# Patient Record
Sex: FEMALE | Race: BLACK OR AFRICAN AMERICAN | NOT HISPANIC OR LATINO | Employment: UNEMPLOYED | URBAN - METROPOLITAN AREA
[De-identification: names, ages, dates, MRNs, and addresses within clinical notes are randomized per-mention and may not be internally consistent; named-entity substitution may affect disease eponyms.]

---

## 2017-07-20 ENCOUNTER — HISTORICAL (OUTPATIENT)
Dept: INTERNAL MEDICINE | Facility: CLINIC | Age: 52
End: 2017-07-20

## 2017-07-20 LAB
ABS NEUT (OLG): 4.23 X10(3)/MCL (ref 2.1–9.2)
ALBUMIN SERPL-MCNC: 3.4 GM/DL (ref 3.4–5)
ALBUMIN/GLOB SERPL: 1 RATIO (ref 1–2)
ALP SERPL-CCNC: 104 UNIT/L (ref 45–117)
ALT SERPL-CCNC: 56 UNIT/L (ref 12–78)
AST SERPL-CCNC: 58 UNIT/L (ref 15–37)
BASOPHILS # BLD AUTO: 0.05 X10(3)/MCL
BASOPHILS NFR BLD AUTO: 1 % (ref 0–1)
BILIRUB SERPL-MCNC: 0.3 MG/DL (ref 0.2–1)
BILIRUBIN DIRECT+TOT PNL SERPL-MCNC: 0.1 MG/DL
BILIRUBIN DIRECT+TOT PNL SERPL-MCNC: 0.2 MG/DL
BUN SERPL-MCNC: 18 MG/DL (ref 7–18)
CALCIUM SERPL-MCNC: 10.4 MG/DL (ref 8.5–10.1)
CHLORIDE SERPL-SCNC: 105 MMOL/L (ref 98–107)
CHOLEST SERPL-MCNC: 178 MG/DL
CHOLEST/HDLC SERPL: 3 {RATIO} (ref 0–4.4)
CO2 SERPL-SCNC: 24 MMOL/L (ref 21–32)
CREAT SERPL-MCNC: 1.7 MG/DL (ref 0.6–1.3)
EOSINOPHIL # BLD AUTO: 0.33 10*3/UL
EOSINOPHIL NFR BLD AUTO: 4 % (ref 0–5)
ERYTHROCYTE [DISTWIDTH] IN BLOOD BY AUTOMATED COUNT: 16.2 % (ref 11.5–14.5)
EST. AVERAGE GLUCOSE BLD GHB EST-MCNC: 137 MG/DL
GLOBULIN SER-MCNC: 4.5 GM/ML (ref 2.3–3.5)
GLUCOSE SERPL-MCNC: 95 MG/DL (ref 74–106)
HBA1C MFR BLD: 6.4 % (ref 4.2–6.3)
HCT VFR BLD AUTO: 32.4 % (ref 35–46)
HDLC SERPL-MCNC: 59 MG/DL
HGB BLD-MCNC: 10.5 GM/DL (ref 12–16)
IMM GRANULOCYTES # BLD AUTO: 0.18 10*3/UL
IMM GRANULOCYTES NFR BLD AUTO: 2 %
LDLC SERPL CALC-MCNC: 100 MG/DL (ref 0–130)
LYMPHOCYTES # BLD AUTO: 2.76 X10(3)/MCL
LYMPHOCYTES NFR BLD AUTO: 34 % (ref 15–40)
MCH RBC QN AUTO: 27.9 PG (ref 26–34)
MCHC RBC AUTO-ENTMCNC: 32.4 GM/DL (ref 31–37)
MCV RBC AUTO: 86.2 FL (ref 80–100)
MONOCYTES # BLD AUTO: 0.67 X10(3)/MCL
MONOCYTES NFR BLD AUTO: 8 % (ref 4–12)
NEUTROPHILS # BLD AUTO: 4.23 X10(3)/MCL
NEUTROPHILS NFR BLD AUTO: 51 X10(3)/MCL
PLATELET # BLD AUTO: 332 X10(3)/MCL (ref 130–400)
PMV BLD AUTO: 10.1 FL (ref 7.4–10.4)
POTASSIUM SERPL-SCNC: 3.3 MMOL/L (ref 3.5–5.1)
PROT SERPL-MCNC: 7.9 GM/DL (ref 6.4–8.2)
RBC # BLD AUTO: 3.76 X10(6)/MCL (ref 4–5.2)
SODIUM SERPL-SCNC: 142 MMOL/L (ref 136–145)
TRIGL SERPL-MCNC: 96 MG/DL
TSH SERPL-ACNC: 2.57 MIU/L (ref 0.36–3.74)
VLDLC SERPL CALC-MCNC: 19 MG/DL
WBC # SPEC AUTO: 8.2 X10(3)/MCL (ref 4.5–11)

## 2017-07-21 ENCOUNTER — HOSPITAL ENCOUNTER (OUTPATIENT)
Dept: MEDSURG UNIT | Facility: HOSPITAL | Age: 52
End: 2017-07-23
Attending: INTERNAL MEDICINE | Admitting: INTERNAL MEDICINE

## 2017-07-21 LAB
ABS NEUT (OLG): 5.15 X10(3)/MCL (ref 2.1–9.2)
ALBUMIN SERPL-MCNC: 3.8 GM/DL (ref 3.4–5)
ALBUMIN/GLOB SERPL: 1 RATIO (ref 1–2)
ALP SERPL-CCNC: 103 UNIT/L (ref 45–117)
ALT SERPL-CCNC: 68 UNIT/L (ref 12–78)
AMPHET UR QL SCN: NEGATIVE
AMYLASE SERPL-CCNC: 45 UNIT/L (ref 25–115)
APAP SERPL-MCNC: <2 MCG/ML (ref 10–30)
APPEARANCE, UA: ABNORMAL
AST SERPL-CCNC: 84 UNIT/L (ref 15–37)
BACTERIA #/AREA URNS AUTO: ABNORMAL /[HPF]
BARBITURATE SCN PRESENT UR: NEGATIVE
BASOPHILS # BLD AUTO: 0.05 X10(3)/MCL
BASOPHILS NFR BLD AUTO: 0 % (ref 0–1)
BENZODIAZ UR QL SCN: NEGATIVE
BILIRUB SERPL-MCNC: 0.3 MG/DL (ref 0.2–1)
BILIRUB UR QL STRIP: NEGATIVE
BILIRUBIN DIRECT+TOT PNL SERPL-MCNC: 0.1 MG/DL
BILIRUBIN DIRECT+TOT PNL SERPL-MCNC: 0.2 MG/DL
BUN SERPL-MCNC: 21 MG/DL (ref 7–18)
CALCIUM SERPL-MCNC: 11 MG/DL (ref 8.5–10.1)
CANNABINOIDS UR QL SCN: NEGATIVE
CHLORIDE SERPL-SCNC: 105 MMOL/L (ref 98–107)
CK MB SERPL-MCNC: <1 NG/ML (ref 1–3.6)
CK SERPL-CCNC: 102 UNIT/L (ref 26–192)
CO2 SERPL-SCNC: 24 MMOL/L (ref 21–32)
COCAINE UR QL SCN: NEGATIVE
COLOR UR: YELLOW
CREAT SERPL-MCNC: 1.7 MG/DL (ref 0.6–1.3)
EOSINOPHIL # BLD AUTO: 0.24 10*3/UL
EOSINOPHIL NFR BLD AUTO: 3 % (ref 0–5)
ERYTHROCYTE [DISTWIDTH] IN BLOOD BY AUTOMATED COUNT: 16.4 % (ref 11.5–14.5)
ETHANOL SERPL-MCNC: 4 MG/DL
GLOBULIN SER-MCNC: 4.8 GM/ML (ref 2.3–3.5)
GLUCOSE (UA): NORMAL
GLUCOSE SERPL-MCNC: 122 MG/DL (ref 74–106)
HCT VFR BLD AUTO: 32.7 % (ref 35–46)
HGB BLD-MCNC: 10.7 GM/DL (ref 12–16)
HGB UR QL STRIP: NEGATIVE
HYALINE CASTS #/AREA URNS LPF: 0 /[LPF]
IMM GRANULOCYTES # BLD AUTO: 0.2 10*3/UL
IMM GRANULOCYTES NFR BLD AUTO: 2 %
KETONES UR QL STRIP: NEGATIVE
LACTATE SERPL-SCNC: 1.8 MMOL/L (ref 0.4–2)
LACTATE SERPL-SCNC: 2.6 MMOL/L (ref 0.4–2)
LEUKOCYTE ESTERASE UR QL STRIP: 500 LEU/UL
LIPASE SERPL-CCNC: 109 UNIT/L (ref 73–393)
LYMPHOCYTES # BLD AUTO: 3.02 X10(3)/MCL
LYMPHOCYTES NFR BLD AUTO: 32 % (ref 15–40)
MAGNESIUM SERPL-MCNC: 2.1 MG/DL (ref 1.8–2.4)
MCH RBC QN AUTO: 28.1 PG (ref 26–34)
MCHC RBC AUTO-ENTMCNC: 32.7 GM/DL (ref 31–37)
MCV RBC AUTO: 85.8 FL (ref 80–100)
MONOCYTES # BLD AUTO: 0.64 X10(3)/MCL
MONOCYTES NFR BLD AUTO: 7 % (ref 4–12)
MUCOUS THREADS URNS QL MICRO: ABNORMAL
NEUTROPHILS # BLD AUTO: 5.15 X10(3)/MCL
NEUTROPHILS NFR BLD AUTO: 55 X10(3)/MCL
NITRITE UR QL STRIP: NEGATIVE
OPIATES UR QL SCN: NEGATIVE
PCP UR QL: NEGATIVE
PH UR STRIP.AUTO: 6 [PH] (ref 5–8)
PH UR STRIP: 6 [PH] (ref 4.5–8)
PLATELET # BLD AUTO: 342 X10(3)/MCL (ref 130–400)
PMV BLD AUTO: 10.6 FL (ref 7.4–10.4)
POC TROPONIN: 0 NG/ML (ref 0–0.08)
POTASSIUM SERPL-SCNC: 3.2 MMOL/L (ref 3.5–5.1)
PROT SERPL-MCNC: 8.6 GM/DL (ref 6.4–8.2)
PROT UR QL STRIP: 20 MG/DL
RBC # BLD AUTO: 3.81 X10(6)/MCL (ref 4–5.2)
RBC #/AREA URNS AUTO: ABNORMAL /[HPF]
RPR SER QL: NON REACTIVE
SALICYLATES SERPL-MCNC: 4.4 MG/DL (ref 2.8–20)
SODIUM SERPL-SCNC: 143 MMOL/L (ref 136–145)
SP GR UR STRIP: 1.02 (ref 1–1.03)
SQUAMOUS #/AREA URNS LPF: >100 /[LPF]
TEMPERATURE, URINE (OHS): 25 DEGC (ref 20–25)
UROBILINOGEN UR STRIP-ACNC: NORMAL
WBC # SPEC AUTO: 9.3 X10(3)/MCL (ref 4.5–11)
WBC #/AREA URNS AUTO: ABNORMAL /HPF

## 2017-07-22 LAB
ABS NEUT (OLG): 4.09 X10(3)/MCL (ref 2.1–9.2)
BASOPHILS # BLD AUTO: 0.03 X10(3)/MCL
BASOPHILS NFR BLD AUTO: 0 % (ref 0–1)
BUN SERPL-MCNC: 17 MG/DL (ref 7–18)
CALCIUM SERPL-MCNC: 10 MG/DL (ref 8.5–10.1)
CHLORIDE SERPL-SCNC: 108 MMOL/L (ref 98–107)
CO2 SERPL-SCNC: 24 MMOL/L (ref 21–32)
CREAT SERPL-MCNC: 1.4 MG/DL (ref 0.6–1.3)
EOSINOPHIL # BLD AUTO: 0.29 X10(3)/MCL
EOSINOPHIL NFR BLD AUTO: 4 % (ref 0–5)
ERYTHROCYTE [DISTWIDTH] IN BLOOD BY AUTOMATED COUNT: 16.4 % (ref 11.5–14.5)
GLUCOSE SERPL-MCNC: 73 MG/DL (ref 74–106)
HCT VFR BLD AUTO: 27.9 % (ref 35–46)
HGB BLD-MCNC: 9.2 GM/DL (ref 12–16)
IMM GRANULOCYTES # BLD AUTO: 0.17 10*3/UL
IMM GRANULOCYTES NFR BLD AUTO: 2 %
LYMPHOCYTES # BLD AUTO: 2.58 X10(3)/MCL
LYMPHOCYTES NFR BLD AUTO: 33 % (ref 15–40)
MCH RBC QN AUTO: 28.2 PG (ref 26–34)
MCHC RBC AUTO-ENTMCNC: 33 GM/DL (ref 31–37)
MCV RBC AUTO: 85.6 FL (ref 80–100)
MONOCYTES # BLD AUTO: 0.57 X10(3)/MCL
MONOCYTES NFR BLD AUTO: 7 % (ref 4–12)
NEUTROPHILS # BLD AUTO: 4.09 X10(3)/MCL
NEUTROPHILS NFR BLD AUTO: 53 X10(3)/MCL
PLATELET # BLD AUTO: 301 X10(3)/MCL (ref 130–400)
PMV BLD AUTO: 10.9 FL (ref 7.4–10.4)
POTASSIUM SERPL-SCNC: 3.4 MMOL/L (ref 3.5–5.1)
RBC # BLD AUTO: 3.26 X10(6)/MCL (ref 4–5.2)
SODIUM SERPL-SCNC: 144 MMOL/L (ref 136–145)
WBC # SPEC AUTO: 7.7 X10(3)/MCL (ref 4.5–11)

## 2017-07-23 LAB
ABS NEUT (OLG): 4.24 X10(3)/MCL (ref 2.1–9.2)
BASOPHILS # BLD AUTO: 0.04 X10(3)/MCL
BASOPHILS NFR BLD AUTO: 0 % (ref 0–1)
BUN SERPL-MCNC: 16 MG/DL (ref 7–18)
CALCIUM SERPL-MCNC: 9.8 MG/DL (ref 8.5–10.1)
CHLORIDE SERPL-SCNC: 106 MMOL/L (ref 98–107)
CO2 SERPL-SCNC: 26 MMOL/L (ref 21–32)
CREAT SERPL-MCNC: 1.5 MG/DL (ref 0.6–1.3)
EOSINOPHIL # BLD AUTO: 0.38 10*3/UL
EOSINOPHIL NFR BLD AUTO: 5 % (ref 0–5)
ERYTHROCYTE [DISTWIDTH] IN BLOOD BY AUTOMATED COUNT: 16.5 % (ref 11.5–14.5)
FINAL CULTURE: NO GROWTH
GLUCOSE SERPL-MCNC: 92 MG/DL (ref 74–106)
HCT VFR BLD AUTO: 28.1 % (ref 35–46)
HGB BLD-MCNC: 9.3 GM/DL (ref 12–16)
IMM GRANULOCYTES # BLD AUTO: 0.26 10*3/UL
IMM GRANULOCYTES NFR BLD AUTO: 3 %
LYMPHOCYTES # BLD AUTO: 2.41 X10(3)/MCL
LYMPHOCYTES NFR BLD AUTO: 30 % (ref 15–40)
MCH RBC QN AUTO: 28.2 PG (ref 26–34)
MCHC RBC AUTO-ENTMCNC: 33.1 GM/DL (ref 31–37)
MCV RBC AUTO: 85.2 FL (ref 80–100)
MONOCYTES # BLD AUTO: 0.64 X10(3)/MCL
MONOCYTES NFR BLD AUTO: 8 % (ref 4–12)
NEUTROPHILS # BLD AUTO: 4.24 X10(3)/MCL
NEUTROPHILS NFR BLD AUTO: 53 X10(3)/MCL
PLATELET # BLD AUTO: 293 X10(3)/MCL (ref 130–400)
PMV BLD AUTO: 10.5 FL (ref 7.4–10.4)
POTASSIUM SERPL-SCNC: 3.3 MMOL/L (ref 3.5–5.1)
RBC # BLD AUTO: 3.3 X10(6)/MCL (ref 4–5.2)
SODIUM SERPL-SCNC: 142 MMOL/L (ref 136–145)
WBC # SPEC AUTO: 8 X10(3)/MCL (ref 4.5–11)

## 2017-07-26 LAB — FINAL CULTURE: NORMAL

## 2017-08-15 ENCOUNTER — HISTORICAL (OUTPATIENT)
Dept: RADIOLOGY | Facility: HOSPITAL | Age: 52
End: 2017-08-15

## 2017-12-06 ENCOUNTER — HISTORICAL (OUTPATIENT)
Dept: INTERNAL MEDICINE | Facility: CLINIC | Age: 52
End: 2017-12-06

## 2017-12-06 ENCOUNTER — HOSPITAL ENCOUNTER (OUTPATIENT)
Dept: MEDSURG UNIT | Facility: HOSPITAL | Age: 52
End: 2017-12-08
Attending: INTERNAL MEDICINE | Admitting: INTERNAL MEDICINE

## 2017-12-06 LAB
ABS NEUT (OLG): 0.82 X10(3)/MCL
ALBUMIN SERPL-MCNC: 3.8 GM/DL (ref 3.4–5)
ALBUMIN/GLOB SERPL: 1 RATIO (ref 1–2)
ALP SERPL-CCNC: 112 UNIT/L (ref 45–117)
ALT SERPL-CCNC: 20 UNIT/L (ref 12–78)
ANISOCYTOSIS BLD QL SMEAR: ABNORMAL
APPEARANCE, UA: CLEAR
AST SERPL-CCNC: 41 UNIT/L (ref 15–37)
BACTERIA #/AREA URNS AUTO: ABNORMAL /[HPF]
BASOPHILS NFR BLD MANUAL: 0 %
BILIRUB SERPL-MCNC: 0.2 MG/DL (ref 0.2–1)
BILIRUB UR QL STRIP: NEGATIVE
BILIRUBIN DIRECT+TOT PNL SERPL-MCNC: <0.1 MG/DL
BILIRUBIN DIRECT+TOT PNL SERPL-MCNC: ABNORMAL MG/DL
BUN SERPL-MCNC: 20 MG/DL (ref 7–18)
CALCIUM SERPL-MCNC: 11.7 MG/DL (ref 8.5–10.1)
CALCIUM SERPL-MCNC: 12.2 MG/DL (ref 8.5–10.1)
CHLORIDE SERPL-SCNC: 105 MMOL/L (ref 98–107)
CHOLEST SERPL-MCNC: 177 MG/DL
CHOLEST/HDLC SERPL: 3.1 {RATIO} (ref 0–4.4)
CO2 SERPL-SCNC: 23 MMOL/L (ref 21–32)
COLOR UR: ABNORMAL
CREAT SERPL-MCNC: 1.2 MG/DL (ref 0.6–1.3)
EOSINOPHIL NFR BLD MANUAL: 5 %
ERYTHROCYTE [DISTWIDTH] IN BLOOD BY AUTOMATED COUNT: 16.3 % (ref 11.5–14.5)
EST. AVERAGE GLUCOSE BLD GHB EST-MCNC: 123 MG/DL
GLOBULIN SER-MCNC: 4.3 GM/ML (ref 2.3–3.5)
GLUCOSE (UA): NORMAL
GLUCOSE SERPL-MCNC: 89 MG/DL (ref 74–106)
GRANULOCYTES NFR BLD MANUAL: 27 % (ref 43–75)
HBA1C MFR BLD: 5.9 % (ref 4.2–6.3)
HCT VFR BLD AUTO: 24.2 % (ref 35–46)
HDLC SERPL-MCNC: 58 MG/DL
HGB BLD-MCNC: 7.7 GM/DL (ref 12–16)
HGB UR QL STRIP: NEGATIVE
HYALINE CASTS #/AREA URNS LPF: ABNORMAL /[LPF]
HYPOCHROMIA BLD QL SMEAR: ABNORMAL
KETONES UR QL STRIP: NEGATIVE
LDLC SERPL CALC-MCNC: 82 MG/DL (ref 0–130)
LEUKOCYTE ESTERASE UR QL STRIP: NEGATIVE
LYMPHOCYTES NFR BLD MANUAL: 60 % (ref 20.5–51.1)
MAGNESIUM SERPL-MCNC: 2.2 MG/DL (ref 1.8–2.4)
MCH RBC QN AUTO: 30.7 PG (ref 26–34)
MCHC RBC AUTO-ENTMCNC: 31.8 GM/DL (ref 31–37)
MCV RBC AUTO: 96.4 FL (ref 80–100)
MICROCYTES BLD QL SMEAR: ABNORMAL
MONOCYTES NFR BLD MANUAL: 7 % (ref 2–9)
NEUTS BAND NFR BLD MANUAL: 1 % (ref 0–10)
NITRITE UR QL STRIP: NEGATIVE
PH UR STRIP: 5 [PH] (ref 4.5–8)
PLATELET # BLD AUTO: 279 X10(3)/MCL (ref 130–400)
PLATELET # BLD EST: NORMAL 10*3/UL
PMV BLD AUTO: 10.3 FL (ref 7.4–10.4)
POIKILOCYTOSIS BLD QL SMEAR: ABNORMAL
POTASSIUM SERPL-SCNC: 4.4 MMOL/L (ref 3.5–5.1)
PROT SERPL-MCNC: 7.2 GM/DL
PROT SERPL-MCNC: 8.1 GM/DL (ref 6.4–8.2)
PROT UR QL STRIP: 10 MG/DL
PTH-INTACT SERPL-MCNC: 6.3 PG/ML (ref 13.8–85)
RBC # BLD AUTO: 2.51 X10(6)/MCL (ref 4–5.2)
RBC #/AREA URNS AUTO: ABNORMAL /[HPF]
RBC MORPH BLD: ABNORMAL
SODIUM SERPL-SCNC: 138 MMOL/L (ref 136–145)
SP GR UR STRIP: 1.02 (ref 1–1.03)
SQUAMOUS #/AREA URNS LPF: ABNORMAL /[LPF]
TRIGL SERPL-MCNC: 185 MG/DL
TSH SERPL-ACNC: 2.61 MIU/L (ref 0.36–3.74)
UROBILINOGEN UR STRIP-ACNC: NORMAL MG/DL
VLDLC SERPL CALC-MCNC: 37 MG/DL
WBC # SPEC AUTO: 3.5 X10(3)/MCL (ref 4.5–11)
WBC #/AREA URNS AUTO: ABNORMAL /HPF

## 2017-12-07 LAB
ABS NEUT (OLG): 0.86 X10(3)/MCL
ALBUMIN SERPL-MCNC: 2.9 GM/DL (ref 3.4–5)
ALBUMIN/GLOB SERPL: 1 RATIO (ref 1–2)
ALP SERPL-CCNC: 86 UNIT/L (ref 45–117)
ALT SERPL-CCNC: 16 UNIT/L (ref 12–78)
ANISOCYTOSIS BLD QL SMEAR: ABNORMAL
AST SERPL-CCNC: 35 UNIT/L (ref 15–37)
BASOPHILS NFR BLD MANUAL: 2 %
BILIRUB SERPL-MCNC: 0.2 MG/DL (ref 0.2–1)
BILIRUBIN DIRECT+TOT PNL SERPL-MCNC: 0.1 MG/DL
BILIRUBIN DIRECT+TOT PNL SERPL-MCNC: 0.1 MG/DL
BUN SERPL-MCNC: 19 MG/DL (ref 7–18)
CALCIUM SERPL-MCNC: 11 MG/DL (ref 8.5–10.1)
CHLORIDE SERPL-SCNC: 108 MMOL/L (ref 98–107)
CO2 SERPL-SCNC: 22 MMOL/L (ref 21–32)
CREAT SERPL-MCNC: 1 MG/DL (ref 0.6–1.3)
CROSSMATCH INTERPRETATION: NORMAL
EOSINOPHIL NFR BLD MANUAL: 2 %
ERYTHROCYTE [DISTWIDTH] IN BLOOD BY AUTOMATED COUNT: 16.4 % (ref 11.5–14.5)
GLOBULIN SER-MCNC: 3.4 GM/ML (ref 2.3–3.5)
GLUCOSE SERPL-MCNC: 81 MG/DL (ref 74–106)
GRANULOCYTES NFR BLD MANUAL: 29 % (ref 43–75)
GROUP & RH: NORMAL
HCT VFR BLD AUTO: 19.3 % (ref 35–46)
HCT VFR BLD AUTO: 28.5 % (ref 35–46)
HGB BLD-MCNC: 6 GM/DL (ref 12–16)
HGB BLD-MCNC: 9.5 GM/DL (ref 12–16)
HYPOCHROMIA BLD QL SMEAR: ABNORMAL
LYMPHOCYTES NFR BLD MANUAL: 50 % (ref 20.5–51.1)
MCH RBC QN AUTO: 30.6 PG (ref 26–34)
MCHC RBC AUTO-ENTMCNC: 31.1 GM/DL (ref 31–37)
MCV RBC AUTO: 98.5 FL (ref 80–100)
MICROCYTES BLD QL SMEAR: ABNORMAL
MONOCYTES NFR BLD MANUAL: 9 % (ref 2–9)
NEUTS BAND NFR BLD MANUAL: 8 % (ref 0–10)
PLATELET # BLD AUTO: 221 X10(3)/MCL (ref 130–400)
PLATELET # BLD EST: NORMAL 10*3/UL
PMV BLD AUTO: 10.3 FL (ref 7.4–10.4)
POIKILOCYTOSIS BLD QL SMEAR: ABNORMAL
POTASSIUM SERPL-SCNC: 4.9 MMOL/L (ref 3.5–5.1)
PRODUCT READY: NORMAL
PROT SERPL-MCNC: 6.3 GM/DL (ref 6.4–8.2)
RBC # BLD AUTO: 1.96 X10(6)/MCL (ref 4–5.2)
RBC MORPH BLD: ABNORMAL
SODIUM SERPL-SCNC: 140 MMOL/L (ref 136–145)
TRANSFUSION ORDER: NORMAL
WBC # SPEC AUTO: 2.9 X10(3)/MCL (ref 4.5–11)

## 2017-12-08 LAB
ABS NEUT (OLG): 1.12 X10(3)/MCL
ALBUMIN SERPL-MCNC: 2.7 GM/DL (ref 3.4–5)
ALBUMIN/GLOB SERPL: 1 RATIO (ref 1–2)
ALP SERPL-CCNC: 86 UNIT/L (ref 45–117)
ALT SERPL-CCNC: 15 UNIT/L (ref 12–78)
ANISOCYTOSIS BLD QL SMEAR: ABNORMAL
AST SERPL-CCNC: 42 UNIT/L (ref 15–37)
BASOPHILS NFR BLD MANUAL: 0 %
BILIRUB SERPL-MCNC: 0.2 MG/DL (ref 0.2–1)
BILIRUBIN DIRECT+TOT PNL SERPL-MCNC: 0.1 MG/DL
BILIRUBIN DIRECT+TOT PNL SERPL-MCNC: 0.1 MG/DL
BUN SERPL-MCNC: 20 MG/DL (ref 7–18)
CALCIUM SERPL-MCNC: 10.1 MG/DL (ref 8.5–10.1)
CHLORIDE SERPL-SCNC: 108 MMOL/L (ref 98–107)
CO2 SERPL-SCNC: 22 MMOL/L (ref 21–32)
CREAT SERPL-MCNC: 1.2 MG/DL (ref 0.6–1.3)
EOSINOPHIL NFR BLD MANUAL: 6 %
ERYTHROCYTE [DISTWIDTH] IN BLOOD BY AUTOMATED COUNT: 18.2 % (ref 11.5–14.5)
FERRITIN SERPL-MCNC: 1829.2 NG/ML (ref 8–388)
FOLATE SERPL-MCNC: 2.3 NG/ML (ref 3.1–17.5)
GLOBULIN SER-MCNC: 3.4 GM/ML (ref 2.3–3.5)
GLUCOSE SERPL-MCNC: 82 MG/DL (ref 74–106)
GRANULOCYTES NFR BLD MANUAL: 39 % (ref 43–75)
HCT VFR BLD AUTO: 26.2 % (ref 35–46)
HGB BLD-MCNC: 8.6 GM/DL (ref 12–16)
HIV 1+2 AB+HIV1 P24 AG SERPL QL IA: NONREACTIVE
HYPOCHROMIA BLD QL SMEAR: ABNORMAL
INR PPP: 1.04 (ref 0.9–1.2)
IRON SERPL-MCNC: 95 MCG/DL (ref 50–170)
LDH SERPL-CCNC: 461 UNIT/L (ref 84–246)
LYMPHOCYTES NFR BLD MANUAL: 31 % (ref 20.5–51.1)
MACROCYTES BLD QL SMEAR: ABNORMAL
MCH RBC QN AUTO: 30 PG (ref 26–34)
MCHC RBC AUTO-ENTMCNC: 32.8 GM/DL (ref 31–37)
MCV RBC AUTO: 91.3 FL (ref 80–100)
METAMYELOCYTES NFR BLD MANUAL: 1 %
MICROCYTES BLD QL SMEAR: ABNORMAL
MONOCYTES NFR BLD MANUAL: 12 % (ref 2–9)
MYELOCYTES NFR BLD MANUAL: 3 %
NEUTS BAND NFR BLD MANUAL: 8 % (ref 0–10)
NRBC BLD MANUAL-RTO: 1 %
PLATELET # BLD AUTO: 194 X10(3)/MCL (ref 130–400)
PLATELET # BLD EST: ADEQUATE 10*3/UL
PMV BLD AUTO: 9.8 FL (ref 7.4–10.4)
POIKILOCYTOSIS BLD QL SMEAR: ABNORMAL
POLYCHROMASIA BLD QL SMEAR: ABNORMAL
POTASSIUM SERPL-SCNC: 4.3 MMOL/L (ref 3.5–5.1)
PROT SERPL-MCNC: 6.1 GM/DL (ref 6.4–8.2)
PROTHROMBIN TIME: 13.4 SECOND(S) (ref 11.9–14.4)
RBC # BLD AUTO: 2.87 X10(6)/MCL (ref 4–5.2)
RBC MORPH BLD: ABNORMAL
RET# (OHS): 0.04 X10(6)/MCL (ref 0.02–0.08)
RETICULOCYTE COUNT AUTOMATED (OLG): 1.2 % (ref 0.5–1.5)
SODIUM SERPL-SCNC: 140 MMOL/L (ref 136–145)
VIT B12 SERPL-MCNC: 820 PG/ML (ref 193–986)
WBC # SPEC AUTO: 3.5 X10(3)/MCL (ref 4.5–11)

## 2017-12-11 ENCOUNTER — HISTORICAL (OUTPATIENT)
Dept: RADIATION THERAPY | Facility: HOSPITAL | Age: 52
End: 2017-12-11

## 2018-01-04 ENCOUNTER — HISTORICAL (OUTPATIENT)
Dept: RADIOLOGY | Facility: HOSPITAL | Age: 53
End: 2018-01-04

## 2018-01-10 ENCOUNTER — HISTORICAL (OUTPATIENT)
Dept: RADIATION THERAPY | Facility: HOSPITAL | Age: 53
End: 2018-01-10

## 2018-01-15 ENCOUNTER — HISTORICAL (OUTPATIENT)
Dept: RADIATION THERAPY | Facility: HOSPITAL | Age: 53
End: 2018-01-15

## 2018-01-15 ENCOUNTER — HISTORICAL (OUTPATIENT)
Dept: ADMINISTRATIVE | Facility: HOSPITAL | Age: 53
End: 2018-01-15

## 2018-01-15 ENCOUNTER — HISTORICAL (OUTPATIENT)
Dept: INTERNAL MEDICINE | Facility: CLINIC | Age: 53
End: 2018-01-15

## 2018-01-15 LAB
ABS NEUT (OLG): 3.07 X10(3)/MCL
ALBUMIN SERPL-MCNC: 2.9 GM/DL (ref 3.4–5)
ALBUMIN/GLOB SERPL: 1 RATIO (ref 1–2)
ALP SERPL-CCNC: 229 UNIT/L (ref 45–117)
ALT SERPL-CCNC: 22 UNIT/L (ref 12–78)
ANISOCYTOSIS BLD QL SMEAR: ABNORMAL
AST SERPL-CCNC: 66 UNIT/L (ref 15–37)
BASOPHILS NFR BLD MANUAL: 0 %
BILIRUB SERPL-MCNC: 0.4 MG/DL (ref 0.2–1)
BILIRUBIN DIRECT+TOT PNL SERPL-MCNC: 0.1 MG/DL
BILIRUBIN DIRECT+TOT PNL SERPL-MCNC: 0.3 MG/DL
BUN SERPL-MCNC: 15 MG/DL (ref 7–18)
CALCIUM SERPL-MCNC: 8.8 MG/DL (ref 8.5–10.1)
CHLORIDE SERPL-SCNC: 105 MMOL/L (ref 98–107)
CO2 SERPL-SCNC: 26 MMOL/L (ref 21–32)
CREAT SERPL-MCNC: 0.9 MG/DL (ref 0.6–1.3)
EOSINOPHIL NFR BLD MANUAL: 3 %
ERYTHROCYTE [DISTWIDTH] IN BLOOD BY AUTOMATED COUNT: 17.5 % (ref 11.5–14.5)
FSH SERPL-ACNC: 4.4 MIU/ML
GLOBULIN SER-MCNC: 4.2 GM/ML (ref 2.3–3.5)
GLUCOSE SERPL-MCNC: 129 MG/DL (ref 74–106)
GRANULOCYTES NFR BLD MANUAL: 40 % (ref 43–75)
HCT VFR BLD AUTO: 30.4 % (ref 35–46)
HGB BLD-MCNC: 9.5 GM/DL (ref 12–16)
HYPOCHROMIA BLD QL SMEAR: ABNORMAL
LYMPHOCYTES NFR BLD MANUAL: 1 %
LYMPHOCYTES NFR BLD MANUAL: 33 % (ref 20.5–51.1)
MACROCYTES BLD QL SMEAR: ABNORMAL
MCH RBC QN AUTO: 30.2 PG (ref 26–34)
MCHC RBC AUTO-ENTMCNC: 31.3 GM/DL (ref 31–37)
MCV RBC AUTO: 96.5 FL (ref 80–100)
METAMYELOCYTES NFR BLD MANUAL: 8 %
MICROCYTES BLD QL SMEAR: ABNORMAL
MONOCYTES NFR BLD MANUAL: 5 % (ref 2–9)
MYELOCYTES NFR BLD MANUAL: 3 %
NEUTS BAND NFR BLD MANUAL: 5 % (ref 0–10)
NRBC BLD MANUAL-RTO: 3 %
PLATELET # BLD AUTO: 292 X10(3)/MCL (ref 130–400)
PLATELET # BLD EST: NORMAL 10*3/UL
PMV BLD AUTO: 9.5 FL (ref 7.4–10.4)
POIKILOCYTOSIS BLD QL SMEAR: ABNORMAL
POLYCHROMASIA BLD QL SMEAR: ABNORMAL
POTASSIUM SERPL-SCNC: 3.4 MMOL/L (ref 3.5–5.1)
PROT SERPL-MCNC: 7.1 GM/DL (ref 6.4–8.2)
RBC # BLD AUTO: 3.15 X10(6)/MCL (ref 4–5.2)
RBC MORPH BLD: ABNORMAL
SODIUM SERPL-SCNC: 140 MMOL/L (ref 136–145)
WBC # SPEC AUTO: 7.4 X10(3)/MCL (ref 4.5–11)

## 2018-01-16 ENCOUNTER — HISTORICAL (OUTPATIENT)
Dept: RADIATION THERAPY | Facility: HOSPITAL | Age: 53
End: 2018-01-16

## 2018-01-22 ENCOUNTER — HISTORICAL (OUTPATIENT)
Dept: RADIATION THERAPY | Facility: HOSPITAL | Age: 53
End: 2018-01-22

## 2018-01-23 ENCOUNTER — HISTORICAL (OUTPATIENT)
Dept: RADIATION THERAPY | Facility: HOSPITAL | Age: 53
End: 2018-01-23

## 2018-01-24 ENCOUNTER — HISTORICAL (OUTPATIENT)
Dept: RADIATION THERAPY | Facility: HOSPITAL | Age: 53
End: 2018-01-24

## 2018-01-25 ENCOUNTER — HISTORICAL (OUTPATIENT)
Dept: RADIATION THERAPY | Facility: HOSPITAL | Age: 53
End: 2018-01-25

## 2018-01-26 ENCOUNTER — HISTORICAL (OUTPATIENT)
Dept: RADIATION THERAPY | Facility: HOSPITAL | Age: 53
End: 2018-01-26

## 2018-01-29 ENCOUNTER — HISTORICAL (OUTPATIENT)
Dept: RADIATION THERAPY | Facility: HOSPITAL | Age: 53
End: 2018-01-29

## 2018-01-30 ENCOUNTER — HISTORICAL (OUTPATIENT)
Dept: RADIATION THERAPY | Facility: HOSPITAL | Age: 53
End: 2018-01-30

## 2018-01-31 ENCOUNTER — HISTORICAL (OUTPATIENT)
Dept: RADIATION THERAPY | Facility: HOSPITAL | Age: 53
End: 2018-01-31

## 2018-02-01 ENCOUNTER — HISTORICAL (OUTPATIENT)
Dept: RADIATION THERAPY | Facility: HOSPITAL | Age: 53
End: 2018-02-01

## 2018-02-02 ENCOUNTER — HISTORICAL (OUTPATIENT)
Dept: RADIATION THERAPY | Facility: HOSPITAL | Age: 53
End: 2018-02-02

## 2018-02-06 ENCOUNTER — HISTORICAL (OUTPATIENT)
Dept: RADIATION THERAPY | Facility: HOSPITAL | Age: 53
End: 2018-02-06

## 2018-02-12 ENCOUNTER — HISTORICAL (OUTPATIENT)
Dept: ADMINISTRATIVE | Facility: HOSPITAL | Age: 53
End: 2018-02-12

## 2018-02-23 ENCOUNTER — HISTORICAL (OUTPATIENT)
Dept: SURGERY | Facility: HOSPITAL | Age: 53
End: 2018-02-23

## 2018-02-23 LAB
B-HCG SERPL QL: NEGATIVE
HCT VFR BLD AUTO: 23.3 % (ref 35–46)
HGB BLD-MCNC: 7.6 GM/DL (ref 12–16)
POTASSIUM SERPL-SCNC: 3.2 MMOL/L (ref 3.5–5.1)

## 2018-03-05 ENCOUNTER — HISTORICAL (OUTPATIENT)
Dept: ADMINISTRATIVE | Facility: HOSPITAL | Age: 53
End: 2018-03-05

## 2018-03-05 LAB
ABS NEUT (OLG): 1.4 X10(3)/MCL
ALBUMIN SERPL-MCNC: 3.3 GM/DL (ref 3.4–5)
ALBUMIN/GLOB SERPL: 1 RATIO (ref 1–2)
ALP SERPL-CCNC: 127 UNIT/L (ref 45–117)
ALT SERPL-CCNC: 20 UNIT/L (ref 12–78)
ANISOCYTOSIS BLD QL SMEAR: NORMAL
AST SERPL-CCNC: 59 UNIT/L (ref 15–37)
BASOPHILS NFR BLD MANUAL: 0 %
BILIRUB SERPL-MCNC: 0.5 MG/DL (ref 0.2–1)
BILIRUBIN DIRECT+TOT PNL SERPL-MCNC: 0.2 MG/DL
BILIRUBIN DIRECT+TOT PNL SERPL-MCNC: 0.3 MG/DL
BUN SERPL-MCNC: 12 MG/DL (ref 7–18)
CALCIUM SERPL-MCNC: 9.1 MG/DL (ref 8.5–10.1)
CHLORIDE SERPL-SCNC: 104 MMOL/L (ref 98–107)
CHOLEST SERPL-MCNC: 146 MG/DL
CHOLEST/HDLC SERPL: 3 {RATIO} (ref 0–4.4)
CO2 SERPL-SCNC: 25 MMOL/L (ref 21–32)
CREAT SERPL-MCNC: 0.8 MG/DL (ref 0.6–1.3)
EOSINOPHIL NFR BLD MANUAL: 2 %
ERYTHROCYTE [DISTWIDTH] IN BLOOD BY AUTOMATED COUNT: 18.9 % (ref 11.5–14.5)
EST. AVERAGE GLUCOSE BLD GHB EST-MCNC: 105 MG/DL
GLOBULIN SER-MCNC: 3.9 GM/ML (ref 2.3–3.5)
GLUCOSE SERPL-MCNC: 113 MG/DL (ref 74–106)
GRANULOCYTES NFR BLD MANUAL: 47 % (ref 43–75)
HBA1C MFR BLD: 5.3 % (ref 4.2–6.3)
HCT VFR BLD AUTO: 20.8 % (ref 35–46)
HDLC SERPL-MCNC: 48 MG/DL
HGB BLD-MCNC: 6.8 GM/DL (ref 12–16)
HYPOCHROMIA BLD QL SMEAR: NORMAL
LDLC SERPL CALC-MCNC: 62 MG/DL (ref 0–130)
LYMPHOCYTES NFR BLD MANUAL: 32 % (ref 20.5–51.1)
MCH RBC QN AUTO: 30.9 PG (ref 26–34)
MCHC RBC AUTO-ENTMCNC: 32.7 GM/DL (ref 31–37)
MCV RBC AUTO: 94.5 FL (ref 80–100)
MONOCYTES NFR BLD MANUAL: 9 % (ref 2–9)
NEUTS BAND NFR BLD MANUAL: 10 % (ref 0–10)
NRBC BLD MANUAL-RTO: 1 %
PLATELET # BLD AUTO: 88 X10(3)/MCL (ref 130–400)
PLATELET # BLD EST: NORMAL 10*3/UL
PMV BLD AUTO: 11.7 FL (ref 7.4–10.4)
POIKILOCYTOSIS BLD QL SMEAR: NORMAL
POTASSIUM SERPL-SCNC: 3 MMOL/L (ref 3.5–5.1)
PROT SERPL-MCNC: 7.2 GM/DL (ref 6.4–8.2)
RBC # BLD AUTO: 2.2 X10(6)/MCL (ref 4–5.2)
RBC MORPH BLD: NORMAL
SODIUM SERPL-SCNC: 140 MMOL/L (ref 136–145)
TRIGL SERPL-MCNC: 181 MG/DL
TSH SERPL-ACNC: 2.62 MIU/L (ref 0.36–3.74)
VLDLC SERPL CALC-MCNC: 36 MG/DL
WBC # SPEC AUTO: 3.1 X10(3)/MCL (ref 4.5–11)

## 2018-03-12 ENCOUNTER — HISTORICAL (OUTPATIENT)
Dept: INFUSION THERAPY | Facility: HOSPITAL | Age: 53
End: 2018-03-12

## 2018-03-12 LAB
ABS NEUT (OLG): 1.34 X10(3)/MCL
ANISOCYTOSIS BLD QL SMEAR: NORMAL
BASOPHILS NFR BLD MANUAL: 0 %
CROSSMATCH INTERPRETATION: NORMAL
EOSINOPHIL NFR BLD MANUAL: 6 %
ERYTHROCYTE [DISTWIDTH] IN BLOOD BY AUTOMATED COUNT: 19.2 % (ref 11.5–14.5)
GRANULOCYTES NFR BLD MANUAL: 55 % (ref 43–75)
HCT VFR BLD AUTO: 20 % (ref 35–46)
HGB BLD-MCNC: 6.4 GM/DL (ref 12–16)
HYPOCHROMIA BLD QL SMEAR: NORMAL
LYMPHOCYTES NFR BLD MANUAL: 28 % (ref 20.5–51.1)
MACROCYTES BLD QL SMEAR: NORMAL
MCH RBC QN AUTO: 30.8 PG (ref 26–34)
MCHC RBC AUTO-ENTMCNC: 32 GM/DL (ref 31–37)
MCV RBC AUTO: 96.2 FL (ref 80–100)
MONOCYTES NFR BLD MANUAL: 7 % (ref 2–9)
NEUTS BAND NFR BLD MANUAL: 4 % (ref 0–10)
PLATELET # BLD AUTO: 78 X10(3)/MCL (ref 130–400)
PLATELET # BLD EST: NORMAL 10*3/UL
PMV BLD AUTO: 12.4 FL (ref 7.4–10.4)
POIKILOCYTOSIS BLD QL SMEAR: NORMAL
PRODUCT READY: NORMAL
RBC # BLD AUTO: 2.08 X10(6)/MCL (ref 4–5.2)
RBC MORPH BLD: NORMAL
TRANSFUSION ORDER: NORMAL
WBC # SPEC AUTO: 3.3 X10(3)/MCL (ref 4.5–11)

## 2018-03-13 ENCOUNTER — HISTORICAL (OUTPATIENT)
Dept: RADIOLOGY | Facility: HOSPITAL | Age: 53
End: 2018-03-13

## 2018-03-21 ENCOUNTER — HISTORICAL (OUTPATIENT)
Dept: INFUSION THERAPY | Facility: HOSPITAL | Age: 53
End: 2018-03-21

## 2018-03-21 LAB
ABS NEUT (OLG): 1.54 X10(3)/MCL
ALBUMIN SERPL-MCNC: 3.3 GM/DL (ref 3.4–5)
ALBUMIN/GLOB SERPL: 1 RATIO (ref 1–2)
ALP SERPL-CCNC: 174 UNIT/L (ref 45–117)
ALT SERPL-CCNC: 16 UNIT/L (ref 12–78)
ANISOCYTOSIS BLD QL SMEAR: ABNORMAL
AST SERPL-CCNC: 39 UNIT/L (ref 15–37)
BASOPHILS NFR BLD MANUAL: 0 %
BILIRUB SERPL-MCNC: 0.7 MG/DL (ref 0.2–1)
BILIRUBIN DIRECT+TOT PNL SERPL-MCNC: 0.2 MG/DL
BILIRUBIN DIRECT+TOT PNL SERPL-MCNC: 0.5 MG/DL
BUN SERPL-MCNC: 17 MG/DL (ref 7–18)
CALCIUM SERPL-MCNC: 9.1 MG/DL (ref 8.5–10.1)
CHLORIDE SERPL-SCNC: 107 MMOL/L (ref 98–107)
CO2 SERPL-SCNC: 24 MMOL/L (ref 21–32)
CREAT SERPL-MCNC: 0.9 MG/DL (ref 0.6–1.3)
EOSINOPHIL NFR BLD MANUAL: 0 %
ERYTHROCYTE [DISTWIDTH] IN BLOOD BY AUTOMATED COUNT: 19.3 % (ref 11.5–14.5)
GLOBULIN SER-MCNC: 3.8 GM/ML (ref 2.3–3.5)
GLUCOSE SERPL-MCNC: 86 MG/DL (ref 74–106)
GRANULOCYTES NFR BLD MANUAL: 33 % (ref 43–75)
HCT VFR BLD AUTO: 31.7 % (ref 35–46)
HGB BLD-MCNC: 10.4 GM/DL (ref 12–16)
HYPOCHROMIA BLD QL SMEAR: ABNORMAL
LYMPHOCYTES NFR BLD MANUAL: 2 %
LYMPHOCYTES NFR BLD MANUAL: 24 % (ref 20.5–51.1)
MCH RBC QN AUTO: 30.1 PG (ref 26–34)
MCHC RBC AUTO-ENTMCNC: 32.8 GM/DL (ref 31–37)
MCV RBC AUTO: 91.6 FL (ref 80–100)
METAMYELOCYTES NFR BLD MANUAL: 8 %
MICROCYTES BLD QL SMEAR: ABNORMAL
MONOCYTES NFR BLD MANUAL: 9 % (ref 2–9)
MYELOCYTES NFR BLD MANUAL: 4 %
NEUTS BAND NFR BLD MANUAL: 19 % (ref 0–10)
NRBC BLD MANUAL-RTO: 1 %
PLATELET # BLD AUTO: 205 X10(3)/MCL (ref 130–400)
PLATELET # BLD EST: ADEQUATE 10*3/UL
PMV BLD AUTO: 11 FL (ref 7.4–10.4)
POIKILOCYTOSIS BLD QL SMEAR: ABNORMAL
POLYCHROMASIA BLD QL SMEAR: ABNORMAL
POTASSIUM SERPL-SCNC: 3.4 MMOL/L (ref 3.5–5.1)
PROT SERPL-MCNC: 7.1 GM/DL (ref 6.4–8.2)
RBC # BLD AUTO: 3.46 X10(6)/MCL (ref 4–5.2)
RBC MORPH BLD: ABNORMAL
SODIUM SERPL-SCNC: 141 MMOL/L (ref 136–145)
WBC # SPEC AUTO: 3.9 X10(3)/MCL (ref 4.5–11)

## 2018-03-28 ENCOUNTER — HISTORICAL (OUTPATIENT)
Dept: ADMINISTRATIVE | Facility: HOSPITAL | Age: 53
End: 2018-03-28

## 2018-03-28 LAB
ABS NEUT (OLG): 0.15 X10(3)/MCL
ALBUMIN SERPL-MCNC: 2.7 GM/DL (ref 3.4–5)
ALBUMIN/GLOB SERPL: 1 RATIO (ref 1–2)
ALP SERPL-CCNC: 173 UNIT/L (ref 45–117)
ALT SERPL-CCNC: 12 UNIT/L (ref 12–78)
ANISOCYTOSIS BLD QL SMEAR: ABNORMAL
AST SERPL-CCNC: 24 UNIT/L (ref 15–37)
BASOPHILS NFR BLD MANUAL: 1 %
BILIRUB SERPL-MCNC: 0.9 MG/DL (ref 0.2–1)
BILIRUBIN DIRECT+TOT PNL SERPL-MCNC: 0.4 MG/DL
BILIRUBIN DIRECT+TOT PNL SERPL-MCNC: 0.5 MG/DL
BUN SERPL-MCNC: 6 MG/DL (ref 7–18)
CALCIUM SERPL-MCNC: 8 MG/DL (ref 8.5–10.1)
CHLORIDE SERPL-SCNC: 105 MMOL/L (ref 98–107)
CO2 SERPL-SCNC: 22 MMOL/L (ref 21–32)
CREAT SERPL-MCNC: 0.5 MG/DL (ref 0.6–1.3)
DACRYOCYTES BLD QL SMEAR: ABNORMAL
EOSINOPHIL NFR BLD MANUAL: 3 %
ERYTHROCYTE [DISTWIDTH] IN BLOOD BY AUTOMATED COUNT: 17.9 % (ref 11.5–14.5)
GLOBULIN SER-MCNC: 4.1 GM/ML (ref 2.3–3.5)
GLUCOSE SERPL-MCNC: 85 MG/DL (ref 74–106)
GRANULOCYTES NFR BLD MANUAL: 3 % (ref 43–75)
HCT VFR BLD AUTO: 22.2 % (ref 35–46)
HGB BLD-MCNC: 7.4 GM/DL (ref 12–16)
LYMPHOCYTES NFR BLD MANUAL: 13 %
LYMPHOCYTES NFR BLD MANUAL: 63 % (ref 20.5–51.1)
MCH RBC QN AUTO: 29.6 PG (ref 26–34)
MCHC RBC AUTO-ENTMCNC: 33.3 GM/DL (ref 31–37)
MCV RBC AUTO: 88.8 FL (ref 80–100)
METAMYELOCYTES NFR BLD MANUAL: 1 %
MONOCYTES NFR BLD MANUAL: 7 % (ref 2–9)
NEUTS BAND NFR BLD MANUAL: 8 % (ref 0–10)
PLATELET # BLD AUTO: 153 X10(3)/MCL (ref 130–400)
PLATELET # BLD EST: ADEQUATE 10*3/UL
PMV BLD AUTO: 10.3 FL (ref 7.4–10.4)
POTASSIUM SERPL-SCNC: 3 MMOL/L (ref 3.5–5.1)
PROT SERPL-MCNC: 6.8 GM/DL (ref 6.4–8.2)
RBC # BLD AUTO: 2.5 X10(6)/MCL (ref 4–5.2)
RBC MORPH BLD: ABNORMAL
SODIUM SERPL-SCNC: 138 MMOL/L (ref 136–145)
WBC # SPEC AUTO: 0.7 X10(3)/MCL (ref 4.5–11)

## 2018-06-06 ENCOUNTER — HISTORICAL (OUTPATIENT)
Dept: RADIATION THERAPY | Facility: HOSPITAL | Age: 53
End: 2018-06-06

## 2018-06-11 ENCOUNTER — HISTORICAL (OUTPATIENT)
Dept: ADMINISTRATIVE | Facility: HOSPITAL | Age: 53
End: 2018-06-11

## 2018-06-11 LAB
ABS NEUT (OLG): 3.83 X10(3)/MCL (ref 2.1–9.2)
ALBUMIN SERPL-MCNC: 3.6 GM/DL (ref 3.4–5)
ALBUMIN/GLOB SERPL: 1 RATIO (ref 1–2)
ALP SERPL-CCNC: 198 UNIT/L (ref 45–117)
ALT SERPL-CCNC: 30 UNIT/L (ref 12–78)
AST SERPL-CCNC: 18 UNIT/L (ref 15–37)
BASOPHILS # BLD AUTO: 0.02 X10(3)/MCL
BASOPHILS NFR BLD AUTO: 0 %
BILIRUB SERPL-MCNC: 0.5 MG/DL (ref 0.2–1)
BILIRUBIN DIRECT+TOT PNL SERPL-MCNC: 0.1 MG/DL
BILIRUBIN DIRECT+TOT PNL SERPL-MCNC: 0.4 MG/DL
BUN SERPL-MCNC: 20 MG/DL (ref 7–18)
CALCIUM SERPL-MCNC: 8.7 MG/DL (ref 8.5–10.1)
CEA SERPL-MCNC: 4.6 NG/ML
CHLORIDE SERPL-SCNC: 112 MMOL/L (ref 98–107)
CO2 SERPL-SCNC: 21 MMOL/L (ref 21–32)
CREAT SERPL-MCNC: 0.8 MG/DL (ref 0.6–1.3)
EOSINOPHIL # BLD AUTO: 0.16 X10(3)/MCL
EOSINOPHIL NFR BLD AUTO: 3 %
ERYTHROCYTE [DISTWIDTH] IN BLOOD BY AUTOMATED COUNT: 17.6 % (ref 11.5–14.5)
GLOBULIN SER-MCNC: 3.4 GM/ML (ref 2.3–3.5)
GLUCOSE SERPL-MCNC: 97 MG/DL (ref 74–106)
HCT VFR BLD AUTO: 27.5 % (ref 35–46)
HGB BLD-MCNC: 8.6 GM/DL (ref 12–16)
IMM GRANULOCYTES # BLD AUTO: 0.02 10*3/UL
IMM GRANULOCYTES NFR BLD AUTO: 0 %
LYMPHOCYTES # BLD AUTO: 0.97 X10(3)/MCL
LYMPHOCYTES NFR BLD AUTO: 18 % (ref 13–40)
MCH RBC QN AUTO: 30.4 PG (ref 26–34)
MCHC RBC AUTO-ENTMCNC: 31.3 GM/DL (ref 31–37)
MCV RBC AUTO: 97.2 FL (ref 80–100)
MONOCYTES # BLD AUTO: 0.37 X10(3)/MCL
MONOCYTES NFR BLD AUTO: 7 % (ref 4–12)
NEUTROPHILS # BLD AUTO: 3.83 X10(3)/MCL
NEUTROPHILS NFR BLD AUTO: 71 X10(3)/MCL
PLATELET # BLD AUTO: 307 X10(3)/MCL (ref 130–400)
PMV BLD AUTO: 9.9 FL (ref 7.4–10.4)
POTASSIUM SERPL-SCNC: 3.8 MMOL/L (ref 3.5–5.1)
PROT SERPL-MCNC: 7 GM/DL (ref 6.4–8.2)
RBC # BLD AUTO: 2.83 X10(6)/MCL (ref 4–5.2)
SODIUM SERPL-SCNC: 141 MMOL/L (ref 136–145)
WBC # SPEC AUTO: 5.4 X10(3)/MCL (ref 4.5–11)

## 2018-07-16 ENCOUNTER — HISTORICAL (OUTPATIENT)
Dept: RADIOLOGY | Facility: HOSPITAL | Age: 53
End: 2018-07-16

## 2018-09-27 ENCOUNTER — HISTORICAL (OUTPATIENT)
Dept: RADIOLOGY | Facility: HOSPITAL | Age: 53
End: 2018-09-27

## 2018-09-27 LAB
ABS NEUT (OLG): 3.6 X10(3)/MCL (ref 2.1–9.2)
ALBUMIN SERPL-MCNC: 4.1 GM/DL (ref 3.4–5)
ALBUMIN/GLOB SERPL: 1 RATIO (ref 1–2)
ALP SERPL-CCNC: 86 UNIT/L (ref 45–117)
ALT SERPL-CCNC: 32 UNIT/L (ref 12–78)
AST SERPL-CCNC: 30 UNIT/L (ref 15–37)
BASOPHILS # BLD AUTO: 0.04 X10(3)/MCL
BASOPHILS NFR BLD AUTO: 1 %
BILIRUB SERPL-MCNC: 0.5 MG/DL (ref 0.2–1)
BILIRUBIN DIRECT+TOT PNL SERPL-MCNC: 0.1 MG/DL
BILIRUBIN DIRECT+TOT PNL SERPL-MCNC: 0.4 MG/DL
BUN SERPL-MCNC: 23 MG/DL (ref 7–18)
CALCIUM SERPL-MCNC: 9.5 MG/DL (ref 8.5–10.1)
CHLORIDE SERPL-SCNC: 108 MMOL/L (ref 98–107)
CHOLEST SERPL-MCNC: 188 MG/DL
CHOLEST/HDLC SERPL: 3.5 {RATIO} (ref 0–4.4)
CO2 SERPL-SCNC: 21 MMOL/L (ref 21–32)
CREAT SERPL-MCNC: 1.2 MG/DL (ref 0.6–1.3)
EOSINOPHIL # BLD AUTO: 0.19 X10(3)/MCL
EOSINOPHIL NFR BLD AUTO: 3 %
ERYTHROCYTE [DISTWIDTH] IN BLOOD BY AUTOMATED COUNT: 16.5 % (ref 11.5–14.5)
EST. AVERAGE GLUCOSE BLD GHB EST-MCNC: 105 MG/DL
GLOBULIN SER-MCNC: 4.3 GM/ML (ref 2.3–3.5)
GLUCOSE SERPL-MCNC: 76 MG/DL (ref 74–106)
HBA1C MFR BLD: 5.3 % (ref 4.2–6.3)
HCT VFR BLD AUTO: 36.4 % (ref 35–46)
HDLC SERPL-MCNC: 54 MG/DL
HGB BLD-MCNC: 11.9 GM/DL (ref 12–16)
IMM GRANULOCYTES # BLD AUTO: 0.07 10*3/UL
IMM GRANULOCYTES NFR BLD AUTO: 1 %
LDLC SERPL CALC-MCNC: 117 MG/DL (ref 0–130)
LYMPHOCYTES # BLD AUTO: 1.42 X10(3)/MCL
LYMPHOCYTES NFR BLD AUTO: 24 % (ref 13–40)
MCH RBC QN AUTO: 29 PG (ref 26–34)
MCHC RBC AUTO-ENTMCNC: 32.7 GM/DL (ref 31–37)
MCV RBC AUTO: 88.8 FL (ref 80–100)
MONOCYTES # BLD AUTO: 0.55 X10(3)/MCL
MONOCYTES NFR BLD AUTO: 9 % (ref 4–12)
NEUTROPHILS # BLD AUTO: 3.6 X10(3)/MCL
NEUTROPHILS NFR BLD AUTO: 61 X10(3)/MCL
PLATELET # BLD AUTO: 279 X10(3)/MCL (ref 130–400)
PMV BLD AUTO: 9.8 FL (ref 7.4–10.4)
POTASSIUM SERPL-SCNC: 4.6 MMOL/L (ref 3.5–5.1)
PROT SERPL-MCNC: 8.4 GM/DL (ref 6.4–8.2)
RBC # BLD AUTO: 4.1 X10(6)/MCL (ref 4–5.2)
SODIUM SERPL-SCNC: 138 MMOL/L (ref 136–145)
TRIGL SERPL-MCNC: 85 MG/DL
TSH SERPL-ACNC: 3.21 MIU/L (ref 0.36–3.74)
VLDLC SERPL CALC-MCNC: 17 MG/DL
WBC # SPEC AUTO: 5.9 X10(3)/MCL (ref 4.5–11)

## 2018-09-28 ENCOUNTER — HISTORICAL (OUTPATIENT)
Dept: RADIOLOGY | Facility: HOSPITAL | Age: 53
End: 2018-09-28

## 2018-10-11 ENCOUNTER — HISTORICAL (OUTPATIENT)
Dept: RADIOLOGY | Facility: HOSPITAL | Age: 53
End: 2018-10-11

## 2018-11-26 ENCOUNTER — HISTORICAL (OUTPATIENT)
Dept: RADIATION THERAPY | Facility: HOSPITAL | Age: 53
End: 2018-11-26

## 2018-12-20 ENCOUNTER — HISTORICAL (OUTPATIENT)
Dept: ADMINISTRATIVE | Facility: HOSPITAL | Age: 53
End: 2018-12-20

## 2018-12-20 LAB
ABS NEUT (OLG): 2.41 X10(3)/MCL (ref 2.1–9.2)
ALBUMIN SERPL-MCNC: 3.7 GM/DL (ref 3.4–5)
ALBUMIN/GLOB SERPL: 1 RATIO (ref 1–2)
ALP SERPL-CCNC: 104 UNIT/L (ref 45–117)
ALT SERPL-CCNC: 39 UNIT/L (ref 12–78)
AST SERPL-CCNC: 70 UNIT/L (ref 15–37)
BASOPHILS # BLD AUTO: 0.03 X10(3)/MCL
BASOPHILS NFR BLD AUTO: 1 %
BILIRUB SERPL-MCNC: 0.3 MG/DL (ref 0.2–1)
BILIRUBIN DIRECT+TOT PNL SERPL-MCNC: 0.1 MG/DL
BILIRUBIN DIRECT+TOT PNL SERPL-MCNC: 0.2 MG/DL
BUN SERPL-MCNC: 41 MG/DL (ref 7–18)
CALCIUM SERPL-MCNC: 10.3 MG/DL (ref 8.5–10.1)
CHLORIDE SERPL-SCNC: 105 MMOL/L (ref 98–107)
CO2 SERPL-SCNC: 22 MMOL/L (ref 21–32)
CREAT SERPL-MCNC: 2.2 MG/DL (ref 0.6–1.3)
EOSINOPHIL # BLD AUTO: 0.1 10*3/UL
EOSINOPHIL NFR BLD AUTO: 2 %
ERYTHROCYTE [DISTWIDTH] IN BLOOD BY AUTOMATED COUNT: 14.2 % (ref 11.5–14.5)
GLOBULIN SER-MCNC: 4.3 GM/ML (ref 2.3–3.5)
GLUCOSE SERPL-MCNC: 82 MG/DL (ref 74–106)
HCT VFR BLD AUTO: 31.9 % (ref 35–46)
HGB BLD-MCNC: 10.1 GM/DL (ref 12–16)
IMM GRANULOCYTES # BLD AUTO: 0.17 10*3/UL
IMM GRANULOCYTES NFR BLD AUTO: 4 %
LYMPHOCYTES # BLD AUTO: 1.13 X10(3)/MCL
LYMPHOCYTES NFR BLD AUTO: 26 % (ref 13–40)
MCH RBC QN AUTO: 30.1 PG (ref 26–34)
MCHC RBC AUTO-ENTMCNC: 31.7 GM/DL (ref 31–37)
MCV RBC AUTO: 94.9 FL (ref 80–100)
MONOCYTES # BLD AUTO: 0.49 X10(3)/MCL
MONOCYTES NFR BLD AUTO: 11 % (ref 4–12)
NEUTROPHILS # BLD AUTO: 2.41 X10(3)/MCL
NEUTROPHILS NFR BLD AUTO: 56 X10(3)/MCL
PLATELET # BLD AUTO: 222 X10(3)/MCL (ref 130–400)
PMV BLD AUTO: 10.1 FL (ref 7.4–10.4)
POTASSIUM SERPL-SCNC: 4.5 MMOL/L (ref 3.5–5.1)
PROT SERPL-MCNC: 8 GM/DL (ref 6.4–8.2)
RBC # BLD AUTO: 3.36 X10(6)/MCL (ref 4–5.2)
SODIUM SERPL-SCNC: 137 MMOL/L (ref 136–145)
WBC # SPEC AUTO: 4.3 X10(3)/MCL (ref 4.5–11)

## 2019-01-21 ENCOUNTER — HISTORICAL (OUTPATIENT)
Dept: ADMINISTRATIVE | Facility: HOSPITAL | Age: 54
End: 2019-01-21

## 2019-01-21 LAB
ALBUMIN SERPL-MCNC: 3.4 GM/DL (ref 3.4–5)
ALBUMIN/GLOB SERPL: 0.83 RATIO (ref 1.1–2)
ALP SERPL-CCNC: 124 UNIT/L (ref 45–117)
ALT SERPL-CCNC: 16 UNIT/L (ref 12–78)
ANISOCYTOSIS BLD QL SMEAR: ABNORMAL
AST SERPL-CCNC: 51 UNIT/L (ref 15–37)
BASOPHILS NFR BLD MANUAL: 0 %
BILIRUB SERPL-MCNC: 0.2 MG/DL (ref 0.2–1)
BILIRUBIN DIRECT+TOT PNL SERPL-MCNC: <0.1 MG/DL
BILIRUBIN DIRECT+TOT PNL SERPL-MCNC: ABNORMAL MG/DL
BUN SERPL-MCNC: 26 MG/DL (ref 7–18)
CALCIUM SERPL-MCNC: 9.8 MG/DL (ref 8.5–10.1)
CEA SERPL-MCNC: 206.1 NG/ML
CHLORIDE SERPL-SCNC: 104 MMOL/L (ref 98–107)
CO2 SERPL-SCNC: 25 MMOL/L (ref 21–32)
CREAT SERPL-MCNC: 1.8 MG/DL (ref 0.6–1.3)
EOSINOPHIL NFR BLD MANUAL: 7 %
ERYTHROCYTE [DISTWIDTH] IN BLOOD BY AUTOMATED COUNT: 15.5 % (ref 11.5–14.5)
GLOBULIN SER-MCNC: 4.1 GM/ML (ref 2.3–3.5)
GLUCOSE SERPL-MCNC: 90 MG/DL (ref 74–106)
GRANULOCYTES NFR BLD MANUAL: 54 % (ref 43–75)
HCT VFR BLD AUTO: 29 % (ref 35–46)
HGB BLD-MCNC: 9 GM/DL (ref 12–16)
HYPOCHROMIA BLD QL SMEAR: ABNORMAL
LYMPHOCYTES NFR BLD MANUAL: 23 % (ref 20.5–51.1)
MCH RBC QN AUTO: 30.3 PG (ref 26–34)
MCHC RBC AUTO-ENTMCNC: 31 GM/DL (ref 31–37)
MCV RBC AUTO: 97.6 FL (ref 80–100)
METAMYELOCYTES NFR BLD MANUAL: 1 %
MONOCYTES NFR BLD MANUAL: 8 % (ref 2–9)
MYELOCYTES NFR BLD MANUAL: 1 %
NEUTS BAND NFR BLD MANUAL: 6 % (ref 0–10)
PLATELET # BLD AUTO: 242 X10(3)/MCL (ref 130–400)
PLATELET # BLD EST: ADEQUATE 10*3/UL
PMV BLD AUTO: 10.5 FL (ref 7.4–10.4)
POLYCHROMASIA BLD QL SMEAR: ABNORMAL
POTASSIUM SERPL-SCNC: 3.9 MMOL/L (ref 3.5–5.1)
PROT SERPL-MCNC: 7.5 GM/DL (ref 6.4–8.2)
RBC # BLD AUTO: 2.97 X10(6)/MCL (ref 4–5.2)
RBC MORPH BLD: ABNORMAL
SODIUM SERPL-SCNC: 137 MMOL/L (ref 136–145)
WBC # SPEC AUTO: 5.6 X10(3)/MCL (ref 4.5–11)

## 2019-02-15 ENCOUNTER — HISTORICAL (OUTPATIENT)
Dept: HEMATOLOGY/ONCOLOGY | Facility: CLINIC | Age: 54
End: 2019-02-15

## 2019-02-20 ENCOUNTER — HISTORICAL (OUTPATIENT)
Dept: RADIOLOGY | Facility: HOSPITAL | Age: 54
End: 2019-02-20

## 2019-02-28 ENCOUNTER — HISTORICAL (OUTPATIENT)
Dept: ADMINISTRATIVE | Facility: HOSPITAL | Age: 54
End: 2019-02-28

## 2019-02-28 LAB
ABS NEUT (OLG): 1.66 X10(3)/MCL (ref 2.1–9.2)
ALBUMIN SERPL-MCNC: 4.1 GM/DL (ref 3.4–5)
ALBUMIN/GLOB SERPL: 1 RATIO (ref 1.1–2)
ALP SERPL-CCNC: 143 UNIT/L (ref 45–117)
ALT SERPL-CCNC: 50 UNIT/L (ref 12–78)
AST SERPL-CCNC: 80 UNIT/L (ref 15–37)
BASOPHILS # BLD AUTO: 0.03 X10(3)/MCL
BASOPHILS NFR BLD AUTO: 1 %
BILIRUB SERPL-MCNC: 0.5 MG/DL (ref 0.2–1)
BILIRUBIN DIRECT+TOT PNL SERPL-MCNC: 0.2 MG/DL
BILIRUBIN DIRECT+TOT PNL SERPL-MCNC: 0.3 MG/DL
BUN SERPL-MCNC: 32 MG/DL (ref 7–18)
CALCIUM SERPL-MCNC: 11.7 MG/DL (ref 8.5–10.1)
CEA SERPL-MCNC: 285.7 NG/ML
CHLORIDE SERPL-SCNC: 108 MMOL/L (ref 98–107)
CO2 SERPL-SCNC: 21 MMOL/L (ref 21–32)
CREAT SERPL-MCNC: 1.7 MG/DL (ref 0.6–1.3)
EOSINOPHIL # BLD AUTO: 0.08 X10(3)/MCL
EOSINOPHIL NFR BLD AUTO: 2 %
ERYTHROCYTE [DISTWIDTH] IN BLOOD BY AUTOMATED COUNT: 16 % (ref 11.5–14.5)
FSH SERPL-ACNC: 60.4 MIU/ML
GLOBULIN SER-MCNC: 4.2 GM/ML (ref 2.3–3.5)
GLUCOSE SERPL-MCNC: 81 MG/DL (ref 74–106)
HCT VFR BLD AUTO: 31 % (ref 35–46)
HGB BLD-MCNC: 10 GM/DL (ref 12–16)
IMM GRANULOCYTES # BLD AUTO: 0.18 10*3/UL
IMM GRANULOCYTES NFR BLD AUTO: 5 %
LYMPHOCYTES # BLD AUTO: 1.16 X10(3)/MCL
LYMPHOCYTES NFR BLD AUTO: 32 % (ref 13–40)
MCH RBC QN AUTO: 31.6 PG (ref 26–34)
MCHC RBC AUTO-ENTMCNC: 32.3 GM/DL (ref 31–37)
MCV RBC AUTO: 98.1 FL (ref 80–100)
MONOCYTES # BLD AUTO: 0.55 X10(3)/MCL
MONOCYTES NFR BLD AUTO: 15 % (ref 4–12)
NEUTROPHILS # BLD AUTO: 1.66 X10(3)/MCL
NEUTROPHILS NFR BLD AUTO: 45 X10(3)/MCL
PLATELET # BLD AUTO: 216 X10(3)/MCL (ref 130–400)
PMV BLD AUTO: 10.3 FL (ref 7.4–10.4)
POTASSIUM SERPL-SCNC: 4 MMOL/L (ref 3.5–5.1)
PROT SERPL-MCNC: 8.3 GM/DL (ref 6.4–8.2)
RBC # BLD AUTO: 3.16 X10(6)/MCL (ref 4–5.2)
SODIUM SERPL-SCNC: 138 MMOL/L (ref 136–145)
WBC # SPEC AUTO: 3.7 X10(3)/MCL (ref 4.5–11)

## 2019-03-07 ENCOUNTER — HISTORICAL (OUTPATIENT)
Dept: INFUSION THERAPY | Facility: HOSPITAL | Age: 54
End: 2019-03-07

## 2019-03-07 LAB
ABS NEUT (OLG): 1.97 X10(3)/MCL
ALBUMIN SERPL-MCNC: 3.7 GM/DL (ref 3.4–5)
ALBUMIN/GLOB SERPL: 0.9 RATIO (ref 1.1–2)
ALP SERPL-CCNC: 144 UNIT/L (ref 45–117)
ALT SERPL-CCNC: 32 UNIT/L (ref 12–78)
ANISOCYTOSIS BLD QL SMEAR: ABNORMAL
AST SERPL-CCNC: 79 UNIT/L (ref 15–37)
BASOPHILS NFR BLD MANUAL: 1 %
BILIRUB SERPL-MCNC: 0.3 MG/DL (ref 0.2–1)
BILIRUBIN DIRECT+TOT PNL SERPL-MCNC: 0.1 MG/DL
BILIRUBIN DIRECT+TOT PNL SERPL-MCNC: 0.2 MG/DL
BUN SERPL-MCNC: 49 MG/DL (ref 7–18)
CALCIUM SERPL-MCNC: 11.9 MG/DL (ref 8.5–10.1)
CHLORIDE SERPL-SCNC: 109 MMOL/L (ref 98–107)
CO2 SERPL-SCNC: 22 MMOL/L (ref 21–32)
CREAT SERPL-MCNC: 2.1 MG/DL (ref 0.6–1.3)
EOSINOPHIL NFR BLD MANUAL: 4 %
ERYTHROCYTE [DISTWIDTH] IN BLOOD BY AUTOMATED COUNT: 15.9 % (ref 11.5–14.5)
GLOBULIN SER-MCNC: 4 GM/ML (ref 2.3–3.5)
GLUCOSE SERPL-MCNC: 79 MG/DL (ref 74–106)
GRANULOCYTES NFR BLD MANUAL: 59 % (ref 43–75)
HCT VFR BLD AUTO: 29 % (ref 35–46)
HGB BLD-MCNC: 9.3 GM/DL (ref 12–16)
LYMPHOCYTES NFR BLD MANUAL: 18 % (ref 20.5–51.1)
LYMPHOCYTES NFR BLD MANUAL: 2 %
MACROCYTES BLD QL SMEAR: ABNORMAL
MCH RBC QN AUTO: 32.1 PG (ref 26–34)
MCHC RBC AUTO-ENTMCNC: 32.1 GM/DL (ref 31–37)
MCV RBC AUTO: 100 FL (ref 80–100)
MONOCYTES NFR BLD MANUAL: 9 % (ref 2–9)
NEUTS BAND NFR BLD MANUAL: 7 % (ref 0–10)
PLATELET # BLD AUTO: 203 X10(3)/MCL (ref 130–400)
PLATELET # BLD EST: ADEQUATE 10*3/UL
PMV BLD AUTO: 10.2 FL (ref 7.4–10.4)
POIKILOCYTOSIS BLD QL SMEAR: ABNORMAL
POLYCHROMASIA BLD QL SMEAR: ABNORMAL
POTASSIUM SERPL-SCNC: 4.3 MMOL/L (ref 3.5–5.1)
PROT SERPL-MCNC: 7.7 GM/DL (ref 6.4–8.2)
RBC # BLD AUTO: 2.9 X10(6)/MCL (ref 4–5.2)
RBC MORPH BLD: ABNORMAL
SODIUM SERPL-SCNC: 138 MMOL/L (ref 136–145)
WBC # SPEC AUTO: 3.9 X10(3)/MCL (ref 4.5–11)

## 2019-03-26 ENCOUNTER — HISTORICAL (OUTPATIENT)
Dept: ADMINISTRATIVE | Facility: HOSPITAL | Age: 54
End: 2019-03-26

## 2019-03-26 LAB
ABS NEUT (OLG): 4.08 X10(3)/MCL
ALBUMIN SERPL-MCNC: 2.8 GM/DL (ref 3.4–5)
ALBUMIN/GLOB SERPL: 0.8 RATIO (ref 1.1–2)
ALP SERPL-CCNC: 421 UNIT/L (ref 45–117)
ALT SERPL-CCNC: 16 UNIT/L (ref 12–78)
ANISOCYTOSIS BLD QL SMEAR: ABNORMAL
AST SERPL-CCNC: 21 UNIT/L (ref 15–37)
BASOPHILS NFR BLD MANUAL: 1 %
BILIRUB SERPL-MCNC: 0.3 MG/DL (ref 0.2–1)
BILIRUBIN DIRECT+TOT PNL SERPL-MCNC: <0.1 MG/DL
BILIRUBIN DIRECT+TOT PNL SERPL-MCNC: ABNORMAL MG/DL
BUN SERPL-MCNC: 16 MG/DL (ref 7–18)
CALCIUM SERPL-MCNC: 8.2 MG/DL (ref 8.5–10.1)
CHLORIDE SERPL-SCNC: 111 MMOL/L (ref 98–107)
CO2 SERPL-SCNC: 24 MMOL/L (ref 21–32)
CREAT SERPL-MCNC: 1.1 MG/DL (ref 0.6–1.3)
DACRYOCYTES BLD QL SMEAR: ABNORMAL
EOSINOPHIL NFR BLD MANUAL: 0 %
ERYTHROCYTE [DISTWIDTH] IN BLOOD BY AUTOMATED COUNT: 16.1 % (ref 11.5–14.5)
GLOBULIN SER-MCNC: 3.3 GM/ML (ref 2.3–3.5)
GLUCOSE SERPL-MCNC: 104 MG/DL (ref 74–106)
GRANULOCYTES NFR BLD MANUAL: 71 % (ref 43–75)
HCT VFR BLD AUTO: 24.3 % (ref 35–46)
HGB BLD-MCNC: 7.7 GM/DL (ref 12–16)
LYMPHOCYTES NFR BLD MANUAL: 12 % (ref 20.5–51.1)
MACROCYTES BLD QL SMEAR: ABNORMAL
MAGNESIUM SERPL-MCNC: 2 MG/DL (ref 1.6–2.6)
MCH RBC QN AUTO: 32 PG (ref 26–34)
MCHC RBC AUTO-ENTMCNC: 31.7 GM/DL (ref 31–37)
MCV RBC AUTO: 100.8 FL (ref 80–100)
METAMYELOCYTES NFR BLD MANUAL: 2 %
MICROCYTES BLD QL SMEAR: ABNORMAL
MONOCYTES NFR BLD MANUAL: 9 % (ref 2–9)
NEUTS BAND NFR BLD MANUAL: 5 % (ref 0–10)
PLATELET # BLD AUTO: 205 X10(3)/MCL (ref 130–400)
PLATELET # BLD EST: NORMAL 10*3/UL
PMV BLD AUTO: 10.1 FL (ref 7.4–10.4)
POLYCHROMASIA BLD QL SMEAR: ABNORMAL
POTASSIUM SERPL-SCNC: 2.8 MMOL/L (ref 3.5–5.1)
PROT SERPL-MCNC: 6.1 GM/DL (ref 6.4–8.2)
RBC # BLD AUTO: 2.41 X10(6)/MCL (ref 4–5.2)
RBC MORPH BLD: ABNORMAL
SCHISTOCYTES BLD QL AUTO: ABNORMAL
SODIUM SERPL-SCNC: 143 MMOL/L (ref 136–145)
WBC # SPEC AUTO: 6.4 X10(3)/MCL (ref 4.5–11)

## 2019-03-28 ENCOUNTER — HISTORICAL (OUTPATIENT)
Dept: INFUSION THERAPY | Facility: HOSPITAL | Age: 54
End: 2019-03-28

## 2019-04-02 ENCOUNTER — HISTORICAL (OUTPATIENT)
Dept: ADMINISTRATIVE | Facility: HOSPITAL | Age: 54
End: 2019-04-02

## 2019-04-02 LAB
ABS NEUT (OLG): 1.92 X10(3)/MCL (ref 2.1–9.2)
ALBUMIN SERPL-MCNC: 2.9 GM/DL (ref 3.4–5)
ALBUMIN/GLOB SERPL: 0.9 RATIO (ref 1.1–2)
ALP SERPL-CCNC: 470 UNIT/L (ref 45–117)
ALT SERPL-CCNC: 19 UNIT/L (ref 12–78)
AST SERPL-CCNC: 25 UNIT/L (ref 15–37)
BASOPHILS # BLD AUTO: 0.03 X10(3)/MCL
BASOPHILS NFR BLD AUTO: 1 %
BILIRUB SERPL-MCNC: 0.5 MG/DL (ref 0.2–1)
BILIRUBIN DIRECT+TOT PNL SERPL-MCNC: 0.2 MG/DL
BILIRUBIN DIRECT+TOT PNL SERPL-MCNC: 0.3 MG/DL
BUN SERPL-MCNC: 12 MG/DL (ref 7–18)
CALCIUM SERPL-MCNC: 8.4 MG/DL (ref 8.5–10.1)
CEA SERPL-MCNC: 97.1 NG/ML
CHLORIDE SERPL-SCNC: 109 MMOL/L (ref 98–107)
CO2 SERPL-SCNC: 24 MMOL/L (ref 21–32)
CREAT SERPL-MCNC: 0.9 MG/DL (ref 0.6–1.3)
EOSINOPHIL # BLD AUTO: 0.11 X10(3)/MCL
EOSINOPHIL NFR BLD AUTO: 3 %
ERYTHROCYTE [DISTWIDTH] IN BLOOD BY AUTOMATED COUNT: 15.9 % (ref 11.5–14.5)
GLOBULIN SER-MCNC: 3.2 GM/ML (ref 2.3–3.5)
GLUCOSE SERPL-MCNC: 73 MG/DL (ref 74–106)
HCT VFR BLD AUTO: 23.3 % (ref 35–46)
HGB BLD-MCNC: 7.5 GM/DL (ref 12–16)
IMM GRANULOCYTES # BLD AUTO: 0.5 10*3/UL
IMM GRANULOCYTES NFR BLD AUTO: 13 %
LYMPHOCYTES # BLD AUTO: 0.93 X10(3)/MCL
LYMPHOCYTES NFR BLD AUTO: 24 % (ref 13–40)
MAGNESIUM SERPL-MCNC: 2.2 MG/DL (ref 1.6–2.6)
MCH RBC QN AUTO: 32.6 PG (ref 26–34)
MCHC RBC AUTO-ENTMCNC: 32.2 GM/DL (ref 31–37)
MCV RBC AUTO: 101.3 FL (ref 80–100)
MONOCYTES # BLD AUTO: 0.3 X10(3)/MCL
MONOCYTES NFR BLD AUTO: 8 % (ref 4–12)
NEUTROPHILS # BLD AUTO: 1.92 X10(3)/MCL
NEUTROPHILS NFR BLD AUTO: 51 X10(3)/MCL
PLATELET # BLD AUTO: 204 X10(3)/MCL (ref 130–400)
PMV BLD AUTO: 11.5 FL (ref 7.4–10.4)
POTASSIUM SERPL-SCNC: 3.9 MMOL/L (ref 3.5–5.1)
PROT SERPL-MCNC: 6.1 GM/DL (ref 6.4–8.2)
RBC # BLD AUTO: 2.3 X10(6)/MCL (ref 4–5.2)
SODIUM SERPL-SCNC: 139 MMOL/L (ref 136–145)
WBC # SPEC AUTO: 3.8 X10(3)/MCL (ref 4.5–11)

## 2019-04-09 ENCOUNTER — HISTORICAL (OUTPATIENT)
Dept: ADMINISTRATIVE | Facility: HOSPITAL | Age: 54
End: 2019-04-09

## 2019-04-09 LAB
FERRITIN SERPL-MCNC: 1667.6 NG/ML (ref 8–388)
FOLATE SERPL-MCNC: 5.3 NG/ML (ref 3.1–17.5)
IRON SATN MFR SERPL: 27 % (ref 15–50)
IRON SERPL-MCNC: 76 MCG/DL (ref 50–170)
LDH SERPL-CCNC: 368 UNIT/L (ref 84–246)
PROT SERPL-MCNC: 5.7 GM/DL
RET# (OHS): 0.1 X10(6)/MCL (ref 0.02–0.08)
RETICULOCYTE COUNT AUTOMATED (OLG): 4.4 % (ref 0.5–1.5)
TIBC SERPL-MCNC: 282 MCG/DL (ref 250–450)
TRANSFERRIN SERPL-MCNC: 191 MG/DL (ref 200–360)

## 2019-04-25 ENCOUNTER — HISTORICAL (OUTPATIENT)
Dept: ADMINISTRATIVE | Facility: HOSPITAL | Age: 54
End: 2019-04-25

## 2019-04-25 LAB
ABS NEUT (OLG): 2.24 X10(3)/MCL (ref 2.1–9.2)
ALBUMIN SERPL-MCNC: 3.2 GM/DL (ref 3.4–5)
ALBUMIN/GLOB SERPL: 1 RATIO (ref 1.1–2)
ALP SERPL-CCNC: 688 UNIT/L (ref 45–117)
ALT SERPL-CCNC: 23 UNIT/L (ref 12–78)
AST SERPL-CCNC: 22 UNIT/L (ref 15–37)
BASOPHILS # BLD AUTO: 0.03 X10(3)/MCL
BASOPHILS NFR BLD AUTO: 1 %
BILIRUB SERPL-MCNC: 0.4 MG/DL (ref 0.2–1)
BILIRUBIN DIRECT+TOT PNL SERPL-MCNC: <0.1 MG/DL
BILIRUBIN DIRECT+TOT PNL SERPL-MCNC: ABNORMAL MG/DL
BUN SERPL-MCNC: 22 MG/DL (ref 7–18)
CALCIUM SERPL-MCNC: 8.5 MG/DL (ref 8.5–10.1)
CHLORIDE SERPL-SCNC: 110 MMOL/L (ref 98–107)
CO2 SERPL-SCNC: 24 MMOL/L (ref 21–32)
CREAT SERPL-MCNC: 0.9 MG/DL (ref 0.6–1.3)
EOSINOPHIL # BLD AUTO: 0.06 10*3/UL
EOSINOPHIL NFR BLD AUTO: 1 %
ERYTHROCYTE [DISTWIDTH] IN BLOOD BY AUTOMATED COUNT: 18.6 % (ref 11.5–14.5)
GLOBULIN SER-MCNC: 3.3 GM/ML (ref 2.3–3.5)
GLUCOSE SERPL-MCNC: 104 MG/DL (ref 74–106)
HCT VFR BLD AUTO: 27.6 % (ref 35–46)
HGB BLD-MCNC: 8.3 GM/DL (ref 12–16)
IMM GRANULOCYTES # BLD AUTO: 0.4 10*3/UL
IMM GRANULOCYTES NFR BLD AUTO: 9 %
LYMPHOCYTES # BLD AUTO: 1.46 X10(3)/MCL
LYMPHOCYTES NFR BLD AUTO: 32 % (ref 13–40)
MCH RBC QN AUTO: 31.9 PG (ref 26–34)
MCHC RBC AUTO-ENTMCNC: 30.1 GM/DL (ref 31–37)
MCV RBC AUTO: 106.2 FL (ref 80–100)
MONOCYTES # BLD AUTO: 0.36 X10(3)/MCL
MONOCYTES NFR BLD AUTO: 8 % (ref 4–12)
NEUTROPHILS # BLD AUTO: 2.24 X10(3)/MCL
NEUTROPHILS NFR BLD AUTO: 49 X10(3)/MCL
PLATELET # BLD AUTO: 216 X10(3)/MCL (ref 130–400)
PMV BLD AUTO: 10.1 FL (ref 7.4–10.4)
POTASSIUM SERPL-SCNC: 4 MMOL/L (ref 3.5–5.1)
PROT SERPL-MCNC: 6.5 GM/DL (ref 6.4–8.2)
RBC # BLD AUTO: 2.6 X10(6)/MCL (ref 4–5.2)
SODIUM SERPL-SCNC: 140 MMOL/L (ref 136–145)
WBC # SPEC AUTO: 4.6 X10(3)/MCL (ref 4.5–11)

## 2019-04-26 ENCOUNTER — HISTORICAL (OUTPATIENT)
Dept: INFUSION THERAPY | Facility: HOSPITAL | Age: 54
End: 2019-04-26

## 2019-05-16 ENCOUNTER — HISTORICAL (OUTPATIENT)
Dept: ADMINISTRATIVE | Facility: HOSPITAL | Age: 54
End: 2019-05-16

## 2019-05-16 LAB
ABS NEUT (OLG): 2 X10(3)/MCL (ref 2.1–9.2)
ALBUMIN SERPL-MCNC: 3.5 GM/DL (ref 3.4–5)
ALBUMIN/GLOB SERPL: 1 RATIO (ref 1.1–2)
ALP SERPL-CCNC: 353 UNIT/L (ref 45–117)
ALT SERPL-CCNC: 19 UNIT/L (ref 12–78)
AST SERPL-CCNC: 16 UNIT/L (ref 15–37)
BASOPHILS # BLD AUTO: 0.04 X10(3)/MCL
BASOPHILS NFR BLD AUTO: 1 %
BILIRUB SERPL-MCNC: 0.3 MG/DL (ref 0.2–1)
BILIRUBIN DIRECT+TOT PNL SERPL-MCNC: 0.1 MG/DL
BILIRUBIN DIRECT+TOT PNL SERPL-MCNC: 0.2 MG/DL
BUN SERPL-MCNC: 18 MG/DL (ref 7–18)
CALCIUM SERPL-MCNC: 8.3 MG/DL (ref 8.5–10.1)
CEA SERPL-MCNC: 8.3 NG/ML
CHLORIDE SERPL-SCNC: 111 MMOL/L (ref 98–107)
CO2 SERPL-SCNC: 24 MMOL/L (ref 21–32)
CREAT SERPL-MCNC: 0.9 MG/DL (ref 0.6–1.3)
EOSINOPHIL # BLD AUTO: 0.01 X10(3)/MCL
EOSINOPHIL NFR BLD AUTO: 0 %
ERYTHROCYTE [DISTWIDTH] IN BLOOD BY AUTOMATED COUNT: 18 % (ref 11.5–14.5)
GLOBULIN SER-MCNC: 3.4 GM/ML (ref 2.3–3.5)
GLUCOSE SERPL-MCNC: 91 MG/DL (ref 74–106)
HCT VFR BLD AUTO: 27 % (ref 35–46)
HGB BLD-MCNC: 8.2 GM/DL (ref 12–16)
IMM GRANULOCYTES # BLD AUTO: 0.17 10*3/UL
IMM GRANULOCYTES NFR BLD AUTO: 4 %
LYMPHOCYTES # BLD AUTO: 1.1 X10(3)/MCL
LYMPHOCYTES NFR BLD AUTO: 29 % (ref 13–40)
MCH RBC QN AUTO: 31.4 PG (ref 26–34)
MCHC RBC AUTO-ENTMCNC: 30.4 GM/DL (ref 31–37)
MCV RBC AUTO: 103.4 FL (ref 80–100)
MONOCYTES # BLD AUTO: 0.5 X10(3)/MCL
MONOCYTES NFR BLD AUTO: 13 % (ref 4–12)
NEUTROPHILS # BLD AUTO: 2 X10(3)/MCL
NEUTROPHILS NFR BLD AUTO: 52 X10(3)/MCL
PLATELET # BLD AUTO: 289 X10(3)/MCL (ref 130–400)
PMV BLD AUTO: 10.4 FL (ref 7.4–10.4)
POTASSIUM SERPL-SCNC: 3.4 MMOL/L (ref 3.5–5.1)
PROT SERPL-MCNC: 6.9 GM/DL (ref 6.4–8.2)
RBC # BLD AUTO: 2.61 X10(6)/MCL (ref 4–5.2)
SODIUM SERPL-SCNC: 140 MMOL/L (ref 136–145)
WBC # SPEC AUTO: 3.8 X10(3)/MCL (ref 4.5–11)

## 2019-05-17 ENCOUNTER — HISTORICAL (OUTPATIENT)
Dept: INFUSION THERAPY | Facility: HOSPITAL | Age: 54
End: 2019-05-17

## 2019-06-03 ENCOUNTER — HISTORICAL (OUTPATIENT)
Dept: RADIOLOGY | Facility: HOSPITAL | Age: 54
End: 2019-06-03

## 2019-06-06 ENCOUNTER — HISTORICAL (OUTPATIENT)
Dept: ADMINISTRATIVE | Facility: HOSPITAL | Age: 54
End: 2019-06-06

## 2019-06-06 LAB
ABS NEUT (OLG): 2.31 X10(3)/MCL (ref 2.1–9.2)
ALBUMIN SERPL-MCNC: 3.4 GM/DL (ref 3.4–5)
ALBUMIN/GLOB SERPL: 1.1 RATIO (ref 1.1–2)
ALP SERPL-CCNC: 216 UNIT/L (ref 45–117)
ALT SERPL-CCNC: 38 UNIT/L (ref 12–78)
AST SERPL-CCNC: 26 UNIT/L (ref 15–37)
BASOPHILS # BLD AUTO: 0.04 X10(3)/MCL
BASOPHILS NFR BLD AUTO: 1 %
BILIRUB SERPL-MCNC: 0.3 MG/DL (ref 0.2–1)
BILIRUBIN DIRECT+TOT PNL SERPL-MCNC: 0.1 MG/DL
BILIRUBIN DIRECT+TOT PNL SERPL-MCNC: 0.2 MG/DL
BUN SERPL-MCNC: 17 MG/DL (ref 7–18)
CALCIUM SERPL-MCNC: 8.6 MG/DL (ref 8.5–10.1)
CHLORIDE SERPL-SCNC: 112 MMOL/L (ref 98–107)
CO2 SERPL-SCNC: 22 MMOL/L (ref 21–32)
CREAT SERPL-MCNC: 0.9 MG/DL (ref 0.6–1.3)
EOSINOPHIL # BLD AUTO: 0.03 X10(3)/MCL
EOSINOPHIL NFR BLD AUTO: 1 %
ERYTHROCYTE [DISTWIDTH] IN BLOOD BY AUTOMATED COUNT: 17.7 % (ref 11.5–14.5)
GLOBULIN SER-MCNC: 3.2 GM/ML (ref 2.3–3.5)
GLUCOSE SERPL-MCNC: 103 MG/DL (ref 74–106)
HCT VFR BLD AUTO: 26.8 % (ref 35–46)
HGB BLD-MCNC: 8.3 GM/DL (ref 12–16)
IMM GRANULOCYTES # BLD AUTO: 0.04 10*3/UL
IMM GRANULOCYTES NFR BLD AUTO: 1 %
LYMPHOCYTES # BLD AUTO: 0.99 X10(3)/MCL
LYMPHOCYTES NFR BLD AUTO: 26 % (ref 13–40)
MCH RBC QN AUTO: 31.8 PG (ref 26–34)
MCHC RBC AUTO-ENTMCNC: 31 GM/DL (ref 31–37)
MCV RBC AUTO: 102.7 FL (ref 80–100)
MONOCYTES # BLD AUTO: 0.36 X10(3)/MCL
MONOCYTES NFR BLD AUTO: 10 % (ref 4–12)
NEUTROPHILS # BLD AUTO: 2.31 X10(3)/MCL
NEUTROPHILS NFR BLD AUTO: 61 X10(3)/MCL
PLATELET # BLD AUTO: 296 X10(3)/MCL (ref 130–400)
PMV BLD AUTO: 10.7 FL (ref 7.4–10.4)
POTASSIUM SERPL-SCNC: 3.8 MMOL/L (ref 3.5–5.1)
PROT SERPL-MCNC: 6.6 GM/DL (ref 6.4–8.2)
RBC # BLD AUTO: 2.61 X10(6)/MCL (ref 4–5.2)
SODIUM SERPL-SCNC: 140 MMOL/L (ref 136–145)
WBC # SPEC AUTO: 3.8 X10(3)/MCL (ref 4.5–11)

## 2019-06-07 ENCOUNTER — HISTORICAL (OUTPATIENT)
Dept: INFUSION THERAPY | Facility: HOSPITAL | Age: 54
End: 2019-06-07

## 2019-06-07 LAB — VIT B12 SERPL-MCNC: 1795 PG/ML (ref 193–986)

## 2019-06-21 ENCOUNTER — HISTORICAL (OUTPATIENT)
Dept: RADIOLOGY | Facility: HOSPITAL | Age: 54
End: 2019-06-21

## 2019-06-27 ENCOUNTER — HISTORICAL (OUTPATIENT)
Dept: ADMINISTRATIVE | Facility: HOSPITAL | Age: 54
End: 2019-06-27

## 2019-06-27 LAB
ABS NEUT (OLG): 1.96 X10(3)/MCL (ref 2.1–9.2)
ALBUMIN SERPL-MCNC: 3.5 GM/DL (ref 3.4–5)
ALBUMIN/GLOB SERPL: 1.1 RATIO (ref 1.1–2)
ALP SERPL-CCNC: 167 UNIT/L (ref 45–117)
ALT SERPL-CCNC: 50 UNIT/L (ref 12–78)
AST SERPL-CCNC: 30 UNIT/L (ref 15–37)
BASOPHILS # BLD AUTO: 0.05 X10(3)/MCL
BASOPHILS NFR BLD AUTO: 1 %
BILIRUB SERPL-MCNC: 0.2 MG/DL (ref 0.2–1)
BILIRUBIN DIRECT+TOT PNL SERPL-MCNC: <0.1 MG/DL
BILIRUBIN DIRECT+TOT PNL SERPL-MCNC: ABNORMAL MG/DL
BUN SERPL-MCNC: 24 MG/DL (ref 7–18)
CALCIUM SERPL-MCNC: 8.4 MG/DL (ref 8.5–10.1)
CHLORIDE SERPL-SCNC: 111 MMOL/L (ref 98–107)
CO2 SERPL-SCNC: 25 MMOL/L (ref 21–32)
CREAT SERPL-MCNC: 0.9 MG/DL (ref 0.6–1.3)
EOSINOPHIL # BLD AUTO: 0.03 10*3/UL
EOSINOPHIL NFR BLD AUTO: 1 %
ERYTHROCYTE [DISTWIDTH] IN BLOOD BY AUTOMATED COUNT: 17.4 % (ref 11.5–14.5)
GLOBULIN SER-MCNC: 3.2 GM/ML (ref 2.3–3.5)
GLUCOSE SERPL-MCNC: 83 MG/DL (ref 74–106)
HCT VFR BLD AUTO: 29.1 % (ref 35–46)
HGB BLD-MCNC: 9 GM/DL (ref 12–16)
IMM GRANULOCYTES # BLD AUTO: 0.05 10*3/UL
IMM GRANULOCYTES NFR BLD AUTO: 1 %
LYMPHOCYTES # BLD AUTO: 1.12 X10(3)/MCL
LYMPHOCYTES NFR BLD AUTO: 29 % (ref 13–40)
MCH RBC QN AUTO: 31 PG (ref 26–34)
MCHC RBC AUTO-ENTMCNC: 30.9 GM/DL (ref 31–37)
MCV RBC AUTO: 100.3 FL (ref 80–100)
MONOCYTES # BLD AUTO: 0.6 X10(3)/MCL
MONOCYTES NFR BLD AUTO: 16 % (ref 4–12)
NEUTROPHILS # BLD AUTO: 1.96 X10(3)/MCL
NEUTROPHILS NFR BLD AUTO: 52 X10(3)/MCL
PLATELET # BLD AUTO: 245 X10(3)/MCL (ref 130–400)
PMV BLD AUTO: 11.3 FL (ref 7.4–10.4)
POTASSIUM SERPL-SCNC: 3.9 MMOL/L (ref 3.5–5.1)
PROT SERPL-MCNC: 6.7 GM/DL (ref 6.4–8.2)
RBC # BLD AUTO: 2.9 X10(6)/MCL (ref 4–5.2)
SODIUM SERPL-SCNC: 140 MMOL/L (ref 136–145)
WBC # SPEC AUTO: 3.8 X10(3)/MCL (ref 4.5–11)

## 2019-06-28 ENCOUNTER — HISTORICAL (OUTPATIENT)
Dept: INFUSION THERAPY | Facility: HOSPITAL | Age: 54
End: 2019-06-28

## 2019-07-18 ENCOUNTER — HISTORICAL (OUTPATIENT)
Dept: ADMINISTRATIVE | Facility: HOSPITAL | Age: 54
End: 2019-07-18

## 2019-07-18 LAB
ABS NEUT (OLG): 2.14 X10(3)/MCL (ref 2.1–9.2)
ALBUMIN SERPL-MCNC: 3.5 GM/DL (ref 3.4–5)
ALBUMIN/GLOB SERPL: 1.2 RATIO (ref 1.1–2)
ALP SERPL-CCNC: 153 UNIT/L (ref 45–117)
ALT SERPL-CCNC: 45 UNIT/L (ref 12–78)
AST SERPL-CCNC: 21 UNIT/L (ref 15–37)
BASOPHILS # BLD AUTO: 0.04 X10(3)/MCL
BASOPHILS NFR BLD AUTO: 1 %
BILIRUB SERPL-MCNC: 0.3 MG/DL (ref 0.2–1)
BILIRUBIN DIRECT+TOT PNL SERPL-MCNC: 0.1 MG/DL
BILIRUBIN DIRECT+TOT PNL SERPL-MCNC: 0.2 MG/DL
BUN SERPL-MCNC: 20 MG/DL (ref 7–18)
CALCIUM SERPL-MCNC: 8.4 MG/DL (ref 8.5–10.1)
CHLORIDE SERPL-SCNC: 113 MMOL/L (ref 98–107)
CO2 SERPL-SCNC: 24 MMOL/L (ref 21–32)
CREAT SERPL-MCNC: 0.9 MG/DL (ref 0.6–1.3)
EOSINOPHIL # BLD AUTO: 0.04 X10(3)/MCL
EOSINOPHIL NFR BLD AUTO: 1 %
ERYTHROCYTE [DISTWIDTH] IN BLOOD BY AUTOMATED COUNT: 16.8 % (ref 11.5–14.5)
GLOBULIN SER-MCNC: 3 GM/ML (ref 2.3–3.5)
GLUCOSE SERPL-MCNC: 74 MG/DL (ref 74–106)
HCT VFR BLD AUTO: 29.8 % (ref 35–46)
HGB BLD-MCNC: 9.1 GM/DL (ref 12–16)
IMM GRANULOCYTES # BLD AUTO: 0.02 10*3/UL
IMM GRANULOCYTES NFR BLD AUTO: 0 %
LYMPHOCYTES # BLD AUTO: 1.03 X10(3)/MCL
LYMPHOCYTES NFR BLD AUTO: 27 % (ref 13–40)
MCH RBC QN AUTO: 30.7 PG (ref 26–34)
MCHC RBC AUTO-ENTMCNC: 30.5 GM/DL (ref 31–37)
MCV RBC AUTO: 100.7 FL (ref 80–100)
MONOCYTES # BLD AUTO: 0.52 X10(3)/MCL
MONOCYTES NFR BLD AUTO: 14 % (ref 4–12)
NEUTROPHILS # BLD AUTO: 2.14 X10(3)/MCL
NEUTROPHILS NFR BLD AUTO: 56 X10(3)/MCL
PLATELET # BLD AUTO: 271 X10(3)/MCL (ref 130–400)
PMV BLD AUTO: 11.2 FL (ref 7.4–10.4)
POTASSIUM SERPL-SCNC: 4 MMOL/L (ref 3.5–5.1)
PROT SERPL-MCNC: 6.5 GM/DL (ref 6.4–8.2)
RBC # BLD AUTO: 2.96 X10(6)/MCL (ref 4–5.2)
SODIUM SERPL-SCNC: 140 MMOL/L (ref 136–145)
WBC # SPEC AUTO: 3.8 X10(3)/MCL (ref 4.5–11)

## 2019-07-19 ENCOUNTER — HISTORICAL (OUTPATIENT)
Dept: INFUSION THERAPY | Facility: HOSPITAL | Age: 54
End: 2019-07-19

## 2019-08-08 ENCOUNTER — HISTORICAL (OUTPATIENT)
Dept: ADMINISTRATIVE | Facility: HOSPITAL | Age: 54
End: 2019-08-08

## 2019-08-08 LAB
ABS NEUT (OLG): 2.97 X10(3)/MCL (ref 2.1–9.2)
ALBUMIN SERPL-MCNC: 3.4 GM/DL (ref 3.4–5)
ALBUMIN/GLOB SERPL: 1 RATIO (ref 1.1–2)
ALP SERPL-CCNC: 104 UNIT/L (ref 45–117)
ALT SERPL-CCNC: 56 UNIT/L (ref 12–78)
AST SERPL-CCNC: 20 UNIT/L (ref 15–37)
BASOPHILS # BLD AUTO: 0.03 X10(3)/MCL
BASOPHILS NFR BLD AUTO: 1 %
BILIRUB SERPL-MCNC: 0.2 MG/DL (ref 0.2–1)
BILIRUBIN DIRECT+TOT PNL SERPL-MCNC: <0.1 MG/DL
BILIRUBIN DIRECT+TOT PNL SERPL-MCNC: ABNORMAL MG/DL
BUN SERPL-MCNC: 24 MG/DL (ref 7–18)
CALCIUM SERPL-MCNC: 8.2 MG/DL (ref 8.5–10.1)
CHLORIDE SERPL-SCNC: 113 MMOL/L (ref 98–107)
CO2 SERPL-SCNC: 24 MMOL/L (ref 21–32)
CREAT SERPL-MCNC: 1.1 MG/DL (ref 0.6–1.3)
EOSINOPHIL # BLD AUTO: 0.02 10*3/UL
EOSINOPHIL NFR BLD AUTO: 0 %
ERYTHROCYTE [DISTWIDTH] IN BLOOD BY AUTOMATED COUNT: 16.4 % (ref 11.5–14.5)
GLOBULIN SER-MCNC: 3.3 GM/ML (ref 2.3–3.5)
GLUCOSE SERPL-MCNC: 71 MG/DL (ref 74–106)
HCT VFR BLD AUTO: 31.7 % (ref 35–46)
HGB BLD-MCNC: 9.9 GM/DL (ref 12–16)
IMM GRANULOCYTES # BLD AUTO: 0.02 10*3/UL
IMM GRANULOCYTES NFR BLD AUTO: 0 %
LYMPHOCYTES # BLD AUTO: 1.02 X10(3)/MCL
LYMPHOCYTES NFR BLD AUTO: 21 % (ref 13–40)
MCH RBC QN AUTO: 30.7 PG (ref 26–34)
MCHC RBC AUTO-ENTMCNC: 31.2 GM/DL (ref 31–37)
MCV RBC AUTO: 98.1 FL (ref 80–100)
MONOCYTES # BLD AUTO: 0.7 X10(3)/MCL
MONOCYTES NFR BLD AUTO: 15 % (ref 0–24)
NEUTROPHILS # BLD AUTO: 2.97 X10(3)/MCL
NEUTROPHILS NFR BLD AUTO: 62 X10(3)/MCL
PLATELET # BLD AUTO: 322 X10(3)/MCL (ref 130–400)
PMV BLD AUTO: 10.6 FL (ref 7.4–10.4)
POTASSIUM SERPL-SCNC: 3.4 MMOL/L (ref 3.5–5.1)
PROT SERPL-MCNC: 6.7 GM/DL (ref 6.4–8.2)
RBC # BLD AUTO: 3.23 X10(6)/MCL (ref 4–5.2)
SODIUM SERPL-SCNC: 142 MMOL/L (ref 136–145)
WBC # SPEC AUTO: 4.8 X10(3)/MCL (ref 4.5–11)

## 2019-08-09 ENCOUNTER — HISTORICAL (OUTPATIENT)
Dept: INFUSION THERAPY | Facility: HOSPITAL | Age: 54
End: 2019-08-09

## 2019-09-06 ENCOUNTER — HISTORICAL (OUTPATIENT)
Dept: RADIOLOGY | Facility: HOSPITAL | Age: 54
End: 2019-09-06

## 2019-09-11 ENCOUNTER — HISTORICAL (OUTPATIENT)
Dept: ADMINISTRATIVE | Facility: HOSPITAL | Age: 54
End: 2019-09-11

## 2019-09-11 LAB
ABS NEUT (OLG): 3.62 X10(3)/MCL (ref 2.1–9.2)
ALBUMIN SERPL-MCNC: 3.4 GM/DL (ref 3.4–5)
ALBUMIN/GLOB SERPL: 1 RATIO (ref 1.1–2)
ALP SERPL-CCNC: 121 UNIT/L (ref 45–117)
ALT SERPL-CCNC: 27 UNIT/L (ref 12–78)
AST SERPL-CCNC: 15 UNIT/L (ref 15–37)
BASOPHILS # BLD AUTO: 0 X10(3)/MCL (ref 0–0.2)
BASOPHILS NFR BLD AUTO: 1 %
BILIRUB SERPL-MCNC: 0.3 MG/DL (ref 0.2–1)
BILIRUBIN DIRECT+TOT PNL SERPL-MCNC: 0.1 MG/DL (ref 0–0.2)
BILIRUBIN DIRECT+TOT PNL SERPL-MCNC: 0.2 MG/DL
BUN SERPL-MCNC: 21 MG/DL (ref 7–18)
CALCIUM SERPL-MCNC: 8.1 MG/DL (ref 8.5–10.1)
CHLORIDE SERPL-SCNC: 111 MMOL/L (ref 98–107)
CO2 SERPL-SCNC: 24 MMOL/L (ref 21–32)
CREAT SERPL-MCNC: 1.2 MG/DL (ref 0.6–1.3)
EOSINOPHIL # BLD AUTO: 0.1 X10(3)/MCL (ref 0–0.9)
EOSINOPHIL NFR BLD AUTO: 2 %
ERYTHROCYTE [DISTWIDTH] IN BLOOD BY AUTOMATED COUNT: 14.7 % (ref 11.5–14.5)
GLOBULIN SER-MCNC: 3.5 GM/ML (ref 2.3–3.5)
GLUCOSE SERPL-MCNC: 105 MG/DL (ref 74–106)
HCT VFR BLD AUTO: 32.6 % (ref 35–46)
HGB BLD-MCNC: 10.2 GM/DL (ref 12–16)
IMM GRANULOCYTES # BLD AUTO: 0.01 10*3/UL
IMM GRANULOCYTES NFR BLD AUTO: 0 %
LYMPHOCYTES # BLD AUTO: 1 X10(3)/MCL (ref 0.6–4.6)
LYMPHOCYTES NFR BLD AUTO: 19 %
MCH RBC QN AUTO: 30.4 PG (ref 26–34)
MCHC RBC AUTO-ENTMCNC: 31.3 GM/DL (ref 31–37)
MCV RBC AUTO: 97 FL (ref 80–100)
MONOCYTES # BLD AUTO: 0.6 X10(3)/MCL (ref 0.1–1.3)
MONOCYTES NFR BLD AUTO: 10 %
NEUTROPHILS # BLD AUTO: 3.62 X10(3)/MCL (ref 2.1–9.2)
NEUTROPHILS NFR BLD AUTO: 68 %
PLATELET # BLD AUTO: 293 X10(3)/MCL (ref 130–400)
PMV BLD AUTO: 9.6 FL (ref 7.4–10.4)
POTASSIUM SERPL-SCNC: 3.6 MMOL/L (ref 3.5–5.1)
PROT SERPL-MCNC: 6.9 GM/DL (ref 6.4–8.2)
RBC # BLD AUTO: 3.36 X10(6)/MCL (ref 4–5.2)
SODIUM SERPL-SCNC: 140 MMOL/L (ref 136–145)
WBC # SPEC AUTO: 5.3 X10(3)/MCL (ref 4.5–11)

## 2019-09-12 ENCOUNTER — HISTORICAL (OUTPATIENT)
Dept: INFUSION THERAPY | Facility: HOSPITAL | Age: 54
End: 2019-09-12

## 2019-09-19 ENCOUNTER — HISTORICAL (OUTPATIENT)
Dept: RADIOLOGY | Facility: HOSPITAL | Age: 54
End: 2019-09-19

## 2019-10-02 ENCOUNTER — HISTORICAL (OUTPATIENT)
Dept: ADMINISTRATIVE | Facility: HOSPITAL | Age: 54
End: 2019-10-02

## 2019-10-02 LAB
ABS NEUT (OLG): 2.58 X10(3)/MCL (ref 2.1–9.2)
ALBUMIN SERPL-MCNC: 3.4 GM/DL (ref 3.4–5)
ALBUMIN/GLOB SERPL: 1 RATIO (ref 1.1–2)
ALP SERPL-CCNC: 96 UNIT/L (ref 45–117)
ALT SERPL-CCNC: 30 UNIT/L (ref 12–78)
AST SERPL-CCNC: 13 UNIT/L (ref 15–37)
BASOPHILS # BLD AUTO: 0 X10(3)/MCL (ref 0–0.2)
BASOPHILS NFR BLD AUTO: 1 %
BILIRUB SERPL-MCNC: 0.4 MG/DL (ref 0.2–1)
BILIRUBIN DIRECT+TOT PNL SERPL-MCNC: 0.2 MG/DL
BILIRUBIN DIRECT+TOT PNL SERPL-MCNC: 0.2 MG/DL (ref 0–0.2)
BUN SERPL-MCNC: 21 MG/DL (ref 7–18)
CALCIUM SERPL-MCNC: 8.4 MG/DL (ref 8.5–10.1)
CHLORIDE SERPL-SCNC: 110 MMOL/L (ref 98–107)
CO2 SERPL-SCNC: 26 MMOL/L (ref 21–32)
CREAT SERPL-MCNC: 1.1 MG/DL (ref 0.6–1.3)
EOSINOPHIL # BLD AUTO: 0 X10(3)/MCL (ref 0–0.9)
EOSINOPHIL NFR BLD AUTO: 1 %
ERYTHROCYTE [DISTWIDTH] IN BLOOD BY AUTOMATED COUNT: 14.9 % (ref 11.5–14.5)
GLOBULIN SER-MCNC: 3.4 GM/ML (ref 2.3–3.5)
GLUCOSE SERPL-MCNC: 99 MG/DL (ref 74–106)
HCT VFR BLD AUTO: 32.6 % (ref 35–46)
HGB BLD-MCNC: 10.2 GM/DL (ref 12–16)
IMM GRANULOCYTES # BLD AUTO: 0.02 10*3/UL
IMM GRANULOCYTES NFR BLD AUTO: 0 %
LYMPHOCYTES # BLD AUTO: 1.3 X10(3)/MCL (ref 0.6–4.6)
LYMPHOCYTES NFR BLD AUTO: 29 %
MCH RBC QN AUTO: 29.9 PG (ref 26–34)
MCHC RBC AUTO-ENTMCNC: 31.3 GM/DL (ref 31–37)
MCV RBC AUTO: 95.6 FL (ref 80–100)
MONOCYTES # BLD AUTO: 0.4 X10(3)/MCL (ref 0.1–1.3)
MONOCYTES NFR BLD AUTO: 10 %
NEUTROPHILS # BLD AUTO: 2.58 X10(3)/MCL (ref 2.1–9.2)
NEUTROPHILS NFR BLD AUTO: 59 %
PLATELET # BLD AUTO: 338 X10(3)/MCL (ref 130–400)
PMV BLD AUTO: 9.7 FL (ref 7.4–10.4)
POTASSIUM SERPL-SCNC: 4.4 MMOL/L (ref 3.5–5.1)
PROT SERPL-MCNC: 6.8 GM/DL (ref 6.4–8.2)
RBC # BLD AUTO: 3.41 X10(6)/MCL (ref 4–5.2)
SODIUM SERPL-SCNC: 140 MMOL/L (ref 136–145)
WBC # SPEC AUTO: 4.4 X10(3)/MCL (ref 4.5–11)

## 2019-10-03 ENCOUNTER — HISTORICAL (OUTPATIENT)
Dept: INFUSION THERAPY | Facility: HOSPITAL | Age: 54
End: 2019-10-03

## 2019-10-11 ENCOUNTER — HISTORICAL (OUTPATIENT)
Dept: RADIOLOGY | Facility: HOSPITAL | Age: 54
End: 2019-10-11

## 2019-10-24 ENCOUNTER — HISTORICAL (OUTPATIENT)
Dept: ADMINISTRATIVE | Facility: HOSPITAL | Age: 54
End: 2019-10-24

## 2019-10-24 LAB
ABS NEUT (OLG): 2.1 X10(3)/MCL (ref 2.1–9.2)
ALBUMIN SERPL-MCNC: 3.4 GM/DL (ref 3.4–5)
ALBUMIN/GLOB SERPL: 1 RATIO (ref 1.1–2)
ALP SERPL-CCNC: 92 UNIT/L (ref 45–117)
ALT SERPL-CCNC: 31 UNIT/L (ref 12–78)
AST SERPL-CCNC: 18 UNIT/L (ref 15–37)
BASOPHILS # BLD AUTO: 0 X10(3)/MCL (ref 0–0.2)
BASOPHILS NFR BLD AUTO: 1 %
BILIRUB SERPL-MCNC: 0.4 MG/DL (ref 0.2–1)
BILIRUBIN DIRECT+TOT PNL SERPL-MCNC: 0.1 MG/DL (ref 0–0.2)
BILIRUBIN DIRECT+TOT PNL SERPL-MCNC: 0.3 MG/DL
BUN SERPL-MCNC: 26 MG/DL (ref 7–18)
CALCIUM SERPL-MCNC: 8.3 MG/DL (ref 8.5–10.1)
CHLORIDE SERPL-SCNC: 109 MMOL/L (ref 98–107)
CO2 SERPL-SCNC: 22 MMOL/L (ref 21–32)
CREAT SERPL-MCNC: 1.2 MG/DL (ref 0.6–1.3)
EOSINOPHIL # BLD AUTO: 0 X10(3)/MCL (ref 0–0.9)
EOSINOPHIL NFR BLD AUTO: 1 %
ERYTHROCYTE [DISTWIDTH] IN BLOOD BY AUTOMATED COUNT: 15.7 % (ref 11.5–14.5)
GLOBULIN SER-MCNC: 3.5 GM/ML (ref 2.3–3.5)
GLUCOSE SERPL-MCNC: 95 MG/DL (ref 74–106)
HCT VFR BLD AUTO: 33.2 % (ref 35–46)
HGB BLD-MCNC: 10.4 GM/DL (ref 12–16)
IMM GRANULOCYTES # BLD AUTO: 0.01 10*3/UL
IMM GRANULOCYTES NFR BLD AUTO: 0 %
LYMPHOCYTES # BLD AUTO: 1.4 X10(3)/MCL (ref 0.6–4.6)
LYMPHOCYTES NFR BLD AUTO: 34 %
MCH RBC QN AUTO: 29.9 PG (ref 26–34)
MCHC RBC AUTO-ENTMCNC: 31.3 GM/DL (ref 31–37)
MCV RBC AUTO: 95.4 FL (ref 80–100)
MONOCYTES # BLD AUTO: 0.5 X10(3)/MCL (ref 0.1–1.3)
MONOCYTES NFR BLD AUTO: 11 %
NEUTROPHILS # BLD AUTO: 2.1 X10(3)/MCL (ref 2.1–9.2)
NEUTROPHILS NFR BLD AUTO: 52 %
PLATELET # BLD AUTO: 332 X10(3)/MCL (ref 130–400)
PMV BLD AUTO: 10 FL (ref 7.4–10.4)
POTASSIUM SERPL-SCNC: 3.8 MMOL/L (ref 3.5–5.1)
PROT SERPL-MCNC: 6.9 GM/DL (ref 6.4–8.2)
RBC # BLD AUTO: 3.48 X10(6)/MCL (ref 4–5.2)
SODIUM SERPL-SCNC: 139 MMOL/L (ref 136–145)
WBC # SPEC AUTO: 4 X10(3)/MCL (ref 4.5–11)

## 2019-10-25 ENCOUNTER — HISTORICAL (OUTPATIENT)
Dept: INFUSION THERAPY | Facility: HOSPITAL | Age: 54
End: 2019-10-25

## 2019-11-11 ENCOUNTER — HISTORICAL (OUTPATIENT)
Dept: RADIOLOGY | Facility: HOSPITAL | Age: 54
End: 2019-11-11

## 2019-11-13 ENCOUNTER — HISTORICAL (OUTPATIENT)
Dept: ADMINISTRATIVE | Facility: HOSPITAL | Age: 54
End: 2019-11-13

## 2019-11-13 LAB
ABS NEUT (OLG): 3.75 X10(3)/MCL (ref 2.1–9.2)
ALBUMIN SERPL-MCNC: 3.3 GM/DL (ref 3.4–5)
ALBUMIN/GLOB SERPL: 0.9 RATIO (ref 1.1–2)
ALP SERPL-CCNC: 74 UNIT/L (ref 45–117)
ALT SERPL-CCNC: 20 UNIT/L (ref 12–78)
AST SERPL-CCNC: 16 UNIT/L (ref 15–37)
BASOPHILS # BLD AUTO: 0 X10(3)/MCL (ref 0–0.2)
BASOPHILS NFR BLD AUTO: 1 %
BILIRUB SERPL-MCNC: 0.4 MG/DL (ref 0.2–1)
BILIRUBIN DIRECT+TOT PNL SERPL-MCNC: 0.1 MG/DL (ref 0–0.2)
BILIRUBIN DIRECT+TOT PNL SERPL-MCNC: 0.3 MG/DL
BUN SERPL-MCNC: 22 MG/DL (ref 7–18)
CALCIUM SERPL-MCNC: 8.9 MG/DL (ref 8.5–10.1)
CHLORIDE SERPL-SCNC: 111 MMOL/L (ref 98–107)
CO2 SERPL-SCNC: 22 MMOL/L (ref 21–32)
CREAT SERPL-MCNC: 1 MG/DL (ref 0.6–1.3)
EOSINOPHIL # BLD AUTO: 0 X10(3)/MCL (ref 0–0.9)
EOSINOPHIL NFR BLD AUTO: 0 %
ERYTHROCYTE [DISTWIDTH] IN BLOOD BY AUTOMATED COUNT: 15.9 % (ref 11.5–14.5)
GLOBULIN SER-MCNC: 3.8 GM/ML (ref 2.3–3.5)
GLUCOSE SERPL-MCNC: 83 MG/DL (ref 74–106)
HCT VFR BLD AUTO: 33 % (ref 35–46)
HGB BLD-MCNC: 10.1 GM/DL (ref 12–16)
IMM GRANULOCYTES # BLD AUTO: 0.02 10*3/UL
IMM GRANULOCYTES NFR BLD AUTO: 0 %
LYMPHOCYTES # BLD AUTO: 1.3 X10(3)/MCL (ref 0.6–4.6)
LYMPHOCYTES NFR BLD AUTO: 23 %
MCH RBC QN AUTO: 28.8 PG (ref 26–34)
MCHC RBC AUTO-ENTMCNC: 30.6 GM/DL (ref 31–37)
MCV RBC AUTO: 94 FL (ref 80–100)
MONOCYTES # BLD AUTO: 0.6 X10(3)/MCL (ref 0.1–1.3)
MONOCYTES NFR BLD AUTO: 10 %
NEUTROPHILS # BLD AUTO: 3.75 X10(3)/MCL (ref 2.1–9.2)
NEUTROPHILS NFR BLD AUTO: 66 %
PLATELET # BLD AUTO: 384 X10(3)/MCL (ref 130–400)
PMV BLD AUTO: 10.1 FL (ref 7.4–10.4)
POTASSIUM SERPL-SCNC: 4.2 MMOL/L (ref 3.5–5.1)
PROT SERPL-MCNC: 7.1 GM/DL (ref 6.4–8.2)
RBC # BLD AUTO: 3.51 X10(6)/MCL (ref 4–5.2)
SODIUM SERPL-SCNC: 140 MMOL/L (ref 136–145)
WBC # SPEC AUTO: 5.7 X10(3)/MCL (ref 4.5–11)

## 2019-11-15 ENCOUNTER — HISTORICAL (OUTPATIENT)
Dept: INFUSION THERAPY | Facility: HOSPITAL | Age: 54
End: 2019-11-15

## 2019-12-04 ENCOUNTER — HISTORICAL (OUTPATIENT)
Dept: ADMINISTRATIVE | Facility: HOSPITAL | Age: 54
End: 2019-12-04

## 2019-12-04 LAB
ABS NEUT (OLG): 3.04 X10(3)/MCL (ref 2.1–9.2)
ALBUMIN SERPL-MCNC: 3.7 GM/DL (ref 3.4–5)
ALBUMIN/GLOB SERPL: 1.1 RATIO (ref 1.1–2)
ALP SERPL-CCNC: 77 UNIT/L (ref 45–117)
ALT SERPL-CCNC: 22 UNIT/L (ref 12–78)
AST SERPL-CCNC: 13 UNIT/L (ref 15–37)
BASOPHILS # BLD AUTO: 0 X10(3)/MCL (ref 0–0.2)
BASOPHILS NFR BLD AUTO: 1 %
BILIRUB SERPL-MCNC: 0.4 MG/DL (ref 0.2–1)
BILIRUBIN DIRECT+TOT PNL SERPL-MCNC: <0.1 MG/DL (ref 0–0.2)
BILIRUBIN DIRECT+TOT PNL SERPL-MCNC: ABNORMAL MG/DL
BUN SERPL-MCNC: 23 MG/DL (ref 7–18)
CALCIUM SERPL-MCNC: 8.5 MG/DL (ref 8.5–10.1)
CHLORIDE SERPL-SCNC: 112 MMOL/L (ref 98–107)
CO2 SERPL-SCNC: 20 MMOL/L (ref 21–32)
CREAT SERPL-MCNC: 1.1 MG/DL (ref 0.6–1.3)
EOSINOPHIL # BLD AUTO: 0 X10(3)/MCL (ref 0–0.9)
EOSINOPHIL NFR BLD AUTO: 0 %
ERYTHROCYTE [DISTWIDTH] IN BLOOD BY AUTOMATED COUNT: 17.2 % (ref 11.5–14.5)
GLOBULIN SER-MCNC: 3.5 GM/ML (ref 2.3–3.5)
GLUCOSE SERPL-MCNC: 109 MG/DL (ref 74–106)
HCT VFR BLD AUTO: 34.4 % (ref 35–46)
HGB BLD-MCNC: 10.6 GM/DL (ref 12–16)
IMM GRANULOCYTES # BLD AUTO: 0.02 10*3/UL
IMM GRANULOCYTES NFR BLD AUTO: 0 %
LYMPHOCYTES # BLD AUTO: 1.2 X10(3)/MCL (ref 0.6–4.6)
LYMPHOCYTES NFR BLD AUTO: 27 %
MCH RBC QN AUTO: 29 PG (ref 26–34)
MCHC RBC AUTO-ENTMCNC: 30.8 GM/DL (ref 31–37)
MCV RBC AUTO: 94.2 FL (ref 80–100)
MONOCYTES # BLD AUTO: 0.3 X10(3)/MCL (ref 0.1–1.3)
MONOCYTES NFR BLD AUTO: 6 %
NEUTROPHILS # BLD AUTO: 3.04 X10(3)/MCL (ref 2.1–9.2)
NEUTROPHILS NFR BLD AUTO: 65 %
PLATELET # BLD AUTO: 295 X10(3)/MCL (ref 130–400)
PMV BLD AUTO: 10.5 FL (ref 7.4–10.4)
POTASSIUM SERPL-SCNC: 4.3 MMOL/L (ref 3.5–5.1)
PROT SERPL-MCNC: 7.2 GM/DL (ref 6.4–8.2)
RBC # BLD AUTO: 3.65 X10(6)/MCL (ref 4–5.2)
SODIUM SERPL-SCNC: 140 MMOL/L (ref 136–145)
WBC # SPEC AUTO: 4.7 X10(3)/MCL (ref 4.5–11)

## 2019-12-06 ENCOUNTER — HISTORICAL (OUTPATIENT)
Dept: INFUSION THERAPY | Facility: HOSPITAL | Age: 54
End: 2019-12-06

## 2019-12-30 ENCOUNTER — HISTORICAL (OUTPATIENT)
Dept: RADIOLOGY | Facility: HOSPITAL | Age: 54
End: 2019-12-30

## 2020-01-02 ENCOUNTER — HISTORICAL (OUTPATIENT)
Dept: ADMINISTRATIVE | Facility: HOSPITAL | Age: 55
End: 2020-01-02

## 2020-01-02 LAB
ABS NEUT (OLG): 2.13 X10(3)/MCL (ref 2.1–9.2)
ALBUMIN SERPL-MCNC: 3.5 GM/DL (ref 3.4–5)
ALBUMIN/GLOB SERPL: 1.1 RATIO (ref 1.1–2)
ALP SERPL-CCNC: 66 UNIT/L (ref 45–117)
ALT SERPL-CCNC: 14 UNIT/L (ref 12–78)
AST SERPL-CCNC: 13 UNIT/L (ref 15–37)
BASOPHILS # BLD AUTO: 0 X10(3)/MCL (ref 0–0.2)
BASOPHILS NFR BLD AUTO: 1 %
BILIRUB SERPL-MCNC: 0.4 MG/DL (ref 0.2–1)
BILIRUBIN DIRECT+TOT PNL SERPL-MCNC: <0.1 MG/DL (ref 0–0.2)
BILIRUBIN DIRECT+TOT PNL SERPL-MCNC: ABNORMAL MG/DL
BUN SERPL-MCNC: 23 MG/DL (ref 7–18)
CALCIUM SERPL-MCNC: 8.4 MG/DL (ref 8.5–10.1)
CHLORIDE SERPL-SCNC: 110 MMOL/L (ref 98–107)
CO2 SERPL-SCNC: 24 MMOL/L (ref 21–32)
CREAT SERPL-MCNC: 1.1 MG/DL (ref 0.6–1.3)
EOSINOPHIL # BLD AUTO: 0.2 X10(3)/MCL (ref 0–0.9)
EOSINOPHIL NFR BLD AUTO: 4 %
ERYTHROCYTE [DISTWIDTH] IN BLOOD BY AUTOMATED COUNT: 16.8 % (ref 11.5–14.5)
GLOBULIN SER-MCNC: 3.3 GM/ML (ref 2.3–3.5)
GLUCOSE SERPL-MCNC: 77 MG/DL (ref 74–106)
HCT VFR BLD AUTO: 33.5 % (ref 35–46)
HGB BLD-MCNC: 10.3 GM/DL (ref 12–16)
IMM GRANULOCYTES # BLD AUTO: 0.01 10*3/UL
IMM GRANULOCYTES NFR BLD AUTO: 0 %
LYMPHOCYTES # BLD AUTO: 1.2 X10(3)/MCL (ref 0.6–4.6)
LYMPHOCYTES NFR BLD AUTO: 29 %
MCH RBC QN AUTO: 29.2 PG (ref 26–34)
MCHC RBC AUTO-ENTMCNC: 30.7 GM/DL (ref 31–37)
MCV RBC AUTO: 94.9 FL (ref 80–100)
MONOCYTES # BLD AUTO: 0.6 X10(3)/MCL (ref 0.1–1.3)
MONOCYTES NFR BLD AUTO: 14 %
NEUTROPHILS # BLD AUTO: 2.13 X10(3)/MCL (ref 2.1–9.2)
NEUTROPHILS NFR BLD AUTO: 53 %
PLATELET # BLD AUTO: 352 X10(3)/MCL (ref 130–400)
PMV BLD AUTO: 9.7 FL (ref 7.4–10.4)
POTASSIUM SERPL-SCNC: 4.2 MMOL/L (ref 3.5–5.1)
PROT SERPL-MCNC: 6.8 GM/DL (ref 6.4–8.2)
RBC # BLD AUTO: 3.53 X10(6)/MCL (ref 4–5.2)
SODIUM SERPL-SCNC: 139 MMOL/L (ref 136–145)
WBC # SPEC AUTO: 4 X10(3)/MCL (ref 4.5–11)

## 2020-01-03 ENCOUNTER — HISTORICAL (OUTPATIENT)
Dept: INFUSION THERAPY | Facility: HOSPITAL | Age: 55
End: 2020-01-03

## 2020-01-15 ENCOUNTER — HISTORICAL (OUTPATIENT)
Dept: HEMATOLOGY/ONCOLOGY | Facility: CLINIC | Age: 55
End: 2020-01-15

## 2020-01-15 LAB
ABS NEUT (OLG): 3.61 X10(3)/MCL (ref 2.1–9.2)
ALBUMIN SERPL-MCNC: 3.4 GM/DL (ref 3.4–5)
ALBUMIN/GLOB SERPL: 1.2 RATIO (ref 1.1–2)
ALP SERPL-CCNC: 62 UNIT/L (ref 45–117)
ALT SERPL-CCNC: 14 UNIT/L (ref 12–78)
AST SERPL-CCNC: 15 UNIT/L (ref 15–37)
BASOPHILS # BLD AUTO: 0 X10(3)/MCL (ref 0–0.2)
BASOPHILS NFR BLD AUTO: 0 %
BILIRUB SERPL-MCNC: 0.2 MG/DL (ref 0.2–1)
BILIRUBIN DIRECT+TOT PNL SERPL-MCNC: <0.1 MG/DL (ref 0–0.2)
BILIRUBIN DIRECT+TOT PNL SERPL-MCNC: >0.1 MG/DL
BUN SERPL-MCNC: 26 MG/DL (ref 7–18)
CALCIUM SERPL-MCNC: 8.4 MG/DL (ref 8.5–10.1)
CHLORIDE SERPL-SCNC: 106 MMOL/L (ref 98–107)
CO2 SERPL-SCNC: 24 MMOL/L (ref 21–32)
CREAT SERPL-MCNC: 1.1 MG/DL (ref 0.6–1.3)
EOSINOPHIL # BLD AUTO: 0 X10(3)/MCL (ref 0–0.9)
EOSINOPHIL NFR BLD AUTO: 0 %
ERYTHROCYTE [DISTWIDTH] IN BLOOD BY AUTOMATED COUNT: 16.5 % (ref 11.5–14.5)
GLOBULIN SER-MCNC: 2.9 GM/ML (ref 2.3–3.5)
GLUCOSE SERPL-MCNC: 75 MG/DL (ref 74–106)
HCT VFR BLD AUTO: 33.3 % (ref 35–46)
HGB BLD-MCNC: 10.5 GM/DL (ref 12–16)
IMM GRANULOCYTES # BLD AUTO: 0.16 10*3/UL
IMM GRANULOCYTES NFR BLD AUTO: 3 %
LYMPHOCYTES # BLD AUTO: 1.2 X10(3)/MCL (ref 0.6–4.6)
LYMPHOCYTES NFR BLD AUTO: 22 %
MCH RBC QN AUTO: 29.6 PG (ref 26–34)
MCHC RBC AUTO-ENTMCNC: 31.5 GM/DL (ref 31–37)
MCV RBC AUTO: 93.8 FL (ref 80–100)
MONOCYTES # BLD AUTO: 0.5 X10(3)/MCL (ref 0.1–1.3)
MONOCYTES NFR BLD AUTO: 9 %
NEUTROPHILS # BLD AUTO: 3.61 X10(3)/MCL (ref 2.1–9.2)
NEUTROPHILS NFR BLD AUTO: 66 %
PLATELET # BLD AUTO: 307 X10(3)/MCL (ref 130–400)
PMV BLD AUTO: 9.9 FL (ref 7.4–10.4)
POTASSIUM SERPL-SCNC: 4.1 MMOL/L (ref 3.5–5.1)
PROT SERPL-MCNC: 6.3 GM/DL (ref 6.4–8.2)
RBC # BLD AUTO: 3.55 X10(6)/MCL (ref 4–5.2)
SODIUM SERPL-SCNC: 137 MMOL/L (ref 136–145)
WBC # SPEC AUTO: 5.5 X10(3)/MCL (ref 4.5–11)

## 2020-01-23 ENCOUNTER — HISTORICAL (OUTPATIENT)
Dept: ADMINISTRATIVE | Facility: HOSPITAL | Age: 55
End: 2020-01-23

## 2020-01-23 LAB
ABS NEUT (OLG): 2.54 X10(3)/MCL (ref 2.1–9.2)
ALBUMIN SERPL-MCNC: 3.4 GM/DL (ref 3.4–5)
ALBUMIN/GLOB SERPL: 1 RATIO (ref 1.1–2)
ALP SERPL-CCNC: 63 UNIT/L (ref 45–117)
ALT SERPL-CCNC: 15 UNIT/L (ref 12–78)
AST SERPL-CCNC: 14 UNIT/L (ref 15–37)
BASOPHILS # BLD AUTO: 0 X10(3)/MCL (ref 0–0.2)
BASOPHILS NFR BLD AUTO: 1 %
BILIRUB SERPL-MCNC: 0.4 MG/DL (ref 0.2–1)
BILIRUBIN DIRECT+TOT PNL SERPL-MCNC: 0.1 MG/DL (ref 0–0.2)
BILIRUBIN DIRECT+TOT PNL SERPL-MCNC: 0.3 MG/DL
BUN SERPL-MCNC: 26 MG/DL (ref 7–18)
CALCIUM SERPL-MCNC: 8.6 MG/DL (ref 8.5–10.1)
CHLORIDE SERPL-SCNC: 110 MMOL/L (ref 98–107)
CO2 SERPL-SCNC: 21 MMOL/L (ref 21–32)
CREAT SERPL-MCNC: 1.1 MG/DL (ref 0.6–1.3)
EOSINOPHIL # BLD AUTO: 0 X10(3)/MCL (ref 0–0.9)
EOSINOPHIL NFR BLD AUTO: 1 %
ERYTHROCYTE [DISTWIDTH] IN BLOOD BY AUTOMATED COUNT: 16.4 % (ref 11.5–14.5)
GLOBULIN SER-MCNC: 3.3 GM/ML (ref 2.3–3.5)
GLUCOSE SERPL-MCNC: 73 MG/DL (ref 74–106)
HCT VFR BLD AUTO: 34.8 % (ref 35–46)
HGB BLD-MCNC: 10.9 GM/DL (ref 12–16)
IMM GRANULOCYTES # BLD AUTO: 0.01 10*3/UL
IMM GRANULOCYTES NFR BLD AUTO: 0 %
LYMPHOCYTES # BLD AUTO: 1 X10(3)/MCL (ref 0.6–4.6)
LYMPHOCYTES NFR BLD AUTO: 25 %
MCH RBC QN AUTO: 29.9 PG (ref 26–34)
MCHC RBC AUTO-ENTMCNC: 31.3 GM/DL (ref 31–37)
MCV RBC AUTO: 95.6 FL (ref 80–100)
MONOCYTES # BLD AUTO: 0.4 X10(3)/MCL (ref 0.1–1.3)
MONOCYTES NFR BLD AUTO: 10 %
NEUTROPHILS # BLD AUTO: 2.54 X10(3)/MCL (ref 2.1–9.2)
NEUTROPHILS NFR BLD AUTO: 63 %
PLATELET # BLD AUTO: 330 X10(3)/MCL (ref 130–400)
PMV BLD AUTO: 9.9 FL (ref 7.4–10.4)
POTASSIUM SERPL-SCNC: 3.9 MMOL/L (ref 3.5–5.1)
PROT SERPL-MCNC: 6.7 GM/DL (ref 6.4–8.2)
RBC # BLD AUTO: 3.64 X10(6)/MCL (ref 4–5.2)
SODIUM SERPL-SCNC: 137 MMOL/L (ref 136–145)
WBC # SPEC AUTO: 4.1 X10(3)/MCL (ref 4.5–11)

## 2020-02-05 ENCOUNTER — HISTORICAL (OUTPATIENT)
Dept: INFUSION THERAPY | Facility: HOSPITAL | Age: 55
End: 2020-02-05

## 2020-02-05 LAB
ABS NEUT (OLG): 3.44 X10(3)/MCL (ref 2.1–9.2)
ALBUMIN SERPL-MCNC: 3.6 GM/DL (ref 3.4–5)
ALBUMIN/GLOB SERPL: 1.1 RATIO (ref 1.1–2)
ALP SERPL-CCNC: 61 UNIT/L (ref 45–117)
ALT SERPL-CCNC: 11 UNIT/L (ref 12–78)
AST SERPL-CCNC: 9 UNIT/L (ref 15–37)
BASOPHILS # BLD AUTO: 0 X10(3)/MCL (ref 0–0.2)
BASOPHILS NFR BLD AUTO: 0 %
BILIRUB SERPL-MCNC: 0.3 MG/DL (ref 0.2–1)
BILIRUBIN DIRECT+TOT PNL SERPL-MCNC: 0.1 MG/DL (ref 0–0.2)
BILIRUBIN DIRECT+TOT PNL SERPL-MCNC: 0.2 MG/DL
BUN SERPL-MCNC: 26 MG/DL (ref 7–18)
CALCIUM SERPL-MCNC: 8.9 MG/DL (ref 8.5–10.1)
CHLORIDE SERPL-SCNC: 111 MMOL/L (ref 98–107)
CO2 SERPL-SCNC: 19 MMOL/L (ref 21–32)
CREAT SERPL-MCNC: 1.2 MG/DL (ref 0.6–1.3)
ERYTHROCYTE [DISTWIDTH] IN BLOOD BY AUTOMATED COUNT: 15.7 % (ref 11.5–14.5)
GLOBULIN SER-MCNC: 3.4 GM/ML (ref 2.3–3.5)
GLUCOSE SERPL-MCNC: 94 MG/DL (ref 74–106)
HCT VFR BLD AUTO: 32.5 % (ref 35–46)
HGB BLD-MCNC: 10.4 GM/DL (ref 12–16)
IMM GRANULOCYTES # BLD AUTO: 0.02 10*3/UL
IMM GRANULOCYTES NFR BLD AUTO: 0 %
LYMPHOCYTES # BLD AUTO: 0.6 X10(3)/MCL (ref 0.6–4.6)
LYMPHOCYTES NFR BLD AUTO: 14 %
MAGNESIUM SERPL-MCNC: 2.4 MG/DL (ref 1.6–2.6)
MCH RBC QN AUTO: 29.5 PG (ref 26–34)
MCHC RBC AUTO-ENTMCNC: 32 GM/DL (ref 31–37)
MCV RBC AUTO: 92.3 FL (ref 80–100)
MONOCYTES # BLD AUTO: 0.1 X10(3)/MCL (ref 0.1–1.3)
MONOCYTES NFR BLD AUTO: 3 %
NEUTROPHILS # BLD AUTO: 3.44 X10(3)/MCL (ref 2.1–9.2)
NEUTROPHILS NFR BLD AUTO: 82 %
PLATELET # BLD AUTO: 377 X10(3)/MCL (ref 130–400)
PMV BLD AUTO: 9.8 FL (ref 7.4–10.4)
POTASSIUM SERPL-SCNC: 4.1 MMOL/L (ref 3.5–5.1)
PROT SERPL-MCNC: 7 GM/DL (ref 6.4–8.2)
RBC # BLD AUTO: 3.52 X10(6)/MCL (ref 4–5.2)
SODIUM SERPL-SCNC: 137 MMOL/L (ref 136–145)
WBC # SPEC AUTO: 4.2 X10(3)/MCL (ref 4.5–11)

## 2020-02-10 ENCOUNTER — HISTORICAL (OUTPATIENT)
Dept: RADIOLOGY | Facility: HOSPITAL | Age: 55
End: 2020-02-10

## 2020-02-19 ENCOUNTER — HISTORICAL (OUTPATIENT)
Dept: ADMINISTRATIVE | Facility: HOSPITAL | Age: 55
End: 2020-02-19

## 2020-02-19 LAB
ABS NEUT (OLG): 3.37 X10(3)/MCL (ref 2.1–9.2)
ALBUMIN SERPL-MCNC: 3.3 GM/DL (ref 3.4–5)
ALBUMIN/GLOB SERPL: 1 RATIO (ref 1.1–2)
ALP SERPL-CCNC: 58 UNIT/L (ref 45–117)
ALT SERPL-CCNC: 16 UNIT/L (ref 12–78)
AST SERPL-CCNC: 12 UNIT/L (ref 15–37)
BASOPHILS # BLD AUTO: 0 X10(3)/MCL (ref 0–0.2)
BASOPHILS NFR BLD AUTO: 1 %
BILIRUB SERPL-MCNC: 0.3 MG/DL (ref 0.2–1)
BILIRUBIN DIRECT+TOT PNL SERPL-MCNC: <0.1 MG/DL (ref 0–0.2)
BILIRUBIN DIRECT+TOT PNL SERPL-MCNC: >0.2 MG/DL
BUN SERPL-MCNC: 28 MG/DL (ref 7–18)
CALCIUM SERPL-MCNC: 8.7 MG/DL (ref 8.5–10.1)
CHLORIDE SERPL-SCNC: 108 MMOL/L (ref 98–107)
CO2 SERPL-SCNC: 25 MMOL/L (ref 21–32)
CREAT SERPL-MCNC: 1.1 MG/DL (ref 0.6–1.3)
EOSINOPHIL # BLD AUTO: 0 X10(3)/MCL (ref 0–0.9)
EOSINOPHIL NFR BLD AUTO: 0 %
ERYTHROCYTE [DISTWIDTH] IN BLOOD BY AUTOMATED COUNT: 16.5 % (ref 11.5–14.5)
GLOBULIN SER-MCNC: 3.4 GM/ML (ref 2.3–3.5)
GLUCOSE SERPL-MCNC: 112 MG/DL (ref 74–106)
HCT VFR BLD AUTO: 34.3 % (ref 35–46)
HGB BLD-MCNC: 10.9 GM/DL (ref 12–16)
IMM GRANULOCYTES # BLD AUTO: 0.05 10*3/UL
IMM GRANULOCYTES NFR BLD AUTO: 1 %
LYMPHOCYTES # BLD AUTO: 1.2 X10(3)/MCL (ref 0.6–4.6)
LYMPHOCYTES NFR BLD AUTO: 24 %
MCH RBC QN AUTO: 29.8 PG (ref 26–34)
MCHC RBC AUTO-ENTMCNC: 31.8 GM/DL (ref 31–37)
MCV RBC AUTO: 93.7 FL (ref 80–100)
MONOCYTES # BLD AUTO: 0.3 X10(3)/MCL (ref 0.1–1.3)
MONOCYTES NFR BLD AUTO: 6 %
NEUTROPHILS # BLD AUTO: 3.37 X10(3)/MCL (ref 2.1–9.2)
NEUTROPHILS NFR BLD AUTO: 69 %
PLATELET # BLD AUTO: 267 X10(3)/MCL (ref 130–400)
PMV BLD AUTO: 10.3 FL (ref 7.4–10.4)
POTASSIUM SERPL-SCNC: 4.1 MMOL/L (ref 3.5–5.1)
PROT SERPL-MCNC: 6.7 GM/DL (ref 6.4–8.2)
RBC # BLD AUTO: 3.66 X10(6)/MCL (ref 4–5.2)
SODIUM SERPL-SCNC: 138 MMOL/L (ref 136–145)
WBC # SPEC AUTO: 4.9 X10(3)/MCL (ref 4.5–11)

## 2020-02-26 ENCOUNTER — HISTORICAL (OUTPATIENT)
Dept: INFUSION THERAPY | Facility: HOSPITAL | Age: 55
End: 2020-02-26

## 2020-02-26 LAB
ABS NEUT (OLG): 2.16 X10(3)/MCL (ref 2.1–9.2)
ALBUMIN SERPL-MCNC: 3.5 GM/DL (ref 3.4–5)
ALBUMIN/GLOB SERPL: 1.1 RATIO (ref 1.1–2)
ALP SERPL-CCNC: 54 UNIT/L (ref 45–117)
ALT SERPL-CCNC: 18 UNIT/L (ref 12–78)
AST SERPL-CCNC: 14 UNIT/L (ref 15–37)
BASOPHILS # BLD AUTO: 0 X10(3)/MCL (ref 0–0.2)
BASOPHILS NFR BLD AUTO: 1 %
BILIRUB SERPL-MCNC: 0.4 MG/DL (ref 0.2–1)
BILIRUBIN DIRECT+TOT PNL SERPL-MCNC: 0.1 MG/DL (ref 0–0.2)
BILIRUBIN DIRECT+TOT PNL SERPL-MCNC: 0.3 MG/DL
BUN SERPL-MCNC: 22 MG/DL (ref 7–18)
CALCIUM SERPL-MCNC: 8.4 MG/DL (ref 8.5–10.1)
CHLORIDE SERPL-SCNC: 110 MMOL/L (ref 98–107)
CO2 SERPL-SCNC: 24 MMOL/L (ref 21–32)
CREAT SERPL-MCNC: 1.1 MG/DL (ref 0.6–1.3)
EOSINOPHIL # BLD AUTO: 0 X10(3)/MCL (ref 0–0.9)
EOSINOPHIL NFR BLD AUTO: 0 %
ERYTHROCYTE [DISTWIDTH] IN BLOOD BY AUTOMATED COUNT: 16.2 % (ref 11.5–14.5)
GLOBULIN SER-MCNC: 3.1 GM/ML (ref 2.3–3.5)
GLUCOSE SERPL-MCNC: 93 MG/DL (ref 74–106)
HCT VFR BLD AUTO: 33.2 % (ref 35–46)
HGB BLD-MCNC: 10.3 GM/DL (ref 12–16)
IMM GRANULOCYTES # BLD AUTO: 0.01 10*3/UL
IMM GRANULOCYTES NFR BLD AUTO: 0 %
LYMPHOCYTES # BLD AUTO: 1 X10(3)/MCL (ref 0.6–4.6)
LYMPHOCYTES NFR BLD AUTO: 28 %
MCH RBC QN AUTO: 29.2 PG (ref 26–34)
MCHC RBC AUTO-ENTMCNC: 31 GM/DL (ref 31–37)
MCV RBC AUTO: 94.1 FL (ref 80–100)
MONOCYTES # BLD AUTO: 0.4 X10(3)/MCL (ref 0.1–1.3)
MONOCYTES NFR BLD AUTO: 11 %
NEUTROPHILS # BLD AUTO: 2.16 X10(3)/MCL (ref 2.1–9.2)
NEUTROPHILS NFR BLD AUTO: 59 %
PLATELET # BLD AUTO: 376 X10(3)/MCL (ref 130–400)
PMV BLD AUTO: 8.9 FL (ref 7.4–10.4)
POTASSIUM SERPL-SCNC: 4.2 MMOL/L (ref 3.5–5.1)
PROT SERPL-MCNC: 6.6 GM/DL (ref 6.4–8.2)
RBC # BLD AUTO: 3.53 X10(6)/MCL (ref 4–5.2)
SODIUM SERPL-SCNC: 138 MMOL/L (ref 136–145)
WBC # SPEC AUTO: 3.7 X10(3)/MCL (ref 4.5–11)

## 2020-03-17 ENCOUNTER — HISTORICAL (OUTPATIENT)
Dept: ADMINISTRATIVE | Facility: HOSPITAL | Age: 55
End: 2020-03-17

## 2020-03-17 LAB
ABS NEUT (OLG): 1.79 X10(3)/MCL (ref 2.1–9.2)
ALBUMIN SERPL-MCNC: 3.3 GM/DL (ref 3.4–5)
ALBUMIN/GLOB SERPL: 1.1 RATIO (ref 1.1–2)
ALP SERPL-CCNC: 50 UNIT/L (ref 45–117)
ALT SERPL-CCNC: 17 UNIT/L (ref 12–78)
AST SERPL-CCNC: 12 UNIT/L (ref 15–37)
BASOPHILS # BLD AUTO: 0 X10(3)/MCL (ref 0–0.2)
BASOPHILS NFR BLD AUTO: 2 %
BILIRUB SERPL-MCNC: 0.5 MG/DL (ref 0.2–1)
BILIRUBIN DIRECT+TOT PNL SERPL-MCNC: 0.1 MG/DL (ref 0–0.2)
BILIRUBIN DIRECT+TOT PNL SERPL-MCNC: 0.4 MG/DL
BUN SERPL-MCNC: 24 MG/DL (ref 7–18)
CALCIUM SERPL-MCNC: 8.4 MG/DL (ref 8.5–10.1)
CHLORIDE SERPL-SCNC: 112 MMOL/L (ref 98–107)
CO2 SERPL-SCNC: 22 MMOL/L (ref 21–32)
CREAT SERPL-MCNC: 1 MG/DL (ref 0.6–1.3)
EOSINOPHIL # BLD AUTO: 0 X10(3)/MCL (ref 0–0.9)
EOSINOPHIL NFR BLD AUTO: 1 %
ERYTHROCYTE [DISTWIDTH] IN BLOOD BY AUTOMATED COUNT: 16.5 % (ref 11.5–14.5)
GLOBULIN SER-MCNC: 3.1 GM/ML (ref 2.3–3.5)
GLUCOSE SERPL-MCNC: 87 MG/DL (ref 74–106)
HCT VFR BLD AUTO: 32.4 % (ref 35–46)
HGB BLD-MCNC: 10.2 GM/DL (ref 12–16)
LYMPHOCYTES # BLD AUTO: 1 X10(3)/MCL (ref 0.6–4.6)
LYMPHOCYTES NFR BLD AUTO: 30 %
MCH RBC QN AUTO: 29.6 PG (ref 26–34)
MCHC RBC AUTO-ENTMCNC: 31.5 GM/DL (ref 31–37)
MCV RBC AUTO: 93.9 FL (ref 80–100)
MONOCYTES # BLD AUTO: 0.4 X10(3)/MCL (ref 0.1–1.3)
MONOCYTES NFR BLD AUTO: 12 %
NEUTROPHILS # BLD AUTO: 1.79 X10(3)/MCL (ref 2.1–9.2)
NEUTROPHILS NFR BLD AUTO: 55 %
PLATELET # BLD AUTO: 286 X10(3)/MCL (ref 130–400)
PMV BLD AUTO: 10 FL (ref 7.4–10.4)
POTASSIUM SERPL-SCNC: 3.8 MMOL/L (ref 3.5–5.1)
PROT SERPL-MCNC: 6.4 GM/DL (ref 6.4–8.2)
RBC # BLD AUTO: 3.45 X10(6)/MCL (ref 4–5.2)
SODIUM SERPL-SCNC: 138 MMOL/L (ref 136–145)
WBC # SPEC AUTO: 3.3 X10(3)/MCL (ref 4.5–11)

## 2020-03-18 ENCOUNTER — HISTORICAL (OUTPATIENT)
Dept: INFUSION THERAPY | Facility: HOSPITAL | Age: 55
End: 2020-03-18

## 2020-04-06 ENCOUNTER — HISTORICAL (OUTPATIENT)
Dept: ADMINISTRATIVE | Facility: HOSPITAL | Age: 55
End: 2020-04-06

## 2020-04-06 LAB
ABS NEUT (OLG): 1.89 X10(3)/MCL (ref 2.1–9.2)
ALBUMIN SERPL-MCNC: 3.2 GM/DL (ref 3.4–5)
ALBUMIN/GLOB SERPL: 1 RATIO (ref 1.1–2)
ALP SERPL-CCNC: 69 UNIT/L (ref 45–117)
ALT SERPL-CCNC: 32 UNIT/L (ref 12–78)
AST SERPL-CCNC: 17 UNIT/L (ref 15–37)
BASOPHILS # BLD AUTO: 0 X10(3)/MCL (ref 0–0.2)
BASOPHILS NFR BLD AUTO: 1 %
BILIRUB SERPL-MCNC: 0.3 MG/DL (ref 0.2–1)
BILIRUBIN DIRECT+TOT PNL SERPL-MCNC: <0.1 MG/DL (ref 0–0.2)
BILIRUBIN DIRECT+TOT PNL SERPL-MCNC: >0.2 MG/DL
BUN SERPL-MCNC: 28 MG/DL (ref 7–18)
CALCIUM SERPL-MCNC: 7.9 MG/DL (ref 8.5–10.1)
CHLORIDE SERPL-SCNC: 114 MMOL/L (ref 98–107)
CO2 SERPL-SCNC: 21 MMOL/L (ref 21–32)
CREAT SERPL-MCNC: 1.2 MG/DL (ref 0.6–1.3)
EOSINOPHIL # BLD AUTO: 0 X10(3)/MCL (ref 0–0.9)
EOSINOPHIL NFR BLD AUTO: 0 %
ERYTHROCYTE [DISTWIDTH] IN BLOOD BY AUTOMATED COUNT: 16.9 % (ref 11.5–14.5)
GLOBULIN SER-MCNC: 3.1 GM/ML (ref 2.3–3.5)
GLUCOSE SERPL-MCNC: 82 MG/DL (ref 74–106)
HCT VFR BLD AUTO: 30.7 % (ref 35–46)
HGB BLD-MCNC: 9.8 GM/DL (ref 12–16)
IMM GRANULOCYTES # BLD AUTO: 0.01 10*3/UL
IMM GRANULOCYTES NFR BLD AUTO: 0 %
LYMPHOCYTES # BLD AUTO: 1.5 X10(3)/MCL (ref 0.6–4.6)
LYMPHOCYTES NFR BLD AUTO: 38 %
MAGNESIUM SERPL-MCNC: 2.2 MG/DL (ref 1.6–2.6)
MCH RBC QN AUTO: 30.3 PG (ref 26–34)
MCHC RBC AUTO-ENTMCNC: 31.9 GM/DL (ref 31–37)
MCV RBC AUTO: 95 FL (ref 80–100)
MONOCYTES # BLD AUTO: 0.5 X10(3)/MCL (ref 0.1–1.3)
MONOCYTES NFR BLD AUTO: 13 %
NEUTROPHILS # BLD AUTO: 1.89 X10(3)/MCL (ref 2.1–9.2)
NEUTROPHILS NFR BLD AUTO: 47 %
PLATELET # BLD AUTO: 330 X10(3)/MCL (ref 130–400)
PMV BLD AUTO: 10.1 FL (ref 7.4–10.4)
POTASSIUM SERPL-SCNC: 3.9 MMOL/L (ref 3.5–5.1)
PROT SERPL-MCNC: 6.3 GM/DL (ref 6.4–8.2)
RBC # BLD AUTO: 3.23 X10(6)/MCL (ref 4–5.2)
SODIUM SERPL-SCNC: 140 MMOL/L (ref 136–145)
WBC # SPEC AUTO: 4 X10(3)/MCL (ref 4.5–11)

## 2020-04-08 ENCOUNTER — HISTORICAL (OUTPATIENT)
Dept: INFUSION THERAPY | Facility: HOSPITAL | Age: 55
End: 2020-04-08

## 2020-04-16 ENCOUNTER — HISTORICAL (OUTPATIENT)
Dept: RADIOLOGY | Facility: HOSPITAL | Age: 55
End: 2020-04-16

## 2020-04-21 ENCOUNTER — HISTORICAL (OUTPATIENT)
Dept: HEMATOLOGY/ONCOLOGY | Facility: CLINIC | Age: 55
End: 2020-04-21

## 2020-04-21 LAB
ABS NEUT (OLG): 2.32 X10(3)/MCL (ref 2.1–9.2)
ALBUMIN SERPL-MCNC: 3.4 GM/DL (ref 3.4–5)
ALBUMIN/GLOB SERPL: 1.1 RATIO (ref 1.1–2)
ALP SERPL-CCNC: 58 UNIT/L (ref 45–117)
ALT SERPL-CCNC: 29 UNIT/L (ref 12–78)
AST SERPL-CCNC: 22 UNIT/L (ref 15–37)
BASOPHILS # BLD AUTO: 0 X10(3)/MCL (ref 0–0.2)
BASOPHILS NFR BLD AUTO: 1 %
BILIRUB SERPL-MCNC: 0.3 MG/DL (ref 0.2–1)
BILIRUBIN DIRECT+TOT PNL SERPL-MCNC: <0.1 MG/DL (ref 0–0.2)
BILIRUBIN DIRECT+TOT PNL SERPL-MCNC: ABNORMAL MG/DL
BUN SERPL-MCNC: 24 MG/DL (ref 7–18)
CALCIUM SERPL-MCNC: 8.1 MG/DL (ref 8.5–10.1)
CHLORIDE SERPL-SCNC: 110 MMOL/L (ref 98–107)
CO2 SERPL-SCNC: 23 MMOL/L (ref 21–32)
CREAT SERPL-MCNC: 1.1 MG/DL (ref 0.6–1.3)
EOSINOPHIL # BLD AUTO: 0.1 X10(3)/MCL (ref 0–0.9)
EOSINOPHIL NFR BLD AUTO: 2 %
ERYTHROCYTE [DISTWIDTH] IN BLOOD BY AUTOMATED COUNT: 16.7 % (ref 11.5–14.5)
GLOBULIN SER-MCNC: 3 GM/ML (ref 2.3–3.5)
GLUCOSE SERPL-MCNC: 77 MG/DL (ref 74–106)
HCT VFR BLD AUTO: 32.5 % (ref 35–46)
HGB BLD-MCNC: 10.1 GM/DL (ref 12–16)
IMM GRANULOCYTES # BLD AUTO: 0.08 10*3/UL
IMM GRANULOCYTES NFR BLD AUTO: 2 %
LYMPHOCYTES # BLD AUTO: 1.1 X10(3)/MCL (ref 0.6–4.6)
LYMPHOCYTES NFR BLD AUTO: 28 %
MCH RBC QN AUTO: 29.5 PG (ref 26–34)
MCHC RBC AUTO-ENTMCNC: 31.1 GM/DL (ref 31–37)
MCV RBC AUTO: 95 FL (ref 80–100)
MONOCYTES # BLD AUTO: 0.4 X10(3)/MCL (ref 0.1–1.3)
MONOCYTES NFR BLD AUTO: 10 %
NEUTROPHILS # BLD AUTO: 2.32 X10(3)/MCL (ref 2.1–9.2)
NEUTROPHILS NFR BLD AUTO: 58 %
PLATELET # BLD AUTO: 287 X10(3)/MCL (ref 130–400)
PMV BLD AUTO: 9.7 FL (ref 7.4–10.4)
POTASSIUM SERPL-SCNC: 4 MMOL/L (ref 3.5–5.1)
PROT SERPL-MCNC: 6.4 GM/DL (ref 6.4–8.2)
RBC # BLD AUTO: 3.42 X10(6)/MCL (ref 4–5.2)
SODIUM SERPL-SCNC: 139 MMOL/L (ref 136–145)
WBC # SPEC AUTO: 4 X10(3)/MCL (ref 4.5–11)

## 2020-04-29 ENCOUNTER — HISTORICAL (OUTPATIENT)
Dept: INFUSION THERAPY | Facility: HOSPITAL | Age: 55
End: 2020-04-29

## 2020-04-29 LAB
ABS NEUT (OLG): 1.15 X10(3)/MCL (ref 2.1–9.2)
ALBUMIN SERPL-MCNC: 3.5 GM/DL (ref 3.4–5)
ALBUMIN/GLOB SERPL: 1 RATIO (ref 1.1–2)
ALP SERPL-CCNC: 68 UNIT/L (ref 45–117)
ALT SERPL-CCNC: 21 UNIT/L (ref 12–78)
AST SERPL-CCNC: 16 UNIT/L (ref 15–37)
BASOPHILS # BLD AUTO: 0 X10(3)/MCL (ref 0–0.2)
BASOPHILS NFR BLD AUTO: 2 %
BILIRUB SERPL-MCNC: 0.4 MG/DL (ref 0.2–1)
BILIRUBIN DIRECT+TOT PNL SERPL-MCNC: <0.1 MG/DL (ref 0–0.2)
BILIRUBIN DIRECT+TOT PNL SERPL-MCNC: ABNORMAL MG/DL
BUN SERPL-MCNC: 32 MG/DL (ref 7–18)
CALCIUM SERPL-MCNC: 8.7 MG/DL (ref 8.5–10.1)
CHLORIDE SERPL-SCNC: 113 MMOL/L (ref 98–107)
CO2 SERPL-SCNC: 22 MMOL/L (ref 21–32)
CREAT SERPL-MCNC: 1.2 MG/DL (ref 0.6–1.3)
EOSINOPHIL # BLD AUTO: 0 X10(3)/MCL (ref 0–0.9)
EOSINOPHIL NFR BLD AUTO: 2 %
ERYTHROCYTE [DISTWIDTH] IN BLOOD BY AUTOMATED COUNT: 16.5 % (ref 11.5–14.5)
GLOBULIN SER-MCNC: 3.4 GM/ML (ref 2.3–3.5)
GLUCOSE SERPL-MCNC: 82 MG/DL (ref 74–106)
HCT VFR BLD AUTO: 33.3 % (ref 35–46)
HGB BLD-MCNC: 10.3 GM/DL (ref 12–16)
LYMPHOCYTES # BLD AUTO: 1 X10(3)/MCL (ref 0.6–4.6)
LYMPHOCYTES NFR BLD AUTO: 36 %
MCH RBC QN AUTO: 29.8 PG (ref 26–34)
MCHC RBC AUTO-ENTMCNC: 30.9 GM/DL (ref 31–37)
MCV RBC AUTO: 96.2 FL (ref 80–100)
MONOCYTES # BLD AUTO: 0.5 X10(3)/MCL (ref 0.1–1.3)
MONOCYTES NFR BLD AUTO: 18 %
NEUTROPHILS # BLD AUTO: 1.15 X10(3)/MCL (ref 2.1–9.2)
NEUTROPHILS NFR BLD AUTO: 43 %
PLATELET # BLD AUTO: 328 X10(3)/MCL (ref 130–400)
PMV BLD AUTO: 9.4 FL (ref 7.4–10.4)
POTASSIUM SERPL-SCNC: 4.2 MMOL/L (ref 3.5–5.1)
PROT SERPL-MCNC: 6.9 GM/DL (ref 6.4–8.2)
RBC # BLD AUTO: 3.46 X10(6)/MCL (ref 4–5.2)
SODIUM SERPL-SCNC: 143 MMOL/L (ref 136–145)
WBC # SPEC AUTO: 2.7 X10(3)/MCL (ref 4.5–11)

## 2020-05-19 ENCOUNTER — HISTORICAL (OUTPATIENT)
Dept: ADMINISTRATIVE | Facility: HOSPITAL | Age: 55
End: 2020-05-19

## 2020-05-19 LAB
ABS NEUT (OLG): 1.7 X10(3)/MCL (ref 2.1–9.2)
ALBUMIN SERPL-MCNC: 3.3 GM/DL (ref 3.4–5)
ALBUMIN/GLOB SERPL: 1.1 RATIO (ref 1.1–2)
ALP SERPL-CCNC: 50 UNIT/L (ref 45–117)
ALT SERPL-CCNC: 24 UNIT/L (ref 12–78)
AST SERPL-CCNC: 17 UNIT/L (ref 15–37)
BASOPHILS # BLD AUTO: 0 X10(3)/MCL (ref 0–0.2)
BASOPHILS NFR BLD AUTO: 1 %
BILIRUB SERPL-MCNC: 0.4 MG/DL (ref 0.2–1)
BILIRUBIN DIRECT+TOT PNL SERPL-MCNC: <0.1 MG/DL (ref 0–0.2)
BILIRUBIN DIRECT+TOT PNL SERPL-MCNC: ABNORMAL MG/DL
BUN SERPL-MCNC: 20 MG/DL (ref 7–18)
CALCIUM SERPL-MCNC: 8.4 MG/DL (ref 8.5–10.1)
CHLORIDE SERPL-SCNC: 111 MMOL/L (ref 98–107)
CO2 SERPL-SCNC: 23 MMOL/L (ref 21–32)
CREAT SERPL-MCNC: 1 MG/DL (ref 0.6–1.3)
EOSINOPHIL # BLD AUTO: 0 X10(3)/MCL (ref 0–0.9)
EOSINOPHIL NFR BLD AUTO: 0 %
ERYTHROCYTE [DISTWIDTH] IN BLOOD BY AUTOMATED COUNT: 16.1 % (ref 11.5–14.5)
GLOBULIN SER-MCNC: 3.1 GM/ML (ref 2.3–3.5)
GLUCOSE SERPL-MCNC: 78 MG/DL (ref 74–106)
HCT VFR BLD AUTO: 32.4 % (ref 35–46)
HGB BLD-MCNC: 10.2 GM/DL (ref 12–16)
IMM GRANULOCYTES # BLD AUTO: 0.01 10*3/UL
IMM GRANULOCYTES NFR BLD AUTO: 0 %
LYMPHOCYTES # BLD AUTO: 0.9 X10(3)/MCL (ref 0.6–4.6)
LYMPHOCYTES NFR BLD AUTO: 29 %
MCH RBC QN AUTO: 30.9 PG (ref 26–34)
MCHC RBC AUTO-ENTMCNC: 31.5 GM/DL (ref 31–37)
MCV RBC AUTO: 98.2 FL (ref 80–100)
MONOCYTES # BLD AUTO: 0.4 X10(3)/MCL (ref 0.1–1.3)
MONOCYTES NFR BLD AUTO: 13 %
NEUTROPHILS # BLD AUTO: 1.7 X10(3)/MCL (ref 2.1–9.2)
NEUTROPHILS NFR BLD AUTO: 56 %
PLATELET # BLD AUTO: 279 X10(3)/MCL (ref 130–400)
PMV BLD AUTO: 10 FL (ref 7.4–10.4)
POTASSIUM SERPL-SCNC: 4.3 MMOL/L (ref 3.5–5.1)
PROT SERPL-MCNC: 6.4 GM/DL (ref 6.4–8.2)
RBC # BLD AUTO: 3.3 X10(6)/MCL (ref 4–5.2)
SODIUM SERPL-SCNC: 139 MMOL/L (ref 136–145)
WBC # SPEC AUTO: 3 X10(3)/MCL (ref 4.5–11)

## 2020-05-20 ENCOUNTER — HISTORICAL (OUTPATIENT)
Dept: INFUSION THERAPY | Facility: HOSPITAL | Age: 55
End: 2020-05-20

## 2020-05-27 ENCOUNTER — HISTORICAL (OUTPATIENT)
Dept: RADIOLOGY | Facility: HOSPITAL | Age: 55
End: 2020-05-27

## 2020-06-09 ENCOUNTER — HISTORICAL (OUTPATIENT)
Dept: HEMATOLOGY/ONCOLOGY | Facility: CLINIC | Age: 55
End: 2020-06-09

## 2020-06-09 LAB
ABS NEUT (OLG): 2.1 X10(3)/MCL (ref 2.1–9.2)
ALBUMIN SERPL-MCNC: 3.3 GM/DL (ref 3.4–5)
ALBUMIN/GLOB SERPL: 1 RATIO (ref 1.1–2)
ALP SERPL-CCNC: 56 UNIT/L (ref 45–117)
ALT SERPL-CCNC: 21 UNIT/L (ref 12–78)
AST SERPL-CCNC: 17 UNIT/L (ref 15–37)
BASOPHILS # BLD AUTO: 0 X10(3)/MCL (ref 0–0.2)
BASOPHILS NFR BLD AUTO: 2 %
BILIRUB SERPL-MCNC: 0.4 MG/DL (ref 0.2–1)
BILIRUBIN DIRECT+TOT PNL SERPL-MCNC: 0.1 MG/DL (ref 0–0.2)
BILIRUBIN DIRECT+TOT PNL SERPL-MCNC: 0.3 MG/DL
BUN SERPL-MCNC: 26 MG/DL (ref 7–18)
CALCIUM SERPL-MCNC: 8.5 MG/DL (ref 8.5–10.1)
CHLORIDE SERPL-SCNC: 110 MMOL/L (ref 98–107)
CO2 SERPL-SCNC: 23 MMOL/L (ref 21–32)
CREAT SERPL-MCNC: 1.2 MG/DL (ref 0.6–1.3)
EOSINOPHIL # BLD AUTO: 0 X10(3)/MCL (ref 0–0.9)
EOSINOPHIL NFR BLD AUTO: 0 %
ERYTHROCYTE [DISTWIDTH] IN BLOOD BY AUTOMATED COUNT: 15.3 % (ref 11.5–14.5)
GLOBULIN SER-MCNC: 3.2 GM/ML (ref 2.3–3.5)
GLUCOSE SERPL-MCNC: 74 MG/DL (ref 74–106)
HCT VFR BLD AUTO: 33 % (ref 35–46)
HGB BLD-MCNC: 10.3 GM/DL (ref 12–16)
IMM GRANULOCYTES # BLD AUTO: 0.01 10*3/UL
IMM GRANULOCYTES NFR BLD AUTO: 0 %
LYMPHOCYTES # BLD AUTO: 0.6 X10(3)/MCL (ref 0.6–4.6)
LYMPHOCYTES NFR BLD AUTO: 18 %
MCH RBC QN AUTO: 30.7 PG (ref 26–34)
MCHC RBC AUTO-ENTMCNC: 31.2 GM/DL (ref 31–37)
MCV RBC AUTO: 98.5 FL (ref 80–100)
MONOCYTES # BLD AUTO: 0.4 X10(3)/MCL (ref 0.1–1.3)
MONOCYTES NFR BLD AUTO: 14 %
NEUTROPHILS # BLD AUTO: 2.1 X10(3)/MCL (ref 2.1–9.2)
NEUTROPHILS NFR BLD AUTO: 67 %
PLATELET # BLD AUTO: 326 X10(3)/MCL (ref 130–400)
PMV BLD AUTO: 10.2 FL (ref 7.4–10.4)
POTASSIUM SERPL-SCNC: 3.8 MMOL/L (ref 3.5–5.1)
PROT SERPL-MCNC: 6.5 GM/DL (ref 6.4–8.2)
RBC # BLD AUTO: 3.35 X10(6)/MCL (ref 4–5.2)
SODIUM SERPL-SCNC: 139 MMOL/L (ref 136–145)
WBC # SPEC AUTO: 3.2 X10(3)/MCL (ref 4.5–11)

## 2020-06-12 ENCOUNTER — HISTORICAL (OUTPATIENT)
Dept: INFUSION THERAPY | Facility: HOSPITAL | Age: 55
End: 2020-06-12

## 2020-06-12 LAB
ABS NEUT (OLG): 1.04 X10(3)/MCL (ref 2.1–9.2)
ACANTHOCYTES (OLG): NORMAL
ALBUMIN SERPL-MCNC: 3.4 GM/DL (ref 3.4–5)
ALBUMIN/GLOB SERPL: 1 RATIO (ref 1.1–2)
ALP SERPL-CCNC: 58 UNIT/L (ref 45–117)
ALT SERPL-CCNC: 24 UNIT/L (ref 12–78)
ANISOCYTOSIS BLD QL SMEAR: NORMAL
AST SERPL-CCNC: 17 UNIT/L (ref 15–37)
BASOPHILS NFR BLD MANUAL: 0 %
BILIRUB SERPL-MCNC: 0.3 MG/DL (ref 0.2–1)
BILIRUBIN DIRECT+TOT PNL SERPL-MCNC: <0.1 MG/DL (ref 0–0.2)
BILIRUBIN DIRECT+TOT PNL SERPL-MCNC: ABNORMAL MG/DL
BUN SERPL-MCNC: 22 MG/DL (ref 7–18)
BURR CELLS BLD QL SMEAR: NORMAL
CALCIUM SERPL-MCNC: 8.6 MG/DL (ref 8.5–10.1)
CHLORIDE SERPL-SCNC: 109 MMOL/L (ref 98–107)
CO2 SERPL-SCNC: 20 MMOL/L (ref 21–32)
CREAT SERPL-MCNC: 1.2 MG/DL (ref 0.6–1.3)
EOSINOPHIL NFR BLD MANUAL: 0 %
ERYTHROCYTE [DISTWIDTH] IN BLOOD BY AUTOMATED COUNT: 15 % (ref 11.5–14.5)
GLOBULIN SER-MCNC: 3.5 GM/ML (ref 2.3–3.5)
GLUCOSE SERPL-MCNC: 128 MG/DL (ref 74–106)
GRANULOCYTES NFR BLD MANUAL: 72 % (ref 43–75)
HCT VFR BLD AUTO: 35.2 % (ref 35–46)
HGB BLD-MCNC: 11.3 GM/DL (ref 12–16)
HYPOCHROMIA BLD QL SMEAR: NORMAL
LYMPHOCYTES NFR BLD MANUAL: 24 % (ref 20.5–51.1)
MACROCYTES BLD QL SMEAR: NORMAL
MCH RBC QN AUTO: 31.1 PG (ref 26–34)
MCHC RBC AUTO-ENTMCNC: 32.1 GM/DL (ref 31–37)
MCV RBC AUTO: 97 FL (ref 80–100)
MICROCYTES BLD QL SMEAR: NORMAL
MONOCYTES NFR BLD MANUAL: 4 % (ref 2–9)
OVALOCYTES BLD QL SMEAR: NORMAL
PLATELET # BLD AUTO: 327 X10(3)/MCL (ref 130–400)
PLATELET # BLD EST: ADEQUATE 10*3/UL
PMV BLD AUTO: 10.3 FL (ref 7.4–10.4)
POIKILOCYTOSIS BLD QL SMEAR: NORMAL
POTASSIUM SERPL-SCNC: 4.9 MMOL/L (ref 3.5–5.1)
PROT SERPL-MCNC: 6.9 GM/DL (ref 6.4–8.2)
RBC # BLD AUTO: 3.63 X10(6)/MCL (ref 4–5.2)
RBC MORPH BLD: NORMAL
SODIUM SERPL-SCNC: 138 MMOL/L (ref 136–145)
WBC # SPEC AUTO: 1.5 X10(3)/MCL (ref 4.5–11)

## 2020-07-02 ENCOUNTER — HISTORICAL (OUTPATIENT)
Dept: INFUSION THERAPY | Facility: HOSPITAL | Age: 55
End: 2020-07-02

## 2020-07-02 LAB
ABS NEUT (OLG): 2.58 X10(3)/MCL (ref 2.1–9.2)
ALBUMIN SERPL-MCNC: 3.4 GM/DL (ref 3.4–5)
ALBUMIN/GLOB SERPL: 1 RATIO (ref 1.1–2)
ALP SERPL-CCNC: 55 UNIT/L (ref 45–117)
ALT SERPL-CCNC: 26 UNIT/L (ref 12–78)
AST SERPL-CCNC: 17 UNIT/L (ref 15–37)
BASOPHILS # BLD AUTO: 0 X10(3)/MCL (ref 0–0.2)
BASOPHILS NFR BLD AUTO: 0 %
BILIRUB SERPL-MCNC: 0.3 MG/DL (ref 0.2–1)
BILIRUBIN DIRECT+TOT PNL SERPL-MCNC: 0.1 MG/DL (ref 0–0.2)
BILIRUBIN DIRECT+TOT PNL SERPL-MCNC: 0.2 MG/DL
BUN SERPL-MCNC: 24 MG/DL (ref 7–18)
CALCIUM SERPL-MCNC: 8.7 MG/DL (ref 8.5–10.1)
CHLORIDE SERPL-SCNC: 110 MMOL/L (ref 98–107)
CO2 SERPL-SCNC: 24 MMOL/L (ref 21–32)
CREAT SERPL-MCNC: 1.3 MG/DL (ref 0.6–1.3)
ERYTHROCYTE [DISTWIDTH] IN BLOOD BY AUTOMATED COUNT: 15.7 % (ref 11.5–14.5)
GLOBULIN SER-MCNC: 3.3 GM/ML (ref 2.3–3.5)
GLUCOSE SERPL-MCNC: 106 MG/DL (ref 74–106)
HCT VFR BLD AUTO: 31.3 % (ref 35–46)
HGB BLD-MCNC: 9.9 GM/DL (ref 12–16)
IMM GRANULOCYTES # BLD AUTO: 0.01 10*3/UL
IMM GRANULOCYTES NFR BLD AUTO: 0 %
LYMPHOCYTES # BLD AUTO: 0.6 X10(3)/MCL (ref 0.6–4.6)
LYMPHOCYTES NFR BLD AUTO: 17 %
MCH RBC QN AUTO: 30.3 PG (ref 26–34)
MCHC RBC AUTO-ENTMCNC: 31.6 GM/DL (ref 31–37)
MCV RBC AUTO: 95.7 FL (ref 80–100)
MONOCYTES # BLD AUTO: 0.2 X10(3)/MCL (ref 0.1–1.3)
MONOCYTES NFR BLD AUTO: 5 %
NEUTROPHILS # BLD AUTO: 2.58 X10(3)/MCL (ref 2.1–9.2)
NEUTROPHILS NFR BLD AUTO: 78 %
PLATELET # BLD AUTO: 407 X10(3)/MCL (ref 130–400)
PMV BLD AUTO: 9.9 FL (ref 7.4–10.4)
POTASSIUM SERPL-SCNC: 3.7 MMOL/L (ref 3.5–5.1)
PROT SERPL-MCNC: 6.7 GM/DL (ref 6.4–8.2)
RBC # BLD AUTO: 3.27 X10(6)/MCL (ref 4–5.2)
SODIUM SERPL-SCNC: 139 MMOL/L (ref 136–145)
WBC # SPEC AUTO: 3.3 X10(3)/MCL (ref 4.5–11)

## 2020-07-17 ENCOUNTER — HISTORICAL (OUTPATIENT)
Dept: RADIOLOGY | Facility: HOSPITAL | Age: 55
End: 2020-07-17

## 2020-07-22 ENCOUNTER — HISTORICAL (OUTPATIENT)
Dept: ADMINISTRATIVE | Facility: HOSPITAL | Age: 55
End: 2020-07-22

## 2020-07-22 LAB
ABS NEUT (OLG): 6.46 X10(3)/MCL (ref 2.1–9.2)
ALBUMIN SERPL-MCNC: 3.1 GM/DL (ref 3.4–5)
ALBUMIN/GLOB SERPL: 0.9 RATIO (ref 1.1–2)
ALP SERPL-CCNC: 53 UNIT/L (ref 45–117)
ALT SERPL-CCNC: 23 UNIT/L (ref 12–78)
AST SERPL-CCNC: 20 UNIT/L (ref 15–37)
BASOPHILS # BLD AUTO: 0.1 X10(3)/MCL (ref 0–0.2)
BASOPHILS NFR BLD AUTO: 1 %
BILIRUB SERPL-MCNC: 0.2 MG/DL (ref 0.2–1)
BILIRUBIN DIRECT+TOT PNL SERPL-MCNC: <0.1 MG/DL (ref 0–0.2)
BILIRUBIN DIRECT+TOT PNL SERPL-MCNC: ABNORMAL MG/DL
BUN SERPL-MCNC: 22 MG/DL (ref 7–18)
CALCIUM SERPL-MCNC: 8.6 MG/DL (ref 8.5–10.1)
CHLORIDE SERPL-SCNC: 113 MMOL/L (ref 98–107)
CO2 SERPL-SCNC: 22 MMOL/L (ref 21–32)
CREAT SERPL-MCNC: 1.2 MG/DL (ref 0.6–1.3)
EOSINOPHIL # BLD AUTO: 0 X10(3)/MCL (ref 0–0.9)
EOSINOPHIL NFR BLD AUTO: 0 %
ERYTHROCYTE [DISTWIDTH] IN BLOOD BY AUTOMATED COUNT: 16.6 % (ref 11.5–14.5)
GLOBULIN SER-MCNC: 3.3 GM/ML (ref 2.3–3.5)
GLUCOSE SERPL-MCNC: 88 MG/DL (ref 74–106)
HCT VFR BLD AUTO: 31.5 % (ref 35–46)
HGB BLD-MCNC: 9.7 GM/DL (ref 12–16)
IMM GRANULOCYTES # BLD AUTO: 0.05 10*3/UL
IMM GRANULOCYTES NFR BLD AUTO: 1 %
LYMPHOCYTES # BLD AUTO: 0.9 X10(3)/MCL (ref 0.6–4.6)
LYMPHOCYTES NFR BLD AUTO: 11 %
MAGNESIUM SERPL-MCNC: 2.4 MG/DL (ref 1.6–2.6)
MCH RBC QN AUTO: 29.3 PG (ref 26–34)
MCHC RBC AUTO-ENTMCNC: 30.8 GM/DL (ref 31–37)
MCV RBC AUTO: 95.2 FL (ref 80–100)
MONOCYTES # BLD AUTO: 0.3 X10(3)/MCL (ref 0.1–1.3)
MONOCYTES NFR BLD AUTO: 4 %
NEUTROPHILS # BLD AUTO: 6.46 X10(3)/MCL (ref 2.1–9.2)
NEUTROPHILS NFR BLD AUTO: 84 %
PLATELET # BLD AUTO: 379 X10(3)/MCL (ref 130–400)
PMV BLD AUTO: 9.6 FL (ref 7.4–10.4)
POTASSIUM SERPL-SCNC: 4 MMOL/L (ref 3.5–5.1)
PROT SERPL-MCNC: 6.4 GM/DL (ref 6.4–8.2)
RBC # BLD AUTO: 3.31 X10(6)/MCL (ref 4–5.2)
SODIUM SERPL-SCNC: 139 MMOL/L (ref 136–145)
WBC # SPEC AUTO: 7.7 X10(3)/MCL (ref 4.5–11)

## 2020-07-23 ENCOUNTER — HISTORICAL (OUTPATIENT)
Dept: INFUSION THERAPY | Facility: HOSPITAL | Age: 55
End: 2020-07-23

## 2020-07-31 ENCOUNTER — HISTORICAL (OUTPATIENT)
Dept: HEMATOLOGY/ONCOLOGY | Facility: CLINIC | Age: 55
End: 2020-07-31

## 2020-07-31 LAB
ABS NEUT (OLG): 2.42 X10(3)/MCL (ref 2.1–9.2)
ALBUMIN SERPL-MCNC: 3.1 GM/DL (ref 3.4–5)
ALBUMIN/GLOB SERPL: 1 RATIO (ref 1.1–2)
ALP SERPL-CCNC: 56 UNIT/L (ref 45–117)
ALT SERPL-CCNC: 18 UNIT/L (ref 12–78)
ANISOCYTOSIS BLD QL SMEAR: ABNORMAL
AST SERPL-CCNC: 13 UNIT/L (ref 15–37)
BASOPHILS NFR BLD MANUAL: 0 %
BILIRUB SERPL-MCNC: 0.3 MG/DL (ref 0.2–1)
BILIRUBIN DIRECT+TOT PNL SERPL-MCNC: 0.1 MG/DL (ref 0–0.2)
BILIRUBIN DIRECT+TOT PNL SERPL-MCNC: 0.2 MG/DL
BUN SERPL-MCNC: 19 MG/DL (ref 7–18)
CALCIUM SERPL-MCNC: 8.4 MG/DL (ref 8.5–10.1)
CHLORIDE SERPL-SCNC: 111 MMOL/L (ref 98–107)
CO2 SERPL-SCNC: 23 MMOL/L (ref 21–32)
CREAT SERPL-MCNC: 1.2 MG/DL (ref 0.6–1.3)
EOSINOPHIL NFR BLD MANUAL: 1 %
ERYTHROCYTE [DISTWIDTH] IN BLOOD BY AUTOMATED COUNT: 17.6 % (ref 11.5–14.5)
GLOBULIN SER-MCNC: 3 GM/ML (ref 2.3–3.5)
GLUCOSE SERPL-MCNC: 86 MG/DL (ref 74–106)
GRANULOCYTES NFR BLD MANUAL: 60 % (ref 43–75)
HCT VFR BLD AUTO: 27.8 % (ref 35–46)
HGB BLD-MCNC: 8.8 GM/DL (ref 12–16)
HYPOCHROMIA BLD QL SMEAR: ABNORMAL
LYMPHOCYTES NFR BLD MANUAL: 18 % (ref 20.5–51.1)
MACROCYTES BLD QL SMEAR: ABNORMAL
MCH RBC QN AUTO: 29.8 PG (ref 26–34)
MCHC RBC AUTO-ENTMCNC: 31.7 GM/DL (ref 31–37)
MCV RBC AUTO: 94.2 FL (ref 80–100)
MICROCYTES BLD QL SMEAR: ABNORMAL
MONOCYTES NFR BLD MANUAL: 18 % (ref 2–9)
NEUTS BAND NFR BLD MANUAL: 3 % (ref 0–10)
OVALOCYTES BLD QL SMEAR: ABNORMAL
PLATELET # BLD AUTO: 316 X10(3)/MCL (ref 130–400)
PLATELET # BLD EST: ADEQUATE 10*3/UL
PMV BLD AUTO: 10.6 FL (ref 7.4–10.4)
POIKILOCYTOSIS BLD QL SMEAR: ABNORMAL
POTASSIUM SERPL-SCNC: 3.5 MMOL/L (ref 3.5–5.1)
PROT SERPL-MCNC: 6.1 GM/DL (ref 6.4–8.2)
RBC # BLD AUTO: 2.95 X10(6)/MCL (ref 4–5.2)
RBC MORPH BLD: ABNORMAL
SCHISTOCYTES BLD QL AUTO: ABNORMAL
SODIUM SERPL-SCNC: 141 MMOL/L (ref 136–145)
WBC # SPEC AUTO: 5.5 X10(3)/MCL (ref 4.5–11)

## 2020-08-12 ENCOUNTER — HISTORICAL (OUTPATIENT)
Dept: INFUSION THERAPY | Facility: HOSPITAL | Age: 55
End: 2020-08-12

## 2020-08-12 LAB
ABS NEUT (OLG): 2.46 X10(3)/MCL (ref 2.1–9.2)
ALBUMIN SERPL-MCNC: 3.3 GM/DL (ref 3.4–5)
ALBUMIN/GLOB SERPL: 1 RATIO (ref 1.1–2)
ALP SERPL-CCNC: 43 UNIT/L (ref 45–117)
ALT SERPL-CCNC: 10 UNIT/L (ref 12–78)
AST SERPL-CCNC: 10 UNIT/L (ref 15–37)
BASOPHILS # BLD AUTO: 0 X10(3)/MCL (ref 0–0.2)
BASOPHILS NFR BLD AUTO: 0 %
BILIRUB SERPL-MCNC: 0.4 MG/DL (ref 0.2–1)
BILIRUBIN DIRECT+TOT PNL SERPL-MCNC: 0.1 MG/DL (ref 0–0.2)
BILIRUBIN DIRECT+TOT PNL SERPL-MCNC: 0.3 MG/DL
BUN SERPL-MCNC: 24 MG/DL (ref 7–18)
CALCIUM SERPL-MCNC: 8.7 MG/DL (ref 8.5–10.1)
CHLORIDE SERPL-SCNC: 112 MMOL/L (ref 98–107)
CO2 SERPL-SCNC: 18 MMOL/L (ref 21–32)
CREAT SERPL-MCNC: 1.3 MG/DL (ref 0.6–1.3)
ERYTHROCYTE [DISTWIDTH] IN BLOOD BY AUTOMATED COUNT: 17.7 % (ref 11.5–14.5)
GLOBULIN SER-MCNC: 3.3 GM/ML (ref 2.3–3.5)
GLUCOSE SERPL-MCNC: 146 MG/DL (ref 74–106)
HCT VFR BLD AUTO: 30.9 % (ref 35–46)
HGB BLD-MCNC: 9.8 GM/DL (ref 12–16)
LYMPHOCYTES # BLD AUTO: 0.5 X10(3)/MCL (ref 0.6–4.6)
LYMPHOCYTES NFR BLD AUTO: 17 %
MCH RBC QN AUTO: 29.9 PG (ref 26–34)
MCHC RBC AUTO-ENTMCNC: 31.7 GM/DL (ref 31–37)
MCV RBC AUTO: 94.2 FL (ref 80–100)
MONOCYTES # BLD AUTO: 0 X10(3)/MCL (ref 0.1–1.3)
MONOCYTES NFR BLD AUTO: 1 %
NEUTROPHILS # BLD AUTO: 2.46 X10(3)/MCL (ref 2.1–9.2)
NEUTROPHILS NFR BLD AUTO: 82 %
PLATELET # BLD AUTO: 324 X10(3)/MCL (ref 130–400)
PMV BLD AUTO: 10.3 FL (ref 7.4–10.4)
POTASSIUM SERPL-SCNC: 4.1 MMOL/L (ref 3.5–5.1)
PROT SERPL-MCNC: 6.6 GM/DL (ref 6.4–8.2)
RBC # BLD AUTO: 3.28 X10(6)/MCL (ref 4–5.2)
SODIUM SERPL-SCNC: 138 MMOL/L (ref 136–145)
WBC # SPEC AUTO: 3 X10(3)/MCL (ref 4.5–11)

## 2020-09-09 ENCOUNTER — HISTORICAL (OUTPATIENT)
Dept: RADIOLOGY | Facility: HOSPITAL | Age: 55
End: 2020-09-09

## 2020-09-10 ENCOUNTER — HISTORICAL (OUTPATIENT)
Dept: INFUSION THERAPY | Facility: HOSPITAL | Age: 55
End: 2020-09-10

## 2020-09-10 LAB
ABS NEUT (OLG): 5.67 X10(3)/MCL (ref 2.1–9.2)
ALBUMIN SERPL-MCNC: 3.6 GM/DL (ref 3.4–5)
ALBUMIN/GLOB SERPL: 1.1 RATIO (ref 1.1–2)
ALP SERPL-CCNC: 49 UNIT/L (ref 45–117)
ALT SERPL-CCNC: 10 UNIT/L (ref 12–78)
AST SERPL-CCNC: 16 UNIT/L (ref 15–37)
BASOPHILS # BLD AUTO: 0 X10(3)/MCL (ref 0–0.2)
BASOPHILS NFR BLD AUTO: 0 %
BILIRUB SERPL-MCNC: 0.3 MG/DL (ref 0.2–1)
BILIRUBIN DIRECT+TOT PNL SERPL-MCNC: <0.1 MG/DL (ref 0–0.2)
BILIRUBIN DIRECT+TOT PNL SERPL-MCNC: ABNORMAL MG/DL
BUN SERPL-MCNC: 21 MG/DL (ref 7–18)
CALCIUM SERPL-MCNC: 9.1 MG/DL (ref 8.5–10.1)
CHLORIDE SERPL-SCNC: 108 MMOL/L (ref 98–107)
CO2 SERPL-SCNC: 24 MMOL/L (ref 21–32)
CREAT SERPL-MCNC: 1.2 MG/DL (ref 0.6–1.3)
ERYTHROCYTE [DISTWIDTH] IN BLOOD BY AUTOMATED COUNT: 17.2 % (ref 11.5–14.5)
GLOBULIN SER-MCNC: 3.2 GM/ML (ref 2.3–3.5)
GLUCOSE SERPL-MCNC: 96 MG/DL (ref 74–106)
HCT VFR BLD AUTO: 34.8 % (ref 35–46)
HGB BLD-MCNC: 11 GM/DL (ref 12–16)
IMM GRANULOCYTES # BLD AUTO: 0.02 10*3/UL
IMM GRANULOCYTES NFR BLD AUTO: 0 %
LYMPHOCYTES # BLD AUTO: 0.6 X10(3)/MCL (ref 0.6–4.6)
LYMPHOCYTES NFR BLD AUTO: 9 %
MCH RBC QN AUTO: 30.1 PG (ref 26–34)
MCHC RBC AUTO-ENTMCNC: 31.6 GM/DL (ref 31–37)
MCV RBC AUTO: 95.3 FL (ref 80–100)
MONOCYTES # BLD AUTO: 0.2 X10(3)/MCL (ref 0.1–1.3)
MONOCYTES NFR BLD AUTO: 2 %
NEUTROPHILS # BLD AUTO: 5.67 X10(3)/MCL (ref 2.1–9.2)
NEUTROPHILS NFR BLD AUTO: 88 %
PLATELET # BLD AUTO: 319 X10(3)/MCL (ref 130–400)
PMV BLD AUTO: 10.8 FL (ref 7.4–10.4)
POTASSIUM SERPL-SCNC: 4.3 MMOL/L (ref 3.5–5.1)
PROT SERPL-MCNC: 6.8 GM/DL (ref 6.4–8.2)
RBC # BLD AUTO: 3.65 X10(6)/MCL (ref 4–5.2)
SODIUM SERPL-SCNC: 138 MMOL/L (ref 136–145)
WBC # SPEC AUTO: 6.4 X10(3)/MCL (ref 4.5–11)

## 2020-10-01 ENCOUNTER — HISTORICAL (OUTPATIENT)
Dept: INFUSION THERAPY | Facility: HOSPITAL | Age: 55
End: 2020-10-01

## 2020-10-01 LAB
ABS NEUT (OLG): 2.81 X10(3)/MCL (ref 2.1–9.2)
ALBUMIN SERPL-MCNC: 3.4 GM/DL (ref 3.4–5)
ALBUMIN/GLOB SERPL: 1 RATIO (ref 1.1–2)
ALP SERPL-CCNC: 53 UNIT/L (ref 45–117)
ALT SERPL-CCNC: 21 UNIT/L (ref 12–78)
AST SERPL-CCNC: 19 UNIT/L (ref 15–37)
BASOPHILS NFR BLD MANUAL: 0 %
BILIRUB SERPL-MCNC: 0.2 MG/DL (ref 0.2–1)
BILIRUBIN DIRECT+TOT PNL SERPL-MCNC: <0.1 MG/DL (ref 0–0.2)
BILIRUBIN DIRECT+TOT PNL SERPL-MCNC: ABNORMAL MG/DL
BUN SERPL-MCNC: 35 MG/DL (ref 7–18)
CALCIUM SERPL-MCNC: 9.3 MG/DL (ref 8.5–10.1)
CHLORIDE SERPL-SCNC: 106 MMOL/L (ref 98–107)
CO2 SERPL-SCNC: 23 MMOL/L (ref 21–32)
CREAT SERPL-MCNC: 1 MG/DL (ref 0.6–1.3)
EOSINOPHIL NFR BLD MANUAL: 0 %
ERYTHROCYTE [DISTWIDTH] IN BLOOD BY AUTOMATED COUNT: 17 % (ref 11.5–14.5)
GLOBULIN SER-MCNC: 3.4 GM/ML (ref 2.3–3.5)
GLUCOSE SERPL-MCNC: 118 MG/DL (ref 74–106)
GRANULOCYTES NFR BLD MANUAL: 82 % (ref 43–75)
HCT VFR BLD AUTO: 32.7 % (ref 35–46)
HGB BLD-MCNC: 10.2 GM/DL (ref 12–16)
LYMPHOCYTES NFR BLD MANUAL: 18 % (ref 20.5–51.1)
MAGNESIUM SERPL-MCNC: 2.7 MG/DL (ref 1.6–2.6)
MCH RBC QN AUTO: 30 PG (ref 26–34)
MCHC RBC AUTO-ENTMCNC: 31.2 GM/DL (ref 31–37)
MCV RBC AUTO: 96.2 FL (ref 80–100)
MONOCYTES NFR BLD MANUAL: 0 % (ref 2–9)
PLATELET # BLD AUTO: 358 X10(3)/MCL (ref 130–400)
PMV BLD AUTO: 10.9 FL (ref 7.4–10.4)
POTASSIUM SERPL-SCNC: 5.3 MMOL/L (ref 3.5–5.1)
PROT SERPL-MCNC: 6.8 GM/DL (ref 6.4–8.2)
RBC # BLD AUTO: 3.4 X10(6)/MCL (ref 4–5.2)
SODIUM SERPL-SCNC: 137 MMOL/L (ref 136–145)
WBC # SPEC AUTO: 3.5 X10(3)/MCL (ref 4.5–11)

## 2020-10-22 ENCOUNTER — HISTORICAL (OUTPATIENT)
Dept: INFUSION THERAPY | Facility: HOSPITAL | Age: 55
End: 2020-10-22

## 2020-10-22 LAB
ABS NEUT (OLG): 2.16 X10(3)/MCL (ref 2.1–9.2)
ALBUMIN SERPL-MCNC: 3.6 GM/DL (ref 3.5–5)
ALBUMIN/GLOB SERPL: 1.2 RATIO (ref 1.1–2)
ALP SERPL-CCNC: 51 UNIT/L (ref 40–150)
ALT SERPL-CCNC: 11 UNIT/L (ref 0–55)
AST SERPL-CCNC: 16 UNIT/L (ref 5–34)
BILIRUB SERPL-MCNC: 0.2 MG/DL
BILIRUBIN DIRECT+TOT PNL SERPL-MCNC: 0.1 MG/DL (ref 0–0.5)
BILIRUBIN DIRECT+TOT PNL SERPL-MCNC: 0.1 MG/DL (ref 0–0.8)
BUN SERPL-MCNC: 39 MG/DL (ref 9.8–20.1)
CALCIUM SERPL-MCNC: 9.3 MG/DL (ref 8.4–10.2)
CHLORIDE SERPL-SCNC: 105 MMOL/L (ref 98–107)
CO2 SERPL-SCNC: 22 MMOL/L (ref 22–29)
CREAT SERPL-MCNC: 0.95 MG/DL (ref 0.55–1.02)
ERYTHROCYTE [DISTWIDTH] IN BLOOD BY AUTOMATED COUNT: 17.1 % (ref 11.5–14.5)
GLOBULIN SER-MCNC: 3.1 GM/DL (ref 2.4–3.5)
GLUCOSE SERPL-MCNC: 104 MG/DL (ref 74–100)
HCT VFR BLD AUTO: 31.8 % (ref 35–46)
HGB BLD-MCNC: 10 GM/DL (ref 12–16)
IMM GRANULOCYTES # BLD AUTO: 0.01 10*3/UL
IMM GRANULOCYTES NFR BLD AUTO: 0 %
LYMPHOCYTES # BLD AUTO: 0.6 X10(3)/MCL (ref 0.6–4.6)
LYMPHOCYTES NFR BLD AUTO: 20 %
MCH RBC QN AUTO: 29.7 PG (ref 26–34)
MCHC RBC AUTO-ENTMCNC: 31.4 GM/DL (ref 31–37)
MCV RBC AUTO: 94.4 FL (ref 80–100)
MONOCYTES # BLD AUTO: 0.1 X10(3)/MCL (ref 0.1–1.3)
MONOCYTES NFR BLD AUTO: 2 %
NEUTROPHILS # BLD AUTO: 2.16 X10(3)/MCL (ref 2.1–9.2)
NEUTROPHILS NFR BLD AUTO: 77 %
PLATELET # BLD AUTO: 347 X10(3)/MCL (ref 130–400)
PMV BLD AUTO: 10.9 FL (ref 7.4–10.4)
POTASSIUM SERPL-SCNC: 5.2 MMOL/L (ref 3.5–5.1)
PROT SERPL-MCNC: 6.7 GM/DL (ref 6.4–8.3)
RBC # BLD AUTO: 3.37 X10(6)/MCL (ref 4–5.2)
SODIUM SERPL-SCNC: 133 MMOL/L (ref 136–145)
WBC # SPEC AUTO: 2.8 X10(3)/MCL (ref 4.5–11)

## 2020-11-10 ENCOUNTER — HISTORICAL (OUTPATIENT)
Dept: RADIOLOGY | Facility: HOSPITAL | Age: 55
End: 2020-11-10

## 2020-11-13 ENCOUNTER — HISTORICAL (OUTPATIENT)
Dept: INFUSION THERAPY | Facility: HOSPITAL | Age: 55
End: 2020-11-13

## 2020-11-13 LAB
ABS NEUT (OLG): 3.39 X10(3)/MCL (ref 2.1–9.2)
ALBUMIN SERPL-MCNC: 3.8 GM/DL (ref 3.5–5)
ALBUMIN/GLOB SERPL: 1.5 RATIO (ref 1.1–2)
ALP SERPL-CCNC: 53 UNIT/L (ref 40–150)
ALT SERPL-CCNC: 9 UNIT/L (ref 0–55)
AST SERPL-CCNC: 15 UNIT/L (ref 5–34)
BASOPHILS # BLD AUTO: 0 X10(3)/MCL (ref 0–0.2)
BASOPHILS NFR BLD AUTO: 0 %
BILIRUB SERPL-MCNC: 0.3 MG/DL
BILIRUBIN DIRECT+TOT PNL SERPL-MCNC: 0.1 MG/DL (ref 0–0.5)
BILIRUBIN DIRECT+TOT PNL SERPL-MCNC: 0.2 MG/DL (ref 0–0.8)
BUN SERPL-MCNC: 31 MG/DL (ref 9.8–20.1)
CALCIUM SERPL-MCNC: 9 MG/DL (ref 8.4–10.2)
CHLORIDE SERPL-SCNC: 107 MMOL/L (ref 98–107)
CO2 SERPL-SCNC: 20 MMOL/L (ref 22–29)
CREAT SERPL-MCNC: 0.93 MG/DL (ref 0.55–1.02)
ERYTHROCYTE [DISTWIDTH] IN BLOOD BY AUTOMATED COUNT: 16.7 % (ref 11.5–14.5)
GLOBULIN SER-MCNC: 2.6 GM/DL (ref 2.4–3.5)
GLUCOSE SERPL-MCNC: 87 MG/DL (ref 74–100)
HCT VFR BLD AUTO: 30.9 % (ref 35–46)
HGB BLD-MCNC: 10 GM/DL (ref 12–16)
IMM GRANULOCYTES # BLD AUTO: 0.02 10*3/UL
IMM GRANULOCYTES NFR BLD AUTO: 0 %
LYMPHOCYTES # BLD AUTO: 0.8 X10(3)/MCL (ref 0.6–4.6)
LYMPHOCYTES NFR BLD AUTO: 17 %
MAGNESIUM SERPL-MCNC: 2.35 MG/DL (ref 1.6–2.6)
MCH RBC QN AUTO: 30.4 PG (ref 26–34)
MCHC RBC AUTO-ENTMCNC: 32.4 GM/DL (ref 31–37)
MCV RBC AUTO: 93.9 FL (ref 80–100)
MONOCYTES # BLD AUTO: 0.2 X10(3)/MCL (ref 0.1–1.3)
MONOCYTES NFR BLD AUTO: 6 %
NEUTROPHILS # BLD AUTO: 3.39 X10(3)/MCL (ref 2.1–9.2)
NEUTROPHILS NFR BLD AUTO: 76 %
PLATELET # BLD AUTO: 289 X10(3)/MCL (ref 130–400)
PMV BLD AUTO: 10.6 FL (ref 7.4–10.4)
POTASSIUM SERPL-SCNC: 4.8 MMOL/L (ref 3.5–5.1)
PROT SERPL-MCNC: 6.4 GM/DL (ref 6.4–8.3)
RBC # BLD AUTO: 3.29 X10(6)/MCL (ref 4–5.2)
SODIUM SERPL-SCNC: 136 MMOL/L (ref 136–145)
WBC # SPEC AUTO: 4.4 X10(3)/MCL (ref 4.5–11)

## 2020-11-23 ENCOUNTER — HISTORICAL (OUTPATIENT)
Dept: HEMATOLOGY/ONCOLOGY | Facility: CLINIC | Age: 55
End: 2020-11-23

## 2020-11-23 LAB
ABS NEUT (OLG): 11.56 X10(3)/MCL (ref 2.1–9.2)
ALBUMIN SERPL-MCNC: 3.4 GM/DL (ref 3.5–5)
ALBUMIN/GLOB SERPL: 1.4 RATIO (ref 1.1–2)
ALP SERPL-CCNC: 76 UNIT/L (ref 40–150)
ALT SERPL-CCNC: 7 UNIT/L (ref 0–55)
ANISOCYTOSIS BLD QL SMEAR: ABNORMAL
AST SERPL-CCNC: 12 UNIT/L (ref 5–34)
BASOPHILS NFR BLD MANUAL: 0 %
BILIRUB SERPL-MCNC: 0.2 MG/DL
BILIRUBIN DIRECT+TOT PNL SERPL-MCNC: 0.1 MG/DL (ref 0–0.5)
BILIRUBIN DIRECT+TOT PNL SERPL-MCNC: 0.1 MG/DL (ref 0–0.8)
BUN SERPL-MCNC: 18 MG/DL (ref 9.8–20.1)
CALCIUM SERPL-MCNC: 8.7 MG/DL (ref 8.4–10.2)
CHLORIDE SERPL-SCNC: 108 MMOL/L (ref 98–107)
CO2 SERPL-SCNC: 25 MMOL/L (ref 22–29)
CREAT SERPL-MCNC: 0.94 MG/DL (ref 0.55–1.02)
EOSINOPHIL NFR BLD MANUAL: 0 %
ERYTHROCYTE [DISTWIDTH] IN BLOOD BY AUTOMATED COUNT: 17.8 % (ref 11.5–14.5)
GLOBULIN SER-MCNC: 2.5 GM/DL (ref 2.4–3.5)
GLUCOSE SERPL-MCNC: 89 MG/DL (ref 74–100)
GRANULOCYTES NFR BLD MANUAL: 79 % (ref 43–75)
HCT VFR BLD AUTO: 30 % (ref 35–46)
HGB BLD-MCNC: 9.2 GM/DL (ref 12–16)
LYMPHOCYTES NFR BLD MANUAL: 7 % (ref 20.5–51.1)
MCH RBC QN AUTO: 29.4 PG (ref 26–34)
MCHC RBC AUTO-ENTMCNC: 30.7 GM/DL (ref 31–37)
MCV RBC AUTO: 95.8 FL (ref 80–100)
METAMYELOCYTES NFR BLD MANUAL: 2 %
MONOCYTES NFR BLD MANUAL: 6 % (ref 2–9)
MYELOCYTES NFR BLD MANUAL: 3 %
NEUTS BAND NFR BLD MANUAL: 3 % (ref 0–10)
PLATELET # BLD AUTO: 297 X10(3)/MCL (ref 130–400)
PLATELET # BLD EST: ADEQUATE 10*3/UL
PMV BLD AUTO: 10.5 FL (ref 7.4–10.4)
POIKILOCYTOSIS BLD QL SMEAR: ABNORMAL
POLYCHROMASIA BLD QL SMEAR: ABNORMAL
POTASSIUM SERPL-SCNC: 3.9 MMOL/L (ref 3.5–5.1)
PROT SERPL-MCNC: 5.9 GM/DL (ref 6.4–8.3)
RBC # BLD AUTO: 3.13 X10(6)/MCL (ref 4–5.2)
RBC MORPH BLD: ABNORMAL
SCHISTOCYTES BLD QL AUTO: ABNORMAL
SODIUM SERPL-SCNC: 140 MMOL/L (ref 136–145)
WBC # SPEC AUTO: 17.3 X10(3)/MCL (ref 4.5–11)

## 2020-12-04 ENCOUNTER — HISTORICAL (OUTPATIENT)
Dept: INFUSION THERAPY | Facility: HOSPITAL | Age: 55
End: 2020-12-04

## 2020-12-04 LAB
ABS NEUT (OLG): 1.87 X10(3)/MCL (ref 2.1–9.2)
ALBUMIN SERPL-MCNC: 3.7 GM/DL (ref 3.5–5)
ALBUMIN/GLOB SERPL: 1.3 RATIO (ref 1.1–2)
ALP SERPL-CCNC: 56 UNIT/L (ref 40–150)
ALT SERPL-CCNC: 9 UNIT/L (ref 0–55)
AST SERPL-CCNC: 14 UNIT/L (ref 5–34)
BILIRUB SERPL-MCNC: 0.3 MG/DL
BILIRUBIN DIRECT+TOT PNL SERPL-MCNC: 0.1 MG/DL (ref 0–0.5)
BILIRUBIN DIRECT+TOT PNL SERPL-MCNC: 0.2 MG/DL (ref 0–0.8)
BUN SERPL-MCNC: 36 MG/DL (ref 9.8–20.1)
CALCIUM SERPL-MCNC: 9.1 MG/DL (ref 8.4–10.2)
CHLORIDE SERPL-SCNC: 107 MMOL/L (ref 98–107)
CO2 SERPL-SCNC: 21 MMOL/L (ref 22–29)
CREAT SERPL-MCNC: 0.86 MG/DL (ref 0.55–1.02)
ERYTHROCYTE [DISTWIDTH] IN BLOOD BY AUTOMATED COUNT: 17 % (ref 11.5–14.5)
GLOBULIN SER-MCNC: 2.9 GM/DL (ref 2.4–3.5)
GLUCOSE SERPL-MCNC: 96 MG/DL (ref 74–100)
HCT VFR BLD AUTO: 31.6 % (ref 35–46)
HGB BLD-MCNC: 9.8 GM/DL (ref 12–16)
IMM GRANULOCYTES # BLD AUTO: 0.01 10*3/UL
IMM GRANULOCYTES NFR BLD AUTO: 0 %
LYMPHOCYTES # BLD AUTO: 0.9 X10(3)/MCL (ref 0.6–4.6)
LYMPHOCYTES NFR BLD AUTO: 29 %
MCH RBC QN AUTO: 29.3 PG (ref 26–34)
MCHC RBC AUTO-ENTMCNC: 31 GM/DL (ref 31–37)
MCV RBC AUTO: 94.6 FL (ref 80–100)
MONOCYTES # BLD AUTO: 0.4 X10(3)/MCL (ref 0.1–1.3)
MONOCYTES NFR BLD AUTO: 11 %
NEUTROPHILS # BLD AUTO: 1.87 X10(3)/MCL (ref 2.1–9.2)
NEUTROPHILS NFR BLD AUTO: 59 %
PLATELET # BLD AUTO: 380 X10(3)/MCL (ref 130–400)
PMV BLD AUTO: 10.1 FL (ref 7.4–10.4)
POTASSIUM SERPL-SCNC: 4.6 MMOL/L (ref 3.5–5.1)
PROT SERPL-MCNC: 6.6 GM/DL (ref 6.4–8.3)
RBC # BLD AUTO: 3.34 X10(6)/MCL (ref 4–5.2)
SODIUM SERPL-SCNC: 136 MMOL/L (ref 136–145)
WBC # SPEC AUTO: 3.2 X10(3)/MCL (ref 4.5–11)

## 2020-12-09 ENCOUNTER — HISTORICAL (OUTPATIENT)
Dept: RADIOLOGY | Facility: HOSPITAL | Age: 55
End: 2020-12-09

## 2020-12-28 ENCOUNTER — HISTORICAL (OUTPATIENT)
Dept: INFUSION THERAPY | Facility: HOSPITAL | Age: 55
End: 2020-12-28

## 2020-12-28 LAB
ABS NEUT (OLG): 2.8 X10(3)/MCL (ref 2.1–9.2)
ALBUMIN SERPL-MCNC: 3.7 GM/DL (ref 3.5–5)
ALBUMIN/GLOB SERPL: 1.3 RATIO (ref 1.1–2)
ALP SERPL-CCNC: 50 UNIT/L (ref 40–150)
ALT SERPL-CCNC: 7 UNIT/L (ref 0–55)
AST SERPL-CCNC: 13 UNIT/L (ref 5–34)
BASOPHILS # BLD AUTO: 0 X10(3)/MCL (ref 0–0.2)
BASOPHILS NFR BLD AUTO: 0 %
BILIRUB SERPL-MCNC: 0.4 MG/DL
BILIRUBIN DIRECT+TOT PNL SERPL-MCNC: 0.2 MG/DL (ref 0–0.5)
BILIRUBIN DIRECT+TOT PNL SERPL-MCNC: 0.2 MG/DL (ref 0–0.8)
BUN SERPL-MCNC: 35 MG/DL (ref 9.8–20.1)
CALCIUM SERPL-MCNC: 9.2 MG/DL (ref 8.4–10.2)
CHLORIDE SERPL-SCNC: 106 MMOL/L (ref 98–107)
CO2 SERPL-SCNC: 22 MMOL/L (ref 22–29)
CREAT SERPL-MCNC: 0.91 MG/DL (ref 0.55–1.02)
ERYTHROCYTE [DISTWIDTH] IN BLOOD BY AUTOMATED COUNT: 16.8 % (ref 11.5–14.5)
GLOBULIN SER-MCNC: 2.8 GM/DL (ref 2.4–3.5)
GLUCOSE SERPL-MCNC: 88 MG/DL (ref 74–100)
HCT VFR BLD AUTO: 29.8 % (ref 35–46)
HGB BLD-MCNC: 9.3 GM/DL (ref 12–16)
IMM GRANULOCYTES # BLD AUTO: 0.01 10*3/UL
IMM GRANULOCYTES NFR BLD AUTO: 0 %
LYMPHOCYTES # BLD AUTO: 0.7 X10(3)/MCL (ref 0.6–4.6)
LYMPHOCYTES NFR BLD AUTO: 18 %
MCH RBC QN AUTO: 29.2 PG (ref 26–34)
MCHC RBC AUTO-ENTMCNC: 31.2 GM/DL (ref 31–37)
MCV RBC AUTO: 93.4 FL (ref 80–100)
MONOCYTES # BLD AUTO: 0.3 X10(3)/MCL (ref 0.1–1.3)
MONOCYTES NFR BLD AUTO: 7 %
NEUTROPHILS # BLD AUTO: 2.8 X10(3)/MCL (ref 2.1–9.2)
NEUTROPHILS NFR BLD AUTO: 74 %
PLATELET # BLD AUTO: 378 X10(3)/MCL (ref 130–400)
PMV BLD AUTO: 10.4 FL (ref 7.4–10.4)
POTASSIUM SERPL-SCNC: 4.6 MMOL/L (ref 3.5–5.1)
PROT SERPL-MCNC: 6.5 GM/DL (ref 6.4–8.3)
RBC # BLD AUTO: 3.19 X10(6)/MCL (ref 4–5.2)
SODIUM SERPL-SCNC: 136 MMOL/L (ref 136–145)
WBC # SPEC AUTO: 3.8 X10(3)/MCL (ref 4.5–11)

## 2021-01-01 ENCOUNTER — HISTORICAL (OUTPATIENT)
Dept: ADMINISTRATIVE | Facility: HOSPITAL | Age: 56
End: 2021-01-01

## 2021-01-01 ENCOUNTER — HISTORICAL (OUTPATIENT)
Dept: RADIOLOGY | Facility: HOSPITAL | Age: 56
End: 2021-01-01

## 2021-01-01 ENCOUNTER — HISTORICAL (OUTPATIENT)
Dept: RADIATION THERAPY | Facility: HOSPITAL | Age: 56
End: 2021-01-01

## 2021-01-01 ENCOUNTER — HISTORICAL (OUTPATIENT)
Dept: INFUSION THERAPY | Facility: HOSPITAL | Age: 56
End: 2021-01-01

## 2021-01-01 ENCOUNTER — HISTORICAL (OUTPATIENT)
Dept: LAB | Facility: HOSPITAL | Age: 56
End: 2021-01-01

## 2021-01-01 LAB
ABS NEUT (OLG): 0.76 X10(3)/MCL (ref 2.1–9.2)
ABS NEUT (OLG): 1.13 X10(3)/MCL (ref 2.1–9.2)
ABS NEUT (OLG): 1.37 X10(3)/MCL (ref 2.1–9.2)
ABS NEUT (OLG): 1.41 X10(3)/MCL (ref 2.1–9.2)
ABS NEUT (OLG): 1.59 X10(3)/MCL (ref 2.1–9.2)
ABS NEUT (OLG): 1.64 X10(3)/MCL (ref 2.1–9.2)
ABS NEUT (OLG): 2.22 X10(3)/MCL (ref 2.1–9.2)
ABS NEUT (OLG): 2.51 X10(3)/MCL (ref 2.1–9.2)
ALBUMIN SERPL-MCNC: 3.3 GM/DL (ref 3.5–5)
ALBUMIN SERPL-MCNC: 3.5 GM/DL (ref 3.5–5)
ALBUMIN SERPL-MCNC: 3.6 GM/DL (ref 3.5–5)
ALBUMIN SERPL-MCNC: 3.6 GM/DL (ref 3.5–5)
ALBUMIN SERPL-MCNC: 3.7 GM/DL (ref 3.5–5)
ALBUMIN SERPL-MCNC: 3.7 GM/DL (ref 3.5–5)
ALBUMIN SERPL-MCNC: 3.8 GM/DL (ref 3.5–5)
ALBUMIN SERPL-MCNC: 4.2 GM/DL (ref 3.5–5)
ALBUMIN/GLOB SERPL: 0.9 RATIO (ref 1.1–2)
ALBUMIN/GLOB SERPL: 1 RATIO (ref 1.1–2)
ALBUMIN/GLOB SERPL: 1.2 RATIO (ref 1.1–2)
ALBUMIN/GLOB SERPL: 1.2 RATIO (ref 1.1–2)
ALP SERPL-CCNC: 54 UNIT/L (ref 40–150)
ALP SERPL-CCNC: 54 UNIT/L (ref 40–150)
ALP SERPL-CCNC: 59 UNIT/L (ref 40–150)
ALP SERPL-CCNC: 59 UNIT/L (ref 40–150)
ALP SERPL-CCNC: 65 UNIT/L (ref 40–150)
ALP SERPL-CCNC: 74 UNIT/L (ref 40–150)
ALP SERPL-CCNC: 81 UNIT/L (ref 40–150)
ALP SERPL-CCNC: 91 UNIT/L (ref 40–150)
ALT SERPL-CCNC: 5 UNIT/L (ref 0–55)
ALT SERPL-CCNC: 5 UNIT/L (ref 0–55)
ALT SERPL-CCNC: 6 UNIT/L (ref 0–55)
ALT SERPL-CCNC: 7 UNIT/L (ref 0–55)
ALT SERPL-CCNC: <5 UNIT/L (ref 0–55)
ANISOCYTOSIS BLD QL SMEAR: ABNORMAL
AST SERPL-CCNC: 12 UNIT/L (ref 5–34)
AST SERPL-CCNC: 13 UNIT/L (ref 5–34)
AST SERPL-CCNC: 14 UNIT/L (ref 5–34)
AST SERPL-CCNC: 16 UNIT/L (ref 5–34)
AST SERPL-CCNC: 17 UNIT/L (ref 5–34)
BASOPHILS # BLD AUTO: 0 X10(3)/MCL (ref 0–0.2)
BASOPHILS NFR BLD AUTO: 1 %
BILIRUB SERPL-MCNC: 0.3 MG/DL
BILIRUB SERPL-MCNC: 0.5 MG/DL
BILIRUB SERPL-MCNC: 0.5 MG/DL
BILIRUB SERPL-MCNC: 0.6 MG/DL
BILIRUB SERPL-MCNC: 0.7 MG/DL
BILIRUB SERPL-MCNC: 1.1 MG/DL
BILIRUBIN DIRECT+TOT PNL SERPL-MCNC: 0.1 MG/DL (ref 0–0.5)
BILIRUBIN DIRECT+TOT PNL SERPL-MCNC: 0.1 MG/DL (ref 0–0.5)
BILIRUBIN DIRECT+TOT PNL SERPL-MCNC: 0.1 MG/DL (ref 0–0.8)
BILIRUBIN DIRECT+TOT PNL SERPL-MCNC: 0.2 MG/DL (ref 0–0.5)
BILIRUBIN DIRECT+TOT PNL SERPL-MCNC: 0.2 MG/DL (ref 0–0.8)
BILIRUBIN DIRECT+TOT PNL SERPL-MCNC: 0.2 MG/DL (ref 0–0.8)
BILIRUBIN DIRECT+TOT PNL SERPL-MCNC: 0.3 MG/DL (ref 0–0.5)
BILIRUBIN DIRECT+TOT PNL SERPL-MCNC: 0.3 MG/DL (ref 0–0.8)
BILIRUBIN DIRECT+TOT PNL SERPL-MCNC: 0.4 MG/DL (ref 0–0.5)
BILIRUBIN DIRECT+TOT PNL SERPL-MCNC: 0.5 MG/DL (ref 0–0.8)
BILIRUBIN DIRECT+TOT PNL SERPL-MCNC: 0.7 MG/DL (ref 0–0.8)
BUN SERPL-MCNC: 14.8 MG/DL (ref 9.8–20.1)
BUN SERPL-MCNC: 20.7 MG/DL (ref 9.8–20.1)
BUN SERPL-MCNC: 21.8 MG/DL (ref 9.8–20.1)
BUN SERPL-MCNC: 22.1 MG/DL (ref 9.8–20.1)
BUN SERPL-MCNC: 22.8 MG/DL (ref 9.8–20.1)
BUN SERPL-MCNC: 23.4 MG/DL (ref 9.8–20.1)
BUN SERPL-MCNC: 25.5 MG/DL (ref 9.8–20.1)
BUN SERPL-MCNC: 34.2 MG/DL (ref 9.8–20.1)
CALCIUM SERPL-MCNC: 10 MG/DL (ref 8.7–10.5)
CALCIUM SERPL-MCNC: 9.1 MG/DL (ref 8.7–10.5)
CALCIUM SERPL-MCNC: 9.4 MG/DL (ref 8.4–10.2)
CALCIUM SERPL-MCNC: 9.5 MG/DL (ref 8.4–10.2)
CALCIUM SERPL-MCNC: 9.5 MG/DL (ref 8.4–10.2)
CALCIUM SERPL-MCNC: 9.7 MG/DL (ref 8.7–10.5)
CALCIUM SERPL-MCNC: 9.8 MG/DL (ref 8.4–10.2)
CALCIUM SERPL-MCNC: 9.8 MG/DL (ref 8.4–10.2)
CHLORIDE SERPL-SCNC: 105 MMOL/L (ref 98–107)
CHLORIDE SERPL-SCNC: 105 MMOL/L (ref 98–107)
CHLORIDE SERPL-SCNC: 107 MMOL/L (ref 98–107)
CHLORIDE SERPL-SCNC: 107 MMOL/L (ref 98–107)
CHLORIDE SERPL-SCNC: 108 MMOL/L (ref 98–107)
CHLORIDE SERPL-SCNC: 109 MMOL/L (ref 98–107)
CO2 SERPL-SCNC: 19 MMOL/L (ref 22–29)
CO2 SERPL-SCNC: 21 MMOL/L (ref 22–29)
CO2 SERPL-SCNC: 21 MMOL/L (ref 22–29)
CO2 SERPL-SCNC: 23 MMOL/L (ref 22–29)
CO2 SERPL-SCNC: 25 MMOL/L (ref 22–29)
CO2 SERPL-SCNC: 25 MMOL/L (ref 22–29)
CREAT SERPL-MCNC: 0.81 MG/DL (ref 0.55–1.02)
CREAT SERPL-MCNC: 0.89 MG/DL (ref 0.55–1.02)
CREAT SERPL-MCNC: 0.98 MG/DL (ref 0.55–1.02)
CREAT SERPL-MCNC: 1.01 MG/DL (ref 0.55–1.02)
CREAT SERPL-MCNC: 1.01 MG/DL (ref 0.55–1.02)
CREAT SERPL-MCNC: 1.09 MG/DL (ref 0.55–1.02)
CREAT SERPL-MCNC: 1.13 MG/DL (ref 0.55–1.02)
CREAT SERPL-MCNC: 1.2 MG/DL (ref 0.55–1.02)
CREAT SERPL-MCNC: 1.21 MG/DL (ref 0.55–1.02)
EOSINOPHIL # BLD AUTO: 0.1 X10(3)/MCL (ref 0–0.9)
EOSINOPHIL # BLD AUTO: 0.2 X10(3)/MCL (ref 0–0.9)
EOSINOPHIL NFR BLD AUTO: 2 %
EOSINOPHIL NFR BLD AUTO: 3 %
EOSINOPHIL NFR BLD AUTO: 4 %
EOSINOPHIL NFR BLD AUTO: 6 %
EOSINOPHIL NFR BLD MANUAL: 7 % (ref 0–8)
EOSINOPHIL NFR BLD MANUAL: 9 %
ERYTHROCYTE [DISTWIDTH] IN BLOOD BY AUTOMATED COUNT: 15.9 % (ref 11.5–14.5)
ERYTHROCYTE [DISTWIDTH] IN BLOOD BY AUTOMATED COUNT: 16.3 % (ref 11.5–14.5)
ERYTHROCYTE [DISTWIDTH] IN BLOOD BY AUTOMATED COUNT: 16.3 % (ref 11.5–17)
ERYTHROCYTE [DISTWIDTH] IN BLOOD BY AUTOMATED COUNT: 16.7 % (ref 11.5–14.5)
ERYTHROCYTE [DISTWIDTH] IN BLOOD BY AUTOMATED COUNT: 17.4 % (ref 11.5–14.5)
ERYTHROCYTE [DISTWIDTH] IN BLOOD BY AUTOMATED COUNT: 17.6 % (ref 11.5–14.5)
ERYTHROCYTE [DISTWIDTH] IN BLOOD BY AUTOMATED COUNT: 18.1 % (ref 11.5–14.5)
ERYTHROCYTE [DISTWIDTH] IN BLOOD BY AUTOMATED COUNT: 22.6 % (ref 11.5–14.5)
GLOBULIN SER-MCNC: 3 GM/DL (ref 2.4–3.5)
GLOBULIN SER-MCNC: 3.4 GM/DL (ref 2.4–3.5)
GLOBULIN SER-MCNC: 3.6 GM/DL (ref 2.4–3.5)
GLOBULIN SER-MCNC: 3.9 GM/DL (ref 2.4–3.5)
GLOBULIN SER-MCNC: 4 GM/DL (ref 2.4–3.5)
GLUCOSE SERPL-MCNC: 112 MG/DL (ref 74–100)
GLUCOSE SERPL-MCNC: 122 MG/DL (ref 74–100)
GLUCOSE SERPL-MCNC: 80 MG/DL (ref 74–100)
GLUCOSE SERPL-MCNC: 81 MG/DL (ref 74–100)
GLUCOSE SERPL-MCNC: 82 MG/DL (ref 74–100)
GLUCOSE SERPL-MCNC: 83 MG/DL (ref 74–100)
GLUCOSE SERPL-MCNC: 83 MG/DL (ref 74–100)
GLUCOSE SERPL-MCNC: 89 MG/DL (ref 74–100)
HCT VFR BLD AUTO: 29.9 % (ref 35–46)
HCT VFR BLD AUTO: 30.6 % (ref 35–46)
HCT VFR BLD AUTO: 30.6 % (ref 35–46)
HCT VFR BLD AUTO: 31 % (ref 35–46)
HCT VFR BLD AUTO: 31.5 % (ref 37–47)
HCT VFR BLD AUTO: 32.4 % (ref 35–46)
HCT VFR BLD AUTO: 32.6 % (ref 35–46)
HCT VFR BLD AUTO: 33.2 % (ref 35–46)
HGB BLD-MCNC: 10.4 GM/DL (ref 12–16)
HGB BLD-MCNC: 10.4 GM/DL (ref 12–16)
HGB BLD-MCNC: 10.8 GM/DL (ref 12–16)
HGB BLD-MCNC: 9.7 GM/DL (ref 12–16)
HGB BLD-MCNC: 9.7 GM/DL (ref 12–16)
HGB BLD-MCNC: 9.8 GM/DL (ref 12–16)
HGB BLD-MCNC: 9.8 GM/DL (ref 12–16)
HGB BLD-MCNC: 9.9 GM/DL (ref 12–16)
IMM GRANULOCYTES # BLD AUTO: 0.01 % (ref 0–0.02)
IMM GRANULOCYTES # BLD AUTO: 0.01 10*3/UL
IMM GRANULOCYTES NFR BLD AUTO: 0 %
IMM GRANULOCYTES NFR BLD AUTO: 0.3 % (ref 0–0.43)
LYMPHOCYTES # BLD AUTO: 0.7 X10(3)/MCL (ref 0.6–4.6)
LYMPHOCYTES # BLD AUTO: 0.9 X10(3)/MCL (ref 0.6–4.6)
LYMPHOCYTES # BLD AUTO: 1 X10(3)/MCL (ref 0.6–4.6)
LYMPHOCYTES # BLD AUTO: 1.1 X10(3)/MCL (ref 0.6–4.6)
LYMPHOCYTES # BLD AUTO: 1.1 X10(3)/MCL (ref 0.6–4.6)
LYMPHOCYTES NFR BLD AUTO: 24 %
LYMPHOCYTES NFR BLD AUTO: 25 %
LYMPHOCYTES NFR BLD AUTO: 27 %
LYMPHOCYTES NFR BLD AUTO: 30 %
LYMPHOCYTES NFR BLD AUTO: 31 %
LYMPHOCYTES NFR BLD AUTO: 38 %
LYMPHOCYTES NFR BLD AUTO: 38 %
LYMPHOCYTES NFR BLD MANUAL: 30 % (ref 13–40)
LYMPHOCYTES NFR BLD MANUAL: 61 % (ref 20.5–51.1)
MACROCYTES BLD QL SMEAR: SLIGHT
MAGNESIUM SERPL-MCNC: 2.2 MG/DL (ref 1.6–2.6)
MCH RBC QN AUTO: 31.8 PG (ref 27–31)
MCH RBC QN AUTO: 32.6 PG (ref 26–34)
MCH RBC QN AUTO: 32.8 PG (ref 26–34)
MCH RBC QN AUTO: 33.3 PG (ref 26–34)
MCH RBC QN AUTO: 33.6 PG (ref 26–34)
MCH RBC QN AUTO: 33.6 PG (ref 26–34)
MCH RBC QN AUTO: 34.1 PG (ref 26–34)
MCH RBC QN AUTO: 34.1 PG (ref 26–34)
MCHC RBC AUTO-ENTMCNC: 31.3 GM/DL (ref 31–37)
MCHC RBC AUTO-ENTMCNC: 31.4 GM/DL (ref 33–36)
MCHC RBC AUTO-ENTMCNC: 31.9 GM/DL (ref 31–37)
MCHC RBC AUTO-ENTMCNC: 32 GM/DL (ref 31–37)
MCHC RBC AUTO-ENTMCNC: 32 GM/DL (ref 31–37)
MCHC RBC AUTO-ENTMCNC: 32.1 GM/DL (ref 31–37)
MCHC RBC AUTO-ENTMCNC: 32.4 GM/DL (ref 31–37)
MCHC RBC AUTO-ENTMCNC: 32.5 GM/DL (ref 31–37)
MCV RBC AUTO: 101.3 FL (ref 80–94)
MCV RBC AUTO: 101.7 FL (ref 80–100)
MCV RBC AUTO: 102.8 FL (ref 80–100)
MCV RBC AUTO: 103.4 FL (ref 80–100)
MCV RBC AUTO: 103.5 FL (ref 80–100)
MCV RBC AUTO: 106.2 FL (ref 80–100)
MCV RBC AUTO: 106.5 FL (ref 80–100)
MCV RBC AUTO: 106.6 FL (ref 80–100)
MONOCYTES # BLD AUTO: 0.3 X10(3)/MCL (ref 0.1–1.3)
MONOCYTES # BLD AUTO: 0.4 X10(3)/MCL (ref 0.1–1.3)
MONOCYTES # BLD AUTO: 0.5 X10(3)/MCL (ref 0.1–1.3)
MONOCYTES # BLD AUTO: 0.8 X10(3)/MCL (ref 0.1–1.3)
MONOCYTES NFR BLD AUTO: 10 %
MONOCYTES NFR BLD AUTO: 15 %
MONOCYTES NFR BLD AUTO: 16 %
MONOCYTES NFR BLD AUTO: 16 %
MONOCYTES NFR BLD AUTO: 20 %
MONOCYTES NFR BLD MANUAL: 19 % (ref 2–11)
MONOCYTES NFR BLD MANUAL: 4 % (ref 2–9)
NEUTROPHILS # BLD AUTO: 1.13 X10(3)/MCL (ref 2.1–9.2)
NEUTROPHILS # BLD AUTO: 1.37 X10(3)/MCL (ref 2.1–9.2)
NEUTROPHILS # BLD AUTO: 1.41 X10(3)/MCL (ref 2.1–9.2)
NEUTROPHILS # BLD AUTO: 1.59 X10(3)/MCL (ref 2.1–9.2)
NEUTROPHILS # BLD AUTO: 1.64 X10(3)/MCL (ref 1.4–7.9)
NEUTROPHILS # BLD AUTO: 2.22 X10(3)/MCL (ref 2.1–9.2)
NEUTROPHILS # BLD AUTO: 2.51 X10(3)/MCL (ref 2.1–9.2)
NEUTROPHILS NFR BLD AUTO: 42 %
NEUTROPHILS NFR BLD AUTO: 45 %
NEUTROPHILS NFR BLD AUTO: 47 %
NEUTROPHILS NFR BLD AUTO: 49 %
NEUTROPHILS NFR BLD AUTO: 54 %
NEUTROPHILS NFR BLD AUTO: 61 %
NEUTROPHILS NFR BLD AUTO: 63 %
NEUTROPHILS NFR BLD MANUAL: 26 % (ref 47–80)
NEUTROPHILS NFR BLD MANUAL: 43 % (ref 47–80)
NEUTS BAND NFR BLD MANUAL: 1 % (ref 0–11)
NRBC BLD AUTO-RTO: 0 % (ref 0–0.2)
PLATELET # BLD AUTO: 231 X10(3)/MCL (ref 130–400)
PLATELET # BLD AUTO: 245 X10(3)/MCL (ref 130–400)
PLATELET # BLD AUTO: 257 X10(3)/MCL (ref 130–400)
PLATELET # BLD AUTO: 282 X10(3)/MCL (ref 130–400)
PLATELET # BLD AUTO: 295 X10(3)/MCL (ref 130–400)
PLATELET # BLD AUTO: 312 X10(3)/MCL (ref 130–400)
PLATELET # BLD AUTO: 315 X10(3)/MCL (ref 130–400)
PLATELET # BLD AUTO: 338 X10(3)/MCL (ref 130–400)
PLATELET # BLD EST: NORMAL 10*3/UL
PMV BLD AUTO: 10.4 FL (ref 7.4–10.4)
PMV BLD AUTO: 9.2 FL (ref 7.4–10.4)
PMV BLD AUTO: 9.4 FL (ref 7.4–10.4)
PMV BLD AUTO: 9.4 FL (ref 7.4–10.4)
PMV BLD AUTO: 9.6 FL (ref 9.4–12.4)
PMV BLD AUTO: 9.7 FL (ref 7.4–10.4)
PMV BLD AUTO: 9.9 FL (ref 7.4–10.4)
PMV BLD AUTO: 9.9 FL (ref 7.4–10.4)
POIKILOCYTOSIS BLD QL SMEAR: ABNORMAL
POTASSIUM SERPL-SCNC: 3.6 MMOL/L (ref 3.5–5.1)
POTASSIUM SERPL-SCNC: 3.9 MMOL/L (ref 3.5–5.1)
POTASSIUM SERPL-SCNC: 4.1 MMOL/L (ref 3.5–5.1)
POTASSIUM SERPL-SCNC: 4.1 MMOL/L (ref 3.5–5.1)
POTASSIUM SERPL-SCNC: 4.2 MMOL/L (ref 3.5–5.1)
POTASSIUM SERPL-SCNC: 4.2 MMOL/L (ref 3.5–5.1)
POTASSIUM SERPL-SCNC: 4.4 MMOL/L (ref 3.5–5.1)
POTASSIUM SERPL-SCNC: 4.8 MMOL/L (ref 3.5–5.1)
PROT SERPL-MCNC: 6.7 GM/DL (ref 6.4–8.3)
PROT SERPL-MCNC: 6.7 GM/DL (ref 6.4–8.3)
PROT SERPL-MCNC: 7.2 GM/DL (ref 6.4–8.3)
PROT SERPL-MCNC: 7.2 GM/DL (ref 6.4–8.3)
PROT SERPL-MCNC: 7.3 GM/DL (ref 6.4–8.3)
PROT SERPL-MCNC: 7.4 GM/DL (ref 6.4–8.3)
PROT SERPL-MCNC: 7.8 GM/DL (ref 6.4–8.3)
PROT SERPL-MCNC: 7.8 GM/DL (ref 6.4–8.3)
RBC # BLD AUTO: 2.87 X10(6)/MCL (ref 4–5.2)
RBC # BLD AUTO: 2.89 X10(6)/MCL (ref 4–5.2)
RBC # BLD AUTO: 2.91 X10(6)/MCL (ref 4–5.2)
RBC # BLD AUTO: 3.01 X10(6)/MCL (ref 4–5.2)
RBC # BLD AUTO: 3.05 X10(6)/MCL (ref 4–5.2)
RBC # BLD AUTO: 3.11 X10(6)/MCL (ref 4.2–5.4)
RBC # BLD AUTO: 3.17 X10(6)/MCL (ref 4–5.2)
RBC # BLD AUTO: 3.21 X10(6)/MCL (ref 4–5.2)
RBC MORPH BLD: NORMAL
SODIUM SERPL-SCNC: 136 MMOL/L (ref 136–145)
SODIUM SERPL-SCNC: 137 MMOL/L (ref 136–145)
SODIUM SERPL-SCNC: 138 MMOL/L (ref 136–145)
SODIUM SERPL-SCNC: 139 MMOL/L (ref 136–145)
SODIUM SERPL-SCNC: 139 MMOL/L (ref 136–145)
SODIUM SERPL-SCNC: 140 MMOL/L (ref 136–145)
WBC # SPEC AUTO: 2 X10(3)/MCL (ref 4.5–11)
WBC # SPEC AUTO: 2.6 X10(3)/MCL (ref 4.5–11)
WBC # SPEC AUTO: 2.7 X10(3)/MCL (ref 4.5–11)
WBC # SPEC AUTO: 2.9 X10(3)/MCL (ref 4.5–11)
WBC # SPEC AUTO: 3.3 X10(3)/MCL (ref 4.5–11)
WBC # SPEC AUTO: 3.6 X10(3)/MCL (ref 4.5–11.5)
WBC # SPEC AUTO: 3.7 X10(3)/MCL (ref 4.5–11)
WBC # SPEC AUTO: 4 X10(3)/MCL (ref 4.5–11)

## 2021-01-04 ENCOUNTER — HISTORICAL (OUTPATIENT)
Dept: RADIATION THERAPY | Facility: HOSPITAL | Age: 56
End: 2021-01-04

## 2021-01-12 ENCOUNTER — HISTORICAL (OUTPATIENT)
Dept: RADIOLOGY | Facility: HOSPITAL | Age: 56
End: 2021-01-12

## 2021-01-13 ENCOUNTER — HISTORICAL (OUTPATIENT)
Dept: RADIATION THERAPY | Facility: HOSPITAL | Age: 56
End: 2021-01-13

## 2021-01-14 ENCOUNTER — HISTORICAL (OUTPATIENT)
Dept: RADIATION THERAPY | Facility: HOSPITAL | Age: 56
End: 2021-01-14

## 2021-01-15 ENCOUNTER — HISTORICAL (OUTPATIENT)
Dept: RADIATION THERAPY | Facility: HOSPITAL | Age: 56
End: 2021-01-15

## 2021-01-16 ENCOUNTER — HISTORICAL (OUTPATIENT)
Dept: RADIATION THERAPY | Facility: HOSPITAL | Age: 56
End: 2021-01-16

## 2021-01-17 ENCOUNTER — HISTORICAL (OUTPATIENT)
Dept: RADIATION THERAPY | Facility: HOSPITAL | Age: 56
End: 2021-01-17

## 2021-01-18 ENCOUNTER — HISTORICAL (OUTPATIENT)
Dept: HEMATOLOGY/ONCOLOGY | Facility: CLINIC | Age: 56
End: 2021-01-18

## 2021-01-18 ENCOUNTER — HISTORICAL (OUTPATIENT)
Dept: RADIATION THERAPY | Facility: HOSPITAL | Age: 56
End: 2021-01-18

## 2021-01-18 LAB
ABS NEUT (OLG): 1.21 X10(3)/MCL (ref 2.1–9.2)
ALBUMIN SERPL-MCNC: 3.6 GM/DL (ref 3.5–5)
ALBUMIN/GLOB SERPL: 1.4 RATIO (ref 1.1–2)
ALP SERPL-CCNC: 49 UNIT/L (ref 40–150)
ALT SERPL-CCNC: 18 UNIT/L (ref 0–55)
AST SERPL-CCNC: 18 UNIT/L (ref 5–34)
BASOPHILS # BLD AUTO: 0 X10(3)/MCL (ref 0–0.2)
BASOPHILS NFR BLD AUTO: 0 %
BILIRUB SERPL-MCNC: 0.4 MG/DL
BILIRUBIN DIRECT+TOT PNL SERPL-MCNC: 0.1 MG/DL (ref 0–0.5)
BILIRUBIN DIRECT+TOT PNL SERPL-MCNC: 0.3 MG/DL (ref 0–0.8)
BUN SERPL-MCNC: 31 MG/DL (ref 9.8–20.1)
CALCIUM SERPL-MCNC: 8.8 MG/DL (ref 8.4–10.2)
CHLORIDE SERPL-SCNC: 106 MMOL/L (ref 98–107)
CO2 SERPL-SCNC: 23 MMOL/L (ref 22–29)
CREAT SERPL-MCNC: 0.89 MG/DL (ref 0.55–1.02)
ERYTHROCYTE [DISTWIDTH] IN BLOOD BY AUTOMATED COUNT: 16.6 % (ref 11.5–14.5)
GLOBULIN SER-MCNC: 2.6 GM/DL (ref 2.4–3.5)
GLUCOSE SERPL-MCNC: 83 MG/DL (ref 74–100)
HCT VFR BLD AUTO: 29.7 % (ref 35–46)
HGB BLD-MCNC: 9.4 GM/DL (ref 12–16)
IMM GRANULOCYTES # BLD AUTO: 0.01 10*3/UL
IMM GRANULOCYTES NFR BLD AUTO: 0 %
LYMPHOCYTES # BLD AUTO: 0.9 X10(3)/MCL (ref 0.6–4.6)
LYMPHOCYTES NFR BLD AUTO: 35 %
MAGNESIUM SERPL-MCNC: 2.22 MG/DL (ref 1.6–2.6)
MCH RBC QN AUTO: 29.7 PG (ref 26–34)
MCHC RBC AUTO-ENTMCNC: 31.6 GM/DL (ref 31–37)
MCV RBC AUTO: 94 FL (ref 80–100)
MONOCYTES # BLD AUTO: 0.4 X10(3)/MCL (ref 0.1–1.3)
MONOCYTES NFR BLD AUTO: 16 %
NEUTROPHILS # BLD AUTO: 1.21 X10(3)/MCL (ref 2.1–9.2)
NEUTROPHILS NFR BLD AUTO: 48 %
PLATELET # BLD AUTO: 308 X10(3)/MCL (ref 130–400)
PMV BLD AUTO: 10.5 FL (ref 7.4–10.4)
POTASSIUM SERPL-SCNC: 4.1 MMOL/L (ref 3.5–5.1)
PROT SERPL-MCNC: 6.2 GM/DL (ref 6.4–8.3)
RBC # BLD AUTO: 3.16 X10(6)/MCL (ref 4–5.2)
SODIUM SERPL-SCNC: 137 MMOL/L (ref 136–145)
WBC # SPEC AUTO: 2.5 X10(3)/MCL (ref 4.5–11)

## 2021-01-19 ENCOUNTER — HISTORICAL (OUTPATIENT)
Dept: RADIATION THERAPY | Facility: HOSPITAL | Age: 56
End: 2021-01-19

## 2021-01-20 ENCOUNTER — HISTORICAL (OUTPATIENT)
Dept: RADIATION THERAPY | Facility: HOSPITAL | Age: 56
End: 2021-01-20

## 2021-01-25 ENCOUNTER — HISTORICAL (OUTPATIENT)
Dept: RADIATION THERAPY | Facility: HOSPITAL | Age: 56
End: 2021-01-25

## 2021-01-26 ENCOUNTER — HISTORICAL (OUTPATIENT)
Dept: RADIATION THERAPY | Facility: HOSPITAL | Age: 56
End: 2021-01-26

## 2021-01-28 ENCOUNTER — HISTORICAL (OUTPATIENT)
Dept: RADIATION THERAPY | Facility: HOSPITAL | Age: 56
End: 2021-01-28

## 2021-02-01 ENCOUNTER — HISTORICAL (OUTPATIENT)
Dept: RADIATION THERAPY | Facility: HOSPITAL | Age: 56
End: 2021-02-01

## 2021-02-03 ENCOUNTER — HISTORICAL (OUTPATIENT)
Dept: ADMINISTRATIVE | Facility: HOSPITAL | Age: 56
End: 2021-02-03

## 2021-02-03 LAB
ABS NEUT (OLG): 3.26 X10(3)/MCL (ref 2.1–9.2)
ALBUMIN SERPL-MCNC: 3.5 GM/DL (ref 3.5–5)
ALBUMIN/GLOB SERPL: 1.1 RATIO (ref 1.1–2)
ALP SERPL-CCNC: 57 UNIT/L (ref 40–150)
ALT SERPL-CCNC: 6 UNIT/L (ref 0–55)
AST SERPL-CCNC: 12 UNIT/L (ref 5–34)
BASOPHILS # BLD AUTO: 0 X10(3)/MCL (ref 0–0.2)
BASOPHILS NFR BLD AUTO: 0 %
BILIRUB SERPL-MCNC: 0.3 MG/DL
BILIRUBIN DIRECT+TOT PNL SERPL-MCNC: 0.1 MG/DL (ref 0–0.5)
BILIRUBIN DIRECT+TOT PNL SERPL-MCNC: 0.2 MG/DL (ref 0–0.8)
BUN SERPL-MCNC: 29 MG/DL (ref 9.8–20.1)
CALCIUM SERPL-MCNC: 9.6 MG/DL (ref 8.4–10.2)
CHLORIDE SERPL-SCNC: 106 MMOL/L (ref 98–107)
CO2 SERPL-SCNC: 21 MMOL/L (ref 22–29)
CREAT SERPL-MCNC: 0.92 MG/DL (ref 0.55–1.02)
ERYTHROCYTE [DISTWIDTH] IN BLOOD BY AUTOMATED COUNT: 15.8 % (ref 11.5–14.5)
GLOBULIN SER-MCNC: 3.3 GM/DL (ref 2.4–3.5)
GLUCOSE SERPL-MCNC: 89 MG/DL (ref 74–100)
HCT VFR BLD AUTO: 30.3 % (ref 35–46)
HGB BLD-MCNC: 9.8 GM/DL (ref 12–16)
IMM GRANULOCYTES # BLD AUTO: 0.02 10*3/UL
IMM GRANULOCYTES NFR BLD AUTO: 0 %
LYMPHOCYTES # BLD AUTO: 0.5 X10(3)/MCL (ref 0.6–4.6)
LYMPHOCYTES NFR BLD AUTO: 12 %
MAGNESIUM SERPL-MCNC: 2.28 MG/DL (ref 1.6–2.6)
MCH RBC QN AUTO: 29.2 PG (ref 26–34)
MCHC RBC AUTO-ENTMCNC: 32.3 GM/DL (ref 31–37)
MCV RBC AUTO: 90.2 FL (ref 80–100)
MONOCYTES # BLD AUTO: 0.4 X10(3)/MCL (ref 0.1–1.3)
MONOCYTES NFR BLD AUTO: 10 %
NEUTROPHILS # BLD AUTO: 3.26 X10(3)/MCL (ref 2.1–9.2)
NEUTROPHILS NFR BLD AUTO: 78 %
PLATELET # BLD AUTO: 324 X10(3)/MCL (ref 130–400)
PMV BLD AUTO: 10.6 FL (ref 7.4–10.4)
POTASSIUM SERPL-SCNC: 4.6 MMOL/L (ref 3.5–5.1)
PROT SERPL-MCNC: 6.8 GM/DL (ref 6.4–8.3)
RBC # BLD AUTO: 3.36 X10(6)/MCL (ref 4–5.2)
SODIUM SERPL-SCNC: 137 MMOL/L (ref 136–145)
WBC # SPEC AUTO: 4.2 X10(3)/MCL (ref 4.5–11)

## 2021-02-25 ENCOUNTER — HISTORICAL (OUTPATIENT)
Dept: RADIOLOGY | Facility: HOSPITAL | Age: 56
End: 2021-02-25

## 2021-03-04 ENCOUNTER — HISTORICAL (OUTPATIENT)
Dept: ADMINISTRATIVE | Facility: HOSPITAL | Age: 56
End: 2021-03-04

## 2021-03-04 LAB
ABS NEUT (OLG): 1.07 X10(3)/MCL (ref 2.1–9.2)
ALBUMIN SERPL-MCNC: 3.6 GM/DL (ref 3.5–5)
ALBUMIN/GLOB SERPL: 1.2 RATIO (ref 1.1–2)
ALP SERPL-CCNC: 72 UNIT/L (ref 40–150)
ALT SERPL-CCNC: <5 UNIT/L (ref 0–55)
AST SERPL-CCNC: 12 UNIT/L (ref 5–34)
BASOPHILS # BLD AUTO: 0 X10(3)/MCL (ref 0–0.2)
BASOPHILS NFR BLD AUTO: 1 %
BILIRUB SERPL-MCNC: 0.3 MG/DL
BILIRUBIN DIRECT+TOT PNL SERPL-MCNC: 0.1 MG/DL (ref 0–0.5)
BILIRUBIN DIRECT+TOT PNL SERPL-MCNC: 0.2 MG/DL (ref 0–0.8)
BUN SERPL-MCNC: 18.6 MG/DL (ref 9.8–20.1)
CALCIUM SERPL-MCNC: 9.1 MG/DL (ref 8.4–10.2)
CHLORIDE SERPL-SCNC: 103 MMOL/L (ref 98–107)
CO2 SERPL-SCNC: 25 MMOL/L (ref 22–29)
CREAT SERPL-MCNC: 0.84 MG/DL (ref 0.55–1.02)
EOSINOPHIL # BLD AUTO: 0.1 X10(3)/MCL (ref 0–0.9)
EOSINOPHIL NFR BLD AUTO: 3 %
ERYTHROCYTE [DISTWIDTH] IN BLOOD BY AUTOMATED COUNT: 15.2 % (ref 11.5–14.5)
GLOBULIN SER-MCNC: 3.1 GM/DL (ref 2.4–3.5)
GLUCOSE SERPL-MCNC: 83 MG/DL (ref 74–100)
HCT VFR BLD AUTO: 33.4 % (ref 35–46)
HGB BLD-MCNC: 10.4 GM/DL (ref 12–16)
IMM GRANULOCYTES # BLD AUTO: 0.01 10*3/UL
IMM GRANULOCYTES NFR BLD AUTO: 0 %
LYMPHOCYTES # BLD AUTO: 0.8 X10(3)/MCL (ref 0.6–4.6)
LYMPHOCYTES NFR BLD AUTO: 33 %
MCH RBC QN AUTO: 28.2 PG (ref 26–34)
MCHC RBC AUTO-ENTMCNC: 31.1 GM/DL (ref 31–37)
MCV RBC AUTO: 90.5 FL (ref 80–100)
MONOCYTES # BLD AUTO: 0.4 X10(3)/MCL (ref 0.1–1.3)
MONOCYTES NFR BLD AUTO: 16 %
NEUTROPHILS # BLD AUTO: 1.07 X10(3)/MCL (ref 2.1–9.2)
NEUTROPHILS NFR BLD AUTO: 46 %
PLATELET # BLD AUTO: 300 X10(3)/MCL (ref 130–400)
PMV BLD AUTO: 9.6 FL (ref 7.4–10.4)
POTASSIUM SERPL-SCNC: 4.2 MMOL/L (ref 3.5–5.1)
PROT SERPL-MCNC: 6.7 GM/DL (ref 6.4–8.3)
RBC # BLD AUTO: 3.69 X10(6)/MCL (ref 4–5.2)
SODIUM SERPL-SCNC: 138 MMOL/L (ref 136–145)
WBC # SPEC AUTO: 2.3 X10(3)/MCL (ref 4.5–11)

## 2021-03-10 ENCOUNTER — HISTORICAL (OUTPATIENT)
Dept: RADIOLOGY | Facility: HOSPITAL | Age: 56
End: 2021-03-10

## 2021-03-10 LAB
ABS NEUT (OLG): 1.51 X10(3)/MCL (ref 2.1–9.2)
ALBUMIN SERPL-MCNC: 3.2 GM/DL (ref 3.5–5)
ALBUMIN/GLOB SERPL: 1 RATIO (ref 1.1–2)
ALP SERPL-CCNC: 65 UNIT/L (ref 40–150)
ALT SERPL-CCNC: 8 UNIT/L (ref 0–55)
AST SERPL-CCNC: 15 UNIT/L (ref 5–34)
BASOPHILS # BLD AUTO: 0 X10(3)/MCL (ref 0–0.2)
BASOPHILS NFR BLD AUTO: 1 %
BILIRUB SERPL-MCNC: 0.1 MG/DL
BILIRUBIN DIRECT+TOT PNL SERPL-MCNC: 0 MG/DL (ref 0–0.8)
BILIRUBIN DIRECT+TOT PNL SERPL-MCNC: 0.1 MG/DL (ref 0–0.5)
BUN SERPL-MCNC: 21.5 MG/DL (ref 9.8–20.1)
CALCIUM SERPL-MCNC: 9.1 MG/DL (ref 8.4–10.2)
CHLORIDE SERPL-SCNC: 105 MMOL/L (ref 98–107)
CO2 SERPL-SCNC: 25 MMOL/L (ref 22–29)
CREAT SERPL-MCNC: 0.87 MG/DL (ref 0.55–1.02)
EOSINOPHIL # BLD AUTO: 0.1 X10(3)/MCL (ref 0–0.9)
EOSINOPHIL NFR BLD AUTO: 2 %
ERYTHROCYTE [DISTWIDTH] IN BLOOD BY AUTOMATED COUNT: 15.4 % (ref 11.5–14.5)
GLOBULIN SER-MCNC: 3.3 GM/DL (ref 2.4–3.5)
GLUCOSE SERPL-MCNC: 85 MG/DL (ref 74–100)
HCT VFR BLD AUTO: 33.1 % (ref 35–46)
HGB BLD-MCNC: 10 GM/DL (ref 12–16)
LYMPHOCYTES # BLD AUTO: 0.7 X10(3)/MCL (ref 0.6–4.6)
LYMPHOCYTES NFR BLD AUTO: 26 %
MCH RBC QN AUTO: 27.5 PG (ref 26–34)
MCHC RBC AUTO-ENTMCNC: 30.2 GM/DL (ref 31–37)
MCV RBC AUTO: 91.2 FL (ref 80–100)
MONOCYTES # BLD AUTO: 0.4 X10(3)/MCL (ref 0.1–1.3)
MONOCYTES NFR BLD AUTO: 13 %
NEUTROPHILS # BLD AUTO: 1.51 X10(3)/MCL (ref 2.1–9.2)
NEUTROPHILS NFR BLD AUTO: 57 %
PLATELET # BLD AUTO: 295 X10(3)/MCL (ref 130–400)
PMV BLD AUTO: 9.6 FL (ref 7.4–10.4)
POTASSIUM SERPL-SCNC: 4.2 MMOL/L (ref 3.5–5.1)
PROT SERPL-MCNC: 6.5 GM/DL (ref 6.4–8.3)
RBC # BLD AUTO: 3.63 X10(6)/MCL (ref 4–5.2)
SODIUM SERPL-SCNC: 137 MMOL/L (ref 136–145)
WBC # SPEC AUTO: 2.6 X10(3)/MCL (ref 4.5–11)

## 2021-03-18 ENCOUNTER — HISTORICAL (OUTPATIENT)
Dept: LAB | Facility: HOSPITAL | Age: 56
End: 2021-03-18

## 2021-03-18 LAB
ABS NEUT (OLG): 0.86 X10(3)/MCL (ref 2.1–9.2)
ALBUMIN SERPL-MCNC: 3.3 GM/DL (ref 3.5–5)
ALBUMIN/GLOB SERPL: 1.2 RATIO (ref 1.1–2)
ALP SERPL-CCNC: 62 UNIT/L (ref 40–150)
ALT SERPL-CCNC: 9 UNIT/L (ref 0–55)
AST SERPL-CCNC: 15 UNIT/L (ref 5–34)
BASOPHILS # BLD AUTO: 0 X10(3)/MCL (ref 0–0.2)
BASOPHILS NFR BLD AUTO: 1 %
BILIRUB SERPL-MCNC: 0.4 MG/DL
BILIRUBIN DIRECT+TOT PNL SERPL-MCNC: 0.1 MG/DL (ref 0–0.5)
BILIRUBIN DIRECT+TOT PNL SERPL-MCNC: 0.3 MG/DL (ref 0–0.8)
BUN SERPL-MCNC: 20.1 MG/DL (ref 9.8–20.1)
CALCIUM SERPL-MCNC: 8.3 MG/DL (ref 8.4–10.2)
CHLORIDE SERPL-SCNC: 104 MMOL/L (ref 98–107)
CO2 SERPL-SCNC: 25 MMOL/L (ref 22–29)
CREAT SERPL-MCNC: 1 MG/DL (ref 0.55–1.02)
EOSINOPHIL # BLD AUTO: 0 X10(3)/MCL (ref 0–0.9)
EOSINOPHIL NFR BLD AUTO: 2 %
ERYTHROCYTE [DISTWIDTH] IN BLOOD BY AUTOMATED COUNT: 15.5 % (ref 11.5–17)
GLOBULIN SER-MCNC: 2.8 GM/DL (ref 2.4–3.5)
GLUCOSE SERPL-MCNC: 92 MG/DL (ref 74–100)
HCT VFR BLD AUTO: 34.2 % (ref 37–47)
HGB BLD-MCNC: 10.4 GM/DL (ref 12–16)
LYMPHOCYTES # BLD AUTO: 0.8 X10(3)/MCL (ref 0.6–4.6)
LYMPHOCYTES NFR BLD AUTO: 41 %
MAGNESIUM SERPL-MCNC: 2 MG/DL (ref 1.6–2.6)
MCH RBC QN AUTO: 28 PG (ref 27–31)
MCHC RBC AUTO-ENTMCNC: 30.4 GM/DL (ref 33–36)
MCV RBC AUTO: 92.2 FL (ref 80–94)
MONOCYTES # BLD AUTO: 0.3 X10(3)/MCL (ref 0.1–1.3)
MONOCYTES NFR BLD AUTO: 13 %
NEUTROPHILS # BLD AUTO: 0.86 X10(3)/MCL (ref 1.4–7.9)
NEUTROPHILS NFR BLD AUTO: 42 %
PLATELET # BLD AUTO: 267 X10(3)/MCL (ref 130–400)
PMV BLD AUTO: 9.9 FL (ref 9.4–12.4)
POTASSIUM SERPL-SCNC: 3.9 MMOL/L (ref 3.5–5.1)
PROT SERPL-MCNC: 6.1 GM/DL (ref 6.4–8.3)
RBC # BLD AUTO: 3.71 X10(6)/MCL (ref 4.2–5.4)
SODIUM SERPL-SCNC: 137 MMOL/L (ref 136–145)
WBC # SPEC AUTO: 2 X10(3)/MCL (ref 4.5–11.5)

## 2021-04-06 ENCOUNTER — HISTORICAL (OUTPATIENT)
Dept: ADMINISTRATIVE | Facility: HOSPITAL | Age: 56
End: 2021-04-06

## 2021-04-06 LAB
ABS NEUT (OLG): 1.55 X10(3)/MCL (ref 2.1–9.2)
ALBUMIN SERPL-MCNC: 3.5 GM/DL (ref 3.5–5)
ALBUMIN/GLOB SERPL: 1.1 RATIO (ref 1.1–2)
ALP SERPL-CCNC: 57 UNIT/L (ref 40–150)
ALT SERPL-CCNC: 6 UNIT/L (ref 0–55)
AST SERPL-CCNC: 13 UNIT/L (ref 5–34)
BASOPHILS # BLD AUTO: 0 X10(3)/MCL (ref 0–0.2)
BASOPHILS NFR BLD AUTO: 0 %
BILIRUB SERPL-MCNC: 0.6 MG/DL
BILIRUBIN DIRECT+TOT PNL SERPL-MCNC: 0.2 MG/DL (ref 0–0.5)
BILIRUBIN DIRECT+TOT PNL SERPL-MCNC: 0.4 MG/DL (ref 0–0.8)
BUN SERPL-MCNC: 26.2 MG/DL (ref 9.8–20.1)
CALCIUM SERPL-MCNC: 8.7 MG/DL (ref 8.4–10.2)
CHLORIDE SERPL-SCNC: 111 MMOL/L (ref 98–107)
CO2 SERPL-SCNC: 19 MMOL/L (ref 22–29)
CREAT SERPL-MCNC: 1.1 MG/DL (ref 0.55–1.02)
EOSINOPHIL # BLD AUTO: 0.1 X10(3)/MCL (ref 0–0.9)
EOSINOPHIL NFR BLD AUTO: 5 %
ERYTHROCYTE [DISTWIDTH] IN BLOOD BY AUTOMATED COUNT: 18.6 % (ref 11.5–14.5)
GLOBULIN SER-MCNC: 3.3 GM/DL (ref 2.4–3.5)
GLUCOSE SERPL-MCNC: 111 MG/DL (ref 74–100)
HCT VFR BLD AUTO: 29.2 % (ref 35–46)
HGB BLD-MCNC: 9.2 GM/DL (ref 12–16)
LYMPHOCYTES # BLD AUTO: 0.8 X10(3)/MCL (ref 0.6–4.6)
LYMPHOCYTES NFR BLD AUTO: 29 %
MAGNESIUM SERPL-MCNC: 2.1 MG/DL (ref 1.6–2.6)
MCH RBC QN AUTO: 28.5 PG (ref 26–34)
MCHC RBC AUTO-ENTMCNC: 31.5 GM/DL (ref 31–37)
MCV RBC AUTO: 90.4 FL (ref 80–100)
MONOCYTES # BLD AUTO: 0.3 X10(3)/MCL (ref 0.1–1.3)
MONOCYTES NFR BLD AUTO: 10 %
NEUTROPHILS # BLD AUTO: 1.55 X10(3)/MCL (ref 2.1–9.2)
NEUTROPHILS NFR BLD AUTO: 56 %
PLATELET # BLD AUTO: 217 X10(3)/MCL (ref 130–400)
PMV BLD AUTO: 9.6 FL (ref 7.4–10.4)
POTASSIUM SERPL-SCNC: 4.1 MMOL/L (ref 3.5–5.1)
PROT SERPL-MCNC: 6.8 GM/DL (ref 6.4–8.3)
RBC # BLD AUTO: 3.23 X10(6)/MCL (ref 4–5.2)
SODIUM SERPL-SCNC: 138 MMOL/L (ref 136–145)
WBC # SPEC AUTO: 2.8 X10(3)/MCL (ref 4.5–11)

## 2021-04-20 ENCOUNTER — HISTORICAL (OUTPATIENT)
Dept: ADMINISTRATIVE | Facility: HOSPITAL | Age: 56
End: 2021-04-20

## 2021-04-20 LAB
ABS NEUT (OLG): 1.3 X10(3)/MCL (ref 2.1–9.2)
ALBUMIN SERPL-MCNC: 3.8 GM/DL (ref 3.5–5)
ALBUMIN/GLOB SERPL: 1.2 RATIO (ref 1.1–2)
ALP SERPL-CCNC: 60 UNIT/L (ref 40–150)
ALT SERPL-CCNC: 5 UNIT/L (ref 0–55)
AST SERPL-CCNC: 11 UNIT/L (ref 5–34)
BASOPHILS # BLD AUTO: 0 X10(3)/MCL (ref 0–0.2)
BASOPHILS NFR BLD AUTO: 1 %
BILIRUB SERPL-MCNC: 0.7 MG/DL
BILIRUBIN DIRECT+TOT PNL SERPL-MCNC: 0.3 MG/DL (ref 0–0.5)
BILIRUBIN DIRECT+TOT PNL SERPL-MCNC: 0.4 MG/DL (ref 0–0.8)
BUN SERPL-MCNC: 28.7 MG/DL (ref 9.8–20.1)
CALCIUM SERPL-MCNC: 9.8 MG/DL (ref 8.4–10.2)
CHLORIDE SERPL-SCNC: 110 MMOL/L (ref 98–107)
CO2 SERPL-SCNC: 19 MMOL/L (ref 22–29)
CREAT SERPL-MCNC: 1.17 MG/DL (ref 0.55–1.02)
EOSINOPHIL # BLD AUTO: 0.3 X10(3)/MCL (ref 0–0.9)
EOSINOPHIL NFR BLD AUTO: 9 %
ERYTHROCYTE [DISTWIDTH] IN BLOOD BY AUTOMATED COUNT: 21 % (ref 11.5–14.5)
GLOBULIN SER-MCNC: 3.3 GM/DL (ref 2.4–3.5)
GLUCOSE SERPL-MCNC: 96 MG/DL (ref 74–100)
HCT VFR BLD AUTO: 29.8 % (ref 35–46)
HGB BLD-MCNC: 9.6 GM/DL (ref 12–16)
IMM GRANULOCYTES # BLD AUTO: 0.02 10*3/UL
IMM GRANULOCYTES NFR BLD AUTO: 1 %
LYMPHOCYTES # BLD AUTO: 1.1 X10(3)/MCL (ref 0.6–4.6)
LYMPHOCYTES NFR BLD AUTO: 34 %
MAGNESIUM SERPL-MCNC: 2.4 MG/DL (ref 1.6–2.6)
MCH RBC QN AUTO: 29 PG (ref 26–34)
MCHC RBC AUTO-ENTMCNC: 32.2 GM/DL (ref 31–37)
MCV RBC AUTO: 90 FL (ref 80–100)
MONOCYTES # BLD AUTO: 0.5 X10(3)/MCL (ref 0.1–1.3)
MONOCYTES NFR BLD AUTO: 16 %
NEUTROPHILS # BLD AUTO: 1.3 X10(3)/MCL (ref 2.1–9.2)
NEUTROPHILS NFR BLD AUTO: 40 %
PLATELET # BLD AUTO: 287 X10(3)/MCL (ref 130–400)
PMV BLD AUTO: 9.4 FL (ref 7.4–10.4)
POTASSIUM SERPL-SCNC: 3.7 MMOL/L (ref 3.5–5.1)
PROT SERPL-MCNC: 7.1 GM/DL (ref 6.4–8.3)
RBC # BLD AUTO: 3.31 X10(6)/MCL (ref 4–5.2)
SODIUM SERPL-SCNC: 136 MMOL/L (ref 136–145)
WBC # SPEC AUTO: 3.3 X10(3)/MCL (ref 4.5–11)

## 2021-05-12 ENCOUNTER — HISTORICAL (OUTPATIENT)
Dept: ADMINISTRATIVE | Facility: HOSPITAL | Age: 56
End: 2021-05-12

## 2021-05-12 LAB
ABS NEUT (OLG): 1.13 X10(3)/MCL (ref 2.1–9.2)
ALBUMIN SERPL-MCNC: 3.8 GM/DL (ref 3.5–5)
ALBUMIN/GLOB SERPL: 1.3 RATIO (ref 1.1–2)
ALP SERPL-CCNC: 58 UNIT/L (ref 40–150)
ALT SERPL-CCNC: <5 UNIT/L (ref 0–55)
AST SERPL-CCNC: 12 UNIT/L (ref 5–34)
BASOPHILS # BLD AUTO: 0 X10(3)/MCL (ref 0–0.2)
BASOPHILS NFR BLD AUTO: 1 %
BILIRUB SERPL-MCNC: 0.6 MG/DL
BILIRUBIN DIRECT+TOT PNL SERPL-MCNC: 0.3 MG/DL (ref 0–0.5)
BILIRUBIN DIRECT+TOT PNL SERPL-MCNC: 0.3 MG/DL (ref 0–0.8)
BUN SERPL-MCNC: 27.5 MG/DL (ref 9.8–20.1)
CALCIUM SERPL-MCNC: 9.2 MG/DL (ref 8.4–10.2)
CHLORIDE SERPL-SCNC: 112 MMOL/L (ref 98–107)
CO2 SERPL-SCNC: 17 MMOL/L (ref 22–29)
CREAT SERPL-MCNC: 1.04 MG/DL (ref 0.55–1.02)
EOSINOPHIL # BLD AUTO: 0.1 X10(3)/MCL (ref 0–0.9)
EOSINOPHIL NFR BLD AUTO: 3 %
ERYTHROCYTE [DISTWIDTH] IN BLOOD BY AUTOMATED COUNT: 26.4 % (ref 11.5–14.5)
GLOBULIN SER-MCNC: 3 GM/DL (ref 2.4–3.5)
GLUCOSE SERPL-MCNC: 83 MG/DL (ref 74–100)
HCT VFR BLD AUTO: 28.8 % (ref 35–46)
HGB BLD-MCNC: 9.1 GM/DL (ref 12–16)
IMM GRANULOCYTES # BLD AUTO: 0.03 10*3/UL
IMM GRANULOCYTES NFR BLD AUTO: 1 %
LYMPHOCYTES # BLD AUTO: 1.1 X10(3)/MCL (ref 0.6–4.6)
LYMPHOCYTES NFR BLD AUTO: 40 %
MCH RBC QN AUTO: 29.5 PG (ref 26–34)
MCHC RBC AUTO-ENTMCNC: 31.6 GM/DL (ref 31–37)
MCV RBC AUTO: 93.5 FL (ref 80–100)
MONOCYTES # BLD AUTO: 0.4 X10(3)/MCL (ref 0.1–1.3)
MONOCYTES NFR BLD AUTO: 15 %
NEUTROPHILS # BLD AUTO: 1.13 X10(3)/MCL (ref 2.1–9.2)
NEUTROPHILS NFR BLD AUTO: 41 %
PLATELET # BLD AUTO: 235 X10(3)/MCL (ref 130–400)
PMV BLD AUTO: 8.7 FL (ref 7.4–10.4)
POTASSIUM SERPL-SCNC: 4 MMOL/L (ref 3.5–5.1)
PROT SERPL-MCNC: 6.8 GM/DL (ref 6.4–8.3)
RBC # BLD AUTO: 3.08 X10(6)/MCL (ref 4–5.2)
SODIUM SERPL-SCNC: 139 MMOL/L (ref 136–145)
WBC # SPEC AUTO: 2.7 X10(3)/MCL (ref 4.5–11)

## 2021-05-20 ENCOUNTER — HISTORICAL (OUTPATIENT)
Dept: RADIOLOGY | Facility: HOSPITAL | Age: 56
End: 2021-05-20

## 2021-05-20 ENCOUNTER — HISTORICAL (OUTPATIENT)
Dept: HEMATOLOGY/ONCOLOGY | Facility: CLINIC | Age: 56
End: 2021-05-20

## 2021-05-20 LAB
ABS NEUT (OLG): 2.11 X10(3)/MCL (ref 2.1–9.2)
ALBUMIN SERPL-MCNC: 3.6 GM/DL (ref 3.5–5)
ALBUMIN/GLOB SERPL: 1.1 RATIO (ref 1.1–2)
ALP SERPL-CCNC: 60 UNIT/L (ref 40–150)
ALT SERPL-CCNC: 6 UNIT/L (ref 0–55)
AST SERPL-CCNC: 13 UNIT/L (ref 5–34)
BASOPHILS # BLD AUTO: 0 X10(3)/MCL (ref 0–0.2)
BASOPHILS NFR BLD AUTO: 0 %
BILIRUB SERPL-MCNC: 1.3 MG/DL
BILIRUBIN DIRECT+TOT PNL SERPL-MCNC: 0.5 MG/DL (ref 0–0.5)
BILIRUBIN DIRECT+TOT PNL SERPL-MCNC: 0.8 MG/DL (ref 0–0.8)
BUN SERPL-MCNC: 20.9 MG/DL (ref 9.8–20.1)
CALCIUM SERPL-MCNC: 9.3 MG/DL (ref 8.4–10.2)
CHLORIDE SERPL-SCNC: 106 MMOL/L (ref 98–107)
CO2 SERPL-SCNC: 18 MMOL/L (ref 22–29)
CREAT SERPL-MCNC: 0.89 MG/DL (ref 0.55–1.02)
EOSINOPHIL # BLD AUTO: 0.2 X10(3)/MCL (ref 0–0.9)
EOSINOPHIL NFR BLD AUTO: 3 %
ERYTHROCYTE [DISTWIDTH] IN BLOOD BY AUTOMATED COUNT: 27.8 % (ref 11.5–14.5)
GLOBULIN SER-MCNC: 3.4 GM/DL (ref 2.4–3.5)
GLUCOSE SERPL-MCNC: 78 MG/DL (ref 74–100)
HCT VFR BLD AUTO: 31.2 % (ref 35–46)
HGB BLD-MCNC: 10 GM/DL (ref 12–16)
IMM GRANULOCYTES # BLD AUTO: 0.01 10*3/UL
IMM GRANULOCYTES NFR BLD AUTO: 0 %
LYMPHOCYTES # BLD AUTO: 1.3 X10(3)/MCL (ref 0.6–4.6)
LYMPHOCYTES NFR BLD AUTO: 29 %
MAGNESIUM SERPL-MCNC: 2.1 MG/DL (ref 1.6–2.6)
MCH RBC QN AUTO: 31.3 PG (ref 26–34)
MCHC RBC AUTO-ENTMCNC: 32.1 GM/DL (ref 31–37)
MCV RBC AUTO: 97.5 FL (ref 80–100)
MONOCYTES # BLD AUTO: 0.9 X10(3)/MCL (ref 0.1–1.3)
MONOCYTES NFR BLD AUTO: 20 %
NEUTROPHILS # BLD AUTO: 2.11 X10(3)/MCL (ref 2.1–9.2)
NEUTROPHILS NFR BLD AUTO: 48 %
PLATELET # BLD AUTO: 200 X10(3)/MCL (ref 130–400)
PMV BLD AUTO: 9.5 FL (ref 7.4–10.4)
POTASSIUM SERPL-SCNC: 4.5 MMOL/L (ref 3.5–5.1)
PROT SERPL-MCNC: 7 GM/DL (ref 6.4–8.3)
RBC # BLD AUTO: 3.2 X10(6)/MCL (ref 4–5.2)
SODIUM SERPL-SCNC: 133 MMOL/L (ref 136–145)
WBC # SPEC AUTO: 4.4 X10(3)/MCL (ref 4.5–11)

## 2022-01-01 ENCOUNTER — HISTORICAL (OUTPATIENT)
Dept: RADIATION THERAPY | Facility: HOSPITAL | Age: 57
End: 2022-01-01

## 2022-01-01 ENCOUNTER — HISTORICAL (OUTPATIENT)
Dept: ADMINISTRATIVE | Facility: HOSPITAL | Age: 57
End: 2022-01-01

## 2022-01-01 ENCOUNTER — HISTORICAL (OUTPATIENT)
Dept: INFUSION THERAPY | Facility: HOSPITAL | Age: 57
End: 2022-01-01

## 2022-01-01 ENCOUNTER — ANESTHESIA EVENT (OUTPATIENT)
Dept: SURGERY | Facility: HOSPITAL | Age: 57
DRG: 956 | End: 2022-01-01
Payer: MEDICAID

## 2022-01-01 ENCOUNTER — HISTORICAL (OUTPATIENT)
Dept: INFUSION THERAPY | Facility: HOSPITAL | Age: 57
End: 2022-01-01
Payer: MEDICAID

## 2022-01-01 ENCOUNTER — HISTORICAL (OUTPATIENT)
Dept: ADMINISTRATIVE | Facility: HOSPITAL | Age: 57
End: 2022-01-01
Payer: MEDICAID

## 2022-01-01 ENCOUNTER — HISTORICAL (OUTPATIENT)
Dept: RADIOLOGY | Facility: HOSPITAL | Age: 57
End: 2022-01-01

## 2022-01-01 ENCOUNTER — HOSPITAL ENCOUNTER (INPATIENT)
Facility: HOSPITAL | Age: 57
LOS: 12 days | DRG: 956 | End: 2022-05-28
Attending: STUDENT IN AN ORGANIZED HEALTH CARE EDUCATION/TRAINING PROGRAM | Admitting: SURGERY
Payer: MEDICAID

## 2022-01-01 ENCOUNTER — DOCUMENTATION ONLY (OUTPATIENT)
Dept: HEMATOLOGY/ONCOLOGY | Facility: CLINIC | Age: 57
End: 2022-01-01
Payer: MEDICAID

## 2022-01-01 ENCOUNTER — ANESTHESIA (OUTPATIENT)
Dept: SURGERY | Facility: HOSPITAL | Age: 57
DRG: 956 | End: 2022-01-01
Payer: MEDICAID

## 2022-01-01 VITALS
OXYGEN SATURATION: 98 % | HEIGHT: 55 IN | WEIGHT: 123.88 LBS | DIASTOLIC BLOOD PRESSURE: 69 MMHG | SYSTOLIC BLOOD PRESSURE: 97 MMHG | BODY MASS INDEX: 28.67 KG/M2

## 2022-01-01 VITALS
RESPIRATION RATE: 22 BRPM | BODY MASS INDEX: 18.06 KG/M2 | WEIGHT: 105.81 LBS | DIASTOLIC BLOOD PRESSURE: 88 MMHG | SYSTOLIC BLOOD PRESSURE: 172 MMHG | HEIGHT: 64 IN | TEMPERATURE: 99 F

## 2022-01-01 DIAGNOSIS — I49.9 ABNORMAL HEART RHYTHM: ICD-10-CM

## 2022-01-01 DIAGNOSIS — S72.92XB: ICD-10-CM

## 2022-01-01 DIAGNOSIS — R56.9 CONVULSIONS, UNSPECIFIED CONVULSION TYPE: ICD-10-CM

## 2022-01-01 DIAGNOSIS — C79.9 METASTATIC MALIGNANT NEOPLASM, UNSPECIFIED SITE: ICD-10-CM

## 2022-01-01 DIAGNOSIS — E87.20 ACIDOSIS, METABOLIC: ICD-10-CM

## 2022-01-01 DIAGNOSIS — E87.70 VOLUME OVERLOAD: ICD-10-CM

## 2022-01-01 DIAGNOSIS — S82.402B FRACTURE OF LEFT TIBIA AND FIBULA, OPEN TYPE I OR II, INITIAL ENCOUNTER: ICD-10-CM

## 2022-01-01 DIAGNOSIS — M79.89 LEFT ARM SWELLING: ICD-10-CM

## 2022-01-01 DIAGNOSIS — C50.919 MALIGNANT NEOPLASM OF FEMALE BREAST, UNSPECIFIED ESTROGEN RECEPTOR STATUS, UNSPECIFIED LATERALITY, UNSPECIFIED SITE OF BREAST: Primary | ICD-10-CM
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
## 2022-01-01 DIAGNOSIS — R50.9 FEVER, UNSPECIFIED FEVER CAUSE: ICD-10-CM

## 2022-01-01 DIAGNOSIS — S72.322D: ICD-10-CM

## 2022-01-01 DIAGNOSIS — S82.402B TYPE I OR II OPEN FRACTURE OF LEFT TIBIA AND FIBULA, INITIAL ENCOUNTER: ICD-10-CM

## 2022-01-01 DIAGNOSIS — S72.91XA CLOSED FRACTURE OF RIGHT FEMUR, UNSPECIFIED FRACTURE MORPHOLOGY, UNSPECIFIED PORTION OF FEMUR, INITIAL ENCOUNTER: ICD-10-CM

## 2022-01-01 DIAGNOSIS — C50.919 MALIGNANT NEOPLASM OF FEMALE BREAST, UNSPECIFIED ESTROGEN RECEPTOR STATUS, UNSPECIFIED LATERALITY, UNSPECIFIED SITE OF BREAST: ICD-10-CM

## 2022-01-01 DIAGNOSIS — S82.202B TYPE I OR II OPEN FRACTURE OF LEFT TIBIA AND FIBULA, INITIAL ENCOUNTER: ICD-10-CM

## 2022-01-01 DIAGNOSIS — S72.321D: Primary | ICD-10-CM

## 2022-01-01 DIAGNOSIS — S27.0XXA TRAUMATIC PNEUMOTHORAX, INITIAL ENCOUNTER: ICD-10-CM

## 2022-01-01 DIAGNOSIS — S22.42XA CLOSED FRACTURE OF MULTIPLE RIBS OF LEFT SIDE, INITIAL ENCOUNTER: ICD-10-CM

## 2022-01-01 DIAGNOSIS — A41.01 STAPHYLOCOCCUS AUREUS BACTEREMIA WITH SEPSIS: ICD-10-CM

## 2022-01-01 DIAGNOSIS — S82.202B FRACTURE OF LEFT TIBIA AND FIBULA, OPEN TYPE I OR II, INITIAL ENCOUNTER: ICD-10-CM

## 2022-01-01 LAB
ABO + RH BLD: NORMAL
ABS NEUT (OLG): 0.92 (ref 2.1–9.2)
ABS NEUT (OLG): 1.31 (ref 2.1–9.2)
ABS NEUT (OLG): 1.66 X10(3)/MCL (ref 2.1–9.2)
ABS NEUT (OLG): 1.86 (ref 2.1–9.2)
ABS NEUT (OLG): 10.56 X10(3)/MCL (ref 2.1–9.2)
ABS NEUT (OLG): 11.06 X10(3)/MCL (ref 2.1–9.2)
ABS NEUT (OLG): 11.41 X10(3)/MCL (ref 2.1–9.2)
ABS NEUT (OLG): 14.25 X10(3)/MCL (ref 2.1–9.2)
ABS NEUT (OLG): 15.31 X10(3)/MCL (ref 2.1–9.2)
ABS NEUT (OLG): 2.08 (ref 2.1–9.2)
ABS NEUT (OLG): 2.36 (ref 2.1–9.2)
ABS NEUT (OLG): 2.56 (ref 2.1–9.2)
ABS NEUT (OLG): 3.91 X10(3)/MCL (ref 2.1–9.2)
ABS NEUT (OLG): 4.68 X10(3)/MCL (ref 2.1–9.2)
ABS NEUT (OLG): 5.18 X10(3)/MCL (ref 2.1–9.2)
ABS NEUT (OLG): 6.37 X10(3)/MCL (ref 2.1–9.2)
ABS NEUT (OLG): 8.37 X10(3)/MCL (ref 2.1–9.2)
ABS NEUT (OLG): 8.8 X10(3)/MCL (ref 2.1–9.2)
ABS NEUT (OLG): ABNORMAL
ABS NEUT CALC (OHS): 9.15 X10(3)/MCL (ref 2.1–9.2)
ACANTHOCYTES (OLG): ABNORMAL
ACANTHOCYTES (OLG): ABNORMAL
ALBUMIN SERPL-MCNC: 1.3 GM/DL (ref 3.5–5)
ALBUMIN SERPL-MCNC: 1.3 GM/DL (ref 3.5–5)
ALBUMIN SERPL-MCNC: 1.5 GM/DL (ref 3.5–5)
ALBUMIN SERPL-MCNC: 1.6 GM/DL (ref 3.5–5)
ALBUMIN SERPL-MCNC: 1.9 GM/DL (ref 3.5–5)
ALBUMIN SERPL-MCNC: 1.9 GM/DL (ref 3.5–5)
ALBUMIN SERPL-MCNC: 2.2 GM/DL (ref 3.5–5)
ALBUMIN SERPL-MCNC: 2.4 GM/DL (ref 3.5–5)
ALBUMIN SERPL-MCNC: 2.4 GM/DL (ref 3.5–5)
ALBUMIN SERPL-MCNC: 2.7 GM/DL (ref 3.5–5)
ALBUMIN SERPL-MCNC: 2.8 GM/DL (ref 3.5–5)
ALBUMIN SERPL-MCNC: 2.9 GM/DL (ref 3.5–5)
ALBUMIN SERPL-MCNC: 3 GM/DL (ref 3.5–5)
ALBUMIN SERPL-MCNC: 3.2 G/DL (ref 3.5–5)
ALBUMIN SERPL-MCNC: 3.3 G/DL (ref 3.5–5)
ALBUMIN SERPL-MCNC: 3.4 G/DL (ref 3.5–5)
ALBUMIN SERPL-MCNC: 3.5 G/DL (ref 3.5–5)
ALBUMIN SERPL-MCNC: 3.5 G/DL (ref 3.5–5)
ALBUMIN SERPL-MCNC: 3.5 GM/DL (ref 3.5–5)
ALBUMIN SERPL-MCNC: 3.5 GM/DL (ref 3.5–5)
ALBUMIN SERPL-MCNC: 3.6 G/DL (ref 3.5–5)
ALBUMIN/GLOB SERPL: 0.4 RATIO (ref 1.1–2)
ALBUMIN/GLOB SERPL: 0.4 RATIO (ref 1.1–2)
ALBUMIN/GLOB SERPL: 0.5 RATIO (ref 1.1–2)
ALBUMIN/GLOB SERPL: 0.6 RATIO (ref 1.1–2)
ALBUMIN/GLOB SERPL: 0.7 RATIO (ref 1.1–2)
ALBUMIN/GLOB SERPL: 0.7 {RATIO} (ref 1.1–2)
ALBUMIN/GLOB SERPL: 0.8 RATIO (ref 1.1–2)
ALBUMIN/GLOB SERPL: 0.8 RATIO (ref 1.1–2)
ALBUMIN/GLOB SERPL: 0.8 {RATIO} (ref 1.1–2)
ALBUMIN/GLOB SERPL: 0.8 {RATIO} (ref 1.1–2)
ALBUMIN/GLOB SERPL: 0.9 RATIO (ref 1.1–2)
ALBUMIN/GLOB SERPL: 1.1 RATIO (ref 1.1–2)
ALBUMIN/GLOB SERPL: 1.3 RATIO (ref 1.1–2)
ALBUMIN/GLOB SERPL: 1.4 RATIO (ref 1.1–2)
ALBUMIN/GLOB SERPL: 1.5 RATIO (ref 1.1–2)
ALBUMIN/GLOB SERPL: 1.6 RATIO (ref 1.1–2)
ALP SERPL-CCNC: 115 UNIT/L (ref 40–150)
ALP SERPL-CCNC: 116 UNIT/L (ref 40–150)
ALP SERPL-CCNC: 116 UNIT/L (ref 40–150)
ALP SERPL-CCNC: 117 UNIT/L (ref 40–150)
ALP SERPL-CCNC: 117 UNIT/L (ref 40–150)
ALP SERPL-CCNC: 122 UNIT/L (ref 40–150)
ALP SERPL-CCNC: 125 U/L (ref 40–150)
ALP SERPL-CCNC: 130 UNIT/L (ref 40–150)
ALP SERPL-CCNC: 131 U/L (ref 40–150)
ALP SERPL-CCNC: 131 UNIT/L (ref 40–150)
ALP SERPL-CCNC: 155 U/L (ref 40–150)
ALP SERPL-CCNC: 173 U/L (ref 40–150)
ALP SERPL-CCNC: 180 UNIT/L (ref 40–150)
ALP SERPL-CCNC: 233 U/L (ref 40–150)
ALP SERPL-CCNC: 240 U/L (ref 40–150)
ALP SERPL-CCNC: 72 UNIT/L (ref 40–150)
ALP SERPL-CCNC: 72 UNIT/L (ref 40–150)
ALP SERPL-CCNC: 77 UNIT/L (ref 40–150)
ALP SERPL-CCNC: 80 UNIT/L (ref 40–150)
ALP SERPL-CCNC: 90 UNIT/L (ref 40–150)
ALP SERPL-CCNC: 91 UNIT/L (ref 40–150)
ALP SERPL-CCNC: 92 UNIT/L (ref 40–150)
ALP SERPL-CCNC: 99 UNIT/L (ref 40–150)
ALT SERPL-CCNC: 10 UNIT/L (ref 0–55)
ALT SERPL-CCNC: 12 UNIT/L (ref 0–55)
ALT SERPL-CCNC: 14 UNIT/L (ref 0–55)
ALT SERPL-CCNC: 15 UNIT/L (ref 0–55)
ALT SERPL-CCNC: 16 UNIT/L (ref 0–55)
ALT SERPL-CCNC: 16 UNIT/L (ref 0–55)
ALT SERPL-CCNC: 17 U/L (ref 0–55)
ALT SERPL-CCNC: 17 UNIT/L (ref 0–55)
ALT SERPL-CCNC: 18 U/L (ref 0–55)
ALT SERPL-CCNC: 18 UNIT/L (ref 0–55)
ALT SERPL-CCNC: 22 UNIT/L (ref 0–55)
ALT SERPL-CCNC: 27 UNIT/L (ref 0–55)
ALT SERPL-CCNC: 33 UNIT/L (ref 0–55)
ALT SERPL-CCNC: 34 UNIT/L (ref 0–55)
ALT SERPL-CCNC: 6 U/L (ref 0–55)
ALT SERPL-CCNC: 7 U/L (ref 0–55)
ALT SERPL-CCNC: 7 U/L (ref 0–55)
ALT SERPL-CCNC: 8 UNIT/L (ref 0–55)
ALT SERPL-CCNC: 9 U/L (ref 0–55)
ALT SERPL-CCNC: 9 UNIT/L (ref 0–55)
ALT SERPL-CCNC: 97 UNIT/L (ref 0–55)
AMPHET UR QL SCN: NEGATIVE
ANION GAP SERPL CALC-SCNC: 20 MMOL/L (ref 8–16)
ANION GAP SERPL CALC-SCNC: 8 MEQ/L
ANION GAP SERPL CALC-SCNC: 9 MEQ/L
ANISOCYTOSIS BLD QL SMEAR: ABNORMAL
ANTIGEN TYPING RBC: NORMAL
APPEARANCE UR: ABNORMAL
APPEARANCE UR: CLEAR
APTT PPP: 35.5 SECONDS (ref 23.2–33.7)
APTT PPP: 35.6 SECONDS (ref 23.2–33.7)
APTT PPP: 37.7 SECONDS (ref 23.2–33.7)
APTT PPP: >200 SECONDS (ref 23.2–33.7)
AST SERPL-CCNC: 119 UNIT/L (ref 5–34)
AST SERPL-CCNC: 24 U/L (ref 5–34)
AST SERPL-CCNC: 24 U/L (ref 5–34)
AST SERPL-CCNC: 240 UNIT/L (ref 5–34)
AST SERPL-CCNC: 26 UNIT/L (ref 5–34)
AST SERPL-CCNC: 35 U/L (ref 5–34)
AST SERPL-CCNC: 38 UNIT/L (ref 5–34)
AST SERPL-CCNC: 39 UNIT/L (ref 5–34)
AST SERPL-CCNC: 41 UNIT/L (ref 5–34)
AST SERPL-CCNC: 41 UNIT/L (ref 5–34)
AST SERPL-CCNC: 45 U/L (ref 5–34)
AST SERPL-CCNC: 48 U/L (ref 5–34)
AST SERPL-CCNC: 49 UNIT/L (ref 5–34)
AST SERPL-CCNC: 52 UNIT/L (ref 5–34)
AST SERPL-CCNC: 61 UNIT/L (ref 5–34)
AST SERPL-CCNC: 62 UNIT/L (ref 5–34)
AST SERPL-CCNC: 62 UNIT/L (ref 5–34)
AST SERPL-CCNC: 63 UNIT/L (ref 5–34)
AST SERPL-CCNC: 66 UNIT/L (ref 5–34)
AST SERPL-CCNC: 73 U/L (ref 5–34)
AST SERPL-CCNC: 85 UNIT/L (ref 5–34)
AST SERPL-CCNC: 86 UNIT/L (ref 5–34)
AST SERPL-CCNC: 86 UNIT/L (ref 5–34)
AV INDEX (PROSTH): 0.65
AV INDEX (PROSTH): 0.81
AV MEAN GRADIENT: 1 MMHG
AV MEAN GRADIENT: 7 MMHG
AV PEAK GRADIENT: 14 MMHG
AV PEAK GRADIENT: 3 MMHG
AV REGURGITATION PRESSURE HALF TIME: 370 MS
AV VALVE AREA: 1.66 CM2
AV VALVE AREA: 2.54 CM2
AV VELOCITY RATIO: 0.7
AV VELOCITY RATIO: 0.86
B-HCG SERPL QL: 0 %
BACTERIA #/AREA URNS AUTO: ABNORMAL /HPF
BACTERIA #/AREA URNS AUTO: ABNORMAL /HPF
BACTERIA BLD CULT: ABNORMAL
BACTERIA BLD CULT: NORMAL
BACTERIA UR CULT: NO GROWTH
BARBITURATE SCN PRESENT UR: NEGATIVE
BASE EXCESS ARTERIAL: -1 MMOL/L (ref -2–2)
BASE EXCESS ARTERIAL: NORMAL
BASOPHILS # BLD AUTO: 0 10*3/UL (ref 0–0.2)
BASOPHILS # BLD AUTO: 0 X10(3)/MCL (ref 0–0.2)
BASOPHILS # BLD AUTO: 0.01 X10(3)/MCL (ref 0–0.2)
BASOPHILS # BLD AUTO: 0.02 X10(3)/MCL (ref 0–0.2)
BASOPHILS # BLD AUTO: 0.03 X10(3)/MCL (ref 0–0.2)
BASOPHILS # BLD AUTO: 0.14 X10(3)/MCL (ref 0–0.2)
BASOPHILS # BLD AUTO: 0.35 X10(3)/MCL (ref 0–0.2)
BASOPHILS NFR BLD AUTO: 0 %
BASOPHILS NFR BLD AUTO: 0.2 %
BASOPHILS NFR BLD AUTO: 0.3 %
BASOPHILS NFR BLD AUTO: 0.4 %
BASOPHILS NFR BLD AUTO: 1 %
BASOPHILS NFR BLD AUTO: 1.3 %
BASOPHILS NFR BLD AUTO: 2 %
BASOPHILS NFR BLD MANUAL: 0 %
BASOPHILS NFR BLD MANUAL: 0 % (ref 0–2)
BASOPHILS NFR BLD MANUAL: 0 X10(3)/MCL (ref 0–0.2)
BASOPHILS NFR BLD MANUAL: 1 %
BENZODIAZ UR QL SCN: NEGATIVE
BILIRUB SERPL-MCNC: 0.2 MG/DL
BILIRUB SERPL-MCNC: 0.2 MG/DL
BILIRUB SERPL-MCNC: 0.3 MG/DL
BILIRUB SERPL-MCNC: 0.3 MG/DL
BILIRUB SERPL-MCNC: 0.4 MG/DL
BILIRUB UR QL STRIP.AUTO: ABNORMAL MG/DL
BILIRUB UR QL STRIP.AUTO: NEGATIVE MG/DL
BILIRUBIN DIRECT+TOT PNL SERPL-MCNC: 0.1 (ref 0–0.5)
BILIRUBIN DIRECT+TOT PNL SERPL-MCNC: 0.1 (ref 0–0.8)
BILIRUBIN DIRECT+TOT PNL SERPL-MCNC: 0.1 MG/DL (ref 0–0.5)
BILIRUBIN DIRECT+TOT PNL SERPL-MCNC: 0.2 (ref 0–0.5)
BILIRUBIN DIRECT+TOT PNL SERPL-MCNC: 0.2 (ref 0–0.8)
BILIRUBIN DIRECT+TOT PNL SERPL-MCNC: 0.3 MG/DL (ref 0–0.8)
BILIRUBIN DIRECT+TOT PNL SERPL-MCNC: 0.6 MG/DL
BILIRUBIN DIRECT+TOT PNL SERPL-MCNC: 0.7 MG/DL
BILIRUBIN DIRECT+TOT PNL SERPL-MCNC: 0.8 MG/DL
BILIRUBIN DIRECT+TOT PNL SERPL-MCNC: 0.8 MG/DL
BILIRUBIN DIRECT+TOT PNL SERPL-MCNC: 1.2 MG/DL
BILIRUBIN DIRECT+TOT PNL SERPL-MCNC: 1.3 MG/DL
BILIRUBIN DIRECT+TOT PNL SERPL-MCNC: 11.5 MG/DL
BILIRUBIN DIRECT+TOT PNL SERPL-MCNC: 12.4 MG/DL
BILIRUBIN DIRECT+TOT PNL SERPL-MCNC: 2.1 MG/DL
BILIRUBIN DIRECT+TOT PNL SERPL-MCNC: 2.2 MG/DL
BILIRUBIN DIRECT+TOT PNL SERPL-MCNC: 3.8 MG/DL
BILIRUBIN DIRECT+TOT PNL SERPL-MCNC: 8.6 MG/DL
BILIRUBIN DIRECT+TOT PNL SERPL-MCNC: 9.9 MG/DL
BILIRUBIN DIRECT+TOT PNL SERPL-MCNC: <0.5 MG/DL
BLD PROD TYP BPU: NORMAL
BLOOD UNIT EXPIRATION DATE: NORMAL
BLOOD UNIT TYPE CODE: 5100
BLOOD UNIT TYPE CODE: 6200
BLOOD UNIT TYPE CODE: 9500
BSA FOR ECHO PROCEDURE: 1.47 M2
BSA FOR ECHO PROCEDURE: 1.47 M2
BUN SERPL-MCNC: 17 MG/DL (ref 6–30)
BUN SERPL-MCNC: 17.3 MG/DL (ref 9.8–20.1)
BUN SERPL-MCNC: 17.5 MG/DL (ref 9.8–20.1)
BUN SERPL-MCNC: 17.6 MG/DL (ref 9.8–20.1)
BUN SERPL-MCNC: 18.1 MG/DL (ref 9.8–20.1)
BUN SERPL-MCNC: 18.1 MG/DL (ref 9.8–20.1)
BUN SERPL-MCNC: 18.7 MG/DL (ref 9.8–20.1)
BUN SERPL-MCNC: 19.2 MG/DL (ref 9.8–20.1)
BUN SERPL-MCNC: 19.9 MG/DL (ref 9.8–20.1)
BUN SERPL-MCNC: 19.9 MG/DL (ref 9.8–20.1)
BUN SERPL-MCNC: 20.8 MG/DL (ref 9.8–20.1)
BUN SERPL-MCNC: 21.6 MG/DL (ref 9.8–20.1)
BUN SERPL-MCNC: 21.7 MG/DL (ref 9.8–20.1)
BUN SERPL-MCNC: 26.1 MG/DL (ref 9.8–20.1)
BUN SERPL-MCNC: 27.7 MG/DL (ref 9.8–20.1)
BUN SERPL-MCNC: 33.1 MG/DL (ref 9.8–20.1)
BUN SERPL-MCNC: 33.4 MG/DL (ref 9.8–20.1)
BUN SERPL-MCNC: 34.3 MG/DL (ref 9.8–20.1)
BUN SERPL-MCNC: 35.2 MG/DL (ref 9.8–20.1)
BUN SERPL-MCNC: 36.7 MG/DL (ref 9.8–20.1)
BUN SERPL-MCNC: 40 MG/DL (ref 9.8–20.1)
BUN SERPL-MCNC: 42.8 MG/DL (ref 9.8–20.1)
BUN SERPL-MCNC: 57.6 MG/DL (ref 9.8–20.1)
BUN SERPL-MCNC: 61.4 MG/DL (ref 9.8–20.1)
BUN SERPL-MCNC: 73 MG/DL (ref 9.8–20.1)
BUN SERPL-MCNC: 81.9 MG/DL (ref 9.8–20.1)
BURR CELLS (OLG): ABNORMAL
CALCIUM SERPL-MCNC: 10 MG/DL (ref 8.7–10.5)
CALCIUM SERPL-MCNC: 10.2 MG/DL (ref 8.7–10.5)
CALCIUM SERPL-MCNC: 10.3 MG/DL (ref 8.7–10.5)
CALCIUM SERPL-MCNC: 5.7 MG/DL (ref 8.4–10.2)
CALCIUM SERPL-MCNC: 7.2 MG/DL (ref 8.4–10.2)
CALCIUM SERPL-MCNC: 7.4 MG/DL (ref 8.4–10.2)
CALCIUM SERPL-MCNC: 7.6 MG/DL (ref 8.4–10.2)
CALCIUM SERPL-MCNC: 7.7 MG/DL (ref 8.4–10.2)
CALCIUM SERPL-MCNC: 7.7 MG/DL (ref 8.4–10.2)
CALCIUM SERPL-MCNC: 7.9 MG/DL (ref 8.4–10.2)
CALCIUM SERPL-MCNC: 8 MG/DL (ref 8.4–10.2)
CALCIUM SERPL-MCNC: 8 MG/DL (ref 8.4–10.2)
CALCIUM SERPL-MCNC: 8.2 MG/DL (ref 8.4–10.2)
CALCIUM SERPL-MCNC: 8.2 MG/DL (ref 8.4–10.2)
CALCIUM SERPL-MCNC: 8.4 MG/DL (ref 8.4–10.2)
CALCIUM SERPL-MCNC: 8.4 MG/DL (ref 8.4–10.2)
CALCIUM SERPL-MCNC: 8.6 MG/DL (ref 8.4–10.2)
CALCIUM SERPL-MCNC: 9.6 MG/DL (ref 8.7–10.5)
CALCIUM SERPL-MCNC: 9.7 MG/DL (ref 8.7–10.5)
CALCIUM SERPL-MCNC: 9.8 MG/DL (ref 8.7–10.5)
CALCIUM SERPL-MCNC: 9.9 MG/DL (ref 8.7–10.5)
CANNABINOIDS UR QL SCN: NEGATIVE
CEA SERPL-MCNC: 106.04 NG/ML (ref 0–3)
CEA SERPL-MCNC: 71.43 NG/ML (ref 0–3)
CHLORIDE SERPL-SCNC: 100 MMOL/L (ref 98–107)
CHLORIDE SERPL-SCNC: 103 MMOL/L (ref 98–107)
CHLORIDE SERPL-SCNC: 104 MMOL/L (ref 98–107)
CHLORIDE SERPL-SCNC: 104 MMOL/L (ref 98–107)
CHLORIDE SERPL-SCNC: 105 MMOL/L (ref 98–107)
CHLORIDE SERPL-SCNC: 106 MMOL/L (ref 98–107)
CHLORIDE SERPL-SCNC: 108 MMOL/L (ref 95–110)
CHLORIDE SERPL-SCNC: 108 MMOL/L (ref 98–107)
CHLORIDE SERPL-SCNC: 109 MMOL/L (ref 98–107)
CHLORIDE SERPL-SCNC: 110 MMOL/L (ref 98–107)
CHLORIDE SERPL-SCNC: 110 MMOL/L (ref 98–107)
CHLORIDE SERPL-SCNC: 111 MMOL/L (ref 98–107)
CHLORIDE SERPL-SCNC: 112 MMOL/L (ref 98–107)
CHLORIDE SERPL-SCNC: 112 MMOL/L (ref 98–107)
CHLORIDE SERPL-SCNC: 113 MMOL/L (ref 98–107)
CHLORIDE SERPL-SCNC: 114 MMOL/L (ref 98–107)
CHLORIDE SERPL-SCNC: 116 MMOL/L (ref 98–107)
CO2 SERPL-SCNC: 13 MMOL/L (ref 22–29)
CO2 SERPL-SCNC: 15 MMOL/L (ref 22–29)
CO2 SERPL-SCNC: 16 MMOL/L (ref 22–29)
CO2 SERPL-SCNC: 16 MMOL/L (ref 22–29)
CO2 SERPL-SCNC: 17 MMOL/L (ref 22–29)
CO2 SERPL-SCNC: 19 MMOL/L (ref 22–29)
CO2 SERPL-SCNC: 19 MMOL/L (ref 22–29)
CO2 SERPL-SCNC: 20 MMOL/L (ref 22–29)
CO2 SERPL-SCNC: 22 MMOL/L (ref 22–29)
CO2 SERPL-SCNC: 23 MMOL/L (ref 22–29)
CO2 SERPL-SCNC: 24 MMOL/L (ref 22–29)
CO2 SERPL-SCNC: 25 MMOL/L (ref 22–29)
CO2 SERPL-SCNC: 26 MMOL/L (ref 22–29)
COCAINE UR QL SCN: NEGATIVE
COLOR UR AUTO: ABNORMAL
COLOR UR AUTO: YELLOW
CORRECTED TEMPERATURE (PCO2): 34 MMHG (ref 35–45)
CORRECTED TEMPERATURE (PCO2): 45 MMHG (ref 35–45)
CORRECTED TEMPERATURE (PH): 7.22 (ref 7.29–7.61)
CORRECTED TEMPERATURE (PH): 7.44 (ref 7.35–7.45)
CORRECTED TEMPERATURE (PO2): 54 MMHG
CORRECTED TEMPERATURE (PO2): 84 MMHG (ref 80–100)
CREAT SERPL-MCNC: 0.74 MG/DL (ref 0.55–1.02)
CREAT SERPL-MCNC: 0.74 MG/DL (ref 0.55–1.02)
CREAT SERPL-MCNC: 0.75 MG/DL (ref 0.55–1.02)
CREAT SERPL-MCNC: 0.76 MG/DL (ref 0.55–1.02)
CREAT SERPL-MCNC: 0.76 MG/DL (ref 0.55–1.02)
CREAT SERPL-MCNC: 0.77 MG/DL (ref 0.55–1.02)
CREAT SERPL-MCNC: 0.79 MG/DL (ref 0.55–1.02)
CREAT SERPL-MCNC: 0.8 MG/DL (ref 0.55–1.02)
CREAT SERPL-MCNC: 0.8 MG/DL (ref 0.55–1.02)
CREAT SERPL-MCNC: 0.8 MG/DL (ref 0.5–1.4)
CREAT SERPL-MCNC: 0.81 MG/DL (ref 0.55–1.02)
CREAT SERPL-MCNC: 0.82 MG/DL (ref 0.55–1.02)
CREAT SERPL-MCNC: 0.82 MG/DL (ref 0.55–1.02)
CREAT SERPL-MCNC: 0.83 MG/DL (ref 0.55–1.02)
CREAT SERPL-MCNC: 0.83 MG/DL (ref 0.55–1.02)
CREAT SERPL-MCNC: 0.84 MG/DL (ref 0.55–1.02)
CREAT SERPL-MCNC: 0.86 MG/DL (ref 0.55–1.02)
CREAT SERPL-MCNC: 0.87 MG/DL (ref 0.55–1.02)
CREAT SERPL-MCNC: 0.9 MG/DL (ref 0.55–1.02)
CREAT SERPL-MCNC: 0.92 MG/DL (ref 0.55–1.02)
CREAT SERPL-MCNC: 0.93 MG/DL (ref 0.55–1.02)
CREAT SERPL-MCNC: 0.98 MG/DL (ref 0.55–1.02)
CREAT SERPL-MCNC: 0.99 MG/DL (ref 0.55–1.02)
CREAT SERPL-MCNC: 1.01 MG/DL (ref 0.55–1.02)
CREAT SERPL-MCNC: 1.13 MG/DL (ref 0.55–1.02)
CREAT SERPL-MCNC: 1.26 MG/DL (ref 0.55–1.02)
CREAT/UREA NIT SERPL: 22
CREAT/UREA NIT SERPL: 24
CROSSMATCH INTERPRETATION: NORMAL
CRP SERPL-MCNC: >240 MG/L
CV ECHO LV RWT: 0.38 CM
DISPENSE STATUS: NORMAL
DOP CALC AO PEAK VEL: 0.81 M/S
DOP CALC AO PEAK VEL: 1.84 M/S
DOP CALC AO VTI: 17.2 CM
DOP CALC AO VTI: 28.6 CM
DOP CALC LVOT AREA: 2.5 CM2
DOP CALC LVOT AREA: 3.1 CM2
DOP CALC LVOT DIAMETER: 1.8 CM
DOP CALC LVOT DIAMETER: 2 CM
DOP CALC LVOT PEAK VEL: 0.7 M/S
DOP CALC LVOT PEAK VEL: 1.28 M/S
DOP CALC LVOT STROKE VOLUME: 43.65 CM3
DOP CALC LVOT STROKE VOLUME: 47.56 CM3
DOP CALC MV VTI: 19 CM
DOP CALC MV VTI: 26.8 CM
DOP CALCLVOT PEAK VEL VTI: 13.9 CM
DOP CALCLVOT PEAK VEL VTI: 18.7 CM
E WAVE DECELERATION TIME: 129 MSEC
E WAVE DECELERATION TIME: 301 MSEC
E/A RATIO: 0.8
E/A RATIO: 1.23
E/E' RATIO: 5.71 M/S
E/E' RATIO: 7.57 M/S
ECHO LV POSTERIOR WALL: 0.8 CM (ref 0.6–1.1)
EJECTION FRACTION: 58 %
EJECTION FRACTION: 60 %
EOSINOPHIL # BLD AUTO: 0 10*3/UL (ref 0–0.9)
EOSINOPHIL # BLD AUTO: 0 10*3/UL (ref 0–0.9)
EOSINOPHIL # BLD AUTO: 0 X10(3)/MCL (ref 0–0.9)
EOSINOPHIL # BLD AUTO: 0.09 X10(3)/MCL (ref 0–0.9)
EOSINOPHIL # BLD AUTO: 0.1 10*3/UL (ref 0–0.9)
EOSINOPHIL # BLD AUTO: 0.1 X10(3)/MCL (ref 0–0.9)
EOSINOPHIL NFR BLD AUTO: 0 %
EOSINOPHIL NFR BLD AUTO: 0.5 %
EOSINOPHIL NFR BLD AUTO: 1 %
EOSINOPHIL NFR BLD AUTO: 2 %
EOSINOPHIL NFR BLD AUTO: 3 %
EOSINOPHIL NFR BLD AUTO: 3 %
EOSINOPHIL NFR BLD MANUAL: 0 %
EOSINOPHIL NFR BLD MANUAL: 0 % (ref 0–8)
EOSINOPHIL NFR BLD MANUAL: 0 X10(3)/MCL (ref 0–0.9)
EOSINOPHIL NFR BLD MANUAL: 0.06 X10(3)/MCL (ref 0–0.9)
EOSINOPHIL NFR BLD MANUAL: 0.12 X10(3)/MCL (ref 0–0.9)
EOSINOPHIL NFR BLD MANUAL: 0.2 X10(3)/MCL (ref 0–0.9)
EOSINOPHIL NFR BLD MANUAL: 0.3 X10(3)/MCL (ref 0–0.9)
EOSINOPHIL NFR BLD MANUAL: 0.7 X10(3)/MCL (ref 0–0.9)
EOSINOPHIL NFR BLD MANUAL: 1 %
EOSINOPHIL NFR BLD MANUAL: 1 %
EOSINOPHIL NFR BLD MANUAL: 2 %
EOSINOPHIL NFR BLD MANUAL: 4 %
EOSINOPHIL NFR BLD MANUAL: 7 %
ERYTHROCYTE [DISTWIDTH] IN BLOOD BY AUTOMATED COUNT: 14.7 % (ref 11.5–17)
ERYTHROCYTE [DISTWIDTH] IN BLOOD BY AUTOMATED COUNT: 15.1 % (ref 11.5–17)
ERYTHROCYTE [DISTWIDTH] IN BLOOD BY AUTOMATED COUNT: 15.1 % (ref 11.5–17)
ERYTHROCYTE [DISTWIDTH] IN BLOOD BY AUTOMATED COUNT: 15.2 % (ref 11.5–17)
ERYTHROCYTE [DISTWIDTH] IN BLOOD BY AUTOMATED COUNT: 15.4 % (ref 11.5–17)
ERYTHROCYTE [DISTWIDTH] IN BLOOD BY AUTOMATED COUNT: 15.5 % (ref 11.5–17)
ERYTHROCYTE [DISTWIDTH] IN BLOOD BY AUTOMATED COUNT: 15.8 % (ref 11.5–14.5)
ERYTHROCYTE [DISTWIDTH] IN BLOOD BY AUTOMATED COUNT: 15.8 % (ref 11.5–17)
ERYTHROCYTE [DISTWIDTH] IN BLOOD BY AUTOMATED COUNT: 15.9 % (ref 11.5–14.5)
ERYTHROCYTE [DISTWIDTH] IN BLOOD BY AUTOMATED COUNT: 16.3 % (ref 11.5–17)
ERYTHROCYTE [DISTWIDTH] IN BLOOD BY AUTOMATED COUNT: 16.5 % (ref 11.5–17)
ERYTHROCYTE [DISTWIDTH] IN BLOOD BY AUTOMATED COUNT: 16.6 % (ref 11.5–14.5)
ERYTHROCYTE [DISTWIDTH] IN BLOOD BY AUTOMATED COUNT: 16.8 % (ref 11.5–14.5)
ERYTHROCYTE [DISTWIDTH] IN BLOOD BY AUTOMATED COUNT: 16.8 % (ref 11.5–17)
ERYTHROCYTE [DISTWIDTH] IN BLOOD BY AUTOMATED COUNT: 17.1 % (ref 11.5–14.5)
ERYTHROCYTE [DISTWIDTH] IN BLOOD BY AUTOMATED COUNT: 17.1 % (ref 11.5–14.5)
ERYTHROCYTE [DISTWIDTH] IN BLOOD BY AUTOMATED COUNT: 17.4 % (ref 11.5–17)
ERYTHROCYTE [DISTWIDTH] IN BLOOD BY AUTOMATED COUNT: 17.4 % (ref 11.5–17)
ERYTHROCYTE [DISTWIDTH] IN BLOOD BY AUTOMATED COUNT: 17.6 % (ref 11.5–17)
ERYTHROCYTE [DISTWIDTH] IN BLOOD BY AUTOMATED COUNT: 17.6 % (ref 11.5–17)
ERYTHROCYTE [DISTWIDTH] IN BLOOD BY AUTOMATED COUNT: 17.7 % (ref 11.5–17)
ERYTHROCYTE [DISTWIDTH] IN BLOOD BY AUTOMATED COUNT: 17.7 % (ref 11.5–17)
ERYTHROCYTE [DISTWIDTH] IN BLOOD BY AUTOMATED COUNT: 17.9 % (ref 11.5–14.5)
ERYTHROCYTE [DISTWIDTH] IN BLOOD BY AUTOMATED COUNT: 17.9 % (ref 11.5–17)
ERYTHROCYTE [DISTWIDTH] IN BLOOD BY AUTOMATED COUNT: 17.9 % (ref 11.5–17)
ERYTHROCYTE [DISTWIDTH] IN BLOOD BY AUTOMATED COUNT: 18 % (ref 11.5–17)
ERYTHROCYTE [DISTWIDTH] IN BLOOD BY AUTOMATED COUNT: 18.2 % (ref 11.5–17)
ETHANOL SERPL-MCNC: <10 MG/DL
FENTANYL UR QL SCN: POSITIVE
FIBRINOGEN PPP-MCNC: 231 MG/DL (ref 210–463)
FIBRINOGEN PPP-MCNC: 599 MG/DL (ref 210–463)
FIBRINOGEN PPP-MCNC: 655 MG/DL (ref 210–463)
FLAG2 (OHS): 70
FLAG3 (OHS): 80
FLAGS (OHS): 70
FRACTIONAL SHORTENING: 45 % (ref 28–44)
GLOBULIN SER-MCNC: 1.8 GM/DL (ref 2.4–3.5)
GLOBULIN SER-MCNC: 1.9 GM/DL (ref 2.4–3.5)
GLOBULIN SER-MCNC: 2.2 GM/DL (ref 2.4–3.5)
GLOBULIN SER-MCNC: 2.4 GM/DL (ref 2.4–3.5)
GLOBULIN SER-MCNC: 2.7 GM/DL (ref 2.4–3.5)
GLOBULIN SER-MCNC: 2.8 GM/DL (ref 2.4–3.5)
GLOBULIN SER-MCNC: 3 GM/DL (ref 2.4–3.5)
GLOBULIN SER-MCNC: 3.1 GM/DL (ref 2.4–3.5)
GLOBULIN SER-MCNC: 3.2 GM/DL (ref 2.4–3.5)
GLOBULIN SER-MCNC: 3.3 GM/DL (ref 2.4–3.5)
GLOBULIN SER-MCNC: 3.3 GM/DL (ref 2.4–3.5)
GLOBULIN SER-MCNC: 3.5 GM/DL (ref 2.4–3.5)
GLOBULIN SER-MCNC: 3.7 GM/DL (ref 2.4–3.5)
GLOBULIN SER-MCNC: 4 GM/DL (ref 2.4–3.5)
GLOBULIN SER-MCNC: 4.4 G/DL (ref 2.4–3.5)
GLOBULIN SER-MCNC: 4.4 GM/DL (ref 2.4–3.5)
GLOBULIN SER-MCNC: 4.6 G/DL (ref 2.4–3.5)
GLOBULIN SER-MCNC: 4.8 G/DL (ref 2.4–3.5)
GLOBULIN SER-MCNC: 5 G/DL (ref 2.4–3.5)
GLUCOSE SERPL-MCNC: 102 MG/DL (ref 74–100)
GLUCOSE SERPL-MCNC: 104 MG/DL (ref 74–100)
GLUCOSE SERPL-MCNC: 106 MG/DL (ref 74–100)
GLUCOSE SERPL-MCNC: 111 MG/DL (ref 70–110)
GLUCOSE SERPL-MCNC: 111 MG/DL (ref 74–100)
GLUCOSE SERPL-MCNC: 118 MG/DL (ref 74–100)
GLUCOSE SERPL-MCNC: 136 MG/DL (ref 74–100)
GLUCOSE SERPL-MCNC: 144 MG/DL (ref 74–100)
GLUCOSE SERPL-MCNC: 147 MG/DL (ref 74–100)
GLUCOSE SERPL-MCNC: 156 MG/DL (ref 74–100)
GLUCOSE SERPL-MCNC: 204 MG/DL (ref 74–100)
GLUCOSE SERPL-MCNC: 217 MG/DL (ref 74–100)
GLUCOSE SERPL-MCNC: 62 MG/DL (ref 74–100)
GLUCOSE SERPL-MCNC: 68 MG/DL (ref 74–100)
GLUCOSE SERPL-MCNC: 686 MG/DL (ref 74–100)
GLUCOSE SERPL-MCNC: 70 MG/DL (ref 74–100)
GLUCOSE SERPL-MCNC: 71 MG/DL (ref 74–100)
GLUCOSE SERPL-MCNC: 74 MG/DL (ref 74–100)
GLUCOSE SERPL-MCNC: 76 MG/DL (ref 74–100)
GLUCOSE SERPL-MCNC: 77 MG/DL (ref 74–100)
GLUCOSE SERPL-MCNC: 77 MG/DL (ref 74–100)
GLUCOSE SERPL-MCNC: 79 MG/DL (ref 74–100)
GLUCOSE SERPL-MCNC: 80 MG/DL (ref 74–100)
GLUCOSE SERPL-MCNC: 81 MG/DL (ref 74–100)
GLUCOSE SERPL-MCNC: 81 MG/DL (ref 74–100)
GLUCOSE SERPL-MCNC: 93 MG/DL (ref 74–100)
GLUCOSE UR QL STRIP.AUTO: NEGATIVE MG/DL
GLUCOSE UR QL STRIP.AUTO: NEGATIVE MG/DL
GRAM STN SPEC: ABNORMAL
GRAN CASTS URNS QL MICRO: ABNORMAL /HPF
GROUP & RH: NORMAL
HAPTOGLOB SERPL-MCNC: 199 MG/DL (ref 35–250)
HCO3 ARTERIAL: 21.8 MMOL/L (ref 18–23)
HCO3 ARTERIAL: NORMAL
HCO3 UR-SCNC: 18.4 MMOL/L
HCO3 UR-SCNC: 23.1 MMOL/L
HCT VFR BLD AUTO: 15.8 % (ref 37–47)
HCT VFR BLD AUTO: 16.9 % (ref 37–47)
HCT VFR BLD AUTO: 19 % (ref 37–47)
HCT VFR BLD AUTO: 21.9 % (ref 37–47)
HCT VFR BLD AUTO: 23.4 % (ref 37–47)
HCT VFR BLD AUTO: 25.6 % (ref 37–47)
HCT VFR BLD AUTO: 27.3 % (ref 37–47)
HCT VFR BLD AUTO: 28.8 % (ref 37–47)
HCT VFR BLD AUTO: 29.2 % (ref 37–47)
HCT VFR BLD AUTO: 29.2 % (ref 37–47)
HCT VFR BLD AUTO: 29.4 % (ref 37–47)
HCT VFR BLD AUTO: 30.1 % (ref 37–47)
HCT VFR BLD AUTO: 30.2 % (ref 37–47)
HCT VFR BLD AUTO: 30.4 % (ref 35–46)
HCT VFR BLD AUTO: 31.4 % (ref 35–46)
HCT VFR BLD AUTO: 32.4 % (ref 37–47)
HCT VFR BLD AUTO: 32.8 % (ref 35–46)
HCT VFR BLD AUTO: 33.1 % (ref 35–46)
HCT VFR BLD AUTO: 33.2 % (ref 37–47)
HCT VFR BLD AUTO: 33.5 % (ref 35–46)
HCT VFR BLD AUTO: 34.3 % (ref 37–47)
HCT VFR BLD AUTO: 34.9 % (ref 35–46)
HCT VFR BLD AUTO: 34.9 % (ref 35–46)
HCT VFR BLD AUTO: 36.5 % (ref 37–47)
HCT VFR BLD AUTO: 38 % (ref 37–47)
HCT VFR BLD AUTO: 38 % (ref 37–47)
HCT VFR BLD AUTO: 38.4 % (ref 37–47)
HCT VFR BLD AUTO: 38.7 % (ref 37–47)
HCT VFR BLD AUTO: 43.6 % (ref 37–47)
HCT VFR BLD AUTO: 45 % (ref 37–47)
HCT VFR BLD CALC: 15 %PCV (ref 36–54)
HEMATOLOGIST REVIEW: NORMAL
HEMATOLOGIST REVIEW: NORMAL
HEMOLYSIS INTERF INDEX SERPL-ACNC: 1
HEMOLYSIS INTERF INDEX SERPL-ACNC: 1
HEMOLYSIS INTERF INDEX SERPL-ACNC: 15
HEMOLYSIS INTERF INDEX SERPL-ACNC: 4
HEMOLYSIS INTERF INDEX SERPL-ACNC: 4
HEMOLYSIS INTERF INDEX SERPL-ACNC: <0
HGB BLD-MCNC: 10 G/DL (ref 12–16)
HGB BLD-MCNC: 10 GM/DL (ref 12–16)
HGB BLD-MCNC: 10.2 G/DL (ref 12–16)
HGB BLD-MCNC: 10.4 GM/DL (ref 12–16)
HGB BLD-MCNC: 10.5 G/DL (ref 12–16)
HGB BLD-MCNC: 10.5 G/DL (ref 12–16)
HGB BLD-MCNC: 10.6 G/DL (ref 12–16)
HGB BLD-MCNC: 10.8 GM/DL (ref 12–16)
HGB BLD-MCNC: 11.2 G/DL (ref 12–16)
HGB BLD-MCNC: 11.3 GM/DL (ref 12–16)
HGB BLD-MCNC: 11.5 GM/DL (ref 12–16)
HGB BLD-MCNC: 11.9 GM/DL (ref 12–16)
HGB BLD-MCNC: 12.3 GM/DL (ref 12–16)
HGB BLD-MCNC: 13.2 GM/DL (ref 12–16)
HGB BLD-MCNC: 13.4 GM/DL (ref 12–16)
HGB BLD-MCNC: 13.5 GM/DL (ref 12–16)
HGB BLD-MCNC: 14.6 GM/DL (ref 12–16)
HGB BLD-MCNC: 14.9 GM/DL (ref 12–16)
HGB BLD-MCNC: 5 G/DL
HGB BLD-MCNC: 5.2 GM/DL (ref 12–16)
HGB BLD-MCNC: 5.8 GM/DL (ref 12–16)
HGB BLD-MCNC: 6.5 GM/DL (ref 12–16)
HGB BLD-MCNC: 6.8 G/DL (ref 12–16)
HGB BLD-MCNC: 7.5 GM/DL (ref 12–16)
HGB BLD-MCNC: 7.7 GM/DL (ref 12–16)
HGB BLD-MCNC: 7.9 GM/DL (ref 12–16)
HGB BLD-MCNC: 8.6 GM/DL (ref 12–16)
HGB BLD-MCNC: 9.2 GM/DL (ref 12–16)
HGB BLD-MCNC: 9.3 GM/DL (ref 12–16)
HGB BLD-MCNC: 9.4 GM/DL (ref 12–16)
HGB BLD-MCNC: 9.5 G/DL (ref 12–16)
HGB BLD-MCNC: 9.6 GM/DL (ref 12–16)
HGB BLD-MCNC: 9.7 GM/DL (ref 12–16)
HGB BLD-MCNC: NORMAL G/DL
HYPOCHROMIA BLD QL SMEAR: ABNORMAL
ICTERIC INTERF INDEX SERPL-ACNC: 0
ICTERIC INTERF INDEX SERPL-ACNC: 1
IMM GRANULOCYTES # BLD AUTO: 0.01 10*3/UL
IMM GRANULOCYTES # BLD AUTO: 0.01 X10(3)/MCL (ref 0–0.02)
IMM GRANULOCYTES # BLD AUTO: 0.02 10*3/UL
IMM GRANULOCYTES # BLD AUTO: 0.02 10*3/UL
IMM GRANULOCYTES # BLD AUTO: 0.04 X10(3)/MCL (ref 0–0.02)
IMM GRANULOCYTES # BLD AUTO: 0.04 X10(3)/MCL (ref 0–0.02)
IMM GRANULOCYTES # BLD AUTO: 0.05 X10(3)/MCL (ref 0–0.02)
IMM GRANULOCYTES # BLD AUTO: 0.05 X10(3)/MCL (ref 0–0.02)
IMM GRANULOCYTES # BLD AUTO: 0.08 X10(3)/MCL (ref 0–0.02)
IMM GRANULOCYTES # BLD AUTO: 0.09 X10(3)/MCL (ref 0–0.02)
IMM GRANULOCYTES # BLD AUTO: 0.1 X10(3)/MCL (ref 0–0.02)
IMM GRANULOCYTES # BLD AUTO: 0.14 X10(3)/MCL (ref 0–0.02)
IMM GRANULOCYTES # BLD AUTO: 0.14 X10(3)/MCL (ref 0–0.02)
IMM GRANULOCYTES # BLD AUTO: 0.15 X10(3)/MCL (ref 0–0.02)
IMM GRANULOCYTES # BLD AUTO: 0.18 X10(3)/MCL (ref 0–0.02)
IMM GRANULOCYTES # BLD AUTO: 0.19 X10(3)/MCL (ref 0–0.02)
IMM GRANULOCYTES # BLD AUTO: 0.29 X10(3)/MCL (ref 0–0.02)
IMM GRANULOCYTES # BLD AUTO: 0.34 X10(3)/MCL (ref 0–0.02)
IMM GRANULOCYTES # BLD AUTO: 0.36 X10(3)/MCL (ref 0–0.02)
IMM GRANULOCYTES # BLD AUTO: 0.39 X10(3)/MCL (ref 0–0.02)
IMM GRANULOCYTES # BLD AUTO: 0.54 X10(3)/MCL (ref 0–0.02)
IMM GRANULOCYTES # BLD AUTO: 0.57 X10(3)/MCL (ref 0–0.02)
IMM GRANULOCYTES NFR BLD AUTO: 0 %
IMM GRANULOCYTES NFR BLD AUTO: 0.4 % (ref 0–0.43)
IMM GRANULOCYTES NFR BLD AUTO: 0.7 % (ref 0–0.43)
IMM GRANULOCYTES NFR BLD AUTO: 0.8 % (ref 0–0.43)
IMM GRANULOCYTES NFR BLD AUTO: 0.9 % (ref 0–0.43)
IMM GRANULOCYTES NFR BLD AUTO: 1 %
IMM GRANULOCYTES NFR BLD AUTO: 1 % (ref 0–0.43)
IMM GRANULOCYTES NFR BLD AUTO: 1 % (ref 0–0.43)
IMM GRANULOCYTES NFR BLD AUTO: 1.1 % (ref 0–0.43)
IMM GRANULOCYTES NFR BLD AUTO: 1.1 % (ref 0–0.43)
IMM GRANULOCYTES NFR BLD AUTO: 1.2 % (ref 0–0.43)
IMM GRANULOCYTES NFR BLD AUTO: 1.3 % (ref 0–0.43)
IMM GRANULOCYTES NFR BLD AUTO: 1.4 % (ref 0–0.43)
IMM GRANULOCYTES NFR BLD AUTO: 1.4 % (ref 0–0.43)
IMM GRANULOCYTES NFR BLD AUTO: 1.6 % (ref 0–0.43)
IMM GRANULOCYTES NFR BLD AUTO: 1.7 % (ref 0–0.43)
IMM GRANULOCYTES NFR BLD AUTO: 1.9 % (ref 0–0.43)
IMM GRANULOCYTES NFR BLD AUTO: 2.2 % (ref 0–0.43)
IMM GRANULOCYTES NFR BLD AUTO: 2.3 % (ref 0–0.43)
IMM GRANULOCYTES NFR BLD AUTO: 3.5 % (ref 0–0.43)
IMM GRANULOCYTES NFR BLD AUTO: 3.7 % (ref 0–0.43)
IMM GRANULOCYTES NFR BLD AUTO: 5.6 % (ref 0–0.43)
IMM. NE 1 SUSPECT FLAG (OHS): 10
IMM. NE 1 SUSPECT FLAG (OHS): 10
IMM. NE 2 SUSPECT FLAG (OHS): 10
IMM. NE 2 SUSPECT FLAG (OHS): 20
IMM. NE 2 SUSPECT FLAG (OHS): 30
IMM. NE 2 SUSPECT FLAG (OHS): 40
INDIRECT COOMBS GEL: NORMAL
INR BLD: 1.15 (ref 0–1.3)
INR BLD: 1.42 (ref 0–1.3)
INR BLD: 1.51 (ref 0–1.3)
INR BLD: 1.54 (ref 0–1.3)
INR BLD: 1.58 (ref 0–1.3)
INSTRUMENT WBC (OLG): 10 X10(3)/MCL
INSTRUMENT WBC (OLG): 11 X10(3)/MCL
INSTRUMENT WBC (OLG): 11.4 X10(3)/MCL
INSTRUMENT WBC (OLG): 12.4 X10(3)/MCL
INSTRUMENT WBC (OLG): 15 X10(3)/MCL
INSTRUMENT WBC (OLG): 17.6 X10(3)/MCL
INSTRUMENT WBC (OLG): 4.6 X10(3)/MCL
INSTRUMENT WBC (OLG): 6 X10(3)/MCL
INSTRUMENT WBC (OLG): 6.1 X10(3)/MCL
INSTRUMENT WBC (OLG): 7 X10(3)/MCL
INSTRUMENT WBC (OLG): 9.3 X10(3)/MCL
INTERVENTRICULAR SEPTUM: 0.8 CM (ref 0.6–1.1)
KETONES UR QL STRIP.AUTO: NEGATIVE MG/DL
KETONES UR QL STRIP.AUTO: NEGATIVE MG/DL
LACTATE SERPL-SCNC: 1.6 MMOL/L (ref 0.5–2.2)
LACTATE SERPL-SCNC: 1.6 MMOL/L (ref 0.5–2.2)
LACTATE SERPL-SCNC: 1.7 MMOL/L (ref 0.5–2.2)
LACTATE SERPL-SCNC: 1.8 MMOL/L (ref 0.5–2.2)
LACTATE SERPL-SCNC: 1.8 MMOL/L (ref 0.5–2.2)
LACTATE SERPL-SCNC: 2 MMOL/L (ref 0.5–2.2)
LACTATE SERPL-SCNC: 2.4 MMOL/L (ref 0.5–2.2)
LACTATE SERPL-SCNC: 2.7 MMOL/L (ref 0.5–2.2)
LACTATE SERPL-SCNC: 9.2 MMOL/L (ref 0.5–2.2)
LACTATE SERPL-SCNC: 9.9 MMOL/L (ref 0.5–2.2)
LDH SERPL-CCNC: 392 U/L (ref 125–220)
LDH SERPL-CCNC: 449 U/L (ref 125–220)
LDH SERPL-CCNC: 493 U/L (ref 125–220)
LDH SERPL-CCNC: 497 U/L (ref 125–220)
LDH SERPL-CCNC: 548 U/L (ref 125–220)
LDH SERPL-CCNC: 723 U/L (ref 125–220)
LDH SERPL-CCNC: 742 U/L (ref 125–220)
LEFT ATRIUM SIZE: 3.2 CM
LEFT ATRIUM SIZE: 3.2 CM
LEFT ATRIUM VOLUME INDEX MOD: 17.7 ML/M2
LEFT ATRIUM VOLUME MOD: 26.4 CM3
LEFT INTERNAL DIMENSION IN SYSTOLE: 2.3 CM (ref 2.1–4)
LEFT VENTRICLE DIASTOLIC VOLUME INDEX: 32.62 ML/M2
LEFT VENTRICLE DIASTOLIC VOLUME INDEX: 58.52 ML/M2
LEFT VENTRICLE DIASTOLIC VOLUME: 48.6 ML
LEFT VENTRICLE DIASTOLIC VOLUME: 87.2 ML
LEFT VENTRICLE MASS INDEX: 68 G/M2
LEFT VENTRICLE SYSTOLIC VOLUME INDEX: 12.9 ML/M2
LEFT VENTRICLE SYSTOLIC VOLUME INDEX: 57.6 ML/M2
LEFT VENTRICLE SYSTOLIC VOLUME: 19.2 ML
LEFT VENTRICLE SYSTOLIC VOLUME: 85.8 ML
LEFT VENTRICULAR INTERNAL DIMENSION IN DIASTOLE: 4.2 CM (ref 3.5–6)
LEFT VENTRICULAR MASS: 101.29 G
LEUKOCYTE ESTERASE UR QL STRIP.AUTO: ABNORMAL UNIT/L
LEUKOCYTE ESTERASE UR QL STRIP.AUTO: ABNORMAL UNIT/L
LIPEMIC INTERF INDEX SERPL-ACNC: 1
LIPEMIC INTERF INDEX SERPL-ACNC: 10
LIPEMIC INTERF INDEX SERPL-ACNC: 19
LIPEMIC INTERF INDEX SERPL-ACNC: 2
LIPEMIC INTERF INDEX SERPL-ACNC: 5
LIPEMIC INTERF INDEX SERPL-ACNC: 6
LOW EVENT # SUSPECT FLAG (OHS): 70
LV LATERAL E/E' RATIO: 5.71 M/S
LV LATERAL E/E' RATIO: 6.63 M/S
LV SEPTAL E/E' RATIO: 5.71 M/S
LV SEPTAL E/E' RATIO: 8.83 M/S
LVOT MG: 1 MMHG
LVOT MG: 3 MMHG
LVOT MV: 0.46 CM/S
LVOT MV: 0.83 CM/S
LYMPH ABN # BLD MANUAL: 0 %
LYMPHOCYTES # BLD AUTO: 0.24 X10(3)/MCL (ref 0.6–4.6)
LYMPHOCYTES # BLD AUTO: 0.36 X10(3)/MCL (ref 0.6–4.6)
LYMPHOCYTES # BLD AUTO: 0.49 X10(3)/MCL (ref 0.6–4.6)
LYMPHOCYTES # BLD AUTO: 0.66 X10(3)/MCL (ref 0.6–4.6)
LYMPHOCYTES # BLD AUTO: 0.9 10*3/UL (ref 0.6–4.6)
LYMPHOCYTES # BLD AUTO: 1 10*3/UL (ref 0.6–4.6)
LYMPHOCYTES # BLD AUTO: 1 10*3/UL (ref 0.6–4.6)
LYMPHOCYTES # BLD AUTO: 1.1 X10(3)/MCL (ref 0.6–4.6)
LYMPHOCYTES # BLD AUTO: 1.2 10*3/UL (ref 0.6–4.6)
LYMPHOCYTES # BLD AUTO: 1.2 10*3/UL (ref 0.6–4.6)
LYMPHOCYTES # BLD AUTO: 1.29 X10(3)/MCL (ref 0.6–4.6)
LYMPHOCYTES # BLD AUTO: 1.4 10*3/UL (ref 0.6–4.6)
LYMPHOCYTES NFR BLD AUTO: 10.1 %
LYMPHOCYTES NFR BLD AUTO: 14.6 %
LYMPHOCYTES NFR BLD AUTO: 2.8 %
LYMPHOCYTES NFR BLD AUTO: 23 %
LYMPHOCYTES NFR BLD AUTO: 25 %
LYMPHOCYTES NFR BLD AUTO: 27 %
LYMPHOCYTES NFR BLD AUTO: 27 %
LYMPHOCYTES NFR BLD AUTO: 3.5 %
LYMPHOCYTES NFR BLD AUTO: 32 %
LYMPHOCYTES NFR BLD AUTO: 38 %
LYMPHOCYTES NFR BLD AUTO: 48 %
LYMPHOCYTES NFR BLD AUTO: 7.6 %
LYMPHOCYTES NFR BLD MANUAL: 0.11 X10(3)/MCL
LYMPHOCYTES NFR BLD MANUAL: 0.11 X10(3)/MCL
LYMPHOCYTES NFR BLD MANUAL: 0.45 X10(3)/MCL
LYMPHOCYTES NFR BLD MANUAL: 0.49 X10(3)/MCL
LYMPHOCYTES NFR BLD MANUAL: 0.64 X10(3)/MCL
LYMPHOCYTES NFR BLD MANUAL: 0.65 X10(3)/MCL
LYMPHOCYTES NFR BLD MANUAL: 0.74 X10(3)/MCL
LYMPHOCYTES NFR BLD MANUAL: 0.79 X10(3)/MCL
LYMPHOCYTES NFR BLD MANUAL: 0.83 X10(3)/MCL
LYMPHOCYTES NFR BLD MANUAL: 1 %
LYMPHOCYTES NFR BLD MANUAL: 1 %
LYMPHOCYTES NFR BLD MANUAL: 1.02 X10(3)/MCL
LYMPHOCYTES NFR BLD MANUAL: 1.1 X10(3)/MCL
LYMPHOCYTES NFR BLD MANUAL: 1.58 X10(3)/MCL
LYMPHOCYTES NFR BLD MANUAL: 11 %
LYMPHOCYTES NFR BLD MANUAL: 13 %
LYMPHOCYTES NFR BLD MANUAL: 13 %
LYMPHOCYTES NFR BLD MANUAL: 14 %
LYMPHOCYTES NFR BLD MANUAL: 17 %
LYMPHOCYTES NFR BLD MANUAL: 3 %
LYMPHOCYTES NFR BLD MANUAL: 4 %
LYMPHOCYTES NFR BLD MANUAL: 5 %
LYMPHOCYTES NFR BLD MANUAL: 6 %
LYMPHOCYTES NFR BLD MANUAL: 7 %
LYMPHOCYTES NFR BLD MANUAL: 7 %
LYMPHOCYTES NFR BLD MANUAL: 8 % (ref 13–40)
LYMPHOCYTES NFR BLD MANUAL: 9 %
MACROCYTES BLD QL SMEAR: ABNORMAL
MACROCYTES BLD QL SMEAR: SLIGHT
MAGNESIUM SERPL-MCNC: 1.6 MG/DL (ref 1.6–2.6)
MAGNESIUM SERPL-MCNC: 1.7 MG/DL (ref 1.6–2.6)
MAGNESIUM SERPL-MCNC: 1.8 MG/DL (ref 1.6–2.6)
MAGNESIUM SERPL-MCNC: 2 MG/DL (ref 1.6–2.6)
MAGNESIUM SERPL-MCNC: 2 MG/DL (ref 1.6–2.6)
MAGNESIUM SERPL-MCNC: 2.2 MG/DL (ref 1.6–2.6)
MAGNESIUM SERPL-MCNC: 2.2 MG/DL (ref 1.6–2.6)
MANUAL DIFF? (OHS): NO
MCH RBC QN AUTO: 28.4 PG (ref 26–34)
MCH RBC QN AUTO: 28.5 PG (ref 27–31)
MCH RBC QN AUTO: 28.6 PG (ref 27–31)
MCH RBC QN AUTO: 28.7 PG (ref 26–34)
MCH RBC QN AUTO: 28.7 PG (ref 27–31)
MCH RBC QN AUTO: 28.7 PG (ref 27–31)
MCH RBC QN AUTO: 28.8 PG (ref 27–31)
MCH RBC QN AUTO: 28.9 PG (ref 27–31)
MCH RBC QN AUTO: 29.1 PG (ref 27–31)
MCH RBC QN AUTO: 29.2 PG (ref 27–31)
MCH RBC QN AUTO: 29.2 PG (ref 27–31)
MCH RBC QN AUTO: 29.3 PG (ref 27–31)
MCH RBC QN AUTO: 29.3 PG (ref 27–31)
MCH RBC QN AUTO: 29.4 PG (ref 27–31)
MCH RBC QN AUTO: 29.4 PG (ref 27–31)
MCH RBC QN AUTO: 29.5 PG (ref 27–31)
MCH RBC QN AUTO: 29.5 PG (ref 27–31)
MCH RBC QN AUTO: 29.8 PG (ref 27–31)
MCH RBC QN AUTO: 29.8 PG (ref 27–31)
MCH RBC QN AUTO: 29.9 PG (ref 26–34)
MCH RBC QN AUTO: 30 PG (ref 26–34)
MCH RBC QN AUTO: 30 PG (ref 27–31)
MCH RBC QN AUTO: 30.1 PG (ref 26–34)
MCH RBC QN AUTO: 30.2 PG (ref 27–31)
MCH RBC QN AUTO: 30.3 PG (ref 27–31)
MCH RBC QN AUTO: 31.1 PG (ref 26–34)
MCH RBC QN AUTO: 32.3 PG (ref 26–34)
MCHC RBC AUTO-ENTMCNC: 30.1 G/DL (ref 31–37)
MCHC RBC AUTO-ENTMCNC: 30.4 G/DL (ref 31–37)
MCHC RBC AUTO-ENTMCNC: 30.5 MG/DL (ref 33–36)
MCHC RBC AUTO-ENTMCNC: 30.9 MG/DL (ref 33–36)
MCHC RBC AUTO-ENTMCNC: 31.1 G/DL (ref 31–37)
MCHC RBC AUTO-ENTMCNC: 31.3 G/DL (ref 31–37)
MCHC RBC AUTO-ENTMCNC: 31.5 MG/DL (ref 33–36)
MCHC RBC AUTO-ENTMCNC: 31.5 MG/DL (ref 33–36)
MCHC RBC AUTO-ENTMCNC: 31.7 G/DL (ref 31–37)
MCHC RBC AUTO-ENTMCNC: 31.8 G/DL (ref 31–37)
MCHC RBC AUTO-ENTMCNC: 32 MG/DL (ref 33–36)
MCHC RBC AUTO-ENTMCNC: 32.1 MG/DL (ref 33–36)
MCHC RBC AUTO-ENTMCNC: 32.1 MG/DL (ref 33–36)
MCHC RBC AUTO-ENTMCNC: 32.2 GM/DL (ref 31–37)
MCHC RBC AUTO-ENTMCNC: 32.2 MG/DL (ref 33–36)
MCHC RBC AUTO-ENTMCNC: 32.3 MG/DL (ref 33–36)
MCHC RBC AUTO-ENTMCNC: 32.4 MG/DL (ref 33–36)
MCHC RBC AUTO-ENTMCNC: 32.9 MG/DL (ref 33–36)
MCHC RBC AUTO-ENTMCNC: 32.9 MG/DL (ref 33–36)
MCHC RBC AUTO-ENTMCNC: 33.1 MG/DL (ref 33–36)
MCHC RBC AUTO-ENTMCNC: 34 MG/DL (ref 33–36)
MCHC RBC AUTO-ENTMCNC: 34.1 MG/DL (ref 33–36)
MCHC RBC AUTO-ENTMCNC: 34.2 MG/DL (ref 33–36)
MCHC RBC AUTO-ENTMCNC: 34.2 MG/DL (ref 33–36)
MCHC RBC AUTO-ENTMCNC: 34.3 MG/DL (ref 33–36)
MCHC RBC AUTO-ENTMCNC: 34.7 MG/DL (ref 33–36)
MCHC RBC AUTO-ENTMCNC: 34.9 MG/DL (ref 33–36)
MCHC RBC AUTO-ENTMCNC: 35.2 MG/DL (ref 33–36)
MCHC RBC AUTO-ENTMCNC: 35.5 MG/DL (ref 33–36)
MCV RBC AUTO: 100.3 FL (ref 80–100)
MCV RBC AUTO: 81.4 FL (ref 80–94)
MCV RBC AUTO: 84.4 FL (ref 80–94)
MCV RBC AUTO: 85.1 FL (ref 80–94)
MCV RBC AUTO: 85.2 FL (ref 80–94)
MCV RBC AUTO: 86 FL (ref 80–94)
MCV RBC AUTO: 86.2 FL (ref 80–94)
MCV RBC AUTO: 86.9 FL (ref 80–94)
MCV RBC AUTO: 87.6 FL (ref 80–94)
MCV RBC AUTO: 88 FL (ref 80–94)
MCV RBC AUTO: 88.3 FL (ref 80–94)
MCV RBC AUTO: 88.9 FL (ref 80–94)
MCV RBC AUTO: 89 FL (ref 80–94)
MCV RBC AUTO: 89.1 FL (ref 80–94)
MCV RBC AUTO: 89.3 FL (ref 80–94)
MCV RBC AUTO: 90 FL (ref 80–94)
MCV RBC AUTO: 90.7 FL (ref 80–94)
MCV RBC AUTO: 90.9 FL (ref 80–94)
MCV RBC AUTO: 91 FL (ref 80–94)
MCV RBC AUTO: 91.3 FL (ref 80–94)
MCV RBC AUTO: 92.9 FL (ref 80–94)
MCV RBC AUTO: 93.8 FL (ref 80–94)
MCV RBC AUTO: 94.3 FL (ref 80–100)
MCV RBC AUTO: 94.5 FL (ref 80–94)
MCV RBC AUTO: 94.6 FL (ref 80–100)
MCV RBC AUTO: 94.8 FL (ref 80–100)
MCV RBC AUTO: 95.6 FL (ref 80–100)
MCV RBC AUTO: 96.5 FL (ref 80–100)
MCV RBC AUTO: 97.5 FL (ref 80–100)
MDMA UR QL SCN: NEGATIVE
METAMYELOCYTES NFR BLD MANUAL: 0 %
METAMYELOCYTES NFR BLD MANUAL: 1 %
METAMYELOCYTES NFR BLD MANUAL: 11 %
METAMYELOCYTES NFR BLD MANUAL: 11 %
METAMYELOCYTES NFR BLD MANUAL: 3 %
METAMYELOCYTES NFR BLD MANUAL: 4 %
METAMYELOCYTES NFR BLD MANUAL: 7 %
MICROCYTES BLD QL SMEAR: ABNORMAL
MICROCYTES BLD QL SMEAR: ABNORMAL
MO BLASTS SUSPECT FLAG (OHS): 40
MO BLASTS SUSPECT FLAG (OHS): 60
MO BLASTS SUSPECT FLAG (OHS): 60
MONOCYTES # BLD AUTO: 0.08 X10(3)/MCL (ref 0.1–1.3)
MONOCYTES # BLD AUTO: 0.17 X10(3)/MCL (ref 0.1–1.3)
MONOCYTES # BLD AUTO: 0.29 X10(3)/MCL (ref 0.1–1.3)
MONOCYTES # BLD AUTO: 0.4 X10(3)/MCL (ref 0.1–1.3)
MONOCYTES # BLD AUTO: 0.5 10*3/UL (ref 0.1–1.3)
MONOCYTES # BLD AUTO: 0.53 X10(3)/MCL (ref 0.1–1.3)
MONOCYTES # BLD AUTO: 0.6 10*3/UL (ref 0.1–1.3)
MONOCYTES # BLD AUTO: 0.68 X10(3)/MCL (ref 0.1–1.3)
MONOCYTES # BLD AUTO: 1 10*3/UL (ref 0.1–1.3)
MONOCYTES NFR BLD AUTO: 1.6 %
MONOCYTES NFR BLD AUTO: 1.7 %
MONOCYTES NFR BLD AUTO: 13 %
MONOCYTES NFR BLD AUTO: 14 %
MONOCYTES NFR BLD AUTO: 15 %
MONOCYTES NFR BLD AUTO: 16 %
MONOCYTES NFR BLD AUTO: 16 %
MONOCYTES NFR BLD AUTO: 17 %
MONOCYTES NFR BLD AUTO: 22 %
MONOCYTES NFR BLD AUTO: 3.4 %
MONOCYTES NFR BLD AUTO: 6 %
MONOCYTES NFR BLD AUTO: 7.9 %
MONOCYTES NFR BLD MANUAL: 0.05 X10(3)/MCL (ref 0.1–1.3)
MONOCYTES NFR BLD MANUAL: 0.06 X10(3)/MCL (ref 0.1–1.3)
MONOCYTES NFR BLD MANUAL: 0.1 X10(3)/MCL (ref 0.1–1.3)
MONOCYTES NFR BLD MANUAL: 0.11 X10(3)/MCL (ref 0.1–1.3)
MONOCYTES NFR BLD MANUAL: 0.12 X10(3)/MCL (ref 0.1–1.3)
MONOCYTES NFR BLD MANUAL: 0.14 X10(3)/MCL (ref 0.1–1.3)
MONOCYTES NFR BLD MANUAL: 0.15 X10(3)/MCL (ref 0.1–1.3)
MONOCYTES NFR BLD MANUAL: 0.18 X10(3)/MCL (ref 0.1–1.3)
MONOCYTES NFR BLD MANUAL: 0.28 X10(3)/MCL (ref 0.1–1.3)
MONOCYTES NFR BLD MANUAL: 0.33 X10(3)/MCL (ref 0.1–1.3)
MONOCYTES NFR BLD MANUAL: 0.36 X10(3)/MCL (ref 0.1–1.3)
MONOCYTES NFR BLD MANUAL: 0.42 X10(3)/MCL (ref 0.1–1.3)
MONOCYTES NFR BLD MANUAL: 1 %
MONOCYTES NFR BLD MANUAL: 2 %
MONOCYTES NFR BLD MANUAL: 3 %
MONOCYTES NFR BLD MANUAL: 4 %
MONOCYTES NFR BLD MANUAL: 4 %
MONOCYTES NFR BLD MANUAL: 4 % (ref 2–11)
MONOCYTES NFR BLD MANUAL: 6 %
MV MEAN GRADIENT: 1 MMHG
MV MEAN GRADIENT: 2 MMHG
MV PEAK A VEL: 0.65 M/S
MV PEAK A VEL: 0.66 M/S
MV PEAK E VEL: 0.53 M/S
MV PEAK E VEL: 0.8 M/S
MV PEAK GRADIENT: 2 MMHG
MV PEAK GRADIENT: 4 MMHG
MV STENOSIS PRESSURE HALF TIME: 50 MS
MV STENOSIS PRESSURE HALF TIME: 70 MS
MV VALVE AREA BY CONTINUITY EQUATION: 1.77 CM2
MV VALVE AREA BY CONTINUITY EQUATION: 2.3 CM2
MV VALVE AREA P 1/2 METHOD: 3.14 CM2
MV VALVE AREA P 1/2 METHOD: 4.4 CM2
MYELOCYTES NFR BLD MANUAL: 0 %
MYELOCYTES NFR BLD MANUAL: 1 %
MYELOCYTES NFR BLD MANUAL: 3 %
NEUTROPHILS # BLD AUTO: 0.92 10*3/UL (ref 2.1–9.2)
NEUTROPHILS # BLD AUTO: 1.31 10*3/UL (ref 2.1–9.2)
NEUTROPHILS # BLD AUTO: 1.66 X10(3)/MCL (ref 2.1–9.2)
NEUTROPHILS # BLD AUTO: 1.86 10*3/UL (ref 2.1–9.2)
NEUTROPHILS # BLD AUTO: 15.8 X10(3)/MCL (ref 2.1–9.2)
NEUTROPHILS # BLD AUTO: 2 X10(3)/MCL (ref 2.1–9.2)
NEUTROPHILS # BLD AUTO: 2.08 10*3/UL (ref 2.1–9.2)
NEUTROPHILS # BLD AUTO: 2.36 10*3/UL (ref 2.1–9.2)
NEUTROPHILS # BLD AUTO: 2.56 10*3/UL (ref 2.1–9.2)
NEUTROPHILS # BLD AUTO: 6.9 X10(3)/MCL (ref 2.1–9.2)
NEUTROPHILS # BLD AUTO: 7.2 X10(3)/MCL (ref 2.1–9.2)
NEUTROPHILS # BLD AUTO: 9.6 X10(3)/MCL (ref 2.1–9.2)
NEUTROPHILS NFR BLD AUTO: 32 %
NEUTROPHILS NFR BLD AUTO: 42 %
NEUTROPHILS NFR BLD AUTO: 50 %
NEUTROPHILS NFR BLD AUTO: 51 %
NEUTROPHILS NFR BLD AUTO: 54 %
NEUTROPHILS NFR BLD AUTO: 56 %
NEUTROPHILS NFR BLD AUTO: 60 %
NEUTROPHILS NFR BLD AUTO: 78.2 %
NEUTROPHILS NFR BLD AUTO: 83.3 %
NEUTROPHILS NFR BLD AUTO: 85.7 %
NEUTROPHILS NFR BLD AUTO: 90.8 %
NEUTROPHILS NFR BLD AUTO: 92.3 %
NEUTROPHILS NFR BLD MANUAL: 67 %
NEUTROPHILS NFR BLD MANUAL: 75 %
NEUTROPHILS NFR BLD MANUAL: 79 %
NEUTROPHILS NFR BLD MANUAL: 81 %
NEUTROPHILS NFR BLD MANUAL: 81 %
NEUTROPHILS NFR BLD MANUAL: 82 %
NEUTROPHILS NFR BLD MANUAL: 84 % (ref 47–80)
NEUTROPHILS NFR BLD MANUAL: 85 %
NEUTROPHILS NFR BLD MANUAL: 88 %
NEUTROPHILS NFR BLD MANUAL: 90 %
NEUTROPHILS NFR BLD MANUAL: 91 %
NEUTROPHILS NFR BLD MANUAL: 92 %
NEUTROPHILS NFR BLD MANUAL: 94 %
NEUTROPHILS NFR BLD MANUAL: 97 %
NEUTS BAND NFR BLD MANUAL: 0 %
NEUTS BAND NFR BLD MANUAL: 0 % (ref 0–11)
NITRITE UR QL STRIP.AUTO: NEGATIVE
NITRITE UR QL STRIP.AUTO: NEGATIVE
NRBC BLD AUTO-RTO: 0 %
NRBC BLD AUTO-RTO: 0 % (ref 0–0.2)
NRBC BLD AUTO-RTO: 0.2 %
NRBC BLD AUTO-RTO: 0.2 %
NRBC BLD AUTO-RTO: 0.3 %
NRBC BLD AUTO-RTO: 0.4 %
NRBC BLD AUTO-RTO: 0.4 %
NRBC BLD AUTO-RTO: 0.5 %
NRBC BLD AUTO-RTO: 0.5 %
NRBC BLD AUTO-RTO: 0.7 %
NRBC BLD AUTO-RTO: 0.8 %
NRBC BLD AUTO-RTO: 1.1 %
NRBC BLD AUTO-RTO: 1.5 %
NRBC BLD AUTO-RTO: 1.8 %
NRBC BLD AUTO-RTO: 2.4 %
NRBC BLD AUTO-RTO: 3.3 %
NRBC BLD AUTO-RTO: 3.8 %
NRBC BLD MANUAL-RTO: 1 %
NRBC BLD MANUAL-RTO: 10 %
NRBC BLD MANUAL-RTO: 2 %
NRBC BLD MANUAL-RTO: 6 %
NRBC BLD MANUAL-RTO: 8 %
OPIATES UR QL SCN: NEGATIVE
OTHER CELLS MANUAL: 0 %
OVALOCYTES (OLG): ABNORMAL
PAPPENHEIMER BODIES (OLG): ABNORMAL
PCO2 BLDA: 29 MMHG
PCO2 BLDA: 30 MMHG
PCO2 BLDA: 34 MMHG (ref 35–45)
PCO2 BLDA: 35 MMHG
PCO2 BLDA: 38 MMHG
PCO2 BLDA: 41 MMHG
PCO2 BLDA: 42 MMHG
PCO2 BLDA: 43 MMHG
PCO2 BLDA: 43 MMHG
PCO2 BLDA: 45 MMHG (ref 35–45)
PCO2 BLDA: 49 MMHG
PCO2 BLDA: 52 MMHG
PCO2 BLDA: 54 MMHG
PCO2 BLDA: 60 MMHG
PCO2 BLDA: NORMAL MM[HG]
PCP UR QL: NEGATIVE
PH SMN: 7.11 [PH] (ref 7.29–7.61)
PH SMN: 7.13 [PH] (ref 7.29–7.61)
PH SMN: 7.14 [PH] (ref 7.29–7.61)
PH SMN: 7.15 [PH] (ref 7.29–7.61)
PH SMN: 7.22 [PH] (ref 7.29–7.61)
PH SMN: 7.25 [PH] (ref 7.29–7.61)
PH SMN: 7.25 [PH] (ref 7.29–7.61)
PH SMN: 7.27 [PH] (ref 7.29–7.61)
PH SMN: 7.28 [PH] (ref 7.29–7.61)
PH SMN: 7.31 [PH] (ref 7.35–7.45)
PH SMN: 7.4 [PH]
PH SMN: 7.42 [PH]
PH SMN: 7.44 [PH] (ref 7.35–7.45)
PH SMN: 7.47 [PH] (ref 7.35–7.45)
PH SMN: NORMAL [PH]
PH UR STRIP.AUTO: 5 [PH]
PH UR STRIP.AUTO: 5.5 [PH]
PH UR: 5.5 [PH] (ref 3–11)
PHOSPHATE SERPL-MCNC: 0.8 MG/DL (ref 2.3–4.7)
PHOSPHATE SERPL-MCNC: 1.3 MG/DL (ref 2.3–4.7)
PHOSPHATE SERPL-MCNC: 2.5 MG/DL (ref 2.3–4.7)
PHOSPHATE SERPL-MCNC: 2.5 MG/DL (ref 2.3–4.7)
PHOSPHATE SERPL-MCNC: 3.6 MG/DL (ref 2.3–4.7)
PHOSPHATE SERPL-MCNC: 3.9 MG/DL (ref 2.3–4.7)
PISA AR MAX VEL: 2.84 M/S
PISA MRMAX VEL: 4.15 M/S
PISA TR MAX VEL: 3.35 M/S
PISA TR MAX VEL: 3.43 M/S
PLASMA CELLS BLD QL SMEAR: 0 %
PLATELET # BLD AUTO: 104 X10(3)/MCL (ref 130–400)
PLATELET # BLD AUTO: 107 X10(3)/MCL (ref 130–400)
PLATELET # BLD AUTO: 178 10*3/UL (ref 130–400)
PLATELET # BLD AUTO: 255 X10(3)/MCL (ref 130–400)
PLATELET # BLD AUTO: 273 10*3/UL (ref 130–400)
PLATELET # BLD AUTO: 280 X10(3)/MCL (ref 130–400)
PLATELET # BLD AUTO: 29 X10(3)/MCL (ref 130–400)
PLATELET # BLD AUTO: 315 10*3/UL (ref 130–400)
PLATELET # BLD AUTO: 334 10*3/UL (ref 130–400)
PLATELET # BLD AUTO: 345 10*3/UL (ref 130–400)
PLATELET # BLD AUTO: 392 10*3/UL (ref 130–400)
PLATELET # BLD AUTO: 44 X10(3)/MCL (ref 130–400)
PLATELET # BLD AUTO: 45 X10(3)/MCL (ref 130–400)
PLATELET # BLD AUTO: 46 X10(3)/MCL (ref 130–400)
PLATELET # BLD AUTO: 47 X10(3)/MCL (ref 130–400)
PLATELET # BLD AUTO: 49 X10(3)/MCL (ref 130–400)
PLATELET # BLD AUTO: 49 X10(3)/MCL (ref 130–400)
PLATELET # BLD AUTO: 56 X10(3)/MCL (ref 130–400)
PLATELET # BLD AUTO: 59 X10(3)/MCL (ref 130–400)
PLATELET # BLD AUTO: 60 X10(3)/MCL (ref 130–400)
PLATELET # BLD AUTO: 63 X10(3)/MCL (ref 130–400)
PLATELET # BLD AUTO: 63 X10(3)/MCL (ref 130–400)
PLATELET # BLD AUTO: 66 X10(3)/MCL (ref 130–400)
PLATELET # BLD AUTO: 67 X10(3)/MCL (ref 130–400)
PLATELET # BLD AUTO: 68 X10(3)/MCL (ref 130–400)
PLATELET # BLD AUTO: 71 X10(3)/MCL (ref 130–400)
PLATELET # BLD AUTO: 85 X10(3)/MCL (ref 130–400)
PLATELET # BLD AUTO: 89 X10(3)/MCL (ref 130–400)
PLATELET # BLD AUTO: 97 X10(3)/MCL (ref 130–400)
PLATELET # BLD EST: ABNORMAL 10*3/UL
PLATELET # BLD EST: ADEQUATE 10*3/UL
PLATELET CLUMPS SUSPECT FLAG (OHS): 10
PLATELET CLUMPS SUSPECT FLAG (OHS): 30
PMV BLD AUTO: 10 FL (ref 7.4–10.4)
PMV BLD AUTO: 10.1 FL (ref 7.4–10.4)
PMV BLD AUTO: 10.2 FL (ref 7.4–10.4)
PMV BLD AUTO: 10.3 FL (ref 7.4–10.4)
PMV BLD AUTO: 10.5 FL (ref 9.4–12.4)
PMV BLD AUTO: 10.7 FL (ref 9.4–12.4)
PMV BLD AUTO: 10.9 FL (ref 9.4–12.4)
PMV BLD AUTO: 11.1 FL (ref 9.4–12.4)
PMV BLD AUTO: 11.3 FL (ref 9.4–12.4)
PMV BLD AUTO: 11.4 FL (ref 9.4–12.4)
PMV BLD AUTO: 11.5 FL (ref 9.4–12.4)
PMV BLD AUTO: 11.9 FL (ref 9.4–12.4)
PMV BLD AUTO: 12.3 FL (ref 9.4–12.4)
PMV BLD AUTO: 12.4 FL (ref 9.4–12.4)
PMV BLD AUTO: 12.5 FL (ref 9.4–12.4)
PMV BLD AUTO: 12.8 FL (ref 9.4–12.4)
PMV BLD AUTO: 12.8 FL (ref 9.4–12.4)
PMV BLD AUTO: 13.4 FL (ref 9.4–12.4)
PMV BLD AUTO: 14 FL (ref 9.4–12.4)
PMV BLD AUTO: 8.9 FL (ref 9.4–12.4)
PMV BLD AUTO: 9.6 FL (ref 7.4–10.4)
PMV BLD AUTO: 9.6 FL (ref 7.4–10.4)
PMV BLD AUTO: 9.6 FL (ref 9.4–12.4)
PMV BLD AUTO: 9.7 FL (ref 9.4–12.4)
PMV BLD AUTO: 9.9 FL (ref 7.4–10.4)
PMV BLD AUTO: ABNORMAL FL
PMV BLD AUTO: ABNORMAL FL
PO2 BLDA: 101 MMHG
PO2 BLDA: 112 MMHG
PO2 BLDA: 122 MMHG
PO2 BLDA: 166 MMHG
PO2 BLDA: 47 MMHG
PO2 BLDA: 54 MMHG
PO2 BLDA: 59 MMHG
PO2 BLDA: 62 MMHG
PO2 BLDA: 65 MMHG
PO2 BLDA: 77 MMHG
PO2 BLDA: 79 MMHG
PO2 BLDA: 83 MMHG
PO2 BLDA: 84 MMHG (ref 80–100)
PO2 BLDA: 96 MMHG
PO2 BLDA: NORMAL MM[HG]
POC BASE DEFICIT: -0.9 MMOL/L (ref -2–2)
POC BASE DEFICIT: -1 MMOL/L
POC BASE DEFICIT: -1.3 MMOL/L
POC BASE DEFICIT: -10.8 MMOL/L
POC BASE DEFICIT: -12.2 MMOL/L
POC BASE DEFICIT: -12.4 MMOL/L
POC BASE DEFICIT: -5.6 MMOL/L
POC BASE DEFICIT: -6.3 MMOL/L
POC BASE DEFICIT: -6.6 MMOL/L
POC BASE DEFICIT: -7.3 MMOL/L
POC BASE DEFICIT: -8 MMOL/L
POC BASE DEFICIT: -8.8 MMOL/L
POC BASE DEFICIT: -9 MMOL/L (ref -2–2)
POC BASE DEFICIT: -9.6 MMOL/L
POC COHB: 1.6 %
POC COHB: 3.6 %
POC COHB: NORMAL
POC HCO3: 16.7 MMOL/L
POC HCO3: 17.2 MMOL/L
POC HCO3: 18 MMOL/L
POC HCO3: 18 MMOL/L
POC HCO3: 18.1 MMOL/L
POC HCO3: 18.9 MMOL/L
POC HCO3: 19.1 MMOL/L
POC HCO3: 19.3 MMOL/L
POC HCO3: 20 MMOL/L
POC HCO3: 20.2 MMOL/L
POC HCO3: 21.8 MMOL/L
POC HCO3: 22.7 MMOL/L
POC IONIZED CALCIUM: 1 MMOL/L (ref 1.06–1.42)
POC IONIZED CALCIUM: 1.04 MMOL/L (ref 1.12–1.23)
POC IONIZED CALCIUM: 1.1 MMOL/L (ref 1.12–1.23)
POC IONIZED CALCIUM: 1.1 MMOL/L (ref 1.12–1.23)
POC IONIZED CALCIUM: 1.11 MMOL/L (ref 1.12–1.23)
POC IONIZED CALCIUM: 1.11 MMOL/L (ref 1.12–1.23)
POC IONIZED CALCIUM: 1.12 MMOL/L
POC IONIZED CALCIUM: 1.13 MMOL/L (ref 1.12–1.23)
POC IONIZED CALCIUM: 1.14 MMOL/L
POC IONIZED CALCIUM: 1.16 MMOL/L
POC IONIZED CALCIUM: NORMAL
POC METHB: 0.8 % (ref 0.4–2)
POC METHB: 1.6 % (ref 0.4–1.5)
POC METHB: NORMAL
POC O2HB: 90.6 % (ref 94–97)
POC O2HB: 94.1 % (ref 94–97)
POC O2HB: NORMAL
POC SATURATED O2: 83 %
POC SATURATED O2: 84 %
POC SATURATED O2: 86 %
POC SATURATED O2: 87 %
POC SATURATED O2: 88.9 %
POC SATURATED O2: 93.9 %
POC SATURATED O2: 94 %
POC SATURATED O2: 95 %
POC SATURATED O2: 96 %
POC SATURATED O2: 97 %
POC SATURATED O2: 98 %
POC SATURATED O2: 99 %
POC TCO2 (MEASURED): 19 MMOL/L (ref 23–29)
POC TEMPERATURE: 37 C
POC TEMPERATURE: 37 °C
POC TEMPERATURE: 37 °C
POCT GLUCOSE: 129 MG/DL (ref 70–110)
POCT GLUCOSE: 131 MG/DL (ref 70–110)
POCT GLUCOSE: 200 MG/DL (ref 70–110)
POCT GLUCOSE: 65 MG/DL (ref 70–110)
POIKILOCYTOSIS BLD QL SMEAR: ABNORMAL
POLYCHROMASIA BLD QL SMEAR: ABNORMAL
POTASSIUM BLD-SCNC: 3.2 MMOL/L
POTASSIUM BLD-SCNC: 3.5 MMOL/L
POTASSIUM BLD-SCNC: 3.5 MMOL/L
POTASSIUM BLD-SCNC: 3.7 MMOL/L
POTASSIUM BLD-SCNC: 3.7 MMOL/L
POTASSIUM BLD-SCNC: 4 MMOL/L
POTASSIUM BLD-SCNC: 4 MMOL/L (ref 3.5–5)
POTASSIUM BLD-SCNC: 4 MMOL/L (ref 3.5–5)
POTASSIUM BLD-SCNC: 4.1 MMOL/L
POTASSIUM BLD-SCNC: 4.2 MMOL/L
POTASSIUM BLD-SCNC: 4.3 MMOL/L (ref 3.5–5.1)
POTASSIUM BLD-SCNC: 4.6 MMOL/L
POTASSIUM BLD-SCNC: 4.6 MMOL/L
POTASSIUM BLD-SCNC: 5 MMOL/L
POTASSIUM BLD-SCNC: 5.2 MMOL/L
POTASSIUM BLD-SCNC: NORMAL MMOL/L
POTASSIUM SERPL-SCNC: 3.3 MMOL/L (ref 3.5–5.1)
POTASSIUM SERPL-SCNC: 3.4 MMOL/L (ref 3.5–5.1)
POTASSIUM SERPL-SCNC: 3.5 MMOL/L (ref 3.5–5.1)
POTASSIUM SERPL-SCNC: 3.6 MMOL/L (ref 3.5–5.1)
POTASSIUM SERPL-SCNC: 3.7 MMOL/L (ref 3.5–5.1)
POTASSIUM SERPL-SCNC: 3.8 MMOL/L (ref 3.5–5.1)
POTASSIUM SERPL-SCNC: 4 MMOL/L (ref 3.5–5.1)
POTASSIUM SERPL-SCNC: 4.1 MMOL/L (ref 3.5–5.1)
POTASSIUM SERPL-SCNC: 4.3 MMOL/L (ref 3.5–5.1)
POTASSIUM SERPL-SCNC: 4.4 MMOL/L (ref 3.5–5.1)
POTASSIUM SERPL-SCNC: 4.4 MMOL/L (ref 3.5–5.1)
POTASSIUM SERPL-SCNC: 4.5 MMOL/L (ref 3.5–5.1)
POTASSIUM SERPL-SCNC: 4.6 MMOL/L (ref 3.5–5.1)
POTASSIUM SERPL-SCNC: 4.7 MMOL/L (ref 3.5–5.1)
POTASSIUM SERPL-SCNC: 4.9 MMOL/L (ref 3.5–5.1)
POTASSIUM SERPL-SCNC: 5 MMOL/L (ref 3.5–5.1)
POTASSIUM SERPL-SCNC: 5.4 MMOL/L (ref 3.5–5.1)
POTASSIUM SERPL-SCNC: 5.5 MMOL/L (ref 3.5–5.1)
POTASSIUM SERPL-SCNC: 6.8 MMOL/L (ref 3.5–5.1)
POTASSIUM UR-SCNC: 35 MMOL/L
PROLYMPHOCYTES # BLD MANUAL: 0 %
PROMYELOCYTES # BLD MANUAL: 0 %
PROMYELOCYTES # BLD MANUAL: 1 %
PROMYELOCYTES # BLD MANUAL: 1 %
PROMYELOCYTES # BLD MANUAL: 2 %
PROMYELOCYTES # BLD MANUAL: 2 %
PROT SERPL-MCNC: 3.5 GM/DL (ref 6.4–8.3)
PROT SERPL-MCNC: 4.4 GM/DL (ref 6.4–8.3)
PROT SERPL-MCNC: 4.4 GM/DL (ref 6.4–8.3)
PROT SERPL-MCNC: 4.5 GM/DL (ref 6.4–8.3)
PROT SERPL-MCNC: 4.6 GM/DL (ref 6.4–8.3)
PROT SERPL-MCNC: 4.6 GM/DL (ref 6.4–8.3)
PROT SERPL-MCNC: 4.7 GM/DL (ref 6.4–8.3)
PROT SERPL-MCNC: 4.7 GM/DL (ref 6.4–8.3)
PROT SERPL-MCNC: 5 GM/DL (ref 6.4–8.3)
PROT SERPL-MCNC: 5.1 GM/DL (ref 6.4–8.3)
PROT SERPL-MCNC: 5.2 GM/DL (ref 6.4–8.3)
PROT SERPL-MCNC: 5.4 GM/DL (ref 6.4–8.3)
PROT SERPL-MCNC: 5.5 GM/DL (ref 6.4–8.3)
PROT SERPL-MCNC: 5.5 GM/DL (ref 6.4–8.3)
PROT SERPL-MCNC: 5.9 GM/DL (ref 6.4–8.3)
PROT SERPL-MCNC: 6.7 GM/DL (ref 6.4–8.3)
PROT SERPL-MCNC: 7.6 G/DL (ref 6.4–8.3)
PROT SERPL-MCNC: 7.9 G/DL (ref 6.4–8.3)
PROT SERPL-MCNC: 7.9 GM/DL (ref 6.4–8.3)
PROT SERPL-MCNC: 8 G/DL (ref 6.4–8.3)
PROT SERPL-MCNC: 8 G/DL (ref 6.4–8.3)
PROT SERPL-MCNC: 8.3 G/DL (ref 6.4–8.3)
PROT SERPL-MCNC: 8.3 G/DL (ref 6.4–8.3)
PROT UR QL STRIP.AUTO: ABNORMAL MG/DL
PROT UR QL STRIP.AUTO: ABNORMAL MG/DL
PROTHROMBIN TIME: 14.4 SECONDS (ref 12.5–14.5)
PROTHROMBIN TIME: 17 SECONDS (ref 12.5–14.5)
PROTHROMBIN TIME: 17.8 SECONDS (ref 12.5–14.5)
PROTHROMBIN TIME: 18.1 SECONDS (ref 12.5–14.5)
PROTHROMBIN TIME: 18.5 SECONDS (ref 12.5–14.5)
PV PEAK VELOCITY: 0.76 CM/S
PV PEAK VELOCITY: 1.42 CM/S
RBC # BLD AUTO: 1.79 X10(6)/MCL (ref 4.2–5.4)
RBC # BLD AUTO: 1.92 X10(6)/MCL (ref 4.2–5.4)
RBC # BLD AUTO: 2.17 X10(6)/MCL (ref 4.2–5.4)
RBC # BLD AUTO: 2.52 X10(6)/MCL (ref 4.2–5.4)
RBC # BLD AUTO: 2.54 X10(6)/MCL (ref 4.2–5.4)
RBC # BLD AUTO: 2.71 X10(6)/MCL (ref 4.2–5.4)
RBC # BLD AUTO: 2.99 X10(6)/MCL (ref 4.2–5.4)
RBC # BLD AUTO: 3.18 10*6/UL (ref 4–5.2)
RBC # BLD AUTO: 3.2 X10(6)/MCL (ref 4.2–5.4)
RBC # BLD AUTO: 3.22 10*6/UL (ref 4–5.2)
RBC # BLD AUTO: 3.22 X10(6)/MCL (ref 4.2–5.4)
RBC # BLD AUTO: 3.28 X10(6)/MCL (ref 4.2–5.4)
RBC # BLD AUTO: 3.3 X10(6)/MCL (ref 4.2–5.4)
RBC # BLD AUTO: 3.32 X10(6)/MCL (ref 4.2–5.4)
RBC # BLD AUTO: 3.34 X10(6)/MCL (ref 4–5.2)
RBC # BLD AUTO: 3.36 X10(6)/MCL (ref 4.2–5.4)
RBC # BLD AUTO: 3.4 10*6/UL (ref 4–5.2)
RBC # BLD AUTO: 3.49 10*6/UL (ref 4–5.2)
RBC # BLD AUTO: 3.63 X10(6)/MCL (ref 4.2–5.4)
RBC # BLD AUTO: 3.69 10*6/UL (ref 4–5.2)
RBC # BLD AUTO: 3.7 10*6/UL (ref 4–5.2)
RBC # BLD AUTO: 3.86 X10(6)/MCL (ref 4.2–5.4)
RBC # BLD AUTO: 4.01 X10(6)/MCL (ref 4.2–5.4)
RBC # BLD AUTO: 4.03 X10(6)/MCL (ref 4.2–5.4)
RBC # BLD AUTO: 4.18 X10(6)/MCL (ref 4.2–5.4)
RBC # BLD AUTO: 4.54 X10(6)/MCL (ref 4.2–5.4)
RBC # BLD AUTO: 4.55 X10(6)/MCL (ref 4.2–5.4)
RBC # BLD AUTO: 4.67 X10(6)/MCL (ref 4.2–5.4)
RBC # BLD AUTO: 5.06 X10(6)/MCL (ref 4.2–5.4)
RBC #/AREA URNS AUTO: 17 /HPF
RBC #/AREA URNS AUTO: <5 /HPF
RBC MORPH BLD: ABNORMAL
RBC UR QL AUTO: ABNORMAL UNIT/L
RBC UR QL AUTO: ABNORMAL UNIT/L
RBCS: NORMAL
RBCS: NORMAL
RIGHT VENTRICULAR END-DIASTOLIC DIMENSION: 2.89 CM
SAMPLE: ABNORMAL
SATURATED O2 ARTERIAL, I-STAT: NORMAL
SODIUM BLD-SCNC: 128 MMOL/L (ref 137–145)
SODIUM BLD-SCNC: 131 MMOL/L (ref 137–145)
SODIUM BLD-SCNC: 136 MMOL/L (ref 137–145)
SODIUM BLD-SCNC: 137 MMOL/L
SODIUM BLD-SCNC: 137 MMOL/L
SODIUM BLD-SCNC: 138 MMOL/L
SODIUM BLD-SCNC: 138 MMOL/L (ref 137–145)
SODIUM BLD-SCNC: 138 MMOL/L (ref 137–145)
SODIUM BLD-SCNC: 140 MMOL/L
SODIUM BLD-SCNC: 141 MMOL/L
SODIUM BLD-SCNC: 142 MMOL/L
SODIUM BLD-SCNC: 142 MMOL/L (ref 136–145)
SODIUM BLD-SCNC: NORMAL MMOL/L
SODIUM SERPL-SCNC: 130 MMOL/L (ref 136–145)
SODIUM SERPL-SCNC: 132 MMOL/L (ref 136–145)
SODIUM SERPL-SCNC: 135 MMOL/L (ref 136–145)
SODIUM SERPL-SCNC: 137 MMOL/L (ref 136–145)
SODIUM SERPL-SCNC: 138 MMOL/L (ref 136–145)
SODIUM SERPL-SCNC: 138 MMOL/L (ref 136–145)
SODIUM SERPL-SCNC: 139 MMOL/L (ref 136–145)
SODIUM SERPL-SCNC: 140 MMOL/L (ref 136–145)
SODIUM SERPL-SCNC: 141 MMOL/L (ref 136–145)
SODIUM SERPL-SCNC: 142 MMOL/L (ref 136–145)
SODIUM SERPL-SCNC: 145 MMOL/L (ref 136–145)
SODIUM UR-SCNC: <20 MMOL/L
SP GR UR STRIP.AUTO: 1.02 (ref 1–1.03)
SP GR UR STRIP.AUTO: 1.04 (ref 1–1.03)
SPECIFIC GRAVITY, URINE AUTO (.000) (OHS): 1.04 (ref 1–1.03)
SPECIMEN SOURCE: ABNORMAL
SQUAMOUS #/AREA URNS AUTO: <4 /LPF
SQUAMOUS #/AREA URNS AUTO: <4 /LPF
STOMATOCYTES (OLG): ABNORMAL
STOMATOCYTES (OLG): ABNORMAL
TARGETS BLD QL SMEAR: ABNORMAL
TDI LATERAL: 0.08 M/S
TDI LATERAL: 0.14 M/S
TDI SEPTAL: 0.06 M/S
TDI SEPTAL: 0.14 M/S
TDI: 0.07 M/S
TDI: 0.14 M/S
TR MAX PG: 45 MMHG
TR MAX PG: 47 MMHG
TRICUSPID ANNULAR PLANE SYSTOLIC EXCURSION: 1.67 CM
TRICUSPID ANNULAR PLANE SYSTOLIC EXCURSION: 1.71 CM
UNIT NUMBER: NORMAL
UROBILINOGEN UR STRIP-ACNC: 0.2 MG/DL
UROBILINOGEN UR STRIP-ACNC: 1 MG/DL
VANCOMYCIN TROUGH SERPL-MCNC: 24.9 UG/ML (ref 15–20)
VANCOMYCIN TROUGH SERPL-MCNC: 32 UG/ML (ref 15–20)
WBC # SPEC AUTO: 10.2 X10(3)/MCL (ref 4.5–11.5)
WBC # SPEC AUTO: 10.3 X10(3)/MCL (ref 4.5–11.5)
WBC # SPEC AUTO: 10.3 X10(3)/MCL (ref 4.5–11.5)
WBC # SPEC AUTO: 10.4 X10(3)/MCL (ref 4.5–11.5)
WBC # SPEC AUTO: 10.6 X10(3)/MCL (ref 4.5–11.5)
WBC # SPEC AUTO: 11 X10(3)/MCL (ref 4.5–11.5)
WBC # SPEC AUTO: 11.4 X10(3)/MCL (ref 4.5–11.5)
WBC # SPEC AUTO: 11.8 X10(3)/MCL (ref 4.5–11.5)
WBC # SPEC AUTO: 12.4 X10(3)/MCL (ref 4.5–11.5)
WBC # SPEC AUTO: 14.7 X10(3)/MCL (ref 4.5–11.5)
WBC # SPEC AUTO: 17.4 X10(3)/MCL (ref 4.5–11.5)
WBC # SPEC AUTO: 17.6 X10(3)/MCL (ref 4.5–11.5)
WBC # SPEC AUTO: 2.4 X10(3)/MCL (ref 4.5–11.5)
WBC # SPEC AUTO: 2.9 10*3/UL (ref 4.5–11)
WBC # SPEC AUTO: 3.1 10*3/UL (ref 4.5–11)
WBC # SPEC AUTO: 3.3 X10(3)/MCL (ref 4.5–11)
WBC # SPEC AUTO: 3.4 10*3/UL (ref 4.5–11)
WBC # SPEC AUTO: 3.7 10*3/UL (ref 4.5–11)
WBC # SPEC AUTO: 4.3 10*3/UL (ref 4.5–11)
WBC # SPEC AUTO: 4.6 10*3/UL (ref 4.5–11)
WBC # SPEC AUTO: 4.6 X10(3)/MCL (ref 4.5–11.5)
WBC # SPEC AUTO: 5 X10(3)/MCL (ref 4.5–11.5)
WBC # SPEC AUTO: 5.8 X10(3)/MCL (ref 4.5–11.5)
WBC # SPEC AUTO: 6.1 X10(3)/MCL (ref 4.5–11.5)
WBC # SPEC AUTO: 7.4 X10(3)/MCL (ref 4.5–11.5)
WBC # SPEC AUTO: 8.7 X10(3)/MCL (ref 4.5–11.5)
WBC # SPEC AUTO: 8.8 X10(3)/MCL (ref 4.5–11.5)
WBC # SPEC AUTO: 8.9 X10(3)/MCL (ref 4.5–11.5)
WBC # SPEC AUTO: 9.3 X10(3)/MCL (ref 4.5–11.5)
WBC #/AREA URNS AUTO: 11 /HPF
WBC #/AREA URNS AUTO: 7 /HPF
ZZGIANT PLATELETS (OHS): 10
ZZGIANT PLATELETS (OHS): 20
ZZGIANT PLATELETS (OHS): 20

## 2022-01-01 PROCEDURE — 99232 SBSQ HOSP IP/OBS MODERATE 35: CPT | Mod: ,,, | Performed by: INTERNAL MEDICINE

## 2022-01-01 PROCEDURE — 25000003 PHARM REV CODE 250: Performed by: SURGERY

## 2022-01-01 PROCEDURE — 36600 WITHDRAWAL OF ARTERIAL BLOOD: CPT

## 2022-01-01 PROCEDURE — 27100171 HC OXYGEN HIGH FLOW UP TO 24 HOURS

## 2022-01-01 PROCEDURE — 84133 ASSAY OF URINE POTASSIUM: CPT | Performed by: STUDENT IN AN ORGANIZED HEALTH CARE EDUCATION/TRAINING PROGRAM

## 2022-01-01 PROCEDURE — 20000000 HC ICU ROOM

## 2022-01-01 PROCEDURE — 20800000 HC ICU TRAUMA

## 2022-01-01 PROCEDURE — 36569 INSJ PICC 5 YR+ W/O IMAGING: CPT

## 2022-01-01 PROCEDURE — 37000008 HC ANESTHESIA 1ST 15 MINUTES: Performed by: ORTHOPAEDIC SURGERY

## 2022-01-01 PROCEDURE — 82803 BLOOD GASES ANY COMBINATION: CPT

## 2022-01-01 PROCEDURE — 99900035 HC TECH TIME PER 15 MIN (STAT)

## 2022-01-01 PROCEDURE — 27245 TREAT THIGH FRACTURE: CPT | Mod: 50,,, | Performed by: ORTHOPAEDIC SURGERY

## 2022-01-01 PROCEDURE — 63600175 PHARM REV CODE 636 W HCPCS: Performed by: STUDENT IN AN ORGANIZED HEALTH CARE EDUCATION/TRAINING PROGRAM

## 2022-01-01 PROCEDURE — 36415 COLL VENOUS BLD VENIPUNCTURE: CPT | Performed by: STUDENT IN AN ORGANIZED HEALTH CARE EDUCATION/TRAINING PROGRAM

## 2022-01-01 PROCEDURE — 25000003 PHARM REV CODE 250: Performed by: INTERNAL MEDICINE

## 2022-01-01 PROCEDURE — 96375 TX/PRO/DX INJ NEW DRUG ADDON: CPT

## 2022-01-01 PROCEDURE — 83605 ASSAY OF LACTIC ACID: CPT | Performed by: STUDENT IN AN ORGANIZED HEALTH CARE EDUCATION/TRAINING PROGRAM

## 2022-01-01 PROCEDURE — 94761 N-INVAS EAR/PLS OXIMETRY MLT: CPT

## 2022-01-01 PROCEDURE — 86922 COMPATIBILITY TEST ANTIGLOB: CPT | Performed by: SURGERY

## 2022-01-01 PROCEDURE — P9017 PLASMA 1 DONOR FRZ W/IN 8 HR: HCPCS | Performed by: SURGERY

## 2022-01-01 PROCEDURE — 80053 COMPREHEN METABOLIC PANEL: CPT | Performed by: STUDENT IN AN ORGANIZED HEALTH CARE EDUCATION/TRAINING PROGRAM

## 2022-01-01 PROCEDURE — P9016 RBC LEUKOCYTES REDUCED: HCPCS | Performed by: INTERNAL MEDICINE

## 2022-01-01 PROCEDURE — 25000003 PHARM REV CODE 250: Performed by: STUDENT IN AN ORGANIZED HEALTH CARE EDUCATION/TRAINING PROGRAM

## 2022-01-01 PROCEDURE — 63600175 PHARM REV CODE 636 W HCPCS: Performed by: INTERNAL MEDICINE

## 2022-01-01 PROCEDURE — 85025 COMPLETE CBC W/AUTO DIFF WBC: CPT | Performed by: STUDENT IN AN ORGANIZED HEALTH CARE EDUCATION/TRAINING PROGRAM

## 2022-01-01 PROCEDURE — 93005 ELECTROCARDIOGRAM TRACING: CPT

## 2022-01-01 PROCEDURE — 84100 ASSAY OF PHOSPHORUS: CPT | Performed by: STUDENT IN AN ORGANIZED HEALTH CARE EDUCATION/TRAINING PROGRAM

## 2022-01-01 PROCEDURE — 85025 COMPLETE CBC W/AUTO DIFF WBC: CPT | Performed by: SURGERY

## 2022-01-01 PROCEDURE — 25000003 PHARM REV CODE 250: Performed by: NURSE PRACTITIONER

## 2022-01-01 PROCEDURE — 94003 VENT MGMT INPAT SUBQ DAY: CPT

## 2022-01-01 PROCEDURE — 99497 PR ADVNCD CARE PLAN 30 MIN: ICD-10-PCS | Mod: 25,,, | Performed by: INTERNAL MEDICINE

## 2022-01-01 PROCEDURE — 99497 ADVNCD CARE PLAN 30 MIN: CPT | Mod: 25,,, | Performed by: INTERNAL MEDICINE

## 2022-01-01 PROCEDURE — P9047 ALBUMIN (HUMAN), 25%, 50ML: HCPCS | Mod: JG | Performed by: STUDENT IN AN ORGANIZED HEALTH CARE EDUCATION/TRAINING PROGRAM

## 2022-01-01 PROCEDURE — 27000221 HC OXYGEN, UP TO 24 HOURS

## 2022-01-01 PROCEDURE — C1769 GUIDE WIRE: HCPCS | Performed by: ORTHOPAEDIC SURGERY

## 2022-01-01 PROCEDURE — 63600175 PHARM REV CODE 636 W HCPCS: Performed by: NURSE PRACTITIONER

## 2022-01-01 PROCEDURE — 83735 ASSAY OF MAGNESIUM: CPT | Performed by: STUDENT IN AN ORGANIZED HEALTH CARE EDUCATION/TRAINING PROGRAM

## 2022-01-01 PROCEDURE — 83605 ASSAY OF LACTIC ACID: CPT | Performed by: SURGERY

## 2022-01-01 PROCEDURE — 85347 COAGULATION TIME ACTIVATED: CPT | Performed by: SURGERY

## 2022-01-01 PROCEDURE — 36430 TRANSFUSION BLD/BLD COMPNT: CPT

## 2022-01-01 PROCEDURE — 80053 COMPREHEN METABOLIC PANEL: CPT | Performed by: SURGERY

## 2022-01-01 PROCEDURE — 63600175 PHARM REV CODE 636 W HCPCS: Performed by: ANESTHESIOLOGY

## 2022-01-01 PROCEDURE — 99232 PR SUBSEQUENT HOSPITAL CARE,LEVL II: ICD-10-PCS | Mod: ,,, | Performed by: INTERNAL MEDICINE

## 2022-01-01 PROCEDURE — 27792 TREATMENT OF ANKLE FRACTURE: CPT | Mod: 51,LT,, | Performed by: ORTHOPAEDIC SURGERY

## 2022-01-01 PROCEDURE — 36415 COLL VENOUS BLD VENIPUNCTURE: CPT | Performed by: SURGERY

## 2022-01-01 PROCEDURE — 85347 COAGULATION TIME ACTIVATED: CPT | Performed by: STUDENT IN AN ORGANIZED HEALTH CARE EDUCATION/TRAINING PROGRAM

## 2022-01-01 PROCEDURE — 85384 FIBRINOGEN ACTIVITY: CPT | Performed by: STUDENT IN AN ORGANIZED HEALTH CARE EDUCATION/TRAINING PROGRAM

## 2022-01-01 PROCEDURE — 85060 BLOOD SMEAR INTERPRETATION: CPT | Performed by: SURGERY

## 2022-01-01 PROCEDURE — 99497 PR ADVNCD CARE PLAN 30 MIN: ICD-10-PCS | Mod: ,,, | Performed by: INTERNAL MEDICINE

## 2022-01-01 PROCEDURE — 85007 BL SMEAR W/DIFF WBC COUNT: CPT | Performed by: SURGERY

## 2022-01-01 PROCEDURE — 27000190 HC CPAP FULL FACE MASK W/VALVE

## 2022-01-01 PROCEDURE — 99233 PR SUBSEQUENT HOSPITAL CARE,LEVL III: ICD-10-PCS | Mod: ,,, | Performed by: INTERNAL MEDICINE

## 2022-01-01 PROCEDURE — 85576 BLOOD PLATELET AGGREGATION: CPT | Performed by: STUDENT IN AN ORGANIZED HEALTH CARE EDUCATION/TRAINING PROGRAM

## 2022-01-01 PROCEDURE — 27200966 HC CLOSED SUCTION SYSTEM

## 2022-01-01 PROCEDURE — 85610 PROTHROMBIN TIME: CPT | Performed by: STUDENT IN AN ORGANIZED HEALTH CARE EDUCATION/TRAINING PROGRAM

## 2022-01-01 PROCEDURE — 27245 PR OPEN FIX INTER/SUBTROCH FX,IMPLNT: ICD-10-PCS | Mod: 50,,, | Performed by: ORTHOPAEDIC SURGERY

## 2022-01-01 PROCEDURE — 85007 BL SMEAR W/DIFF WBC COUNT: CPT | Performed by: STUDENT IN AN ORGANIZED HEALTH CARE EDUCATION/TRAINING PROGRAM

## 2022-01-01 PROCEDURE — P9012 CRYOPRECIPITATE EACH UNIT: HCPCS | Performed by: SURGERY

## 2022-01-01 PROCEDURE — 37799 UNLISTED PX VASCULAR SURGERY: CPT

## 2022-01-01 PROCEDURE — 85576 BLOOD PLATELET AGGREGATION: CPT | Performed by: SURGERY

## 2022-01-01 PROCEDURE — 80202 ASSAY OF VANCOMYCIN: CPT | Performed by: STUDENT IN AN ORGANIZED HEALTH CARE EDUCATION/TRAINING PROGRAM

## 2022-01-01 PROCEDURE — 83615 LACTATE (LD) (LDH) ENZYME: CPT | Performed by: INTERNAL MEDICINE

## 2022-01-01 PROCEDURE — 85014 HEMATOCRIT: CPT | Performed by: SURGERY

## 2022-01-01 PROCEDURE — 63600175 PHARM REV CODE 636 W HCPCS: Performed by: SURGERY

## 2022-01-01 PROCEDURE — 63600175 PHARM REV CODE 636 W HCPCS

## 2022-01-01 PROCEDURE — 99285 EMERGENCY DEPT VISIT HI MDM: CPT | Mod: 25

## 2022-01-01 PROCEDURE — 63600175 PHARM REV CODE 636 W HCPCS: Performed by: NURSE ANESTHETIST, CERTIFIED REGISTERED

## 2022-01-01 PROCEDURE — 83010 ASSAY OF HAPTOGLOBIN QUANT: CPT | Performed by: INTERNAL MEDICINE

## 2022-01-01 PROCEDURE — 90715 TDAP VACCINE 7 YRS/> IM: CPT | Performed by: SURGERY

## 2022-01-01 PROCEDURE — 85730 THROMBOPLASTIN TIME PARTIAL: CPT | Performed by: STUDENT IN AN ORGANIZED HEALTH CARE EDUCATION/TRAINING PROGRAM

## 2022-01-01 PROCEDURE — 85060 BLOOD SMEAR INTERPRETATION: CPT | Performed by: STUDENT IN AN ORGANIZED HEALTH CARE EDUCATION/TRAINING PROGRAM

## 2022-01-01 PROCEDURE — 99900026 HC AIRWAY MAINTENANCE (STAT)

## 2022-01-01 PROCEDURE — 25000003 PHARM REV CODE 250

## 2022-01-01 PROCEDURE — P9035 PLATELET PHERES LEUKOREDUCED: HCPCS | Performed by: SURGERY

## 2022-01-01 PROCEDURE — 99223 PR INITIAL HOSPITAL CARE,LEVL III: ICD-10-PCS | Mod: ,,, | Performed by: INTERNAL MEDICINE

## 2022-01-01 PROCEDURE — 81001 URINALYSIS AUTO W/SCOPE: CPT | Performed by: STUDENT IN AN ORGANIZED HEALTH CARE EDUCATION/TRAINING PROGRAM

## 2022-01-01 PROCEDURE — 27792 PR OPEN TX DISTAL FIBULAR FRACTURE LAT MALLEOLUS: ICD-10-PCS | Mod: 51,LT,, | Performed by: ORTHOPAEDIC SURGERY

## 2022-01-01 PROCEDURE — 99233 SBSQ HOSP IP/OBS HIGH 50: CPT | Mod: ,,, | Performed by: NURSE PRACTITIONER

## 2022-01-01 PROCEDURE — 93010 EKG 12-LEAD: ICD-10-PCS | Mod: ,,, | Performed by: INTERNAL MEDICINE

## 2022-01-01 PROCEDURE — 71000033 HC RECOVERY, INTIAL HOUR: Performed by: ORTHOPAEDIC SURGERY

## 2022-01-01 PROCEDURE — 85730 THROMBOPLASTIN TIME PARTIAL: CPT | Performed by: SURGERY

## 2022-01-01 PROCEDURE — 63600175 PHARM REV CODE 636 W HCPCS: Mod: JG | Performed by: STUDENT IN AN ORGANIZED HEALTH CARE EDUCATION/TRAINING PROGRAM

## 2022-01-01 PROCEDURE — 36415 COLL VENOUS BLD VENIPUNCTURE: CPT | Performed by: EMERGENCY MEDICINE

## 2022-01-01 PROCEDURE — 85007 BL SMEAR W/DIFF WBC COUNT: CPT | Performed by: EMERGENCY MEDICINE

## 2022-01-01 PROCEDURE — 86901 BLOOD TYPING SEROLOGIC RH(D): CPT | Performed by: INTERNAL MEDICINE

## 2022-01-01 PROCEDURE — 11012 PR DEBRIDE ASSOC OPEN FX/DISLO SKIN/MUS/BONE: ICD-10-PCS | Mod: 51,,, | Performed by: ORTHOPAEDIC SURGERY

## 2022-01-01 PROCEDURE — 85610 PROTHROMBIN TIME: CPT | Performed by: EMERGENCY MEDICINE

## 2022-01-01 PROCEDURE — 87040 BLOOD CULTURE FOR BACTERIA: CPT | Performed by: INTERNAL MEDICINE

## 2022-01-01 PROCEDURE — 94002 VENT MGMT INPAT INIT DAY: CPT

## 2022-01-01 PROCEDURE — P9016 RBC LEUKOCYTES REDUCED: HCPCS | Performed by: SURGERY

## 2022-01-01 PROCEDURE — 36000711: Performed by: ORTHOPAEDIC SURGERY

## 2022-01-01 PROCEDURE — 85007 BL SMEAR W/DIFF WBC COUNT: CPT | Performed by: INTERNAL MEDICINE

## 2022-01-01 PROCEDURE — 93010 ELECTROCARDIOGRAM REPORT: CPT | Mod: ,,, | Performed by: INTERNAL MEDICINE

## 2022-01-01 PROCEDURE — 25000003 PHARM REV CODE 250: Performed by: NURSE ANESTHETIST, CERTIFIED REGISTERED

## 2022-01-01 PROCEDURE — 27245 TREAT THIGH FRACTURE: CPT | Mod: AS,50,, | Performed by: NURSE PRACTITIONER

## 2022-01-01 PROCEDURE — P9017 PLASMA 1 DONOR FRZ W/IN 8 HR: HCPCS | Performed by: STUDENT IN AN ORGANIZED HEALTH CARE EDUCATION/TRAINING PROGRAM

## 2022-01-01 PROCEDURE — P9016 RBC LEUKOCYTES REDUCED: HCPCS | Performed by: ANESTHESIOLOGY

## 2022-01-01 PROCEDURE — 36000710: Performed by: ORTHOPAEDIC SURGERY

## 2022-01-01 PROCEDURE — 96374 THER/PROPH/DIAG INJ IV PUSH: CPT | Mod: 59

## 2022-01-01 PROCEDURE — C1713 ANCHOR/SCREW BN/BN,TIS/BN: HCPCS | Performed by: ORTHOPAEDIC SURGERY

## 2022-01-01 PROCEDURE — 87040 BLOOD CULTURE FOR BACTERIA: CPT | Performed by: SURGERY

## 2022-01-01 PROCEDURE — 27759 TREATMENT OF TIBIA FRACTURE: CPT | Mod: AS,51,LT, | Performed by: NURSE PRACTITIONER

## 2022-01-01 PROCEDURE — 94660 CPAP INITIATION&MGMT: CPT

## 2022-01-01 PROCEDURE — 25500020 PHARM REV CODE 255: Performed by: STUDENT IN AN ORGANIZED HEALTH CARE EDUCATION/TRAINING PROGRAM

## 2022-01-01 PROCEDURE — 99233 SBSQ HOSP IP/OBS HIGH 50: CPT | Mod: ,,, | Performed by: INTERNAL MEDICINE

## 2022-01-01 PROCEDURE — 86901 BLOOD TYPING SEROLOGIC RH(D): CPT | Performed by: EMERGENCY MEDICINE

## 2022-01-01 PROCEDURE — 27759 PR TREAT TIBIAL SHAFT FX, INTRAMED IMPLANT: ICD-10-PCS | Mod: 51,LT,, | Performed by: ORTHOPAEDIC SURGERY

## 2022-01-01 PROCEDURE — 86901 BLOOD TYPING SEROLOGIC RH(D): CPT | Performed by: SURGERY

## 2022-01-01 PROCEDURE — 27201423 OPTIME MED/SURG SUP & DEVICES STERILE SUPPLY: Performed by: ORTHOPAEDIC SURGERY

## 2022-01-01 PROCEDURE — 27000249 HC VAPOTHERM CIRCUIT

## 2022-01-01 PROCEDURE — 86922 COMPATIBILITY TEST ANTIGLOB: CPT | Performed by: INTERNAL MEDICINE

## 2022-01-01 PROCEDURE — 80053 COMPREHEN METABOLIC PANEL: CPT | Performed by: EMERGENCY MEDICINE

## 2022-01-01 PROCEDURE — 99223 PR INITIAL HOSPITAL CARE,LEVL III: ICD-10-PCS | Mod: ,,, | Performed by: GENERAL PRACTICE

## 2022-01-01 PROCEDURE — 85384 FIBRINOGEN ACTIVITY: CPT | Performed by: SURGERY

## 2022-01-01 PROCEDURE — 85610 PROTHROMBIN TIME: CPT | Performed by: SURGERY

## 2022-01-01 PROCEDURE — 99233 PR SUBSEQUENT HOSPITAL CARE,LEVL III: ICD-10-PCS | Mod: ,,, | Performed by: NURSE PRACTITIONER

## 2022-01-01 PROCEDURE — 86920 COMPATIBILITY TEST SPIN: CPT | Performed by: SURGERY

## 2022-01-01 PROCEDURE — 11012 DEB SKIN BONE AT FX SITE: CPT | Mod: 51,,, | Performed by: ORTHOPAEDIC SURGERY

## 2022-01-01 PROCEDURE — 37000009 HC ANESTHESIA EA ADD 15 MINS: Performed by: ORTHOPAEDIC SURGERY

## 2022-01-01 PROCEDURE — 11000001 HC ACUTE MED/SURG PRIVATE ROOM

## 2022-01-01 PROCEDURE — P9035 PLATELET PHERES LEUKOREDUCED: HCPCS | Performed by: STUDENT IN AN ORGANIZED HEALTH CARE EDUCATION/TRAINING PROGRAM

## 2022-01-01 PROCEDURE — 99291 CRITICAL CARE FIRST HOUR: CPT | Mod: 25

## 2022-01-01 PROCEDURE — 82077 ASSAY SPEC XCP UR&BREATH IA: CPT | Performed by: EMERGENCY MEDICINE

## 2022-01-01 PROCEDURE — 99292 CRITICAL CARE ADDL 30 MIN: CPT

## 2022-01-01 PROCEDURE — 27759 PR TREAT TIBIAL SHAFT FX, INTRAMED IMPLANT: ICD-10-PCS | Mod: AS,51,LT, | Performed by: NURSE PRACTITIONER

## 2022-01-01 PROCEDURE — 90471 IMMUNIZATION ADMIN: CPT | Performed by: SURGERY

## 2022-01-01 PROCEDURE — 86140 C-REACTIVE PROTEIN: CPT | Performed by: STUDENT IN AN ORGANIZED HEALTH CARE EDUCATION/TRAINING PROGRAM

## 2022-01-01 PROCEDURE — 86920 COMPATIBILITY TEST SPIN: CPT | Performed by: ANESTHESIOLOGY

## 2022-01-01 PROCEDURE — 63600175 PHARM REV CODE 636 W HCPCS: Performed by: ORTHOPAEDIC SURGERY

## 2022-01-01 PROCEDURE — P9047 ALBUMIN (HUMAN), 25%, 50ML: HCPCS | Mod: JG | Performed by: NURSE ANESTHETIST, CERTIFIED REGISTERED

## 2022-01-01 PROCEDURE — 85730 THROMBOPLASTIN TIME PARTIAL: CPT | Performed by: EMERGENCY MEDICINE

## 2022-01-01 PROCEDURE — 36591 DRAW BLOOD OFF VENOUS DEVICE: CPT | Performed by: SURGERY

## 2022-01-01 PROCEDURE — 71000039 HC RECOVERY, EACH ADD'L HOUR: Performed by: ORTHOPAEDIC SURGERY

## 2022-01-01 PROCEDURE — G0390 TRAUMA RESPONS W/HOSP CRITI: HCPCS

## 2022-01-01 PROCEDURE — 80053 COMPREHEN METABOLIC PANEL: CPT | Performed by: NURSE PRACTITIONER

## 2022-01-01 PROCEDURE — 80307 DRUG TEST PRSMV CHEM ANLYZR: CPT | Performed by: EMERGENCY MEDICINE

## 2022-01-01 PROCEDURE — P9045 ALBUMIN (HUMAN), 5%, 250 ML: HCPCS | Mod: JG | Performed by: NURSE PRACTITIONER

## 2022-01-01 PROCEDURE — G0390 TRAUMA RESPONS W/HOSP CRITI: HCPCS | Performed by: STUDENT IN AN ORGANIZED HEALTH CARE EDUCATION/TRAINING PROGRAM

## 2022-01-01 PROCEDURE — 84300 ASSAY OF URINE SODIUM: CPT | Performed by: STUDENT IN AN ORGANIZED HEALTH CARE EDUCATION/TRAINING PROGRAM

## 2022-01-01 PROCEDURE — 25000003 PHARM REV CODE 250: Performed by: ANESTHESIOLOGY

## 2022-01-01 PROCEDURE — 87040 BLOOD CULTURE FOR BACTERIA: CPT | Performed by: STUDENT IN AN ORGANIZED HEALTH CARE EDUCATION/TRAINING PROGRAM

## 2022-01-01 PROCEDURE — C1751 CATH, INF, PER/CENT/MIDLINE: HCPCS

## 2022-01-01 PROCEDURE — 99223 1ST HOSP IP/OBS HIGH 75: CPT | Mod: ,,, | Performed by: GENERAL PRACTICE

## 2022-01-01 PROCEDURE — 83605 ASSAY OF LACTIC ACID: CPT | Performed by: INTERNAL MEDICINE

## 2022-01-01 PROCEDURE — 85025 COMPLETE CBC W/AUTO DIFF WBC: CPT | Performed by: EMERGENCY MEDICINE

## 2022-01-01 PROCEDURE — 99223 1ST HOSP IP/OBS HIGH 75: CPT | Mod: ,,, | Performed by: INTERNAL MEDICINE

## 2022-01-01 PROCEDURE — 85025 COMPLETE CBC W/AUTO DIFF WBC: CPT | Performed by: INTERNAL MEDICINE

## 2022-01-01 PROCEDURE — 99497 ADVNCD CARE PLAN 30 MIN: CPT | Mod: ,,, | Performed by: INTERNAL MEDICINE

## 2022-01-01 PROCEDURE — 27759 TREATMENT OF TIBIA FRACTURE: CPT | Mod: 51,LT,, | Performed by: ORTHOPAEDIC SURGERY

## 2022-01-01 PROCEDURE — 36592 COLLECT BLOOD FROM PICC: CPT | Performed by: SURGERY

## 2022-01-01 PROCEDURE — 81001 URINALYSIS AUTO W/SCOPE: CPT | Performed by: EMERGENCY MEDICINE

## 2022-01-01 PROCEDURE — 27245 PR OPEN FIX INTER/SUBTROCH FX,IMPLNT: ICD-10-PCS | Mod: AS,50,, | Performed by: NURSE PRACTITIONER

## 2022-01-01 PROCEDURE — 83615 LACTATE (LD) (LDH) ENZYME: CPT | Performed by: STUDENT IN AN ORGANIZED HEALTH CARE EDUCATION/TRAINING PROGRAM

## 2022-01-01 PROCEDURE — 80048 BASIC METABOLIC PNL TOTAL CA: CPT | Performed by: STUDENT IN AN ORGANIZED HEALTH CARE EDUCATION/TRAINING PROGRAM

## 2022-01-01 PROCEDURE — 27100092 HC HIGH FLOW DELIVERY CANNULA

## 2022-01-01 PROCEDURE — 83605 ASSAY OF LACTIC ACID: CPT | Performed by: EMERGENCY MEDICINE

## 2022-01-01 DEVICE — SCREW XL25 IM LOCK 32X5MM: Type: IMPLANTABLE DEVICE | Site: LEG | Status: FUNCTIONAL

## 2022-01-01 DEVICE — SCREW XL25 IM NAIL 42X5MM: Type: IMPLANTABLE DEVICE | Site: LEG | Status: FUNCTIONAL

## 2022-01-01 DEVICE — IMPLANTABLE DEVICE: Type: IMPLANTABLE DEVICE | Site: LEG | Status: FUNCTIONAL

## 2022-01-01 DEVICE — SCREW XL25 IM 36X5MM: Type: IMPLANTABLE DEVICE | Site: LEG | Status: FUNCTIONAL

## 2022-01-01 DEVICE — SCREW LOK ST STARDRIVE 6X2.4: Type: IMPLANTABLE DEVICE | Site: LEG | Status: FUNCTIONAL

## 2022-01-01 RX ORDER — MIDAZOLAM HYDROCHLORIDE 1 MG/ML
INJECTION INTRAMUSCULAR; INTRAVENOUS
Status: DISCONTINUED | OUTPATIENT
Start: 2022-01-01 | End: 2022-01-01

## 2022-01-01 RX ORDER — MORPHINE SULFATE 4 MG/ML
INJECTION, SOLUTION INTRAMUSCULAR; INTRAVENOUS CODE/TRAUMA/SEDATION MEDICATION
Status: DISCONTINUED | OUTPATIENT
Start: 2022-01-01 | End: 2022-01-01

## 2022-01-01 RX ORDER — ENOXAPARIN SODIUM 100 MG/ML
30 INJECTION SUBCUTANEOUS EVERY 12 HOURS
Status: CANCELLED | OUTPATIENT
Start: 2022-01-01

## 2022-01-01 RX ORDER — FENTANYL CITRATE-0.9 % NACL/PF 10 MCG/ML
0-250 PLASTIC BAG, INJECTION (ML) INTRAVENOUS CONTINUOUS
Status: DISCONTINUED | OUTPATIENT
Start: 2022-01-01 | End: 2022-01-01 | Stop reason: HOSPADM

## 2022-01-01 RX ORDER — METHOCARBAMOL 100 MG/ML
500 INJECTION, SOLUTION INTRAMUSCULAR; INTRAVENOUS EVERY 6 HOURS
Status: DISCONTINUED | OUTPATIENT
Start: 2022-01-01 | End: 2022-01-01 | Stop reason: HOSPADM

## 2022-01-01 RX ORDER — SODIUM CHLORIDE 9 MG/ML
INJECTION, SOLUTION INTRAVENOUS
Status: DISCONTINUED | OUTPATIENT
Start: 2022-01-01 | End: 2022-01-01

## 2022-01-01 RX ORDER — HYDROCODONE BITARTRATE AND ACETAMINOPHEN 500; 5 MG/1; MG/1
TABLET ORAL
Status: DISCONTINUED | OUTPATIENT
Start: 2022-01-01 | End: 2022-01-01

## 2022-01-01 RX ORDER — ENOXAPARIN SODIUM 100 MG/ML
30 INJECTION SUBCUTANEOUS EVERY 12 HOURS
Status: DISCONTINUED | OUTPATIENT
Start: 2022-01-01 | End: 2022-01-01

## 2022-01-01 RX ORDER — SODIUM CHLORIDE, SODIUM LACTATE, POTASSIUM CHLORIDE, CALCIUM CHLORIDE 600; 310; 30; 20 MG/100ML; MG/100ML; MG/100ML; MG/100ML
INJECTION, SOLUTION INTRAVENOUS CONTINUOUS
Status: DISCONTINUED | OUTPATIENT
Start: 2022-01-01 | End: 2022-01-01

## 2022-01-01 RX ORDER — FUROSEMIDE 10 MG/ML
INJECTION INTRAMUSCULAR; INTRAVENOUS
Status: DISPENSED
Start: 2022-01-01 | End: 2022-01-01

## 2022-01-01 RX ORDER — GABAPENTIN 300 MG/1
300 CAPSULE ORAL 3 TIMES DAILY
Status: DISCONTINUED | OUTPATIENT
Start: 2022-01-01 | End: 2022-01-01

## 2022-01-01 RX ORDER — HYDROCODONE BITARTRATE AND ACETAMINOPHEN 500; 5 MG/1; MG/1
TABLET ORAL
Status: DISCONTINUED | OUTPATIENT
Start: 2022-01-01 | End: 2022-01-01 | Stop reason: HOSPADM

## 2022-01-01 RX ORDER — OXYCODONE HYDROCHLORIDE 5 MG/1
5 TABLET ORAL EVERY 6 HOURS PRN
Status: DISCONTINUED | OUTPATIENT
Start: 2022-01-01 | End: 2022-01-01

## 2022-01-01 RX ORDER — ROCURONIUM BROMIDE 10 MG/ML
INJECTION, SOLUTION INTRAVENOUS
Status: DISCONTINUED | OUTPATIENT
Start: 2022-01-01 | End: 2022-01-01

## 2022-01-01 RX ORDER — MORPHINE SULFATE 4 MG/ML
INJECTION, SOLUTION INTRAMUSCULAR; INTRAVENOUS
Status: COMPLETED
Start: 2022-01-01 | End: 2022-01-01

## 2022-01-01 RX ORDER — LORAZEPAM 1 MG/1
2 TABLET ORAL EVERY 30 MIN PRN
Status: DISCONTINUED | OUTPATIENT
Start: 2022-01-01 | End: 2022-01-01 | Stop reason: HOSPADM

## 2022-01-01 RX ORDER — ALBUMIN HUMAN 50 G/1000ML
12.5 SOLUTION INTRAVENOUS ONCE
Status: DISCONTINUED | OUTPATIENT
Start: 2022-01-01 | End: 2022-01-01

## 2022-01-01 RX ORDER — FUROSEMIDE 10 MG/ML
40 INJECTION INTRAMUSCULAR; INTRAVENOUS
Status: DISCONTINUED | OUTPATIENT
Start: 2022-01-01 | End: 2022-01-01

## 2022-01-01 RX ORDER — PROPOFOL 10 MG/ML
INJECTION, EMULSION INTRAVENOUS
Status: COMPLETED
Start: 2022-01-01 | End: 2022-01-01

## 2022-01-01 RX ORDER — FUROSEMIDE 10 MG/ML
40 INJECTION INTRAMUSCULAR; INTRAVENOUS ONCE
Status: COMPLETED | OUTPATIENT
Start: 2022-01-01 | End: 2022-01-01

## 2022-01-01 RX ORDER — HALOPERIDOL 5 MG/ML
1 INJECTION INTRAMUSCULAR EVERY 4 HOURS PRN
Status: DISCONTINUED | OUTPATIENT
Start: 2022-01-01 | End: 2022-01-01 | Stop reason: HOSPADM

## 2022-01-01 RX ORDER — POLYETHYLENE GLYCOL 3350 17 G/17G
17 POWDER, FOR SOLUTION ORAL 2 TIMES DAILY
Status: DISCONTINUED | OUTPATIENT
Start: 2022-01-01 | End: 2022-01-01 | Stop reason: HOSPADM

## 2022-01-01 RX ORDER — PHENYLEPHRINE HYDROCHLORIDE 10 MG/ML
INJECTION INTRAVENOUS
Status: DISCONTINUED | OUTPATIENT
Start: 2022-01-01 | End: 2022-01-01

## 2022-01-01 RX ORDER — LIDOCAINE HYDROCHLORIDE 20 MG/ML
INJECTION, SOLUTION EPIDURAL; INFILTRATION; INTRACAUDAL; PERINEURAL
Status: DISPENSED
Start: 2022-01-01 | End: 2022-01-01

## 2022-01-01 RX ORDER — ROCURONIUM BROMIDE 10 MG/ML
INJECTION, SOLUTION INTRAVENOUS CODE/TRAUMA/SEDATION MEDICATION
Status: COMPLETED | OUTPATIENT
Start: 2022-01-01 | End: 2022-01-01

## 2022-01-01 RX ORDER — LORAZEPAM 2 MG/ML
INJECTION INTRAMUSCULAR
Status: DISPENSED
Start: 2022-01-01 | End: 2022-01-01

## 2022-01-01 RX ORDER — ONDANSETRON 2 MG/ML
INJECTION INTRAMUSCULAR; INTRAVENOUS CODE/TRAUMA/SEDATION MEDICATION
Status: DISCONTINUED | OUTPATIENT
Start: 2022-01-01 | End: 2022-01-01

## 2022-01-01 RX ORDER — LEVETIRACETAM 500 MG/5ML
1000 INJECTION, SOLUTION, CONCENTRATE INTRAVENOUS ONCE
Status: DISCONTINUED | OUTPATIENT
Start: 2022-01-01 | End: 2022-01-01

## 2022-01-01 RX ORDER — OXYCODONE AND ACETAMINOPHEN 5; 325 MG/1; MG/1
1 TABLET ORAL EVERY 4 HOURS PRN
Status: CANCELLED | OUTPATIENT
Start: 2022-01-01

## 2022-01-01 RX ORDER — SODIUM CHLORIDE 0.9 % (FLUSH) 0.9 %
10 SYRINGE (ML) INJECTION
Status: DISCONTINUED | OUTPATIENT
Start: 2022-01-01 | End: 2022-01-01 | Stop reason: HOSPADM

## 2022-01-01 RX ORDER — CEFAZOLIN SODIUM 1 G/3ML
INJECTION, POWDER, FOR SOLUTION INTRAMUSCULAR; INTRAVENOUS
Status: DISCONTINUED | OUTPATIENT
Start: 2022-01-01 | End: 2022-01-01

## 2022-01-01 RX ORDER — TRANEXAMIC ACID 100 MG/ML
INJECTION, SOLUTION INTRAVENOUS
Status: COMPLETED | OUTPATIENT
Start: 2022-01-01 | End: 2022-01-01

## 2022-01-01 RX ORDER — LORAZEPAM 2 MG/ML
INJECTION INTRAMUSCULAR
Status: DISCONTINUED
Start: 2022-01-01 | End: 2022-01-01 | Stop reason: WASHOUT

## 2022-01-01 RX ORDER — TRANEXAMIC ACID 100 MG/ML
INJECTION, SOLUTION INTRAVENOUS
Status: DISPENSED
Start: 2022-01-01 | End: 2022-01-01

## 2022-01-01 RX ORDER — SENNOSIDES 8.6 MG/1
8.6 TABLET ORAL DAILY
Status: DISCONTINUED | OUTPATIENT
Start: 2022-01-01 | End: 2022-01-01 | Stop reason: HOSPADM

## 2022-01-01 RX ORDER — DEXAMETHASONE SODIUM PHOSPHATE 4 MG/ML
INJECTION, SOLUTION INTRA-ARTICULAR; INTRALESIONAL; INTRAMUSCULAR; INTRAVENOUS; SOFT TISSUE
Status: DISCONTINUED | OUTPATIENT
Start: 2022-01-01 | End: 2022-01-01

## 2022-01-01 RX ORDER — ALBUMIN HUMAN 50 G/1000ML
50 SOLUTION INTRAVENOUS ONCE
Status: COMPLETED | OUTPATIENT
Start: 2022-01-01 | End: 2022-01-01

## 2022-01-01 RX ORDER — ACETAMINOPHEN 325 MG/1
650 TABLET ORAL EVERY 6 HOURS
Status: DISCONTINUED | OUTPATIENT
Start: 2022-01-01 | End: 2022-01-01

## 2022-01-01 RX ORDER — ONDANSETRON 2 MG/ML
INJECTION INTRAMUSCULAR; INTRAVENOUS
Status: DISCONTINUED | OUTPATIENT
Start: 2022-01-01 | End: 2022-01-01

## 2022-01-01 RX ORDER — ACETAMINOPHEN 325 MG/1
650 TABLET ORAL EVERY 8 HOURS PRN
Status: DISCONTINUED | OUTPATIENT
Start: 2022-01-01 | End: 2022-01-01

## 2022-01-01 RX ORDER — LIDOCAINE HYDROCHLORIDE 20 MG/ML
INJECTION, SOLUTION INFILTRATION; PERINEURAL
Status: DISPENSED
Start: 2022-01-01 | End: 2022-01-01

## 2022-01-01 RX ORDER — PROPOFOL 10 MG/ML
VIAL (ML) INTRAVENOUS
Status: DISCONTINUED | OUTPATIENT
Start: 2022-01-01 | End: 2022-01-01

## 2022-01-01 RX ORDER — TALC
6 POWDER (GRAM) TOPICAL NIGHTLY PRN
Status: CANCELLED | OUTPATIENT
Start: 2022-01-01

## 2022-01-01 RX ORDER — DEXTROSE MONOHYDRATE, SODIUM CHLORIDE, AND POTASSIUM CHLORIDE 50; 1.49; 4.5 G/1000ML; G/1000ML; G/1000ML
INJECTION, SOLUTION INTRAVENOUS CONTINUOUS
Status: DISCONTINUED | OUTPATIENT
Start: 2022-01-01 | End: 2022-01-01

## 2022-01-01 RX ORDER — CEFAZOLIN SODIUM 2 G/50ML
SOLUTION INTRAVENOUS
Status: DISCONTINUED | OUTPATIENT
Start: 2022-01-01 | End: 2022-01-01

## 2022-01-01 RX ORDER — FUROSEMIDE 10 MG/ML
INJECTION INTRAMUSCULAR; INTRAVENOUS
Status: COMPLETED
Start: 2022-01-01 | End: 2022-01-01

## 2022-01-01 RX ORDER — LEVETIRACETAM 10 MG/ML
1000 INJECTION INTRAVASCULAR ONCE
Status: DISCONTINUED | OUTPATIENT
Start: 2022-01-01 | End: 2022-01-01 | Stop reason: HOSPADM

## 2022-01-01 RX ORDER — ALBUMIN HUMAN 50 G/1000ML
SOLUTION INTRAVENOUS
Status: DISPENSED
Start: 2022-01-01 | End: 2022-01-01

## 2022-01-01 RX ORDER — NOREPINEPHRINE BITARTRATE 0.03 MG/ML
0-3 INJECTION, SOLUTION INTRAVENOUS CONTINUOUS
Status: DISCONTINUED | OUTPATIENT
Start: 2022-01-01 | End: 2022-01-01 | Stop reason: HOSPADM

## 2022-01-01 RX ORDER — ONDANSETRON 4 MG/1
8 TABLET, ORALLY DISINTEGRATING ORAL EVERY 8 HOURS PRN
Status: DISCONTINUED | OUTPATIENT
Start: 2022-01-01 | End: 2022-01-01

## 2022-01-01 RX ORDER — CEFAZOLIN SODIUM 2 G/100ML
2 INJECTION, SOLUTION INTRAVENOUS EVERY 8 HOURS
Status: DISCONTINUED | OUTPATIENT
Start: 2022-01-01 | End: 2022-01-01 | Stop reason: ALTCHOICE

## 2022-01-01 RX ORDER — FENTANYL CITRATE 50 UG/ML
INJECTION, SOLUTION INTRAMUSCULAR; INTRAVENOUS CODE/TRAUMA/SEDATION MEDICATION
Status: DISCONTINUED | OUTPATIENT
Start: 2022-01-01 | End: 2022-01-01

## 2022-01-01 RX ORDER — LIDOCAINE HYDROCHLORIDE 20 MG/ML
INJECTION, SOLUTION EPIDURAL; INFILTRATION; INTRACAUDAL; PERINEURAL
Status: DISCONTINUED | OUTPATIENT
Start: 2022-01-01 | End: 2022-01-01

## 2022-01-01 RX ORDER — MORPHINE SULFATE 4 MG/ML
4 INJECTION, SOLUTION INTRAMUSCULAR; INTRAVENOUS
Status: DISCONTINUED | OUTPATIENT
Start: 2022-01-01 | End: 2022-01-01 | Stop reason: HOSPADM

## 2022-01-01 RX ORDER — FAMOTIDINE 20 MG/1
20 TABLET, FILM COATED ORAL 2 TIMES DAILY
Status: DISCONTINUED | OUTPATIENT
Start: 2022-01-01 | End: 2022-01-01 | Stop reason: HOSPADM

## 2022-01-01 RX ORDER — ENOXAPARIN SODIUM 100 MG/ML
40 INJECTION SUBCUTANEOUS EVERY 24 HOURS
Status: DISCONTINUED | OUTPATIENT
Start: 2022-01-01 | End: 2022-01-01 | Stop reason: HOSPADM

## 2022-01-01 RX ORDER — ACETAMINOPHEN 10 MG/ML
INJECTION, SOLUTION INTRAVENOUS
Status: DISCONTINUED | OUTPATIENT
Start: 2022-01-01 | End: 2022-01-01

## 2022-01-01 RX ORDER — CEFAZOLIN SODIUM 2 G/50ML
2 SOLUTION INTRAVENOUS
Status: DISCONTINUED | OUTPATIENT
Start: 2022-01-01 | End: 2022-01-01

## 2022-01-01 RX ORDER — ALBUMIN HUMAN 250 G/1000ML
25 SOLUTION INTRAVENOUS ONCE
Status: COMPLETED | OUTPATIENT
Start: 2022-01-01 | End: 2022-01-01

## 2022-01-01 RX ORDER — LIDOCAINE HYDROCHLORIDE 20 MG/ML
INJECTION INTRAVENOUS
Status: DISPENSED
Start: 2022-01-01 | End: 2022-01-01

## 2022-01-01 RX ORDER — MORPHINE SULFATE 4 MG/ML
2 INJECTION, SOLUTION INTRAMUSCULAR; INTRAVENOUS EVERY 4 HOURS PRN
Status: DISCONTINUED | OUTPATIENT
Start: 2022-01-01 | End: 2022-01-01 | Stop reason: HOSPADM

## 2022-01-01 RX ORDER — PROPOFOL 10 MG/ML
0-50 INJECTION, EMULSION INTRAVENOUS CONTINUOUS
Status: DISCONTINUED | OUTPATIENT
Start: 2022-01-01 | End: 2022-01-01 | Stop reason: SDUPTHER

## 2022-01-01 RX ORDER — LORAZEPAM 2 MG/ML
INJECTION INTRAMUSCULAR
Status: COMPLETED
Start: 2022-01-01 | End: 2022-01-01

## 2022-01-01 RX ORDER — PHENYLEPHRINE HYDROCHLORIDE 10 MG/ML
INJECTION INTRAVENOUS
Status: DISPENSED
Start: 2022-01-01 | End: 2022-01-01

## 2022-01-01 RX ORDER — MAGNESIUM SULFATE HEPTAHYDRATE 40 MG/ML
2 INJECTION, SOLUTION INTRAVENOUS ONCE
Status: COMPLETED | OUTPATIENT
Start: 2022-01-01 | End: 2022-01-01

## 2022-01-01 RX ORDER — PROPOFOL 10 MG/ML
0-50 INJECTION, EMULSION INTRAVENOUS CONTINUOUS
Status: DISCONTINUED | OUTPATIENT
Start: 2022-01-01 | End: 2022-01-01 | Stop reason: HOSPADM

## 2022-01-01 RX ORDER — ACETAMINOPHEN 650 MG/20.3ML
650 LIQUID ORAL EVERY 6 HOURS PRN
Status: DISCONTINUED | OUTPATIENT
Start: 2022-01-01 | End: 2022-01-01 | Stop reason: HOSPADM

## 2022-01-01 RX ORDER — ALBUMIN HUMAN 250 G/1000ML
SOLUTION INTRAVENOUS CONTINUOUS PRN
Status: DISCONTINUED | OUTPATIENT
Start: 2022-01-01 | End: 2022-01-01

## 2022-01-01 RX ORDER — NOREPINEPHRINE BITARTRATE 0.03 MG/ML
INJECTION, SOLUTION INTRAVENOUS
Status: COMPLETED
Start: 2022-01-01 | End: 2022-01-01

## 2022-01-01 RX ORDER — DEXMEDETOMIDINE HYDROCHLORIDE 4 UG/ML
0-1.4 INJECTION, SOLUTION INTRAVENOUS CONTINUOUS
Status: DISCONTINUED | OUTPATIENT
Start: 2022-01-01 | End: 2022-01-01 | Stop reason: HOSPADM

## 2022-01-01 RX ORDER — FENTANYL CITRATE 50 UG/ML
INJECTION, SOLUTION INTRAMUSCULAR; INTRAVENOUS
Status: DISCONTINUED | OUTPATIENT
Start: 2022-01-01 | End: 2022-01-01

## 2022-01-01 RX ORDER — DEXMEDETOMIDINE HYDROCHLORIDE 4 UG/ML
INJECTION, SOLUTION INTRAVENOUS
Status: COMPLETED
Start: 2022-01-01 | End: 2022-01-01

## 2022-01-01 RX ORDER — KETOROLAC TROMETHAMINE 30 MG/ML
15 INJECTION, SOLUTION INTRAMUSCULAR; INTRAVENOUS EVERY 6 HOURS
Status: COMPLETED | OUTPATIENT
Start: 2022-01-01 | End: 2022-01-01

## 2022-01-01 RX ORDER — VANCOMYCIN HYDROCHLORIDE 1 G/20ML
INJECTION, POWDER, LYOPHILIZED, FOR SOLUTION INTRAVENOUS
Status: DISCONTINUED | OUTPATIENT
Start: 2022-01-01 | End: 2022-01-01 | Stop reason: HOSPADM

## 2022-01-01 RX ORDER — MORPHINE SULFATE 4 MG/ML
2 INJECTION, SOLUTION INTRAMUSCULAR; INTRAVENOUS
Status: DISCONTINUED | OUTPATIENT
Start: 2022-01-01 | End: 2022-01-01 | Stop reason: HOSPADM

## 2022-01-01 RX ORDER — MIDAZOLAM HYDROCHLORIDE 1 MG/ML
INJECTION INTRAMUSCULAR; INTRAVENOUS
Status: COMPLETED
Start: 2022-01-01 | End: 2022-01-01

## 2022-01-01 RX ORDER — METHOCARBAMOL 100 MG/ML
500 INJECTION, SOLUTION INTRAMUSCULAR; INTRAVENOUS EVERY 6 HOURS
Status: DISCONTINUED | OUTPATIENT
Start: 2022-01-01 | End: 2022-01-01

## 2022-01-01 RX ORDER — ONDANSETRON 2 MG/ML
4 INJECTION INTRAMUSCULAR; INTRAVENOUS EVERY 6 HOURS PRN
Status: DISCONTINUED | OUTPATIENT
Start: 2022-01-01 | End: 2022-01-01 | Stop reason: HOSPADM

## 2022-01-01 RX ORDER — MIDAZOLAM HYDROCHLORIDE 1 MG/ML
5 INJECTION INTRAMUSCULAR; INTRAVENOUS ONCE
Status: COMPLETED | OUTPATIENT
Start: 2022-01-01 | End: 2022-01-01

## 2022-01-01 RX ORDER — ACETAMINOPHEN 325 MG/1
650 TABLET ORAL EVERY 6 HOURS
Status: COMPLETED | OUTPATIENT
Start: 2022-01-01 | End: 2022-01-01

## 2022-01-01 RX ORDER — METHOCARBAMOL 500 MG/1
500 TABLET, FILM COATED ORAL EVERY 8 HOURS PRN
Status: CANCELLED | OUTPATIENT
Start: 2022-01-01

## 2022-01-01 RX ORDER — ONDANSETRON 2 MG/ML
8 INJECTION INTRAMUSCULAR; INTRAVENOUS EVERY 8 HOURS PRN
Status: DISCONTINUED | OUTPATIENT
Start: 2022-01-01 | End: 2022-01-01 | Stop reason: HOSPADM

## 2022-01-01 RX ORDER — LORAZEPAM 2 MG/ML
4 INJECTION INTRAMUSCULAR ONCE
Status: COMPLETED | OUTPATIENT
Start: 2022-01-01 | End: 2022-01-01

## 2022-01-01 RX ORDER — LIDOCAINE HYDROCHLORIDE 10 MG/ML
1 INJECTION, SOLUTION EPIDURAL; INFILTRATION; INTRACAUDAL; PERINEURAL ONCE
Status: DISCONTINUED | OUTPATIENT
Start: 2022-01-01 | End: 2022-01-01 | Stop reason: HOSPADM

## 2022-01-01 RX ORDER — FUROSEMIDE 10 MG/ML
20 INJECTION INTRAMUSCULAR; INTRAVENOUS ONCE
Status: COMPLETED | OUTPATIENT
Start: 2022-01-01 | End: 2022-01-01

## 2022-01-01 RX ORDER — KETOROLAC TROMETHAMINE 30 MG/ML
15 INJECTION, SOLUTION INTRAMUSCULAR; INTRAVENOUS EVERY 6 HOURS
Status: DISCONTINUED | OUTPATIENT
Start: 2022-01-01 | End: 2022-01-01

## 2022-01-01 RX ADMIN — DEXMEDETOMIDINE HYDROCHLORIDE 0.8 MCG/KG/HR: 400 INJECTION INTRAVENOUS at 04:05

## 2022-01-01 RX ADMIN — SENNOSIDES 8.6 MG: 8.6 TABLET, FILM COATED ORAL at 08:05

## 2022-01-01 RX ADMIN — METHOCARBAMOL 500 MG: 100 INJECTION, SOLUTION INTRAMUSCULAR; INTRAVENOUS at 09:05

## 2022-01-01 RX ADMIN — KETOROLAC TROMETHAMINE 15 MG: 30 INJECTION, SOLUTION INTRAMUSCULAR at 11:05

## 2022-01-01 RX ADMIN — PHENYLEPHRINE HYDROCHLORIDE 100 MCG: 10 INJECTION INTRAVENOUS at 11:05

## 2022-01-01 RX ADMIN — HYDROCORTISONE SODIUM SUCCINATE 100 MG: 100 INJECTION, POWDER, FOR SOLUTION INTRAMUSCULAR; INTRAVENOUS at 05:05

## 2022-01-01 RX ADMIN — GABAPENTIN 300 MG: 300 CAPSULE ORAL at 08:05

## 2022-01-01 RX ADMIN — METHOCARBAMOL 500 MG: 100 INJECTION, SOLUTION INTRAMUSCULAR; INTRAVENOUS at 08:05

## 2022-01-01 RX ADMIN — HYDROCORTISONE SODIUM SUCCINATE 50 MG: 100 INJECTION, POWDER, FOR SOLUTION INTRAMUSCULAR; INTRAVENOUS at 10:05

## 2022-01-01 RX ADMIN — ROCURONIUM BROMIDE 40 MG: 10 INJECTION, SOLUTION INTRAVENOUS at 10:05

## 2022-01-01 RX ADMIN — Medication 20 MG: at 08:05

## 2022-01-01 RX ADMIN — VASOPRESSIN 0.04 UNITS/MIN: 20 INJECTION INTRAVENOUS at 08:05

## 2022-01-01 RX ADMIN — HYDROCORTISONE SODIUM SUCCINATE 100 MG: 100 INJECTION, POWDER, FOR SOLUTION INTRAMUSCULAR; INTRAVENOUS at 02:05

## 2022-01-01 RX ADMIN — PROPOFOL 100 MG: 10 INJECTION, EMULSION INTRAVENOUS at 10:05

## 2022-01-01 RX ADMIN — Medication 0.76 MCG/KG/MIN: at 07:05

## 2022-01-01 RX ADMIN — METHOCARBAMOL 500 MG: 100 INJECTION, SOLUTION INTRAMUSCULAR; INTRAVENOUS at 03:05

## 2022-01-01 RX ADMIN — PHENYLEPHRINE HYDROCHLORIDE 200 MCG: 10 INJECTION INTRAVENOUS at 02:05

## 2022-01-01 RX ADMIN — POLYETHYLENE GLYCOL 3350 17 G: 17 POWDER, FOR SOLUTION ORAL at 08:05

## 2022-01-01 RX ADMIN — VANCOMYCIN HYDROCHLORIDE 500 MG: 500 INJECTION, POWDER, LYOPHILIZED, FOR SOLUTION INTRAVENOUS at 06:05

## 2022-01-01 RX ADMIN — ALBUMIN (HUMAN): 12.5 SOLUTION INTRAVENOUS at 11:05

## 2022-01-01 RX ADMIN — ENOXAPARIN SODIUM 40 MG: 100 INJECTION SUBCUTANEOUS at 05:05

## 2022-01-01 RX ADMIN — KETOROLAC TROMETHAMINE 15 MG: 30 INJECTION, SOLUTION INTRAMUSCULAR at 06:05

## 2022-01-01 RX ADMIN — CEFEPIME 2 G: 2 INJECTION, POWDER, FOR SOLUTION INTRAVENOUS at 04:05

## 2022-01-01 RX ADMIN — HYDROCORTISONE SODIUM SUCCINATE 50 MG: 100 INJECTION, POWDER, FOR SOLUTION INTRAMUSCULAR; INTRAVENOUS at 02:05

## 2022-01-01 RX ADMIN — METHOCARBAMOL 500 MG: 100 INJECTION, SOLUTION INTRAMUSCULAR; INTRAVENOUS at 05:05

## 2022-01-01 RX ADMIN — DEXMEDETOMIDINE HYDROCHLORIDE 1.4 MCG/KG/HR: 400 INJECTION INTRAVENOUS at 02:05

## 2022-01-01 RX ADMIN — MORPHINE SULFATE 4 MG: 4 INJECTION INTRAVENOUS at 12:05

## 2022-01-01 RX ADMIN — DEXMEDETOMIDINE HYDROCHLORIDE 0.6 MCG/KG/HR: 400 INJECTION INTRAVENOUS at 05:05

## 2022-01-01 RX ADMIN — DEXMEDETOMIDINE HYDROCHLORIDE 0.8 MCG/KG/HR: 400 INJECTION INTRAVENOUS at 07:05

## 2022-01-01 RX ADMIN — ONDANSETRON 4 MG: 2 INJECTION INTRAMUSCULAR; INTRAVENOUS at 09:05

## 2022-01-01 RX ADMIN — PROPOFOL 34.65 MCG/KG/MIN: 10 INJECTION, EMULSION INTRAVENOUS at 12:05

## 2022-01-01 RX ADMIN — VANCOMYCIN HYDROCHLORIDE 750 MG: 1 INJECTION, POWDER, LYOPHILIZED, FOR SOLUTION INTRAVENOUS at 06:05

## 2022-01-01 RX ADMIN — OXYCODONE HYDROCHLORIDE 5 MG: 5 TABLET ORAL at 07:05

## 2022-01-01 RX ADMIN — PROPOFOL 15 MCG/KG/MIN: 10 INJECTION, EMULSION INTRAVENOUS at 05:05

## 2022-01-01 RX ADMIN — POTASSIUM CHLORIDE, DEXTROSE MONOHYDRATE AND SODIUM CHLORIDE: 150; 5; 450 INJECTION, SOLUTION INTRAVENOUS at 08:05

## 2022-01-01 RX ADMIN — ACETAMINOPHEN 650 MG: 650 SOLUTION ORAL at 08:05

## 2022-01-01 RX ADMIN — FUROSEMIDE 40 MG: 10 INJECTION INTRAMUSCULAR; INTRAVENOUS at 12:05

## 2022-01-01 RX ADMIN — SODIUM BICARBONATE: 84 INJECTION, SOLUTION INTRAVENOUS at 11:05

## 2022-01-01 RX ADMIN — METHOCARBAMOL 500 MG: 100 INJECTION, SOLUTION INTRAMUSCULAR; INTRAVENOUS at 11:05

## 2022-01-01 RX ADMIN — DEXMEDETOMIDINE HYDROCHLORIDE 0.6 MCG/KG/HR: 400 INJECTION INTRAVENOUS at 11:05

## 2022-01-01 RX ADMIN — HYDROCORTISONE SODIUM SUCCINATE 50 MG: 100 INJECTION, POWDER, FOR SOLUTION INTRAMUSCULAR; INTRAVENOUS at 08:05

## 2022-01-01 RX ADMIN — HYDROCORTISONE SODIUM SUCCINATE 50 MG: 100 INJECTION, POWDER, FOR SOLUTION INTRAMUSCULAR; INTRAVENOUS at 09:05

## 2022-01-01 RX ADMIN — METHOCARBAMOL 500 MG: 100 INJECTION, SOLUTION INTRAMUSCULAR; INTRAVENOUS at 02:05

## 2022-01-01 RX ADMIN — CEFEPIME 2 G: 2 INJECTION, POWDER, FOR SOLUTION INTRAVENOUS at 11:05

## 2022-01-01 RX ADMIN — PHENYLEPHRINE HYDROCHLORIDE 1.1 MCG/KG/MIN: 10 INJECTION INTRAVENOUS at 04:05

## 2022-01-01 RX ADMIN — FENTANYL CITRATE 50 MCG: 50 INJECTION, SOLUTION INTRAMUSCULAR; INTRAVENOUS at 11:05

## 2022-01-01 RX ADMIN — KETOROLAC TROMETHAMINE 15 MG: 30 INJECTION, SOLUTION INTRAMUSCULAR at 08:05

## 2022-01-01 RX ADMIN — ACETAMINOPHEN 650 MG: 325 TABLET, FILM COATED ORAL at 08:05

## 2022-01-01 RX ADMIN — FUROSEMIDE 40 MG: 10 INJECTION, SOLUTION INTRAMUSCULAR; INTRAVENOUS at 08:05

## 2022-01-01 RX ADMIN — LEVETIRACETAM 1000 MG: 100 INJECTION, SOLUTION INTRAVENOUS at 02:05

## 2022-01-01 RX ADMIN — MORPHINE SULFATE 4 MG: 4 INJECTION INTRAVENOUS at 06:05

## 2022-01-01 RX ADMIN — MIDAZOLAM HYDROCHLORIDE 5 MG: 1 INJECTION INTRAMUSCULAR; INTRAVENOUS at 04:05

## 2022-01-01 RX ADMIN — CEFEPIME 2 G: 2 INJECTION, POWDER, FOR SOLUTION INTRAVENOUS at 08:05

## 2022-01-01 RX ADMIN — DEXMEDETOMIDINE HYDROCHLORIDE 0.8 MCG/KG/HR: 400 INJECTION INTRAVENOUS at 05:05

## 2022-01-01 RX ADMIN — DEXMEDETOMIDINE HYDROCHLORIDE 0.2 MCG/KG/HR: 400 INJECTION INTRAVENOUS at 02:05

## 2022-01-01 RX ADMIN — FENTANYL CITRATE 50 MCG: 50 INJECTION, SOLUTION INTRAMUSCULAR; INTRAVENOUS at 10:05

## 2022-01-01 RX ADMIN — PHENYLEPHRINE HYDROCHLORIDE 100 MCG: 10 INJECTION INTRAVENOUS at 12:05

## 2022-01-01 RX ADMIN — Medication 0.22 MCG/KG/MIN: at 02:05

## 2022-01-01 RX ADMIN — SODIUM CHLORIDE 1000 ML: 9 INJECTION, SOLUTION INTRAVENOUS at 09:05

## 2022-01-01 RX ADMIN — Medication 20 MG: at 09:05

## 2022-01-01 RX ADMIN — Medication 0.02 MCG/KG/MIN: at 06:05

## 2022-01-01 RX ADMIN — PROPOFOL 20 MCG/KG/MIN: 10 INJECTION, EMULSION INTRAVENOUS at 03:05

## 2022-01-01 RX ADMIN — VASOPRESSIN 0.04 UNITS/MIN: 20 INJECTION INTRAVENOUS at 06:05

## 2022-01-01 RX ADMIN — PHENYLEPHRINE HYDROCHLORIDE 100 MCG: 10 INJECTION INTRAVENOUS at 10:05

## 2022-01-01 RX ADMIN — CEFAZOLIN SODIUM 2 G: 2 SOLUTION INTRAVENOUS at 09:05

## 2022-01-01 RX ADMIN — Medication 0.48 MCG/KG/MIN: at 04:05

## 2022-01-01 RX ADMIN — VASOPRESSIN 0.04 UNITS/MIN: 20 INJECTION INTRAVENOUS at 09:05

## 2022-01-01 RX ADMIN — MORPHINE SULFATE 4 MG: 4 INJECTION INTRAVENOUS at 02:05

## 2022-01-01 RX ADMIN — FENTANYL CITRATE 50 MCG: 50 INJECTION, SOLUTION INTRAMUSCULAR; INTRAVENOUS at 09:05

## 2022-01-01 RX ADMIN — POTASSIUM CHLORIDE, DEXTROSE MONOHYDRATE AND SODIUM CHLORIDE: 150; 5; 450 INJECTION, SOLUTION INTRAVENOUS at 04:05

## 2022-01-01 RX ADMIN — Medication 0.02 MCG/KG/MIN: at 03:05

## 2022-01-01 RX ADMIN — GABAPENTIN 300 MG: 300 CAPSULE ORAL at 06:05

## 2022-01-01 RX ADMIN — FUROSEMIDE 40 MG: 10 INJECTION, SOLUTION INTRAMUSCULAR; INTRAVENOUS at 01:05

## 2022-01-01 RX ADMIN — MORPHINE SULFATE 4 MG: 4 INJECTION INTRAVENOUS at 09:05

## 2022-01-01 RX ADMIN — ACETAMINOPHEN 650 MG: 325 TABLET, FILM COATED ORAL at 12:05

## 2022-01-01 RX ADMIN — PHENYLEPHRINE HYDROCHLORIDE 200 MCG: 10 INJECTION INTRAVENOUS at 10:05

## 2022-01-01 RX ADMIN — ROCURONIUM BROMIDE 15 MG: 10 INJECTION, SOLUTION INTRAVENOUS at 12:05

## 2022-01-01 RX ADMIN — PROPOFOL 20 MCG/KG/MIN: 10 INJECTION, EMULSION INTRAVENOUS at 06:05

## 2022-01-01 RX ADMIN — Medication 0.36 MCG/KG/MIN: at 12:05

## 2022-01-01 RX ADMIN — METHOCARBAMOL 500 MG: 100 INJECTION, SOLUTION INTRAMUSCULAR; INTRAVENOUS at 10:05

## 2022-01-01 RX ADMIN — VANCOMYCIN HYDROCHLORIDE 750 MG: 1 INJECTION, POWDER, LYOPHILIZED, FOR SOLUTION INTRAVENOUS at 05:05

## 2022-01-01 RX ADMIN — HYDROCORTISONE SODIUM SUCCINATE 100 MG: 100 INJECTION, POWDER, FOR SOLUTION INTRAMUSCULAR; INTRAVENOUS at 11:05

## 2022-01-01 RX ADMIN — SODIUM CHLORIDE, POTASSIUM CHLORIDE, SODIUM LACTATE AND CALCIUM CHLORIDE 1000 ML: 600; 310; 30; 20 INJECTION, SOLUTION INTRAVENOUS at 09:05

## 2022-01-01 RX ADMIN — PROPOFOL 10 MCG/KG/MIN: 10 INJECTION, EMULSION INTRAVENOUS at 05:05

## 2022-01-01 RX ADMIN — Medication 50 MCG/HR: at 10:05

## 2022-01-01 RX ADMIN — MORPHINE SULFATE 4 MG: 4 INJECTION INTRAVENOUS at 11:05

## 2022-01-01 RX ADMIN — VANCOMYCIN HYDROCHLORIDE 750 MG: 1 INJECTION, POWDER, LYOPHILIZED, FOR SOLUTION INTRAVENOUS at 04:05

## 2022-01-01 RX ADMIN — ROCURONIUM BROMIDE 15 MG: 10 INJECTION, SOLUTION INTRAVENOUS at 11:05

## 2022-01-01 RX ADMIN — ACETAMINOPHEN 650 MG: 325 TABLET, FILM COATED ORAL at 06:05

## 2022-01-01 RX ADMIN — Medication 0.38 MCG/KG/MIN: at 08:05

## 2022-01-01 RX ADMIN — CEFEPIME 2 G: 2 INJECTION, POWDER, FOR SOLUTION INTRAVENOUS at 12:05

## 2022-01-01 RX ADMIN — ACETAMINOPHEN 650 MG: 325 TABLET, FILM COATED ORAL at 05:05

## 2022-01-01 RX ADMIN — ACETAMINOPHEN 650 MG: 650 SOLUTION ORAL at 04:05

## 2022-01-01 RX ADMIN — ALBUMIN (HUMAN) 50 G: 12.5 SOLUTION INTRAVENOUS at 10:05

## 2022-01-01 RX ADMIN — MINERAL OIL AND PETROLATUM: 150; 830 OINTMENT OPHTHALMIC at 08:05

## 2022-01-01 RX ADMIN — MORPHINE SULFATE 4 MG: 4 INJECTION INTRAVENOUS at 04:05

## 2022-01-01 RX ADMIN — TRANEXAMIC ACID 1000 MG: 100 INJECTION, SOLUTION INTRAVENOUS at 10:05

## 2022-01-01 RX ADMIN — ROCURONIUM BROMIDE 20 MG: 10 INJECTION, SOLUTION INTRAVENOUS at 01:05

## 2022-01-01 RX ADMIN — FUROSEMIDE 40 MG: 10 INJECTION, SOLUTION INTRAMUSCULAR; INTRAVENOUS at 12:05

## 2022-01-01 RX ADMIN — DEXMEDETOMIDINE HYDROCHLORIDE 1.4 MCG/KG/HR: 400 INJECTION INTRAVENOUS at 04:05

## 2022-01-01 RX ADMIN — METHOCARBAMOL 500 MG: 100 INJECTION, SOLUTION INTRAMUSCULAR; INTRAVENOUS at 01:05

## 2022-01-01 RX ADMIN — KETOROLAC TROMETHAMINE 15 MG: 30 INJECTION, SOLUTION INTRAMUSCULAR at 02:05

## 2022-01-01 RX ADMIN — MAGNESIUM SULFATE IN WATER 2 G: 40 INJECTION, SOLUTION INTRAVENOUS at 10:05

## 2022-01-01 RX ADMIN — ACETAMINOPHEN 650 MG: 325 TABLET, FILM COATED ORAL at 02:05

## 2022-01-01 RX ADMIN — VASOPRESSIN 0.04 UNITS/MIN: 20 INJECTION INTRAVENOUS at 11:05

## 2022-01-01 RX ADMIN — POTASSIUM CHLORIDE, DEXTROSE MONOHYDRATE AND SODIUM CHLORIDE: 150; 5; 450 INJECTION, SOLUTION INTRAVENOUS at 11:05

## 2022-01-01 RX ADMIN — ONDANSETRON 4 MG: 2 INJECTION INTRAMUSCULAR; INTRAVENOUS at 02:05

## 2022-01-01 RX ADMIN — PHENYLEPHRINE HYDROCHLORIDE 35 MCG/MIN: 10 INJECTION INTRAVENOUS at 12:05

## 2022-01-01 RX ADMIN — POLYETHYLENE GLYCOL 3350 17 G: 17 POWDER, FOR SOLUTION ORAL at 09:05

## 2022-01-01 RX ADMIN — LIDOCAINE HYDROCHLORIDE 2 ML: 20 INJECTION, SOLUTION EPIDURAL; INFILTRATION; INTRACAUDAL; PERINEURAL at 10:05

## 2022-01-01 RX ADMIN — METHOCARBAMOL 500 MG: 100 INJECTION, SOLUTION INTRAMUSCULAR; INTRAVENOUS at 06:05

## 2022-01-01 RX ADMIN — SUGAMMADEX 200 MG: 100 INJECTION, SOLUTION INTRAVENOUS at 02:05

## 2022-01-01 RX ADMIN — HYDROCORTISONE SODIUM SUCCINATE 50 MG: 100 INJECTION, POWDER, FOR SOLUTION INTRAMUSCULAR; INTRAVENOUS at 11:05

## 2022-01-01 RX ADMIN — GABAPENTIN 300 MG: 300 CAPSULE ORAL at 10:05

## 2022-01-01 RX ADMIN — TRANEXAMIC ACID 1000 MG: 100 INJECTION, SOLUTION INTRAVENOUS at 11:05

## 2022-01-01 RX ADMIN — Medication 0.14 MCG/KG/MIN: at 02:05

## 2022-01-01 RX ADMIN — POTASSIUM CHLORIDE, DEXTROSE MONOHYDRATE AND SODIUM CHLORIDE: 150; 5; 450 INJECTION, SOLUTION INTRAVENOUS at 07:05

## 2022-01-01 RX ADMIN — POTASSIUM PHOSPHATE, MONOBASIC AND POTASSIUM PHOSPHATE, DIBASIC 15 MMOL: 224; 236 INJECTION, SOLUTION, CONCENTRATE INTRAVENOUS at 10:05

## 2022-01-01 RX ADMIN — Medication 0.36 MCG/KG/MIN: at 06:05

## 2022-01-01 RX ADMIN — DEXMEDETOMIDINE HYDROCHLORIDE 0.8 MCG/KG/HR: 400 INJECTION INTRAVENOUS at 06:05

## 2022-01-01 RX ADMIN — FUROSEMIDE 20 MG: 10 INJECTION, SOLUTION INTRAMUSCULAR; INTRAVENOUS at 09:05

## 2022-01-01 RX ADMIN — CEFEPIME 2 G: 2 INJECTION, POWDER, FOR SOLUTION INTRAVENOUS at 07:05

## 2022-01-01 RX ADMIN — ENOXAPARIN SODIUM 30 MG: 30 INJECTION SUBCUTANEOUS at 09:05

## 2022-01-01 RX ADMIN — DEXMEDETOMIDINE HYDROCHLORIDE 1.4 MCG/KG/HR: 400 INJECTION INTRAVENOUS at 03:05

## 2022-01-01 RX ADMIN — ROCURONIUM BROMIDE 60 MG: 10 SOLUTION INTRAVENOUS at 01:05

## 2022-01-01 RX ADMIN — SODIUM CHLORIDE 500 ML: 9 INJECTION, SOLUTION INTRAVENOUS at 12:05

## 2022-01-01 RX ADMIN — CEFAZOLIN 1 G: 330 INJECTION, POWDER, FOR SOLUTION INTRAMUSCULAR; INTRAVENOUS at 02:05

## 2022-01-01 RX ADMIN — DEXMEDETOMIDINE HYDROCHLORIDE 1.4 MCG/KG/HR: 400 INJECTION INTRAVENOUS at 08:05

## 2022-01-01 RX ADMIN — Medication 0.56 MCG/KG/MIN: at 12:05

## 2022-01-01 RX ADMIN — CEFAZOLIN SODIUM 2000 MG: 2 INJECTION, SOLUTION INTRAVENOUS at 06:05

## 2022-01-01 RX ADMIN — DEXMEDETOMIDINE HYDROCHLORIDE 0.2 MCG/KG/HR: 400 INJECTION INTRAVENOUS at 10:05

## 2022-01-01 RX ADMIN — MIDAZOLAM 2 MG: 1 INJECTION INTRAMUSCULAR; INTRAVENOUS at 10:05

## 2022-01-01 RX ADMIN — VASOPRESSIN 0.04 UNITS/MIN: 20 INJECTION INTRAVENOUS at 05:05

## 2022-01-01 RX ADMIN — METHOCARBAMOL 500 MG: 100 INJECTION, SOLUTION INTRAMUSCULAR; INTRAVENOUS at 04:05

## 2022-01-01 RX ADMIN — Medication 20 MG: at 10:05

## 2022-01-01 RX ADMIN — CEFAZOLIN 2 G: 330 INJECTION, POWDER, FOR SOLUTION INTRAMUSCULAR; INTRAVENOUS at 10:05

## 2022-01-01 RX ADMIN — VASOPRESSIN 0.04 UNITS/MIN: 20 INJECTION INTRAVENOUS at 03:05

## 2022-01-01 RX ADMIN — ACETAMINOPHEN 650 MG: 650 SOLUTION ORAL at 12:05

## 2022-01-01 RX ADMIN — LORAZEPAM 4 MG: 2 INJECTION INTRAMUSCULAR at 02:05

## 2022-01-01 RX ADMIN — Medication 125 MCG/HR: at 01:05

## 2022-01-01 RX ADMIN — POTASSIUM CHLORIDE, DEXTROSE MONOHYDRATE AND SODIUM CHLORIDE: 150; 5; 450 INJECTION, SOLUTION INTRAVENOUS at 06:05

## 2022-01-01 RX ADMIN — DEXMEDETOMIDINE HYDROCHLORIDE 1.4 MCG/KG/HR: 400 INJECTION INTRAVENOUS at 09:05

## 2022-01-01 RX ADMIN — ALBUMIN (HUMAN) 25 G: 12.5 SOLUTION INTRAVENOUS at 09:05

## 2022-01-01 RX ADMIN — VASOPRESSIN 0.04 UNITS/MIN: 20 INJECTION INTRAVENOUS at 12:05

## 2022-01-01 RX ADMIN — MINERAL OIL AND PETROLATUM: 150; 830 OINTMENT OPHTHALMIC at 12:05

## 2022-01-01 RX ADMIN — DEXMEDETOMIDINE HYDROCHLORIDE 1.4 MCG/KG/HR: 400 INJECTION INTRAVENOUS at 11:05

## 2022-01-01 RX ADMIN — SODIUM CHLORIDE, POTASSIUM CHLORIDE, SODIUM LACTATE AND CALCIUM CHLORIDE: 600; 310; 30; 20 INJECTION, SOLUTION INTRAVENOUS at 12:05

## 2022-01-01 RX ADMIN — Medication 0.03 MCG/KG/MIN: at 06:05

## 2022-01-01 RX ADMIN — ACETAMINOPHEN 650 MG: 325 TABLET, FILM COATED ORAL at 01:05

## 2022-01-01 RX ADMIN — HYDROCORTISONE SODIUM SUCCINATE 50 MG: 100 INJECTION, POWDER, FOR SOLUTION INTRAMUSCULAR; INTRAVENOUS at 05:05

## 2022-01-01 RX ADMIN — Medication 0.34 MCG/KG/MIN: at 05:05

## 2022-01-01 RX ADMIN — ROCURONIUM BROMIDE 10 MG: 10 INJECTION, SOLUTION INTRAVENOUS at 11:05

## 2022-01-01 RX ADMIN — DEXMEDETOMIDINE HYDROCHLORIDE 0.2 MCG/KG/HR: 400 INJECTION INTRAVENOUS at 04:05

## 2022-01-01 RX ADMIN — KETOROLAC TROMETHAMINE 15 MG: 30 INJECTION, SOLUTION INTRAMUSCULAR at 04:05

## 2022-01-01 RX ADMIN — Medication 0.08 MCG/KG/MIN: at 12:05

## 2022-01-01 RX ADMIN — FUROSEMIDE 40 MG: 10 INJECTION, SOLUTION INTRAMUSCULAR; INTRAVENOUS at 09:05

## 2022-01-01 RX ADMIN — KETOROLAC TROMETHAMINE 15 MG: 30 INJECTION, SOLUTION INTRAMUSCULAR at 10:05

## 2022-01-01 RX ADMIN — GABAPENTIN 300 MG: 300 CAPSULE ORAL at 03:05

## 2022-01-01 RX ADMIN — MORPHINE SULFATE 4 MG: 4 INJECTION INTRAVENOUS at 07:05

## 2022-01-01 RX ADMIN — DEXMEDETOMIDINE HYDROCHLORIDE 0.2 MCG/KG/HR: 400 INJECTION INTRAVENOUS at 09:05

## 2022-01-01 RX ADMIN — METHOCARBAMOL 500 MG: 100 INJECTION, SOLUTION INTRAMUSCULAR; INTRAVENOUS at 07:05

## 2022-01-01 RX ADMIN — POTASSIUM CHLORIDE, DEXTROSE MONOHYDRATE AND SODIUM CHLORIDE: 150; 5; 450 INJECTION, SOLUTION INTRAVENOUS at 03:05

## 2022-01-01 RX ADMIN — DEXTROSE MONOHYDRATE 250 ML: 100 INJECTION, SOLUTION INTRAVENOUS at 01:05

## 2022-01-01 RX ADMIN — Medication 100 MCG/HR: at 01:05

## 2022-01-01 RX ADMIN — POTASSIUM CHLORIDE, DEXTROSE MONOHYDRATE AND SODIUM CHLORIDE: 150; 5; 450 INJECTION, SOLUTION INTRAVENOUS at 12:05

## 2022-01-01 RX ADMIN — HYDROCORTISONE SODIUM SUCCINATE 50 MG: 100 INJECTION, POWDER, FOR SOLUTION INTRAMUSCULAR; INTRAVENOUS at 04:05

## 2022-01-01 RX ADMIN — ACETAMINOPHEN 650 MG: 325 TABLET, FILM COATED ORAL at 03:05

## 2022-01-01 RX ADMIN — KETOROLAC TROMETHAMINE 15 MG: 30 INJECTION, SOLUTION INTRAMUSCULAR at 05:05

## 2022-01-01 RX ADMIN — Medication 125 MCG/HR: at 08:05

## 2022-01-01 RX ADMIN — FUROSEMIDE 40 MG: 10 INJECTION, SOLUTION INTRAMUSCULAR; INTRAVENOUS at 02:05

## 2022-01-01 RX ADMIN — POTASSIUM PHOSPHATE, MONOBASIC AND POTASSIUM PHOSPHATE, DIBASIC 30 MMOL: 224; 236 INJECTION, SOLUTION, CONCENTRATE INTRAVENOUS at 05:05

## 2022-01-01 RX ADMIN — MIDAZOLAM HYDROCHLORIDE 5 MG: 1 INJECTION, SOLUTION INTRAMUSCULAR; INTRAVENOUS at 04:05

## 2022-01-01 RX ADMIN — SODIUM CHLORIDE, POTASSIUM CHLORIDE, SODIUM LACTATE AND CALCIUM CHLORIDE: 600; 310; 30; 20 INJECTION, SOLUTION INTRAVENOUS at 09:05

## 2022-01-01 RX ADMIN — FUROSEMIDE 40 MG: 10 INJECTION, SOLUTION INTRAMUSCULAR; INTRAVENOUS at 05:05

## 2022-01-01 RX ADMIN — PHENYLEPHRINE HYDROCHLORIDE 35 MCG/MIN: 10 INJECTION INTRAVENOUS at 11:05

## 2022-01-01 RX ADMIN — Medication 100 MCG/HR: at 02:05

## 2022-01-01 RX ADMIN — CEFAZOLIN SODIUM 2000 MG: 2 INJECTION, SOLUTION INTRAVENOUS at 09:05

## 2022-01-01 RX ADMIN — SODIUM CHLORIDE, POTASSIUM CHLORIDE, SODIUM LACTATE AND CALCIUM CHLORIDE: 600; 310; 30; 20 INJECTION, SOLUTION INTRAVENOUS at 01:05

## 2022-01-01 RX ADMIN — LORAZEPAM 4 MG: 2 INJECTION INTRAMUSCULAR; INTRAVENOUS at 02:05

## 2022-01-01 RX ADMIN — PROPOFOL 30 MCG/KG/MIN: 10 INJECTION, EMULSION INTRAVENOUS at 12:05

## 2022-01-01 RX ADMIN — MORPHINE SULFATE 2 MG: 4 INJECTION INTRAVENOUS at 06:05

## 2022-01-01 RX ADMIN — TETANUS TOXOID, REDUCED DIPHTHERIA TOXOID AND ACELLULAR PERTUSSIS VACCINE, ADSORBED 0.5 ML: 5; 2.5; 8; 8; 2.5 SUSPENSION INTRAMUSCULAR at 03:05

## 2022-01-01 RX ADMIN — IOPAMIDOL 100 ML: 755 INJECTION, SOLUTION INTRAVENOUS at 10:05

## 2022-01-01 RX ADMIN — MINERAL OIL AND PETROLATUM: 150; 830 OINTMENT OPHTHALMIC at 03:05

## 2022-01-01 RX ADMIN — Medication 125 MCG/HR: at 06:05

## 2022-01-01 RX ADMIN — POTASSIUM CHLORIDE, DEXTROSE MONOHYDRATE AND SODIUM CHLORIDE: 150; 5; 450 INJECTION, SOLUTION INTRAVENOUS at 05:05

## 2022-01-01 RX ADMIN — HYDROCORTISONE SODIUM SUCCINATE 100 MG: 100 INJECTION, POWDER, FOR SOLUTION INTRAMUSCULAR; INTRAVENOUS at 10:05

## 2022-01-01 RX ADMIN — PROPOFOL 50 MCG/KG/MIN: 10 INJECTION, EMULSION INTRAVENOUS at 11:05

## 2022-01-01 RX ADMIN — DEXAMETHASONE SODIUM PHOSPHATE 4 MG: 4 INJECTION, SOLUTION INTRA-ARTICULAR; INTRALESIONAL; INTRAMUSCULAR; INTRAVENOUS; SOFT TISSUE at 11:05

## 2022-01-01 RX ADMIN — ACETAMINOPHEN 1000 MG: 10 INJECTION, SOLUTION INTRAVENOUS at 01:05

## 2022-01-01 RX ADMIN — SENNOSIDES 8.6 MG: 8.6 TABLET, FILM COATED ORAL at 10:05

## 2022-04-19 PROBLEM — C50.919 MALIGNANT NEOPLASM OF BREAST (FEMALE): Status: ACTIVE | Noted: 2022-01-01

## 2022-04-30 NOTE — OP NOTE
* Final Report *  PATIENT: Christiana Webb    MRN: [id number]    DATE OF SURGERY: 2/23/2018    SERVICE: [facility]    ATTENDING PHYSICIAN: Ozzie Goss MD    RESIDENT SURGEONS:   1) Meghan Matos, PGY1  2) Guera Lee PGY3    PREOPERATIVE DIAGNOSIS: Stage IV Inflammatory Breast Cancer.    POSTOPERATIVE DIAGNOSIS: Stage IV Inflammatory Breast Cancer.    PROCEDURE: Left subclavian Mediport placement.    ANESTHESIA: MAC.    ESTIMATED BLOOD LOSS: 5 cc    SPECIMENS: None.    DRAINS/PACKINGS: None.    IMPLANTS/DEVICES: PowerPort Mediport.    PROCEDURE IN DETAIL: After adequate anesthesia was obtained, patient was appropriately prepped and draped over the neck and chest in standard sterile fashion. After infiltration with local anesthetic, the left subclavian vein was accessed via Seldinger technique on the first attempt. A J-tipped guidewire was then advanced, followed by removal of the needle, and appropriate placement was confirmed using fluoroscopy. A 15 blade was then used to make a 3-4 cm incision about 2 fingerbreadths below the site of wire access. A subcutaneous pocket was then created using blunt finger dissection and Bovie electrocautery. The catheter, which had been cut to the appropriate length and attached to the port, was then tunneled just below the skin and brought out at the site of wire insertion. Dilator with sheath was then advanced over the wire under direct fluoroscopic guidance. The dilator, along with the wire, was then removed. The catheter was then advanced through the sheath, into the central circulation while slowly breaking and  the sheath. Correct placement of the catheter was again confirmed with fluoroscopy, and it was noted to be in good position. The port was then aspirated, and it flushed well. It was then inserted into the previously created subcutaneous pocket and anchored down using 2 simple 3-0 Prolene sutures. The port was again accessed with the Ramirez needle  and heparin lock was administered. The deep dermal layer was then closed with buried interrupted 3-0 Vicryl suture, and the skin was closed with a running 4-0 PDS suture, along with a single buried interrupted suture at the site of initial subclavian access. The incision was then cleaned and dried, and Dermabond was applied. At the end of the procedure, all lap, sponge and instrument counts were correct. The patient tolerated the procedure well without any complications and was extubated and transferred to the recovery room postoperatively. A postoperative chest x-ray was ordered and will be reviewed.  Result type: Operative Report   Result date: February 25, 2018 9:03 CST   Result status: Auth (Verified)   Result title: Op Note   Performed by: Meghan Matos MD on February 25, 2018 9:12 CST   Verified by: Meghan Maots MD on February 25, 2018 9:12 CST   Encounter info: [id number], [facility], [facility]   Doc has been moved by HIM specialist to the correct encounter.

## 2022-04-30 NOTE — ED PROVIDER NOTES
Patient:   Christiana Webb             MRN: [id number]            FIN: [id number]               Age:   52 years     Sex:  Female     :  1965   Associated Diagnoses:   Generalized weakness; Lateral nystagmus; N&V (nausea and vomiting); Dizziness; Acute lower UTI (urinary tract infection)   Author:   Luis Enrique Polk      Basic Information   Time seen: Date & time 2017 08:52:00.   History source: Patient.   Arrival mode: Private vehicle, walking.   History limitation: None.   Additional information: Chief Complaint from Nursing Triage Note : Chief Complaint   2017 8:39 CDT       Chief Complaint           Weakness and vomiting starting today while at medicine clinic appt.    2017 7:27 CDT       Chief Complaint           was seen in multiple ED visits  .      History of Present Illness   The patient presents with nausea and vomiting.  The onset was 1  hours ago.  The course/duration of symptoms is fluctuating in intensity.  The character of symptoms is food stuff.  The degree at present is minimal.  The exacerbating factor is eating.  There are relieving factors including rest and remaining NPO.  Risk factors consist of none.  Therapy today: none.  Associated symptoms: dizziness, denies abdominal pain, denies diarrhea, denies fever, denies chills, denies chest pain, denies shortness of breath, denies headache, denies back pain and denies blood in stool.  Additional history: none.  pt reports developing weakness, n/v and dizziness after being seen in the med clinic this AM. reports still feeling dizziness and weakness currently. pt report she was seen in the ER for same symptoms at beginning of month where they did a CT of head because they thought she had a stroke. CT head in system wnl. Pt reports otherwise feeling well up to this point. Reports no falls. no cp or sob. no abd pain. Pt has chronic LLE pain and she has been evaluated several times for this. Pt reports no ha, neck  pain, change in vision, smell, taste, f/c, cp, sob, abd pain, dysuria, le swelling, calf pain/swelling, dizziness, tingling/numbness in ext/face at present time..        Review of Systems   Constitutional symptoms:  No fever, no chills.    Skin symptoms:  No rash, no pruritus, no lesion.    Eye symptoms:  No recent vision problems,    ENMT symptoms:  No sore throat, no nasal congestion.    Respiratory symptoms:  No shortness of breath, no cough.    Cardiovascular symptoms:  No chest pain, no peripheral edema.    Gastrointestinal symptoms:  Nausea, vomiting, no abdominal pain, no diarrhea.    Genitourinary symptoms:  No dysuria, no hematuria.    Musculoskeletal symptoms:  No Joint pain,    Neurologic symptoms:  Dizziness, No headache,    Psychiatric symptoms:  No anxiety,    Hematologic/Lymphatic symptoms:  Bleeding tendency negative,              Additional review of systems information: All other systems reviewed and otherwise negative, All systems reviewed as documented in chart.      Health Status   Allergies:    Allergic Reactions (Selected)  No Known Allergies,    Allergies (1) Active Reaction  No Known Allergies None Documented  .   Medications:  (Selected)   Inpatient Medications  Ordered  Zofran 2 mg/mL injectable solution: 4 mg, form: Injection, IV Push, Once-NOW, first dose 07/21/17 8:42:00 CDT, stop date 07/21/17 8:42:00 CDT, 965,196  Prescriptions  Prescribed  Flexeril 5 mg oral tablet: 5 mg = 1 tab(s), Oral, TID, # 90 tab(s), 1 Refill(s), Pharmacy: [facility]  Nizoral 2% topical shampoo: 1 donna, TOP, 2x/Wk, # 120 mL, 0 Refill(s), Pharmacy: [facility]  losartan 50 mg oral tablet: 50 mg = 1 tab(s), Oral, Daily, # 30 tab(s), 11 Refill(s), Pharmacy: [facility]  potassium chloride 8 mEq oral TABLET extended release: 8 mEq = 1 tab(s), Oral, TID, # 90 tab(s), 0 Refill(s), Pharmacy: [facility]  Documented Medications  Documented  ASPIRIN EC 81 MG TABLET: 81 mg = 1 tab(s), Oral, Daily,  include med profile (Selected)   Inpatient Medications  Ordered  Zofran 2 mg/mL injectable solution: 4 mg, form: Injection, IV Push, Once-NOW, first dose 07/21/17 8:42:00 CDT, stop date 07/21/17 8:42:00 CDT, 965,196  Prescriptions  Prescribed  Flexeril 5 mg oral tablet: 5 mg = 1 tab(s), Oral, TID, # 90 tab(s), 1 Refill(s), Pharmacy: [facility]  Nizoral 2% topical shampoo: 1 donna, TOP, 2x/Wk, # 120 mL, 0 Refill(s), Pharmacy: [facility]  losartan 50 mg oral tablet: 50 mg = 1 tab(s), Oral, Daily, # 30 tab(s), 11 Refill(s), Pharmacy: [facility]  potassium chloride 8 mEq oral TABLET extended release: 8 mEq = 1 tab(s), Oral, TID, # 90 tab(s), 0 Refill(s), Pharmacy: [facility]  Documented Medications  Documented  ASPIRIN EC 81 MG TABLET: 81 mg = 1 tab(s), Oral, Daily.      Past Medical/ Family/ Social History   Medical history:    Resolved  Shingles ([id number]):  Resolved..   Surgical history:    Tonsillectomy ([id number])..   Family history:    Cancer  Mother  .   Social history: Alcohol use: Denies, Tobacco use: Denies, Drug use: Denies.   Problem list:    Active Problems (1)  Obesity   .      Physical Examination               Vital Signs   Vital Signs   7/21/2017 8:39 CDT       Temperature Oral          36.7 DegC                             Peripheral Pulse Rate     77 bpm                             Respiratory Rate          16 br/min                             SpO2                      99 %                             Oxygen Therapy            Room air                             Systolic Blood Pressure   172 mmHg  HI                             Diastolic Blood Pressure  85 mmHg    7/21/2017 7:27 CDT       Temperature Oral          36.9 DegC                             Peripheral Pulse Rate     83 bpm                             Respiratory Rate          18 br/min                             Systolic Blood Pressure   136 mmHg                             Diastolic  Blood Pressure  90 mmHg  .   Measurements   7/21/2017 8:39 CDT       Weight Dosing             84 kg                             Weight Measured and Calculated in Lbs     185.19 lb                             Weight Estimated          84 kg                             Height/Length Dosing      170 cm                             Height/Length Estimated   170 cm                             Body Mass Index Estimated 29.07 kg/m2    7/21/2017 7:27 CDT       Weight Dosing             87.2 kg  (Modified)                            Weight Measured           87.2 kg                             Height/Length Dosing      157 cm  (Modified)                            Height/Length Measured    157 cm                             BSA Measured              1.95 m2                             Body Mass Index Measured  35.38 kg/m2  .   Basic Oxygen Information   7/21/2017 8:39 CDT       SpO2                      99 %                             Oxygen Therapy            Room air  .   General:  Alert, no acute distress.    Skin:  Warm, dry, pink.    Head:  Normocephalic, atraumatic.    Neck:  Supple, trachea midline, no tenderness, no JVD.    Eye:  Pupils are equal, round and reactive to light, extraocular movements are intact, normal conjunctiva, vision unchanged.    Ears, nose, mouth and throat:  Tympanic membranes clear, oral mucosa moist, no pharyngeal erythema or exudate.    Cardiovascular:  Regular rate and rhythm, No murmur, Normal peripheral perfusion, No edema.    Respiratory:  Lungs are clear to auscultation, respirations are non-labored, breath sounds are equal, Symmetrical chest wall expansion.    Gastrointestinal:  Soft, Nontender, Non distended, Normal bowel sounds, No organomegaly, Obese, Guarding: Negative, Rebound: Negative, Signs: McBurney's negative, Psoas negative, Obturator negative, Champion's negative.    Back:  Nontender, Normal range of motion, Normal alignment.    Musculoskeletal:  Normal ROM, normal strength,  no tenderness, no swelling.    Neurological:  Normal sensory observed, right lateral nystagmus present on ocular exam, no horizontal nystagmus, Level of consciousness: Appropriate for age, Cognitive function: Oriented x 4, to person, to place, to time, to situation, normal thought processes, Cranial nerves II - XII: Intact, Motor strength: Equal bilaterally, Coordination: Finger(s) to nose bilateral, heel(s) of foot to opposite shin (bilateral, normal), Romberg test negative, Speech: Normal, Gait: Normal, Kirstin coma scale: Eyes open 4 /4, verbal response 5 /5, motor response 6 /6, total score 15.    Lymphatics:  No lymphadenopathy.   Psychiatric:  Cooperative, appropriate mood & affect, normal judgment.       Medical Decision Making   Documents reviewed:  Emergency department nurses' notes.   Orders  Launch Orders   Patient Care:  Saline Lock Insert (Order): 7/21/2017 9:22 CDT  Pharmacy:  Normal Saline (0.9% NS) IV 1,000 mL (Order): 1,000 mL, 1,000 mL, IV, 1,000 mL/hr, start date 7/21/2017 10:00 CDT, stop date 7/21/2017 10:00 CDT, Launch Orders   Laboratory:  Salicylate Level (Order): Stat collect, 7/21/2017 9:24 CDT, Blood, Lab Collect, 7/21/2017 9:24 CDT  Acetaminophen Level (Order): Stat collect, 7/21/2017 9:24 CDT, Blood, Lab Collect, 7/21/2017 9:24 CDT  HIV Rapid Oraquick STAT (Order): Stat collect, Swab, 7/21/2017 9:23 CDT, Nurse collect, 7/21/2017 9:23 CDT  Magnesium Level (Order): Stat collect, 7/21/2017 9:23 CDT, Blood, Lab Collect, 7/21/2017 9:23 CDT  Amylase Level (Order): Stat collect, 7/21/2017 9:23 CDT, Blood, Lab Collect, 7/21/2017 9:23 CDT  Lipase Level (Order): Stat collect, 7/21/2017 9:23 CDT, Blood, Lab Collect, 7/21/2017 9:23 CDT  EtOH Level (Order): Stat collect, 7/21/2017 9:23 CDT, Blood, Lab Collect, 7/21/2017 9:23 CDT  Urine Drug Screen [facility] (Order): Stat collect, Urine, 7/21/2017 9:23 CDT, Nurse collect, 7/21/2017 9:23 CDT  , Launch Orders   Pharmacy:  meclizine 25 mg oral tablet (Order):  25 mg, form: Tab, Oral, Once-NOW, first dose 7/21/2017 10:32 CDT, stop date 7/21/2017 10:32 CDT, 965,196  , Launch Orders   Laboratory:  Urine Culture and Sensitivity (Order): Stat collect, 7/21/2017 10:34 CDT, Urine, Clean Catch, Nurse collect  , Launch Orders   Laboratory:  RPR (Order): Now collect, 7/21/2017 10:37 CDT, Blood, Lab Collect, 7/21/2017 10:37 CDT  Gonorrhea and Chlamydia by PCR [facility] (Order): Stat collect, Urine, 7/21/2017 10:37 CDT, Nurse collect, Print Label By Order Location  Pharmacy:  Rocephin 1 g injection (Order): 1 gm, form: Injection, IV Piggyback, Once-NOW, first dose 7/21/2017 10:37 CDT, stop date 7/21/2017 10:37 CDT, 965,196  .    Electrocardiogram:  Time 7/21/2017 09:23:00, rate 65, normal sinus rhythm, no ectopy, normal MA & QRS intervals, EP Interp, q wave in III, aVF - q wave new since7/5/17.    Results review:  Lab results : Lab View   7/21/2017 9:33 CDT       POC Troponin              0.00 ng/mL    7/21/2017 9:26 CDT       HIV Rapid Scrn            Non-Reactive    7/21/2017 9:25 CDT       Salicylate Lvl            4.4 mg/dL    7/21/2017 8:54 CDT       Sodium Lvl                143 mmol/L                             Potassium Lvl             3.2 mmol/L  LOW                             Chloride                  105 mmol/L                             CO2                       24 mmol/L                             Calcium Lvl               11.0 mg/dL  HI                             Magnesium Lvl             2.1 mg/dL                             Glucose Lvl               122 mg/dL  HI                             BUN                       21 mg/dL  HI                             Creatinine                1.70 mg/dL  HI                             eGFR-AA                   41 mL/min  LOW                             eGFR-ZEESHAN                  34 mL/min  LOW                             Amylase Lvl               45 unit/L                             Bili Total                0.3 mg/dL                              Bili Direct               0.1 mg/dL                             Bili Indirect             0.2 mg/dL                             AST                       84 unit/L  HI                             ALT                       68 unit/L                             Alk Phos                  103 unit/L                             Total Protein             8.6 gm/dL  HI                             Albumin Lvl               3.8 gm/dL                             Globulin                  4.80 gm/mL  HI                             A/G Ratio                 1 ratio                             Lipase Lvl                109 unit/L                             Total CK                  102 unit/L                             CK MB                     <1.0 ng/mL                             U pH                      6.0                             WBC                       9.3 x10(3)/mcL                             RBC                       3.81 x10(6)/mcL  LOW                             Hgb                       10.7 gm/dL  LOW                             Hct                       32.7 %  LOW                             Platelet                  342 x10(3)/mcL                             MCV                       85.8 fL                             MCH                       28.1 pg                             MCHC                      32.7 gm/dL                             RDW                       16.4 %  HI                             MPV                       10.6 fL  HI                             Abs Neut                  5.15 x10(3)/mcL                             Neutro Auto               55 x10(3)/mcL  NA                             Lymph Auto                32 %                             Mono Auto                 7 %                             Eos Auto                  3 %                             Abs Eos                   0.24  NA                             Basophil Auto             0 %                              Abs Neutro                5.15 x10(3)/mcL  NA                             Abs Lymph                 3.02 x10(3)/mcL  NA                             Abs Mono                  0.64 x10(3)/mcL  NA                             Abs Baso                  0.05 x10(3)/mcL  NA                             IG%                       2 %  NA                             IG#                       0.2000  NA                             U Temp                    25.0 DegC                             Acetaminoph Lvl           <2.0 mcg/mL  LOW                             U Amph Scr                Negative                             U Yary Scr                Negative                             U Benzodia Scr            Negative                             U Cannab Scr              Negative                             U Cocaine Scr             Negative                             U Opiate Scr              Negative                             U Phencyclidine Scr       Negative    7/21/2017 8:42 CDT       UA Appear                 Hazy                             UA Color                  YELLOW                             UA Spec Grav              1.022                             UA Bili                   Negative                             UA pH                     6.0                             UA Urobilinogen           Normal                             UA Blood                  Negative                             UA Glucose                Normal                             UA Ketones                Negative                             UA Protein                20 mg/dL                             UA Nitrite                Negative                             UA Leuk Est               500 Nicola/uL                             UA WBC Interp             26-50 /HPF                             UA RBC Interp             3-5                             UA Mucous                 Large                             UA Bact  Interp            Many                             UA Squam Epi Interp       >100                             UA Hyal Cast Interp       0    7/20/2017 9:25 CDT       Sodium Lvl                142 mmol/L                             Potassium Lvl             3.3 mmol/L  LOW                             Chloride                  105 mmol/L                             CO2                       24 mmol/L                             Calcium Lvl               10.4 mg/dL  HI                             Glucose Lvl               95 mg/dL                             EAG                       137 mg/dL  HI                             BUN                       18 mg/dL                             Creatinine                1.70 mg/dL  HI                             eGFR-AA                   41 mL/min  LOW                             eGFR-ZEESHAN                  34 mL/min  LOW                             Bili Total                0.3 mg/dL                             Bili Direct               0.1 mg/dL                             Bili Indirect             0.2 mg/dL                             AST                       58 unit/L  HI                             ALT                       56 unit/L                             Alk Phos                  104 unit/L                             Total Protein             7.9 gm/dL                             Albumin Lvl               3.4 gm/dL                             Globulin                  4.50 gm/mL  HI                             A/G Ratio                 1 ratio                             Hgb A1c                   6.4 %  HI                             Chol                      178 mg/dL                             HDL                       59 mg/dL                             Trig                      96 mg/dL                             LDL                       100 mg/dL                             Chol/HDL                  3.0                             VLDL                       19 mg/dL                             TSH                       2.570 mIU/L                             WBC                       8.2 x10(3)/mcL                             RBC                       3.76 x10(6)/mcL  LOW                             Hgb                       10.5 gm/dL  LOW                             Hct                       32.4 %  LOW                             Platelet                  332 x10(3)/mcL                             MCV                       86.2 fL                             MCH                       27.9 pg                             MCHC                      32.4 gm/dL                             RDW                       16.2 %  HI                             MPV                       10.1 fL                             Abs Neut                  4.23 x10(3)/mcL                             Neutro Auto               51 x10(3)/mcL  NA                             Lymph Auto                34 %                             Mono Auto                 8 %                             Eos Auto                  4 %                             Abs Eos                   0.33  NA                             Basophil Auto             1 %                             Abs Neutro                4.23 x10(3)/mcL  NA                             Abs Lymph                 2.76 x10(3)/mcL  NA                             Abs Mono                  0.67 x10(3)/mcL  NA                             Abs Baso                  0.05 x10(3)/mcL  NA                             IG%                       2 %  NA                             IG#                       0.1800  NA    .      Reexamination/ Reevaluation   Time: 7/21/2017 11:00:00 .   Vital signs   Basic Oxygen Information   7/21/2017 8:39 CDT       SpO2                      99 %                             Oxygen Therapy            Room air     Pain status: decreased.   Assessment: exam improved, Case reviewed with Dr. Haro, will consult medicine for  continued work up and MRI brain to r/o posterior circ infarct. This is pt second visit to Er with similar complaint, unsure if nystagmus is new or old. Pt agrees and understands.      Consult/admit note    Case discussed with Dr. Tobin of medicine. Dr. Tobin will admit the patient for weakness, dizziness, n/v and agrees with treatment given while in ED.. Discussed test results, treatment plan, and need for admission with patient and family members who voice understanding and agree to plan. All questions have been answered at this time.  Admitting service: medicine  Admitting physician: Mahad  Admit to: medicine.      Impression and Plan   Diagnosis   Diagnosis   Generalized weakness (AON18-RW R53.1)   Lateral nystagmus (OGI03-XW H55.09)   N&V (nausea and vomiting) (YIU91-HT R11.2)   Dizziness (UFN47-OB R42)   Acute lower UTI (urinary tract infection) (PDQ50-EN N39.0)      Calls-Consults   -  7/21/2017 10:26:00 , Shaq Tobin DO, Medicine, phone call, consult.    Plan   Condition: Stable.    Disposition: Admit time  7/21/2017 10:25:00, Admit to Inpatient Unit, Joaquin MONROY, Isaac Shrestha, Patient care was supervised by: Estrellita MONROY, Alden DOWLING, on 7/21/2017 10:25:00.    Prescriptions   Patient was given the following educational materials   Follow up with   Counseled: Patient, Family, Regarding diagnosis, Regarding diagnostic results, Regarding treatment plan, Regarding prescription, Patient indicated understanding of instructions.    Notes: Case discussed and evaluated. Agree with documentation and plan of care performing MRI.       Addendum      Teaching-Supervisory Addendum-Brief   I participated in the following activities of this patients care: the medical history, the physical exam.   I personally performed: supervision of the patient's care, the medical history, the physical exam.   The case was discussed with: the physician assistant.   Results interpretation: I agree with the documentation of the study  interpretation.

## 2022-04-30 NOTE — DISCHARGE SUMMARY
Patient:   Christiana Webb             MRN: [id number]            FIN: [id number]               Age:   52 years     Sex:  Female     :  1965   Associated Diagnoses:   None   Author:   Sg Wolfe MD        Discharge summary:   Mrs. Christiana Webb, a 53 y/o AAF presents to the ED this morning from medicine clinic with a chief complaint of nausea, vomiting, and dizziness that began an hour ago while she was leaving clinic.  Patient reports that movement makes her symptoms worse with no alleviating factors.  Vomitus is clear with no blood. Patient reports a previous history of similar episodes roughly 1 month ago.  All studies at that time proved to be inconclusive.  Patient denies pain, other associated symptoms and trauma.  Patient reports pain and weakness of the left lower extremity which she reports to be chronic in nature, lasting roughly 6 months.  Patient denies worsening or alleviating factors.  Patient denies diarrhea, constipation, chest pain, shortness of breath, fever, sweats or chills.  Patient's ability to report a reliable history is questionable at this time.    Date of admission:  2017    Date of discharge:  2017    Presenting complaint: Nausea, vomiting, and dizziness    Positive physical findings: None    Major test results: ultrasound of the left lower extremity: Negative, MRI brain without contrast: No acute central nervous system abnormalities    Procedures: None     Principal diagnosis: UTI    Other important diagnoses: TIMMY    Past medical history: Hypertension, shingles    Allergies: No known drug allergies    Consultants: Nutritionist    Discharge condition: Stable    Discharge medications: None    Discharge instructions:    Activity: As tolerated   Diet: Regular    Follow-up plan: Post for follow-up and IM clinic in 2 weeks with Dr. Sg Wolfe or Dr. Wiley Seaman patient discharged to: Home

## 2022-05-04 NOTE — HISTORICAL [facility] CERNER
This is a historical note converted from [employer]. Formatting and pictures may have been removed.  Please reference [employer] for original formatting and attached multimedia. Chief Complaint  breast cancer  History of Present Illness  Problem list:  1. Stage IV Inflammatory breast cancer. Biopsy proven (right anterior iliac bone on 12/08/2017 showed metastatic carcinoma, consistent with a breast primary) metastatic left breast cancer, with extensive bone metastases, lung metastasis, possible liver and adrenal metastasis, extensive dulce metastasis, and small intracranial (posterior fossa) metastasis?  ????  ER 85% positive, SD 45% positive, Ki-67 30% (high proliferation), HER-2 3+ (overexpressed)  Pathologic L4 compression fracture, to a lesser extent at L3 also, epidural extension of tumor at the L4 level resulting in severe canal stenosis, and left lower extremity weakness.  ???  Current Treatment:?  1. Palliative chemotherapy with Taxotere every 21 days.  ?   C21D1 due: 6/9/2020  ?   Dose modifications & interruptions:  Docetaxel reduced by 25%?from cycle #1.  Added Neulasta support from second cycle of chemotherapy onwards  Herceptin/Perjeta dropped starting 9/12/19 for LVEF 45%.  ???  Treatment History:  1. 12/18/17: Dr. Harish Pearson performed L3-L4 bilateral laminectomies and decompression of the posterior elements; L4-L5 bilateral laminectomies and foraminotomies with decompression of the thecal sac; lumbar open reduction and internal fixation; biopsy of L3 and biopsy of L4 vertebral bodies.??  2. Palliative radiation therapy to the lumbar spine post decompression surgery, as well as radiation therapy to the brain metastasis with Dr. Saeed. She completed on 2/2/2018.  3. Herceptin/Perjeta/Taxotere 03/21/2018-09/12/2019, herceptin and perjeta dropped d/t decreased LVEF of 45%.  ?   Past medical history: Hypertension. ?Anemia. ?Chronic kidney disease. ?Tonsillectomy.  Social history: Denies history of tobacco,  alcohol, or illicit drug abuse. ?Lives with 2 of her siblings. ?Has 3 children and 2 grandchildren.  Family history: Father with history of throat cancer in his 50s. Mother with history of ovarian cancer in her 50s. Brother with history of stomach cancer in her 50s  Menstrual history: As of today, 12/12/2017, premenopausal  ?   ?  History of present illness:?  52-year-old Afro-American female who was seen in internal medicine clinic on 12/06/2017 for left lower extremity pain and was found to have multiple abnormalities on blood tests. ?Was hospitalized for evaluation, and ultimately found to have metastatic breast cancer.  ?  Noticed a mass and skin changes in the left breast for about 3 weeks prior to hospitalization on 12/06/2017. ?Never had screening mammogram performed after the one he had done at age 20. Found to have hypercalcemia, anemia of chronic disease, folate deficiency, leukopenia, neutropenia, severe anemia requiring transfusion with 2 units of packed RBCs, and elevated LDH. ?No monoclonal protein on SPEP and BHAVIK. ?HIV screen negative. ?Copper level normal. ?B12 level normal. ?Calcium level was 12.2. ?Neutrophil count was 820/mm?. ?Serum iron 95, normal. ?Ferritin elevated to 1829. ?Folic acid level 2.3, low B12 level 820, normal copper level 118, normal albumin 2.7, low , elevated. ?Hemoglobin was 6.0 g percent, with MCV of 98.5, normal. She was given a dose of Zometa 4 mg intravenously on 12/07/2017 for hypercalcemia.  ?  X-ray of the right femur on 12/06/2017 showed lytic bone lesions raising the possibility of metastatic disease in the right ischium and proximal femur.  ?  Bilateral diagnostic mammogram on 12/07/2017 showed extensive inflammatory change in the left breast with a 4 cm mass located at 12 oclock position, 3 cm from the nipple; also, possible additional satellite lesions in the left breast, with 2 enlarged suspicious left axillary lymph nodes. ?Ultrasound of the left breast  showed findings suggestive of inflammatory cancer with skin thickening and edematous fibroglandular breast parenchyma; 4 cm spiculated mass at 12 oclock position highly suspicious for cancer, and a satellite lesion at 12 oclock position of the left breast; also, abnormal axillary lymph nodes, measuring 2.7 cm and 1.2 cm.  ?  Contrast enhanced CT scans of chest abdomen and pelvis for staging were performed on 12/07/2017 revealing metastases to thoracic and abdominal pelvic lymph nodes; both lungs; indeterminate right hepatic and left adrenal lesion; diffuse metastasis to axial and appendicular skeleton; pathologic L4 compression fracture and to a lesser extent at L3; epidural extension of the tumor at the L4 level, resulting in severe canal stenosis; numerous, greater than 25 bilateral circumscribed noncalcified lung nodules, ranging between 0.8 cm and 1.3 cm; 1 cm and 1.1 cm hypodensities in the right liver lobe, not simple cysts; possible left adrenal 1.5 cm noncalcified nodule; bulky para-aortic adenopathy, left periaortic 2.6 cm, 1.3 cm right iliac chain lymph node, 1.4 cm right iliac chain lymph node; extensive mixed lytic/sclerotic metastasis to the axial and appendicular skeleton. Lung nodules were noted on chest x-ray also.  ?  Gadolinium enhanced brain MRI scan on 12 0 2017 showed a 0.4 cm enhancing lesion in the right viky; right medial cerebellar punctate focus of enhancement; no mass-effect or shift; bone metastasis to the calvarium, skull base, and upper cervical spine; no scalp or intracranial extension.  ?  12/12/2017: Presents for consultation, accompanied by multiple, 8-10 family members. ?Pathology report from breast biopsy, bone marrow biopsy, and bone biopsy is awaited. No unusual headaches loss of consciousness seizures or vision impairment. ?No chest pain cough dyspnea hemoptysis fevers or chills. Anorectic. ?Has not felt any enlarged lymph nodes. ?Denies abdominal pain nausea vomiting or GI  bleeding. Reports shooting pains in the left lower extremity. ?Denies numbness in lower extremities. ?Denies any problems with urination or defecation. ?Transferred to [facility] with suspicion of spinal cord/cauda equina compression, in need of neurosurgical intervention.  ?  Flow cytometry of blood on 2017 showed no evidence of monoclonality or lymphoproliferative disorder.  ?  2017: Dr. Harish Pearson performed L3-L4 bilateral laminectomies and decompression of the posterior elements; L4-L5 bilateral laminectomies and foraminotomies with decompression of the thecal sac; lumbar open reduction and internal fixation; biopsy of L3 and biopsy of L4 vertebral bodies.?  ?   Imagin17: MRI scan of the lumbar spine: diffuse bone metastasis; allergic L3 and L4 compression fractures; retropulsion at the L4 level resulting in severe canal stenosis with cauda equina compression.  2018: CT scan of abdomen and pelvis showed widespread metastatic disease; liver lesion slightly larger; numerous lung nodules, seen on prior examination; irregular left breast soft tissue; left breast skin thickening; widespread bone metastasis.  2018: Whole-body nuclear medicine bone scan showed widespread bone metastases throughout the axial and proximal appendicular skeleton, stable when compared to the prior study dated 2017; uptake in L3 and L4 vertebral bodies decreased somewhat since the prior study, compatible with interval stabilization/healing of the patients pathologic compression fractures.?  2018: Restaging CTs C/A/P with contrast: Improvement in metastasis except that bone metastasis are unchanged and a new liver segment 7 metastasis is seen  2018: Restaging brain MRI with and without contrast: Previous brain metastasis have disappeared;?no new brain metastasis  10/11/2018: Restaging whole-body nuclear medicine bone scan: Widespread bone metastasis without significant interval change since bone  scan dated 03/13/2018  02/15/2019: Brain MRI?with and without gadolinium: No brain metastasis  02/20/2019: Restaging bone scan:?Widespread?bone metastasis stable since 10/11/2018 bone scan  02/20/2019:?Restaging CTs?C/A/P:?Bilateral progressed lung metastasis, progressed bilateral?axillary lymphadenopathy,?progressive liver metastasis,?left adrenal gland?new nodular enlargement consistent with metastasis, extensive unchanged skeletal metastasis  02/20/2019: Bilateral diagnostic mammogram:?Interval decrease in size of spiculated mass in the upper outer quadrant of left breast?(known malignancy)?since 12/07/2017;?no malignancy in the right breast  06/03/2019:?Restaging?CTs and bone scan:?Improvement in bilateral lung metastasis, bilateral axillary lymphadenopathy,?left adrenal metastasis,?and hepatic metastasis;?extensive bone metastasis?stable  06/21/2019: Surveillance MRI brain with and without contrast: No metastatic disease; paranasal sinus disease, worsened  09/06/2019: Restaging CTs C/A/P with contrast (post 8 cycles of Taxotere/Herceptin/Perjeta): Further improvement in lung liver metastasis; unchanged skeletal metastasis  09/06/2019: Restaging whole-body nuclear medicine bone scan (post 8 cycles of Taxotere/Herceptin/Perjeta): Widespread bone metastasis, unchanged  9/19/19: MRI brain: ?No acute intracranial abnormalities. Specifically no parenchymal metastatic disease. No significant interval change from 6/21/2019.?Diffuse marrow heterogeneity persists.?Paranasal sinus disease, grossly unchanged.  ?   Pathology:  12/8/17: Biopsy of right anterior iliac bone: metastatic carcinoma, consistent with a breast primary.  12/12/17: Punch biopsy of left breast: invasive mammary carcinoma, grade 2, moderately differentiated, without definitive dermal lymphatic invasion. ?ER 85% positive, PA 45% positive, Ki-67 30% (high proliferation), HER-2 3+ (overexpressed).  12/18/17: L4 vertebral biopsy: metastatic mammary  carcinoma.  ?   Labs:  02/28/2019:?FSH 60.4, estradiol 35.2; thus, postmenopausal  ?  Interval History  6/9/2020: Patient presents for follow up on Taxotere; she is scheduled to receive cycle #21 today. VS stable. CMP stable; BUN further increased to 26. eGFR down to 60. WBC 3.2; H&H 10.3 & 33.0; plts 326k; ANC 2100. She denies nausea, vomiting, diarrhea, constipation, mouth sores, abdominal pain. She reports a good appetite. She has not received an appointment with Cardiology yet. She does not request any med refills. Will have her follow up in 3 weeks with labs prior to treatment. She is amenable to this plan.  Review of Systems  A complete 12-point?ROS was performed in full with pertinent positives as described in interval history. Remainder of ROS done in full and are negative.  Physical Exam  Vitals & Measurements  T:?36.7? ?C (Oral)? HR:?80(Peripheral)? RR:?20? BP:?103/70?  HT:?157?cm? WT:?61.6?kg? BMI:?24.99?  General: ?Alert and oriented, No acute distress.??  Appearance: Well-groomed  HEENT: ?Normocephalic, Oral mucosa is moist.?Pupils are equal, round and reactive to light, Extraocular movements are intact, Normal conjunctiva.?Alopecia.  Neck: ?Supple, Non-tender, No lymphadenopathy, No thyromegaly.??  Respiratory: ?Lungs are clear to auscultation, Respirations are non-labored, Breath sounds are equal, Symmetrical chest wall expansion.??  Cardiovascular: ?Normal rate, Regular rhythm, No edema.??  Breast:??Breast exam not performed on todays visit.??  Gastrointestinal: Rounded,?Soft, Non-tender, Non-distended, Normal bowel sounds.??  Musculoskeletal: ?Normal strength.?Presents in a wheelchair.  Integumentary: ?Warm, dry, intact.??  Neurologic: ?Alert, Oriented, No focal deficits, Cranial Nerves II-XII are grossly intact.??  Cognition and Speech: ?Oriented, Speech clear and coherent.??  Psychiatric: ?Cooperative, Appropriate mood & affect. ?  ECOG Performance Scale: 2 - Capable of all self-care but unable to  carry out any work activities. Up and about greater than 50 percent of waking hours.?  Assessment/Plan  1.?Metastatic breast cancer?C50.912  To summarize:  #Inflammatory carcinoma of left breast. ?ER positive. ?OH positive. ?HER-2 positive.? Diagnosed 12/2017  Metastases to brain,?lumbar spine,?extensively to bones,?lymph nodes, lungs, liver, and adrenal.?  For lumbar spine metastasis,?status post laminectomy and internal fixation 12/2017  Status post radiation therapy to lumbar spine and brain?02/2018  Noncompliant with follow-up.  Status post Taxotere/Herceptin/Perjeta?x1 on?03/21/2018; she declined further chemotherapy?  Presented?1 year later,?on?02/2019:?Extensive bone metastases stable;?progression of bilateral lung metastasis, bilateral axillary?lymphadenopathy, progressive liver metastasis, progressive left adrenal metastasis  Dose reduced Taxotere/Herceptin/Perjeta started?03/07/2019  Good response post 4 cycles  Excellent response?post 8 cycles  Starting ninth cycle of chemotherapy,?discontinued Herceptin/Perjeta?due to persistent?drop in LVEF,?without?recovery?post?discontinuation  Continued improvement/stability?post 13 cycles of Taxotere;?dramatic drop in tumor marker level  No brain metastasis?on surveillance brain MRI (02/10/2020)  -Stable metastatic disease?on restaging CTs and bone scan?post 18 cycles of Taxotere?(04/16/2020)  -5/27/2020 Surveillance MRI brain: No acute intracranial findings or metastatic disease.?Chronic microangiopathic ischemia. Bilateral mastoiditis changes and chronic sinusitis.?  --------  ?   #Sites of disease:  Left breast (inflammatory carcinoma);?brain metastasis; lumbar spine metastasis; extensive bone metastasis;?bilateral axillary?lymph nodes; lungs; liver; left?adrenal  --------  ?   ????????  Plan:  Her LVEF remains suppressed?despite?the fact that Herceptin/Perjeta was held?due to drop in LVEF  No?normalization of LVEF?over subsequent?2 months  We discontinued  Herceptin/Perjeta for good.  ?   Has been referred to cardiology for evaluation.  ?   Continue?25% dose reduced Taxotere, with Neulasta support every 3 weeks  Excellent tolerance so far, without any significant cytopenias.  ?   -To prevent skeletal events from bone metastasis, need to start?either Xgeva or bisphosphonate therapy.  Need preventive dentistry and dental clearance?to prevent/minimize the likelihood of?osteonecrosis of the jaw before starting these medications.? This is pending.  Concurrent calcium and vitamin D supplementation is recommended (1200 mg of calcium and 800-1000 units of vitamin D3 daily).  Optimal schedule for zoledronic acid is monthly x12, then quarterly  -->  She has been unable to undergo preventive dentistry; therefore, we will hold off on antiresorptive therapy?for now.  ?   -In view of the fact that she has?extensive visceral metastatic disease,?we started treatment with chemotherapy rather than endocrine therapy.  ?   -Since she experienced severe neutropenia, requiring hospitalization, after first cycle of chemotherapy last year?(2018), therefore, we empirically reduced?the dose of docetaxel by 25%?from cycle #1.  She has required Neulasta support from second cycle of chemotherapy onwards  ?  Herceptin/Perjeta discontinued from ninth cycle of chemotherapy onwards?(persistent drop in?LVEF)  She was administered docetaxel 75 mg/m? last year?(2018), resulting in severe neutropenia requiring hospitalization.?  Therefore, on resumption?in March 2019,?we reduced?Taxotere?dose by 25% (55 mg/m?)?from cycle #1  Has required Neulasta support from second cycle of chemotherapy onwards,?subsequently, with no significant cytopenias.  ?  -Check CBC and CMP every 10 days or so?to monitor for chemotherapy-induced toxicity.  ?  -Check tumor markers monthly?to assess response to treatment.??Normalized as of 06/06/2019. ?  Although all 3 tumor markers are dropping,?we will monitor only CA  27.29.  ?  -Restage with contrast-enhanced CT scans of chest, abdomen, and pelvis,?and whole-body nuclear medicine bone scan in 3 months?to assess response?(mid July)  ?  Surveillance brain MRI with and without gadolinium?in 3 months (May).  ?  Ok to proceed with cycle #21 of Taxotere today. Patient states she cannot stay for treatment. Will move to Friday with recheck of labs.  Check CBC, CMP on 6/12/2020 to be reviewed by infusion nurse.  If labs WNL, ok to proceed with cycle #21 of Taxotere on 6/12/2020.  Follow up in 3 weeks with CBC, CMP prior to cycle #22.  Restaging CT C/A/P and whole-body nuclear medicine bone scan scheduled for 7/22/2020.  Surveillance brain MRI negative for acute or metastatic changes.  Cardiology referral pending.  Dental evaluation and preventive dentistry pending before starting bone antiresorptive therapy for bone metastasis.  Check CBC and CMP every 10 days  Check CA 27.29 level every month  Check?MSI/MMR status?of the tumor  Check for NTRK gene fusion  Check BRCA1/2?status  ?  ?  Trastuzumab induced cardiomyopathy:  -Discontinuation should be strongly considered in patients who develop a clinically significant reduction in LVEF during therapy; treatment with heart failure medications (for example, ACE inhibitors, beta-blockers) should be initiated.  ?  -Withhold treatment for 16% or greater decrease from pretreatment levels or LVEF below normal limits and 10% or greater decrease from baseline.  ?  -Withhold treatment for at least 4 weeks and repeat LVEF every 4 weeks;  may resume trastuzumab treatment if LVEF returns to normal limits within 4-8 weeks and remains at 15% or less?decrease from baseline value;  discontinue permanently for persistent (>8 weeks) LVEF decline or for >3 incidents of treatment interruptions for cardiomyopathy  ?  -Cardiomyopathy due to trastuzumab is generally reversible over the period of 1-3 months after discontinuation  ?  For ER positive, AK positive,  HER-2 positive metastatic disease, treatment options are:  1.? Chemotherapy plus HER-2 targeted therapy with pertuzumab plus trastuzumab plus taxane?(preferred); or, Ado-trastuzumab emtansine (T-DMI); or trastuzumab plus chemotherapy; or,  2.? Endocrine therapy with or without HER-2 targeted therapy (if premenopausal, consider ovarian ablation or suppression); for premenopausal woman, selective ER modulators alone (without ovarian ablation/suppression) plus HER-2 targeted therapy is also an option; or,  3.? Other HER-2 targeted therapies.   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  Metastatic breast cancer  Historical  Shingles  Procedure/Surgical History  Drainage of Buttock Skin, External Approach (07/30/2019)  Incision and drainage of abscess (eg, carbuncle, suppurative hidradenitis, cutaneous or subcutaneous abscess, cyst, furuncle, or paronychia); simple or single (07/30/2019)  Transfusion of Nonautologous Red Blood Cells into Peripheral Vein, Percutaneous Approach (03/28/2018)  Catheter Insertion Mediport (None) (02/23/2018)  Catheter, infusion, inserted peripherally, centrally or midline (other than hemodialysis) (02/23/2018)  Fluoroscopy of Superior Vena Cava using Low Osmolar Contrast, Guidance (02/23/2018)  Insertion of Infusion Device into Superior Vena Cava, Percutaneous Approach (02/23/2018)  Insertion of tunneled centrally inserted central venous access device, with subcutaneous port; age 5 years or older (02/23/2018)  Fusion of 2 or more Lumbar Vertebral Joints with Synthetic Substitute, Posterior Approach, Posterior Column, Open Approach (12/19/2017)  Fusion of Lumbosacral Joint with Synthetic Substitute, Posterior Approach, Posterior Column, Open Approach (12/19/2017)  Lumbar Foraminotomy MIS (.) (12/17/2017)  Bone biopsy sample (12/08/2017)  Breast  biopsy sample (12/07/2017)  Biopsy of breast; percutaneous, needle core, not using imaging guidance (separate procedure) (12/06/2017)  Bone marrow; biopsy, needle or trocar (12/06/2017)  Excision of Left Breast, Percutaneous Approach, Diagnostic (12/06/2017)  Extraction of Iliac Bone Marrow, Percutaneous Approach, Diagnostic (12/06/2017)  Tonsillectomy   Medications  acetaminophen-hydrocodone 325 mg-10 mg oral tablet, 1 tab(s), Oral, QID, PRN  aspirin 81 mg oral Delayed Release (EC) tablet, 81 mg= 1 tab(s), Oral, Daily  aspirin 81 mg oral tablet, 81 mg= 1 tab(s), Oral, Daily, 11 refills  Colace 100 mg oral capsule, 100 mg= 1 cap(s), Oral, BID, PRN  CYCLOBENZAPRINE 10 MG TABLET, Oral, TID  dexamethasone (for IVPB)  dexamethasone 4 mg oral tablet, 8 mg= 2 tab(s), Oral, BID, 12 refills  dexamethasone 4 mg oral tablet, 8 mg= 2 tab(s), Oral, BID, 12 refills  dexamethasone 4 mg oral tablet, 8 mg= 2 tab(s), Oral, BID,? ?Not taking  diphenhdramine (for Inj.), 25 mg= 0.5 mL, IV Push, Once-chemo  GABAPENTIN 300 MG CAPSULE, 300 mg= 1 cap(s), Oral, TID  Heparin Flush 100 U/mL - 5 mL, 500 units= 5 mL, IV Push, Once-chemo  Heparin Flush 100 U/mL - 5 mL, 500 units= 5 mL, IV Push, Once-chemo  Lexapro 20 mg oral tablet, 20 mg= 1 tab(s), Oral, Daily, 11 refills  losartan 50 mg oral tablet, See Instructions, 11 refills  megestrol 40 mg oral tablet, See Instructions, 8 refills  meloxicam 7.5 mg oral tablet, 7.5 mg= 1 tab(s), Oral, Daily, 11 refills  Pazeo 0.7% ophthalmic solution, 1 drop(s), OPTH, Daily  Peridex 0.12% mucous membrane liquid, 0.018 gm= 15 mL, Oral, BID,? ?Not Taking, Completed Rx  Phenergan 25 mg Tab, 25 mg= 1 tab(s), Oral, q6hr  potassium chloride 20 mEq oral TABLET extended release, 20 mEq= 1 tab(s), Oral, BID,? ?Not Taking per Prescriber  ProAir HFA 90 mcg/inh inhalation aerosol with adapter, 2 puff(s), INH, q4hr, PRN, 3 refills  traZODone 100 mg oral tablet, See Instructions, 11 refills  Tylenol 325 mg oral tablet,  650 mg= 2 tab(s), Oral, Once-chemo  Zofran (for IVPB) 16 mg + dexamethasone 10 mg/mL injectable solution 10 mg  Zofran ODT 8 mg oral tablet, disintegrating, 8 mg= 1 tab(s), Oral, q8hr, PRN  Allergies  No Known Allergies  Social History  Abuse/Neglect  No, 06/09/2020  Alcohol - Denies Alcohol Use, 08/24/2014  Never, 04/20/2017  Employment/School  homemaker, Work/School description: homemaker. Operates hazardous equipment: No., 04/20/2017  Exercise  Self assessment: Good condition., 04/20/2017  Home/Environment  Lives with Children. Living situation: Home/Independent. Alcohol abuse in household: No. Substance abuse in household: No. Smoker in household: No. Injuries/Abuse/Neglect in household: No. Feels unsafe at home: No. Family/Friends available for support: Yes. Concern for family members at home: No. Major illness in household: No. Financial concerns: No. TV/Computer concerns: No., 04/20/2017  Nutrition/Health  Regular, Wants to lose weight: No., 04/20/2017  Sexual  Gender Identity Identifies as female., 02/28/2019  Substance Use  Never, 02/12/2018  Never, 04/20/2017  Tobacco - Denies Tobacco Use, 03/04/2013  Never (less than 100 in lifetime), No, 06/09/2020  Family History  Cancer: Mother.  Heart failure.: Mother.  Primary malignant neoplasm of lung: Father.  Immunizations  Vaccine Date Status   pneumococcal 23-polyvalent vaccine 11/14/2017 Recorded   influenza virus vaccine, inactivated 11/15/2016 Recorded   Health Maintenance  Health Maintenance  ???Pending?(in the next year)  ??? ??OverDue  ??? ? ? ?Diabetes Screening due??and every?  ??? ? ? ?Alcohol Misuse Screening due??01/01/20??and every 1??year(s)  ??? ??Due?  ??? ? ? ?Cervical Cancer Screening due??06/09/20??and every?  ??? ? ? ?Colorectal Screening due??06/09/20??and every?  ??? ? ? ?Tetanus Vaccine due??06/09/20??and every 10??year(s)  ??? ??Due In Future?  ??? ? ? ?Obesity Screening not due until??01/01/21??and every 1??year(s)  ??? ? ? ?ADL  Screening not due until??01/15/21??and every 1??year(s)  ??? ? ? ?Breast Cancer Screening not due until??02/19/21??and every 2??year(s)  ???Satisfied?(in the past 1 year)  ??? ??Satisfied?  ??? ? ? ?ADL Screening on??01/15/20.??Satisfied by James RIVERON, Mumtaz Aerial  ??? ? ? ?Blood Pressure Screening on??06/09/20.??Satisfied by Whittington LPN, Beonka S  ??? ? ? ?Body Mass Index Check on??06/09/20.??Satisfied by Whittington LPN, Beonka S  ??? ? ? ?Depression Screening on??06/09/20.??Satisfied by Whittington LPN, Beonka S  ??? ? ? ?Diabetes Screening on??06/09/20.??Satisfied by Kaitlin Anne  ??? ? ? ?Hypertension Management-Blood Pressure on??06/09/20.??Satisfied by Whittington LPN, Beonka S  ??? ? ? ?Influenza Vaccine on??03/18/20.??Satisfied by Reg Montoya RN  ??? ? ? ?Obesity Screening on??06/09/20.??Satisfied by Whittington LPN, Beonka S  ?  Lab Results  Test Name Test Result Date/Time   Sodium Lvl 139 mmol/L 06/09/2020 08:35 CDT   Potassium Lvl 3.8 mmol/L 06/09/2020 08:35 CDT   Chloride 110 mmol/L (High) 06/09/2020 08:35 CDT   CO2 23 mmol/L 06/09/2020 08:35 CDT   Calcium Lvl 8.5 mg/dL 06/09/2020 08:35 CDT   Glucose Lvl 74 mg/dL 06/09/2020 08:35 CDT   BUN 26 mg/dL (High) 06/09/2020 08:35 CDT   Creatinine 1.20 mg/dL 06/09/2020 08:35 CDT   eGFR-AA 60 mL/min (Low) 06/09/2020 08:35 CDT   eGFR-ZEESHAN 50 mL/min (Low) 06/09/2020 08:35 CDT   Bili Total 0.4 mg/dL 06/09/2020 08:35 CDT   Bili Direct 0.1 mg/dL 06/09/2020 08:35 CDT   Bili Indirect 0.3 mg/dL 06/09/2020 08:35 CDT   AST 17 unit/L 06/09/2020 08:35 CDT   ALT 21 unit/L 06/09/2020 08:35 CDT   Alk Phos 56 unit/L 06/09/2020 08:35 CDT   Total Protein 6.5 gm/dL 06/09/2020 08:35 CDT   Albumin Lvl 3.3 gm/dL (Low) 06/09/2020 08:35 CDT   Globulin 3.20 gm/mL 06/09/2020 08:35 CDT   A/G Ratio 1.0 ratio (Low) 06/09/2020 08:35 CDT   WBC 3.2 x10(3)/mcL (Low) 06/09/2020 08:35 CDT   RBC 3.35 x10(6)/mcL (Low) 06/09/2020 08:35 CDT   Hgb 10.3 gm/dL (Low) 06/09/2020 08:35 CDT   Hct 33.0 %  (Low) 06/09/2020 08:35 CDT   Platelet 326 x10(3)/mcL 06/09/2020 08:35 CDT   MCV 98.5 fL 06/09/2020 08:35 CDT   MCH 30.7 pg 06/09/2020 08:35 CDT   MCHC 31.2 gm/dL 06/09/2020 08:35 CDT   RDW 15.3 % (High) 06/09/2020 08:35 CDT   MPV 10.2 fL 06/09/2020 08:35 CDT   Abs Neut 2.10 x10(3)/mcL 06/09/2020 08:35 CDT   Neutro Auto 67 % 06/09/2020 08:35 CDT   Lymph Auto 18 % 06/09/2020 08:35 CDT   Mono Auto 14 % 06/09/2020 08:35 CDT   Eos Auto 0 % 06/09/2020 08:35 CDT   Abs Eos 0.0 x10(3)/mcL 06/09/2020 08:35 CDT   Basophil Auto 2 % 06/09/2020 08:35 CDT   Abs Neutro 2.10 x10(3)/mcL 06/09/2020 08:35 CDT   Abs Lymph 0.6 x10(3)/mcL 06/09/2020 08:35 CDT   Abs Mono 0.4 x10(3)/mcL 06/09/2020 08:35 CDT   Abs Baso 0.0 x10(3)/mcL 06/09/2020 08:35 CDT   IG% 0 % 06/09/2020 08:35 CDT   IG# 0.0100 06/09/2020 08:35 CDT       Of note, patients referral to Cardiology is pending reschedule.

## 2022-05-04 NOTE — HISTORICAL [facility] CERNER
This is a historical note converted from [employer]. Formatting and pictures may have been removed.  Please reference [employer] for original formatting and attached multimedia. Chief Complaint  Metastatic Breast Cancer  History of Present Illness  Problem list:  1. Stage IV Inflammatory breast cancer. Biopsy proven (right anterior iliac bone on 12/08/2017 showed metastatic carcinoma, consistent with a breast primary) metastatic left breast cancer, with extensive bone metastases, lung metastasis, possible liver and adrenal metastasis, extensive dulce metastasis, and small intracranial (posterior fossa) metastasis?  ????  ER 85% positive, OR 45% positive, Ki-67 30% (high proliferation), HER-2 3+ (overexpressed)  Pathologic L4 compression fracture, to a lesser extent at L3 also, epidural extension of tumor at the L4 level resulting in severe canal stenosis, and left lower extremity weakness.  ???  Current Treatment:?  1. Palliative chemotherapy with Taxotere every 21 days.  ?   C23D1 due: 07/02/2020  ?   Dose modifications & interruptions:  Docetaxel reduced by 25%?from cycle #1.  Added Neulasta support from second cycle of chemotherapy onwards  Herceptin/Perjeta dropped starting 9/12/19 for LVEF 45%.  ???  Treatment History:  1. 12/18/17: Dr. Harish Pearson performed L3-L4 bilateral laminectomies and decompression of the posterior elements; L4-L5 bilateral laminectomies and foraminotomies with decompression of the thecal sac; lumbar open reduction and internal fixation; biopsy of L3 and biopsy of L4 vertebral bodies.??  2. Palliative radiation therapy to the lumbar spine post decompression surgery, as well as radiation therapy to the brain metastasis with Dr. Saeed. She completed on 2/2/2018.  3. Herceptin/Perjeta/Taxotere 03/21/2018-09/12/2019, herceptin and perjeta dropped d/t decreased LVEF of 45%.  ?   Past medical history: Hypertension. ?Anemia. ?Chronic kidney disease. ?Tonsillectomy.  Social history: Denies history  of tobacco, alcohol, or illicit drug abuse. ?Lives with 2 of her siblings. ?Has 3 children and 2 grandchildren.  Family history : Father with history of throat cancer in his 50s. Mother with history of ovarian cancer in her 50s. Brother with history of stomach cancer in her 50s  Menstrual history: As of today, 12/12/2017, premenopausal  ?   ?  History of present illness:?  52-year-old Afro-American female who was seen in internal medicine clinic on 12/06/2017 for left lower extremity pain and was found to have multiple abnormalities on blood tests. ?Was hospitalized for evaluation, and ultimately found to have metastatic breast cancer.  ?  Noticed a mass and skin changes in the left breast for about 3 weeks prior to hospitalization on 12/06/2017. ?Never had screening mammogram performed after the one he had done at age 20. Found to have hypercalcemia, anemia of chronic disease, folate deficiency, leukopenia, neutropenia, severe anemia requiring transfusion with 2 units of packed RBCs, and elevated LDH. ?No monoclonal protein on SPEP and BHAVIK. ?HIV screen negative. ?Copper level normal. ?B12 level normal. ?Calcium level was 12.2. ?Neutrophil count was 820/mm?. ?Serum iron 95, normal. ?Ferritin elevated to 1829. ?Folic acid level 2.3, low B12 level 820, normal copper level 118, normal albumin 2.7, low , elevated. ?Hemoglobin was 6.0 g percent, with MCV of 98.5, normal. She was given a dose of Zometa 4 mg intravenously on 12/07/2017 for hypercalcemia.  ?  X-ray of the right femur on 12/06/2017 showed lytic bone lesions raising the possibility of metastatic disease in the right ischium and proximal femur.  ?  Bilateral diagnostic mammogram on 12/07/2017 showed extensive inflammatory change in the left breast with a 4 cm mass located at 12 oclock position, 3 cm from the nipple; also, possible additional satellite lesions in the left breast, with 2 enlarged suspicious left axillary lymph nodes. ?Ultrasound of the  left breast showed findings suggestive of inflammatory cancer with skin thickening and edematous fibroglandular breast parenchyma; 4 cm spiculated mass at 12 oclock position highly suspicious for cancer, and a satellite lesion at 12 oclock position of the left breast; also, abnormal axillary lymph nodes, measuring 2.7 cm and 1.2 cm.  ?  Contrast enhanced CT scans of chest abdomen and pelvis for staging were performed on 12/07/2017 revealing metastases to thoracic and abdominal pelvic lymph nodes; both lungs; indeterminate right hepatic and left adrenal lesion; diffuse metastasis to axial and appendicular skeleton; pathologic L4 compression fracture and to a lesser extent at L3; epidural extension of the tumor at the L4 level, resulting in severe canal stenosis; numerous, greater than 25 bilateral circumscribed noncalcified lung nodules, ranging between 0.8 cm and 1.3 cm; 1 cm and 1.1 cm hypodensities in the right liver lobe, not simple cysts; possible left adrenal 1.5 cm noncalcified nodule; bulky para-aortic adenopathy, left periaortic 2.6 cm, 1.3 cm right iliac chain lymph node, 1.4 cm right iliac chain lymph node; extensive mixed lytic/sclerotic metastasis to the axial and appendicular skeleton. Lung nodules were noted on chest x-ray also.  ?  Gadolinium enhanced brain MRI scan on 12 0 2017 showed a 0.4 cm enhancing lesion in the right viky; right medial cerebellar punctate focus of enhancement; no mass-effect or shift; bone metastasis to the calvarium, skull base, and upper cervical spine; no scalp or intracranial extension.  ?  12/12/2017: Presents for consultation, accompanied by multiple, 8-10 family members. ?Pathology report from breast biopsy, bone marrow biopsy, and bone biopsy is awaited. No unusual headaches loss of consciousness seizures or vision impairment. ?No chest pain cough dyspnea hemoptysis fevers or chills. Anorectic. ?Has not felt any enlarged lymph nodes. ?Denies abdominal pain nausea  vomiting or GI bleeding. Reports shooting pains in the left lower extremity. ?Denies numbness in lower extremities. ?Denies any problems with urination or defecation. ?Transferred to [facility] with suspicion of spinal cord/cauda equina compression, in need of neurosurgical intervention.  ?  Flow cytometry of blood on 2017 showed no evidence of monoclonality or lymphoproliferative disorder.  ?  2017: Dr. Harish Pearson performed L3-L4 bilateral laminectomies and decompression of the posterior elements; L4-L5 bilateral laminectomies and foraminotomies with decompression of the thecal sac; lumbar open reduction and internal fixation; biopsy of L3 and biopsy of L4 vertebral bodies.?  ?   Imagin17: MRI scan of the lumbar spine: diffuse bone metastasis; allergic L3 and L4 compression fractures; retropulsion at the L4 level resulting in severe canal stenosis with cauda equina compression.  2018: CT scan of abdomen and pelvis showed widespread metastatic disease; liver lesion slightly larger; numerous lung nodules, seen on prior examination; irregular left breast soft tissue; left breast skin thickening; widespread bone metastasis.  2018: Whole-body nuclear medicine bone scan showed widespread bone metastases throughout the axial and proximal appendicular skeleton, stable when compared to the prior study dated 2017; uptake in L3 and L4 vertebral bodies decreased somewhat since the prior study, compatible with interval stabilization/healing of the patients pathologic compression fractures.?  2018: Restaging CTs C/A/P with contrast: Improvement in metastasis except that bone metastasis are unchanged and a new liver segment 7 metastasis is seen  2018: Restaging brain MRI with and without contrast: Previous brain metastasis have disappeared;?no new brain metastasis  10/11/2018: Restaging whole-body nuclear medicine bone scan: Widespread bone metastasis without significant interval  change since bone scan dated 03/13/2018  02/15/2019: Brain MRI?with and without gadolinium: No brain metastasis  02/20/2019: Restaging bone scan:?Widespread?bone metastasis stable since 10/11/2018 bone scan  02/20/2019:?Restaging CTs?C/A/P:?Bilateral progressed lung metastasis, progressed bilateral?axillary lymphadenopathy,?progressive liver metastasis,?left adrenal gland?new nodular enlargement consistent with metastasis, extensive unchanged skeletal metastasis  02/20/2019: Bilateral diagnostic mammogram:?Interval decrease in size of spiculated mass in the upper outer quadrant of left breast?(known malignancy)?since 12/07/2017;?no malignancy in the right breast  06/03/2019:?Restaging?CTs and bone scan:?Improvement in bilateral lung metastasis, bilateral axillary lymphadenopathy,?left adrenal metastasis,?and hepatic metastasis;?extensive bone metastasis?stable  06/21/2019: Surveillance MRI brain with and without contrast: No metastatic disease; paranasal sinus disease, worsened  09/06/2019: Restaging CTs C/A/P with contrast (post 8 cycles of Taxotere/Herceptin/Perjeta): Further improvement in lung liver metastasis; unchanged skeletal metastasis  09/06/2019: Restaging whole-body nuclear medicine bone scan (post 8 cycles of Taxotere/Herceptin/Perjeta): Widespread bone metastasis, unchanged  9/19/19: MRI brain: ?No acute intracranial abnormalities. Specifically no parenchymal metastatic disease. No significant interval change from 6/21/2019.?Diffuse marrow heterogeneity persists.?Paranasal sinus disease, grossly unchanged.  ?   Pathology:  12/8/17: Biopsy of right anterior iliac bone: metastatic carcinoma, consistent with a breast primary.  12/12/17: Punch biopsy of left breast: invasive mammary carcinoma, grade 2, moderately differentiated, without definitive dermal lymphatic invasion. ?ER 85% positive, NJ 45% positive, Ki-67 30% (high proliferation), HER-2 3+ (overexpressed).  12/18/17: L4 vertebral biopsy:  metastatic mammary carcinoma.  ?   Labs:  02/28/2019:?FSH 60.4, estradiol 35.2; thus, postmenopausal  ?  Interval History:  ?  Patient presents today for a scheduled follow-up, alone. Currently receiving taxotere as specified above, tolerating well with no intolerable effects noted or reported, no new complaints. She endorses no new problems or concerns today. She reports grade 2 CIPN which is currently controlled on gabapentin. She denies oral mucositis, skin peeling/burning. Denies HA, fever, night sweats, SOB, CP, edema, neuropathy. Denies N/V/C/D, abdominal pain, change in bowel/bladder habits, blood in the urine/stool. Appetite good, weight stable, denies unintentional weight loss/gain. Labs today stable, adequate for treatment, reviewed with patient. Reviewed patient medications. Patient denies needing any medication refills at this time. Denies questions/needs/concerns.  ?  Review of Systems  A complete 12 point ROS was performed in full with pertinent positives described in interval history. Remainder of ROS done in full and are negative.?  Physical Exam  Vitals & Measurements  T:?36.9? ?C (Oral)? HR:?64(Peripheral)? BP:?103/69? SpO2:?100%?  HT:?157?cm? WT:?62.3?kg? BMI:?25.27?  Vital Signs Reviewed  General: AAO, NAD noted  Eye: PERRLA, EOMI  Neuro: Normal mentation, strength 5/5 on all 4 extremities, no sensory deficits  HET: Normocephalic, adequate hearing,?no oral ulcers, mucous membranes pink/moist/intact, no pharyngeal erythema, poor dentition  Neck: Supple, non-tender, no lymphadenopathy  Respiratory: Non-labored breathing, symmetrical chest wall expansion, BBS CTA, no crackles/wheezing/rhonchi noted  Cardiovascular: S1S2 noted, RRR, no M/R/G, pulses equal in all extremities, normal peripheral perfusion  Abdomen: Soft, non-tender, non-distended, BS+, no hepatosplenomegaly  Musculoskeletal: Normal ROM, normal gait, ambulates without assistance  Integumentary: No rashes, no bruises or purpura, warm and  dry, normal for ethnicity  Neurologic: No focal deficits, Cranial nerves II-XII are grossly intact.  Cognition and Speech: Speech clear and coherent, functional cognition intact  Psychiatric: Cooperative, appropriate mood and affect for situation, normal judgement, no suicidal or homicidal ideations  ?  ?   The National Cancer [place] Toxicity Scores:  ?   ECOG Performance Scale: 1 - Strenuous physical activity restricted; fully ambulatory and able to carry out light work.  ?  Assessment/Plan  1.?Metastatic breast cancer?C50.912  ? ? To summarize:  #Inflammatory carcinoma of left breast. ?ER positive. ?VT positive. ?HER-2 positive.? Diagnosed 12/2017  Metastases to brain,?lumbar spine,?extensively to bones,?lymph nodes, lungs, liver, and adrenal.?  For lumbar spine metastasis,?status post laminectomy and internal fixation 12/2017  Status post radiation therapy to lumbar spine and brain?02/2018  Noncompliant with follow-up.  Status post Taxotere/Herceptin/Perjeta?x1 on?03/21/2018; she declined further chemotherapy?  Presented?1 year later,?on?02/2019:?Extensive bone metastases stable;?progression of bilateral lung metastasis, bilateral axillary?lymphadenopathy, progressive liver metastasis, progressive left adrenal metastasis  Dose reduced Taxotere/Herceptin/Perjeta started?03/07/2019  Good response post 4 cycles  Excellent response?post 8 cycles  Starting ninth cycle of chemotherapy,?discontinued Herceptin/Perjeta?due to persistent?drop in LVEF,?without?recovery?post?discontinuation  Continued improvement/stability?post 13 cycles of Taxotere;?dramatic drop in tumor marker level  No brain metastasis?on surveillance brain MRI (02/10/2020)  -Stable metastatic disease?on restaging CTs and bone scan?post 18 cycles of Taxotere?(04/16/2020)  -5/27/2020 Surveillance MRI brain: ? No acute intracranial findings or metastatic disease.?Chronic microangiopathic ischemia. Bilateral mastoiditis changes and chronic sinusitis.?  ?  --------  ?   #Sites of disease:  Left breast (inflammatory carcinoma);?brain metastasis; lumbar spine metastasis; extensive bone metastasis;?bilateral axillary?lymph nodes; lungs; liver; left?adrenal  --------  ?   ?????????  ?Plan:  ?Her LVEF remains suppressed?despite?the fact that Herceptin/Perjeta was held?due to drop in LVEF  No?normalization of LVEF?over subsequent?2 months  We discontinued Herceptin/Perjeta for good.  ?   Has been referred to cardiology for evaluation.  ?   Continue?25% dose reduced Taxotere, with Neulasta support every 3 weeks  Excellent tolerance so far, without any significant cytopenias.  ?   -To prevent skeletal events from bone metastasis, need to start?either Xgeva or bisphosphonate therapy.  Need preventive dentistry and dental clearance?to prevent/minimize the likelihood of?osteonecrosis of the jaw before starting these medications.? This is pending.  Concurrent calcium and vitamin D supplementation is recommended (1200 mg of calcium and 800-1000 units of vitamin D3 daily).  Optimal schedule for zoledronic acid is monthly x12, then quarterly  -->  She has been unable to undergo preventive dentistry; therefore, we will hold off on antiresorptive therapy?for now.  ?   -In view of the fact that she has?extensive visceral metastatic disease,?we started treatment with chemotherapy rather than endocrine therapy.  ?   -Since she experienced severe neutropenia, requiring hospitalization, after first cycle of chemotherapy last year?(2018), therefore, we empirically reduced?the dose of docetaxel by 25%?from cycle #1.  She has required Neulasta support from second cycle of chemotherapy onwards  ?  ?Herceptin/Perjeta discontinued from ninth cycle of chemotherapy onwards?(persistent drop in?LVEF)  She was administered docetaxel 75 mg/m? last year?(2018), resulting in severe neutropenia requiring hospitalization.?  Therefore, ?on resumption?in March 2019,?we reduced?Taxotere?dose by 25% (55  mg/m?)?from cycle #1  Has required Neulasta support from second cycle of chemotherapy onwards,?subsequently, with no significant cytopenias.  ?  -Check CBC and CMP every 10 days or so?to monitor for chemotherapy-induced toxicity.  ?  -Check tumor markers monthly?to assess response to treatment.??Normalized as of 06/06/2019. ?  Although all 3 tumor markers are dropping,?we will monitor only CA 27.29.  ?  -Restage with contrast-enhanced CT scans of chest, abdomen, and pelvis,?and whole-body nuclear medicine bone scan in 3 months?to assess response?(mid July)  ?  Surveillance brain MRI with and without gadolinium?in 3 months (May).  ?  ?  -Proceed with C#23 Taxotere today, 07/02/2020  -Restaging CT C/A/P + BS scheduled: 07/22/2020, however the interferes with treatment, will need to be moved up one week.  -Surveillance MRI Brain due at the end of August.  -Cardiology referral pending.  -Dental evaluation and preventive dentistry pending before starting bone antiresorptive therapy for bone metastasis.  -Check CA 27.29 level every month  -MSI/MMR, MTRK, BRCA1/2 status pending at this time  ?  RTC 3 weeks with MD with labs: CBC, CMP, Mg, Ca 27.29  ?  Discussed POC with patient, all questions answered. Instructed to call clinic for any issues or with any questions PRN, patient verbalizes understanding.  ?   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  Metastatic breast cancer  Historical  Shingles  Procedure/Surgical History  Drainage of Buttock Skin, External Approach (07/30/2019)  Incision and drainage of abscess (eg, carbuncle, suppurative hidradenitis, cutaneous or subcutaneous abscess, cyst, furuncle, or paronychia); simple or single (07/30/2019)  Transfusion of Nonautologous Red Blood Cells into Peripheral Vein, Percutaneous Approach  (03/28/2018)  Catheter Insertion Mediport (None) (02/23/2018)  Catheter, infusion, inserted peripherally, centrally or midline (other than hemodialysis) (02/23/2018)  Fluoroscopy of Superior Vena Cava using Low Osmolar Contrast, Guidance (02/23/2018)  Insertion of Infusion Device into Superior Vena Cava, Percutaneous Approach (02/23/2018)  Insertion of tunneled centrally inserted central venous access device, with subcutaneous port; age 5 years or older (02/23/2018)  Fusion of 2 or more Lumbar Vertebral Joints with Synthetic Substitute, Posterior Approach, Posterior Column, Open Approach (12/19/2017)  Fusion of Lumbosacral Joint with Synthetic Substitute, Posterior Approach, Posterior Column, Open Approach (12/19/2017)  Lumbar Foraminotomy MIS (.) (12/17/2017)  Bone biopsy sample (12/08/2017)  Breast biopsy sample (12/07/2017)  Biopsy of breast; percutaneous, needle core, not using imaging guidance (separate procedure) (12/06/2017)  Bone marrow; biopsy, needle or trocar (12/06/2017)  Excision of Left Breast, Percutaneous Approach, Diagnostic (12/06/2017)  Extraction of Iliac Bone Marrow, Percutaneous Approach, Diagnostic (12/06/2017)  Tonsillectomy   Medications  acetaminophen-hydrocodone 325 mg-10 mg oral tablet, 1 tab(s), Oral, QID, PRN  aspirin 81 mg oral Delayed Release (EC) tablet, 81 mg= 1 tab(s), Oral, Daily  aspirin 81 mg oral tablet, 81 mg= 1 tab(s), Oral, Daily, 11 refills  Colace 100 mg oral capsule, 100 mg= 1 cap(s), Oral, BID, PRN  CYCLOBENZAPRINE 10 MG TABLET, Oral, TID  dexamethasone (for IVPB)  dexamethasone 4 mg oral tablet, 8 mg= 2 tab(s), Oral, BID, 12 refills  dexamethasone 4 mg oral tablet, 8 mg= 2 tab(s), Oral, BID, 12 refills  dexamethasone 4 mg oral tablet, 8 mg= 2 tab(s), Oral, BID,? ?Not taking  dexamethasone 4 mg oral tablet, 8 mg= 2 tab(s), Oral, BID, 12 refills  diphenhdramine (for Inj.), 25 mg= 0.5 mL, IV Push, Once-chemo  GABAPENTIN 300 MG CAPSULE, 300 mg= 1 cap(s), Oral, TID  Heparin  Flush 100 U/mL - 5 mL, 500 units= 5 mL, IV Push, Once-chemo  Heparin Flush 100 U/mL - 5 mL, 500 units= 5 mL, IV Push, Once-chemo  Lexapro 20 mg oral tablet, 20 mg= 1 tab(s), Oral, Daily, 11 refills  losartan 50 mg oral tablet, See Instructions, 11 refills  megestrol 40 mg oral tablet, See Instructions, 8 refills  meloxicam 7.5 mg oral tablet, 7.5 mg= 1 tab(s), Oral, Daily, 11 refills  Pazeo 0.7% ophthalmic solution, 1 drop(s), OPTH, Daily  Peridex 0.12% mucous membrane liquid, 0.018 gm= 15 mL, Oral, BID,? ?Not Taking, Completed Rx  Phenergan 25 mg Tab, 25 mg= 1 tab(s), Oral, q6hr  potassium chloride 20 mEq oral TABLET extended release, 20 mEq= 1 tab(s), Oral, BID,? ?Not Taking per Prescriber  ProAir HFA 90 mcg/inh inhalation aerosol with adapter, 2 puff(s), INH, q4hr, PRN, 3 refills  traZODone 100 mg oral tablet, See Instructions, 11 refills  Tylenol 325 mg oral tablet, 650 mg= 2 tab(s), Oral, Once-chemo  Zofran (for IVPB) 16 mg + dexamethasone 10 mg/mL injectable solution 10 mg  Zofran ODT 8 mg oral tablet, disintegrating, 8 mg= 1 tab(s), Oral, q8hr, PRN  Allergies  No Known Allergies  Social History  Abuse/Neglect  No, 06/12/2020  Alcohol - Denies Alcohol Use, 08/24/2014  Never, 04/20/2017  Employment/School  homemaker, Work/School description: homemaker. Operates hazardous equipment: No., 04/20/2017  Exercise  Self assessment: Good condition., 04/20/2017  Home/Environment  Lives with Children. Living situation: Home/Independent. Alcohol abuse in household: No. Substance abuse in household: No. Smoker in household: No. Injuries/Abuse/Neglect in household: No. Feels unsafe at home: No. Family/Friends available for support: Yes. Concern for family members at home: No. Major illness in household: No. Financial concerns: No. TV/Computer concerns: No., 04/20/2017  Nutrition/Health  Regular, Wants to lose weight: No., 04/20/2017  Sexual  Gender Identity Identifies as female., 02/28/2019  Substance Use  Never,  02/12/2018  Never, 04/20/2017  Tobacco - Denies Tobacco Use, 03/04/2013  Never (less than 100 in lifetime), No, 06/12/2020  Family History  Cancer: Mother.  Heart failure.: Mother.  Primary malignant neoplasm of lung: Father.  Immunizations  Vaccine Date Status   pneumococcal 23-polyvalent vaccine 11/14/2017 Recorded   influenza virus vaccine, inactivated 11/15/2016 Recorded   Health Maintenance  Health Maintenance  ???Pending?(in the next year)  ??? ??OverDue  ??? ? ? ?Diabetes Screening due??and every?  ??? ? ? ?Alcohol Misuse Screening due??01/01/20??and every 1??year(s)  ??? ??Due?  ??? ? ? ?Cervical Cancer Screening due??07/01/20??and every?  ??? ? ? ?Colorectal Screening due??07/01/20??and every?  ??? ? ? ?Tetanus Vaccine due??07/01/20??and every 10??year(s)  ??? ??Due In Future?  ??? ? ? ?Obesity Screening not due until??01/01/21??and every 1??year(s)  ??? ? ? ?ADL Screening not due until??01/15/21??and every 1??year(s)  ??? ? ? ?Breast Cancer Screening not due until??02/19/21??and every 2??year(s)  ??? ? ? ?Depression Screening not due until??06/09/21??and every 1??year(s)  ??? ? ? ?Blood Pressure Screening not due until??06/12/21??and every 1??year(s)  ??? ? ? ?Body Mass Index Check not due until??06/12/21??and every 1??year(s)  ??? ? ? ?Hypertension Management-Blood Pressure not due until??06/12/21??and every 1??year(s)  ??? ? ? ?Hypertension Management-BMP not due until??06/12/21??and every 1??year(s)  ???Satisfied?(in the past 1 year)  ??? ??Satisfied?  ??? ? ? ?ADL Screening on??01/15/20.??Satisfied by Mumtaz Ramirez LPN  ??? ? ? ?Blood Pressure Screening on??06/12/20.??Satisfied by Margoth Goodman RN  ??? ? ? ?Body Mass Index Check on??06/12/20.??Satisfied by Margoth Goodman RN  ??? ? ? ?Depression Screening on??06/09/20.??Satisfied by Marbella Lane LPN  ??? ? ? ?Diabetes Screening on??06/12/20.??Satisfied by Eli Clements  ??? ? ? ?Hypertension Management-Blood  Pressure on??06/12/20.??Satisfied by Maxine CASTILLO, Margoth Ricci  ??? ? ? ?Influenza Vaccine on??03/18/20.??Satisfied by Reg Montoya RN  ??? ? ? ?Obesity Screening on??06/12/20.??Satisfied by Maxine CASTILLO, Margoth Ricci  ?  Lab Results  Test Name Test Result Date/Time   WBC 3.3 x10(3)/mcL (Low) 07/02/2020 09:02 CDT   RBC 3.27 x10(6)/mcL (Low) 07/02/2020 09:02 CDT   Hgb 9.9 gm/dL (Low) 07/02/2020 09:02 CDT   Hct 31.3 % (Low) 07/02/2020 09:02 CDT   Platelet 407 x10(3)/mcL (High) 07/02/2020 09:02 CDT   MCV 95.7 fL 07/02/2020 09:02 CDT   MCH 30.3 pg 07/02/2020 09:02 CDT   MCHC 31.6 gm/dL 07/02/2020 09:02 CDT   RDW 15.7 % (High) 07/02/2020 09:02 CDT   MPV 9.9 fL 07/02/2020 09:02 CDT   Abs Neut 2.58 x10(3)/mcL 07/02/2020 09:02 CDT   Neutro Auto 78 % 07/02/2020 09:02 CDT   Lymph Auto 17 % 07/02/2020 09:02 CDT   Mono Auto 5 % 07/02/2020 09:02 CDT   Basophil Auto 0 % 07/02/2020 09:02 CDT   Abs Neutro 2.58 x10(3)/mcL 07/02/2020 09:02 CDT   Abs Lymph 0.6 x10(3)/mcL 07/02/2020 09:02 CDT   Abs Mono 0.2 x10(3)/mcL 07/02/2020 09:02 CDT   Abs Baso 0.0 x10(3)/mcL 07/02/2020 09:02 CDT   IG% 0 % 07/02/2020 09:02 CDT   IG# 0.0100 07/02/2020 09:02 CDT

## 2022-05-04 NOTE — HISTORICAL [facility] CERNER
This is a historical note converted from [employer]. Formatting and pictures may have been removed.  Please reference [facility] for original formatting and attached multimedia. Chief Complaint  breast cancer  History of Present Illness  Problem list:  1. Stage IV Inflammatory breast cancer. Biopsy proven (right anterior iliac bone on 12/08/2017 showed metastatic carcinoma, consistent with a breast primary) metastatic left breast cancer, with extensive bone metastases, lung metastasis, possible liver and adrenal metastasis, extensive dulce metastasis, and small intracranial (posterior fossa) metastasis?  ????  ER 85% positive, IN 45% positive, Ki-67 30% (high proliferation), HER-2 3+ (overexpressed)  Pathologic L4 compression fracture, to a lesser extent at L3 also, epidural extension of tumor at the L4 level resulting in severe canal stenosis, and left lower extremity weakness.  ???  Current Treatment:?  1. Palliative chemotherapy with Taxotere every 21 days.  ?   C25 due: 8/12/2020  ?   Dose modifications & interruptions:  Docetaxel reduced by 25%?from cycle #1.  Added Neulasta support from second cycle of chemotherapy onwards  Herceptin/Perjeta dropped starting 9/12/19 for LVEF 45%.  ???  Treatment History:  1. 12/18/17: Dr. Harish Pearson performed L3-L4 bilateral laminectomies and decompression of the posterior elements; L4-L5 bilateral laminectomies and foraminotomies with decompression of the thecal sac; lumbar open reduction and internal fixation; biopsy of L3 and biopsy of L4 vertebral bodies.??  2. Palliative radiation therapy to the lumbar spine post decompression surgery, as well as radiation therapy to the brain metastasis with Dr. Saeed. She completed on 2/2/2018.  3. Herceptin/Perjeta/Taxotere 03/21/2018-09/12/2019, herceptin and perjeta dropped d/t decreased LVEF of 45%.  ?   Past medical history: Hypertension. ?Anemia. ?Chronic kidney disease. ?Tonsillectomy.  Social history: Denies history of tobacco,  alcohol, or illicit drug abuse. ?Lives with 2 of her siblings. ?Has 3 children and 2 grandchildren.  Family history: Father with history of throat cancer in his 50s. Mother with history of ovarian cancer in her 50s. Brother with history of stomach cancer in her 50s  Menstrual history: As of today, 12/12/2017, premenopausal  ?   ?  History of present illness:?  52-year-old Afro-American female who was seen in internal medicine clinic on 12/06/2017 for left lower extremity pain and was found to have multiple abnormalities on blood tests. ?Was hospitalized for evaluation, and ultimately found to have metastatic breast cancer.  ?   For?a full, detailed note, see the note dated 7/22/2020.  ?  Interval History  8/12/2020: Patient presents for follow up on Taxotere; she is scheduled to receive cycle #25 today. She reports left breast pulling pain that began last night while she was sleeping. She reports taking a pain pill for it and returning to sleep. She states the pain has not returned. She also complains of generalized bone pain and fluctuating weakness in the legs. She was scheduled to see the dentist this past Monday, but the office was closed on Thursday due to the dentist josse COVID. She was told they would call and reschedule her once they reopen. CMP stable with slightly low CO2 aqt 18. BUN up to 24; creatinine WNL at 1.30; eGFR low at 55. WBC 3.0; H&H 9.8 & 309; plts 324k; ANC 2460. Informed her of her genetic counseling appointment Monday. Will have her follow up in 3 weeks with labs prior to treatment. She is amenable to this plan.  Review of Systems  A complete 12-point?ROS was performed in full with pertinent positives as described in interval history. Remainder of ROS done in full and are negative.  Physical Exam  Vitals & Measurements  T:?36.8? ?C (Oral)? HR:?65(Peripheral)? RR:?18? BP:?110/75?  HT:?157.00?cm? WT:?60.700?kg? BMI:?24.63?  General: ?Alert and oriented, No acute  distress.??  Appearance: Well-groomed  HEENT: ?Normocephalic, Oral mucosa is moist.?Pupils are equal, round and reactive to light, Extraocular movements are intact, Normal conjunctiva.?Alopecia.  Neck: ?Supple, Non-tender, No lymphadenopathy, No thyromegaly.??  Respiratory: ?Lungs are clear to auscultation, Respirations are non-labored, Breath sounds are equal, Symmetrical chest wall expansion.?Left chest wall mediport.?  Cardiovascular: ?Normal rate, Regular rhythm, No edema.??  Breast:??Breast exam not performed on todays visit.??  Gastrointestinal: ?Soft, Non-tender, Non-distended, Normal bowel sounds.??  Musculoskeletal: ?Normal strength.?Presents in wheelchair.  Integumentary: ?Warm, dry, intact.?Skin?abrasion to?left great toe.  Neurologic: ?Alert, Oriented, No focal deficits, Cranial Nerves II-XII are grossly intact.??  Cognition and Speech: ?Oriented, Speech clear and coherent.??Wearing a face mask.  Psychiatric: ?Cooperative, Appropriate mood & affect. ?  ECOG Performance Scale: 2 - Capable of all self-care but unable to carry out any work activities. Up and about greater than 50 percent of waking hours.?  Assessment/Plan  1.?Metastatic breast cancer?C50.912  #Metastatic breast cancer:  Inflammatory carcinoma of left breast. ?ER positive. ?KS positive. ?HER-2 positive.? Diagnosed 12/2017  Metastases to brain,?lumbar spine,?extensively to bones,?lymph nodes, lungs, liver, and adrenal.?  For lumbar spine metastasis,?status post laminectomy and internal fixation 12/2017  Status post radiation therapy to lumbar spine and brain?02/2018  Noncompliant with follow-up.  Status post Taxotere/Herceptin/Perjeta?x1 on?03/21/2018; she declined further chemotherapy?  Presented?1 year later,?on?02/2019:?Extensive bone metastases stable;?progression of bilateral lung metastasis, bilateral axillary?lymphadenopathy, progressive liver metastasis, progressive left adrenal metastasis  Dose reduced Taxotere/Herceptin/Perjeta  started?03/07/2019?(1 year after first cycle of chemotherapy)  Good response post 4 cycles  Excellent response?post 8 cycles  Starting?with ninth cycle of chemotherapy,?discontinued Herceptin/Perjeta?due to persistent?drop in LVEF,?without?recovery?post?discontinuation  Continued improvement/stability?post 13 cycles of Taxotere;?dramatic drop in tumor marker level  No brain metastasis?on surveillance brain MRI (02/10/2020)  -Stable metastatic disease?on restaging CTs and bone scan?post 18 cycles of Taxotere?(04/16/2020)  -No brain metastasis on surveillance brain MRI (05/27/2020)  -No metastatic disease progression?post?Taxotere x23 cycles?(restaging?CTs and bone scan: 07/17/2020)  --------  ?   #Sites of disease:  Left breast (inflammatory carcinoma);?brain metastasis; lumbar spine metastasis; extensive bone metastasis;?bilateral axillary?lymph nodes; lungs; liver; left?adrenal  --------  ?   #Molecular markers:  ER positive. ?HI positive. ?HER-2 positive.  MMR proficient  NTRK gene fusion negative  ?   ????????  Plan:  -In view of the fact that she has?extensive visceral metastatic disease,?we started treatment with chemotherapy rather than endocrine therapy.  ?   -Since she experienced severe neutropenia, requiring hospitalization, after first cycle of chemotherapy last year?(2018), therefore, we empirically reduced?the dose of docetaxel by 25%?from cycle #1.  She has required Neulasta support from second cycle of chemotherapy onwards  ?   Chemotherapy?schedule:  Pertuzumab 840 mg IV day 1, followed by 420 mg IV;  Trastuzumab 8 mg/kg IV day 1 followed by 6 mg/kg IV;  Docetaxel  mg/m? IV on day 1;  Cycled every 21 days.  Herceptin/Perjeta discontinued from ninth cycle of chemotherapy onwards?(persistent drop in?LVEF)  She was administered docetaxel 75 mg/m? last year?(2018), resulting in severe neutropenia requiring hospitalization.?  Therefore, on resumption?in March 2019,?we reduced?Taxotere?dose by 25%  (55 mg/m?)?from cycle #1  Has required Neulasta support from second cycle of chemotherapy onwards,?subsequently, with no significant cytopenias.  ?   -Check CBC and CMP every 10 days or so?to monitor for chemotherapy-induced toxicity.  ?   -Check tumor markers monthly?to assess response to treatment.??Normalized as of 06/06/2019. ?  Although all 3 tumor markers are dropping,?we will monitor only CA 27.29.  ?  -Restage with contrast-enhanced CT scans of chest, abdomen, and pelvis,?and whole-body nuclear medicine bone scan in 3 months?to assess response?(mid July)  ?  Surveillance brain MRI with and without gadolinium?in 3 months (May).  ?  -Excellent response to Taxotere  -No disease progression post?23 cycles of Taxotere  -Continue Taxotere every 3 weeks (25% dose reduced, with Neulasta support)  -Germline?BRCA1/2?testing is pending  ?  Ok to proceed with cycle #25 of Taxotere?today.  Follow up in 3 weeks with CBC, CMP prior to cycle #26.  Check CBC and CMP every 10 days  Check?CA 15-3 and CA 27.29 level every month  Genetic counseling appointment 8/17/2020.  Restage with CT C/A/P with contrast?and bone scan in 3 months (mid October); scheduled for 10/22/2020.  Dental evaluation and preventive dentistry pending before starting bone antiresorptive therapy for bone metastasis.  ?  ?  For ER positive, MA positive, HER-2 positive metastatic disease, treatment options are:  1.? Chemotherapy plus HER-2 targeted therapy with pertuzumab plus trastuzumab plus taxane?(preferred); or, Ado-trastuzumab emtansine (T-DMI); or trastuzumab plus chemotherapy; or,  2.? Endocrine therapy with or without HER-2 targeted therapy (if premenopausal, consider ovarian ablation or suppression); for premenopausal woman, selective ER modulators alone (without ovarian ablation/suppression) plus HER-2 targeted therapy is also an option; or,  3.? Other HER-2 targeted therapies.  Ordered:  ?  2.?Encounter for monitoring cardiotoxic drug  therapy?Z51.81  Her LVEF remains suppressed?despite?the fact that Herceptin/Perjeta was held?due to drop in LVEF  No?normalization of LVEF?over subsequent?2 months  We discontinued Herceptin/Perjeta for good.  ?   Has been referred to cardiology for evaluation.  ?   Continue?25% dose reduced Taxotere, with Neulasta support every 3 weeks  Excellent tolerance so far, without any significant cytopenias.  ?  Plan:  Herceptin/Perjeta discontinued. Cardiology referral pending.  ?  Trastuzumab induced cardiomyopathy:  -Discontinuation should be strongly considered in patients who develop a clinically significant reduction in LVEF during therapy; treatment with heart failure medications (for example, ACE inhibitors, beta-blockers) should be initiated.  ?  -Withhold treatment for 16% or greater decrease from pretreatment levels or LVEF below normal limits and 10% or greater decrease from baseline.  ?  -Withhold treatment for at least 4 weeks and repeat LVEF every 4 weeks;  may resume trastuzumab treatment if LVEF returns to normal limits within 4-8 weeks and remains at 15% or less?decrease from baseline value;  discontinue permanently for persistent (>8 weeks) LVEF decline or for >3 incidents of treatment interruptions for cardiomyopathy  ?  -Cardiomyopathy due to trastuzumab is generally reversible over the period of 1-3 months after discontinuation  Ordered:  ?  3.?Bone metastasis?C79.51  -To prevent skeletal events from bone metastasis, need to start?either Xgeva or bisphosphonate therapy.  Need preventive dentistry and dental clearance?to prevent/minimize the likelihood of?osteonecrosis of the jaw before starting these medications.? This is pending.  Concurrent calcium and vitamin D supplementation is recommended (1200 mg of calcium and 800-1000 units of vitamin D3 daily).  Optimal schedule for zoledronic acid is monthly x12, then quarterly  -->  She has been unable to undergo preventive dentistry; therefore, we will  hold off on antiresorptive therapy?for now.  Ordered:  ?   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  Metastatic breast cancer  Historical  Shingles  Procedure/Surgical History  Drainage of Buttock Skin, External Approach (07/30/2019)  Incision and drainage of abscess (eg, carbuncle, suppurative hidradenitis, cutaneous or subcutaneous abscess, cyst, furuncle, or paronychia); simple or single (07/30/2019)  Transfusion of Nonautologous Red Blood Cells into Peripheral Vein, Percutaneous Approach (03/28/2018)  Catheter Insertion Mediport (None) (02/23/2018)  Catheter, infusion, inserted peripherally, centrally or midline (other than hemodialysis) (02/23/2018)  Fluoroscopy of Superior Vena Cava using Low Osmolar Contrast, Guidance (02/23/2018)  Insertion of Infusion Device into Superior Vena Cava, Percutaneous Approach (02/23/2018)  Insertion of tunneled centrally inserted central venous access device, with subcutaneous port; age 5 years or older (02/23/2018)  Fusion of 2 or more Lumbar Vertebral Joints with Synthetic Substitute, Posterior Approach, Posterior Column, Open Approach (12/19/2017)  Fusion of Lumbosacral Joint with Synthetic Substitute, Posterior Approach, Posterior Column, Open Approach (12/19/2017)  Lumbar Foraminotomy MIS (.) (12/17/2017)  Bone biopsy sample (12/08/2017)  Breast biopsy sample (12/07/2017)  Biopsy of breast; percutaneous, needle core, not using imaging guidance (separate procedure) (12/06/2017)  Bone marrow; biopsy, needle or trocar (12/06/2017)  Excision of Left Breast, Percutaneous Approach, Diagnostic (12/06/2017)  Extraction of Iliac Bone Marrow, Percutaneous Approach, Diagnostic (12/06/2017)  Tonsillectomy   Medications  acetaminophen-hydrocodone 325 mg-10 mg oral tablet, 1 tab(s), Oral, QID, PRN  aspirin 81 mg oral Delayed Release (EC)  tablet, 81 mg= 1 tab(s), Oral, Daily  aspirin 81 mg oral tablet, 81 mg= 1 tab(s), Oral, Daily, 11 refills  Colace 100 mg oral capsule, 100 mg= 1 cap(s), Oral, BID, PRN  CYCLOBENZAPRINE 10 MG TABLET, Oral, TID  dexamethasone (for IVPB)  dexamethasone 4 mg oral tablet, 8 mg= 2 tab(s), Oral, BID, 12 refills  dexamethasone 4 mg oral tablet, 8 mg= 2 tab(s), Oral, BID, 12 refills  dexamethasone 4 mg oral tablet, 8 mg= 2 tab(s), Oral, BID, 12 refills  dexamethasone 4 mg oral tablet, 8 mg= 2 tab(s), Oral, BID,? ?Not taking  dexamethasone 4 mg oral tablet, 8 mg= 2 tab(s), Oral, BID, 12 refills  dexamethasone 4 mg oral tablet, 8 mg= 2 tab(s), Oral, BID, 12 refills  diphenhdramine (for Inj.), 25 mg= 0.5 mL, IV Push, Once-chemo  GABAPENTIN 300 MG CAPSULE, 300 mg= 1 cap(s), Oral, TID  Heparin Flush 100 U/mL - 5 mL, 500 units= 5 mL, IV Push, Once-chemo  Heparin Flush 100 U/mL - 5 mL, 500 units= 5 mL, IV Push, Once-chemo  Lexapro 20 mg oral tablet, 20 mg= 1 tab(s), Oral, Daily, 11 refills  losartan 50 mg oral tablet, See Instructions, 11 refills  megestrol 40 mg oral tablet, See Instructions, 8 refills  meloxicam 7.5 mg oral tablet, 7.5 mg= 1 tab(s), Oral, Daily, 11 refills  Pazeo 0.7% ophthalmic solution, 1 drop(s), OPTH, Daily  Peridex 0.12% mucous membrane liquid, 0.018 gm= 15 mL, Oral, BID,? ?Not Taking, Completed Rx  Phenergan 25 mg Tab, 25 mg= 1 tab(s), Oral, q6hr  potassium chloride 20 mEq oral TABLET extended release, 20 mEq= 1 tab(s), Oral, BID,? ?Not Taking per Prescriber  ProAir HFA 90 mcg/inh inhalation aerosol with adapter, 2 puff(s), INH, q4hr, PRN, 3 refills  traZODone 100 mg oral tablet, See Instructions, 11 refills  Tylenol 325 mg oral tablet, 650 mg= 2 tab(s), Oral, Once-chemo  Zofran (for IVPB) 16 mg + dexamethasone 10 mg/mL injectable solution 10 mg  Zofran ODT 8 mg oral tablet, disintegrating, 8 mg= 1 tab(s), Oral, q8hr, PRN  Allergies  No Known Allergies  Social History  Abuse/Neglect  No, No, Yes,  08/12/2020  Alcohol - Denies Alcohol Use, 08/24/2014  Never, 08/12/2020  Employment/School  homemaker, Work/School description: homemaker. Operates hazardous equipment: No., 04/20/2017  Exercise  Self assessment: Good condition., 04/20/2017  Home/Environment  Lives with Children. Living situation: Home/Independent. Alcohol abuse in household: No. Substance abuse in household: No. Smoker in household: No. Injuries/Abuse/Neglect in household: No. Feels unsafe at home: No. Family/Friends available for support: Yes. Concern for family members at home: No. Major illness in household: No. Financial concerns: No. TV/Computer concerns: No., 04/20/2017  Nutrition/Health  Regular, Wants to lose weight: No., 04/20/2017  Sexual  Gender Identity Identifies as female., 02/28/2019  Substance Use  Never, 08/12/2020  Tobacco - Denies Tobacco Use, 03/04/2013  Never (less than 100 in lifetime), No, 08/12/2020  Family History  Cancer: Mother.  Heart failure.: Mother.  Primary malignant neoplasm of lung: Father.  Immunizations  Vaccine Date Status   pneumococcal 23-polyvalent vaccine 11/14/2017 Recorded   influenza virus vaccine, inactivated 11/15/2016 Recorded   Health Maintenance  Health Maintenance  ???Pending?(in the next year)  ??? ??OverDue  ??? ? ? ?Alcohol Misuse Screening due??01/02/20??and every 1??year(s)  ??? ??Due?  ??? ? ? ?Cervical Cancer Screening due??08/12/20??and every?  ??? ? ? ?Colorectal Screening due??08/12/20??and every?  ??? ? ? ?Influenza Vaccine due??08/12/20??and every?  ??? ? ? ?Tetanus Vaccine due??08/12/20??and every 10??year(s)  ??? ? ? ?Zoster Vaccine due??08/12/20??and every?  ??? ??Due In Future?  ??? ? ? ?HF-Heart Failure Education not due until??08/17/20??and every 1??year(s)  ??? ? ? ?HF-LVEF not due until??11/10/20??and every 1??year(s)  ??? ? ? ?Obesity Screening not due until??01/01/21??and every 1??year(s)  ??? ? ? ?ADL Screening not due until??01/15/21??and every 1??year(s)  ??? ? ? ?Breast  Cancer Screening not due until??02/19/21??and every 2??year(s)  ???Satisfied?(in the past 1 year)  ??? ??Satisfied?  ??? ? ? ?ADL Screening on??01/15/20.??Satisfied by Mumtaz Ramirez LPN  ??? ? ? ?Blood Pressure Screening on??08/12/20.??Satisfied by [place] LPN, Beonka S  ??? ? ? ?Body Mass Index Check on??08/12/20.??Satisfied by [place] LPN, Beonka S  ??? ? ? ?Depression Screening on??08/12/20.??Satisfied by [place] LPN, Beonka S  ??? ? ? ?Diabetes Screening on??08/12/20.??Satisfied by Jessica Lynch.  ??? ? ? ?Hypertension Management-Blood Pressure on??08/12/20.??Satisfied by [place] LPN, Beonka S  ??? ? ? ?Influenza Vaccine on??03/18/20.??Satisfied by Reg Montoya RN  ??? ? ? ?Obesity Screening on??08/12/20.??Satisfied by [place] LPN, Beonka S  ?  Lab Results  Test Name Test Result Date/Time   Sodium Lvl 138 mmol/L 08/12/2020 08:00 CDT   Potassium Lvl 4.1 mmol/L 08/12/2020 08:00 CDT   Chloride 112 mmol/L (High) 08/12/2020 08:00 CDT   CO2 18 mmol/L (Low) 08/12/2020 08:00 CDT   Calcium Lvl 8.7 mg/dL 08/12/2020 08:00 CDT   Glucose Lvl 146 mg/dL (High) 08/12/2020 08:00 CDT   BUN 24 mg/dL (High) 08/12/2020 08:00 CDT   Creatinine 1.30 mg/dL 08/12/2020 08:00 CDT   eGFR-AA 55 mL/min (Low) 08/12/2020 08:00 CDT   eGFR-ZEESHAN 46 mL/min (Low) 08/12/2020 08:00 CDT   Bili Total 0.4 mg/dL 08/12/2020 08:00 CDT   Bili Direct 0.1 mg/dL 08/12/2020 08:00 CDT   Bili Indirect 0.3 mg/dL 08/12/2020 08:00 CDT   AST 10 unit/L (Low) 08/12/2020 08:00 CDT   ALT 10 unit/L (Low) 08/12/2020 08:00 CDT   Alk Phos 43 unit/L (Low) 08/12/2020 08:00 CDT   Total Protein 6.6 gm/dL 08/12/2020 08:00 CDT   Albumin Lvl 3.3 gm/dL (Low) 08/12/2020 08:00 CDT   Globulin 3.30 gm/mL 08/12/2020 08:00 CDT   A/G Ratio 1.0 ratio (Low) 08/12/2020 08:00 CDT   WBC 3.0 x10(3)/mcL (Low) 08/12/2020 08:00 CDT   RBC 3.28 x10(6)/mcL (Low) 08/12/2020 08:00 CDT   Hgb 9.8 gm/dL (Low) 08/12/2020 08:00 CDT   Hct 30.9 % (Low) 08/12/2020 08:00 CDT   Platelet 324  x10(3)/mcL 08/12/2020 08:00 CDT   MCV 94.2 fL 08/12/2020 08:00 CDT   MCH 29.9 pg 08/12/2020 08:00 CDT   MCHC 31.7 gm/dL 08/12/2020 08:00 CDT   RDW 17.7 % (High) 08/12/2020 08:00 CDT   MPV 10.3 fL 08/12/2020 08:00 CDT   Abs Neut 2.46 x10(3)/mcL 08/12/2020 08:00 CDT   Neutro Auto 82 % 08/12/2020 08:00 CDT   Lymph Auto 17 % 08/12/2020 08:00 CDT   Mono Auto 1 % 08/12/2020 08:00 CDT   Basophil Auto 0 % 08/12/2020 08:00 CDT   Abs Neutro 2.46 x10(3)/mcL 08/12/2020 08:00 CDT   Abs Lymph 0.5 x10(3)/mcL (Low) 08/12/2020 08:00 CDT   Abs Mono 0.0 x10(3)/mcL (Low) 08/12/2020 08:00 CDT   Abs Baso 0.0 x10(3)/mcL 08/12/2020 08:00 CDT

## 2022-05-04 NOTE — HISTORICAL [facility]
This is a historical note converted from [employer]. Formatting and pictures may have been removed.  Please reference [employer] for original formatting and attached multimedia. History of Present Illness  ?  Problem list  ??1. Stage IV Inflammatory breast cancer. Biopsy proven (right anterior iliac bone on 12/08/2017 showed metastatic carcinoma, consistent with a breast primary) metastatic left breast cancer, with extensive bone metastases, lung metastasis, possible liver and adrenal metastasis, extensive dulce metastasis, and small intracranial (posterior fossa) metastasis?????  ER 85% positive, AL 45% positive, Ki-67 30% (high proliferation), HER-2 3+ (overexpressed)  ??Pathologic L4 compression fracture, to a lesser extent at L3 also, epidural extension of tumor at the L4 level resulting in severe canal stenosis, and left lower extremity weakness.???  ???  Treatment History: Dr. Harish Pearson performed L3-L4 bilateral laminectomies and decompression of the posterior elements; L4-L5 bilateral laminectomies and foraminotomies with decompression of the thecal sac; lumbar open reduction and internal fixation; biopsy of L3 and biopsy of L4 vertebral bodies on 12/18/2017. ?Palliative radiation therapy to the lumbar spine post decompression surgery, as well as radiation therapy to the brain metastasis with Dr. Saeed. She completed on 2/2/2018.  ?   Reason for visit:  Follow-up for?metastatic?breast cancer  ?   Past medical history: Hypertension. ?Anemia. ?Chronic kidney disease. ?Tonsillectomy.  Social history: Denies history of tobacco, alcohol, or illicit drug abuse. ?Lives with 2 of her siblings. ?Has 3 children and 2 grandchildren.  Family history: Father with history of throat cancer in his 50s. Mother with history of ovarian cancer in her 50s. Brother with history of stomach cancer in her 50s  Menstrual history: As of today, 12/12/2017, premenopausal  ?   ?  History of present illness:?  Patient is being followed for  metastatic left breast cancer, ER positive, AL positive, HER-2 positive.  ?   For detailed history of present illness, please see my last note dated 02/19/2020.? Please also refer to assessment and plan section.  ?   ?  Interval History?  ?   07/22/2020:  -MMR proficient  -NTRK gene fusion negative  -05/27/2020: Surveillance brain MRI with and without contrast (comparison: 02/10/2020): No metastatic disease  -23rd cycle of Taxotere on 07/02/2020  -07/17/2020: Restaging CT C/A/P with contrast: No evidence of new malignancy/metastatic disease in chest, abdomen, or pelvis; stable subcentimeter lung nodules; new stable hypodensities in liver; diffuse osseous metastatic disease with stable compression fractures of the thoracic and lumbar spine  -07/17/2020: Restaging bone scan: SuperScan representing extensive skeletal metastatic involvement, unchanged since 04/16/2020  -07/02/2020: CA 15-3 and CA 27.29 levels normal  1/22/2020: CBC and CMP reviewed.? BUN 22.? Rest of CMP essentially within acceptable limits.? WBC, differential, and platelets normal.? Hemoglobin 9.7, chronically low.  Patient. ?She was at home in [place], [place].? Doing very well. ?Endorses no symptoms of concern. ?No weakness, fatigue, malaise, chest pain, cough, dyspnea, unusual headaches, focal neurological symptoms, abdominal pain, nausea, vomiting, etc.? No significant side effects from?chemotherapy whatsoever.  ?   11/13/2020:  -07/23/2020: Genetic testing negative for clinically significant variants (BRCA1/2 analysis negative)  -09/09/2020: Surveillance brain MRI with and without contrast (comparison: 05/27/2020): Questionable punctate enhancing foci right cerebellar and right occipital  -11/05/2020: ThinPrep cervical Pap smear: Negative for intraepithelial lesion or malignancy; high risk HPV 16, 18/25 -  -Cycle #28 of Taxotere on 10/22/2020 (with Neulasta support)  -11/10/2020: Restaging CTs C/A/P with contrast (comparison: 07/17/2020)  (post Taxotere x28): No evidence of disease progression  -11/10/2020: Restaging bone scan (post Taxotere x28): SuperScan representing extensive skeletal metastatic involvement, without significant interval difference since 07/17/2020  -On Cymbalta and gabapentin for mild Taxotere-induced peripheral neuropathy  -11/13/2020: WBC 4.4.? Hemoglobin 10 g/dL.? Platelets normal.? ANC 3.39.  Presents for follow-up visit. ?In a wheelchair.? In fact, she uses wheelchair?just for support?while she walks.? Overall, doing extremely well.? Chronic aches and pains in the legs and whole back, without radiation; she is on?Norco as needed.? Good appetite.? Reasonably good energy level.? ECOG 1.? No unusual headaches, focal neurologic symptoms, chest pain, cough, dyspnea,?abnormal bleeding or bruising, recurrent fevers or chills, abdominal pain, nausea, vomiting, etc.? No side effects with chemotherapy. Follow-up with cardiology on 08/18/2020.? She denies any symptoms of congestive heart failure.? Cardiology recommended expectant monitoring.  ?  ???????  Review of Systems:  All systems reviewed, and found to be negative?except for the symptoms detailed above.  ?  ?  Physical Examination:  VITAL SIGNS: ?Reviewed. ? ?  GENERAL:? In no apparent distress.?  HEAD:? No signs of head trauma.  EYES:? Pupils are equal.? Extraocular motions intact.?  EARS:? Hearing grossly intact.  MOUTH:? Oropharynx is normal.  NECK:? No adenopathy, no JVD.??  CHEST:? Chest with clear breath sounds bilaterally.? No wheezes, rales, or rhonchi.?  CARDIAC:? Regular rate and rhythm.? S1 and S2, without murmurs, gallops, or rubs.  VASCULAR:? No Edema.? Peripheral pulses normal and equal in all extremities.  ABDOMEN:? Soft, without detectable tenderness.? No sign of distention.? No?? rebound or guarding, and no masses palpated.?? Bowel Sounds normal.  MUSCULOSKELETAL:? Good range of motion of all major joints. Extremities without clubbing, cyanosis or  edema.?  NEUROLOGIC EXAM:? Alert and oriented x 3.? No focal sensory or strength deficits.?? Speech normal.? Follows commands.  PSYCHIATRIC:? Mood normal.  SKIN:? No rash or lesions.  01/29/2019:?Breast examination performed with her verbal consent, in the presence of Prenella, over LPN;?deformed left breast is noted,?with diffuse, vague, nontender mass?palpable in the mid breast?as well as lower outer quadrant;?skin retraction is noted;?nipple retraction is noted;?a couple of small erythematous nodules?in the upper part of right breast, a short distance from nipple;?no palpable regional lymphadenopathy; no warmth; no skin ulceration.  06/06/2019:?Chaperoned breast examination was performed?in the presence of Prenella, over LPN.? No mass palpable in left breast.? No lymphadenopathy in axilla on either side.  07/22/2020: Not examined; it was a telephone visit.  11/13/2020: In no acute discomfort. ?In a wheelchair.  ?  ?  Assessment:  #Metastatic breast cancer:  Inflammatory carcinoma of left breast. ?ER positive. ?IA positive. ?HER-2 positive.? Diagnosed 12/2017  Metastases to brain,?lumbar spine,?extensively to bones,?lymph nodes, lungs, liver, and adrenal.?  For lumbar spine metastasis,?status post laminectomy and internal fixation 12/2017  Status post radiation therapy to lumbar spine and brain?02/2018  Noncompliant with follow-up.  Status post Taxotere/Herceptin/Perjeta?x1 on?03/21/2018; she declined further chemotherapy?  Presented?1 year later,?on?02/2019:?Extensive bone metastases stable;?progression of bilateral lung metastasis, bilateral axillary?lymphadenopathy, progressive liver metastasis, progressive left adrenal metastasis  Dose reduced Taxotere/Herceptin/Perjeta started?03/07/2019?(1 year after first cycle of chemotherapy)  Good response post 4 cycles  Excellent response?post 8 cycles  Starting?with ninth cycle of chemotherapy,?discontinued Herceptin/Perjeta?due to persistent?drop in  LVEF,?without?recovery?post?discontinuation  Continued improvement/stability?post 13 cycles of Taxotere;?dramatic drop in tumor marker level  No brain metastasis?on surveillance brain MRI (02/10/2020)  -Stable metastatic disease?on restaging CTs and bone scan?post 18 cycles of Taxotere?(04/16/2020)  -No brain metastasis on surveillance brain MRI (05/27/2020)  -No metastatic disease progression?post?Taxotere x23 cycles?(restaging?CTs and bone scan: 07/17/2020)  -Genetic testing negative (07/23/2020)  -Questionable punctate?enhancing foci right cerebellar and right occipital (surveillance brain MRI: 09/09/2020)?(completely asymptomatic)  -No disease progression post Taxotere x28?(CTs, bone scan:?11/10/2020)?(bone scan: SuperScan?representing extensive skeletal metastatic involvement, unchanged)  --------  ?  #Sites of disease:  Left breast (inflammatory carcinoma);?brain metastasis; lumbar spine metastasis; extensive bone metastasis;?bilateral axillary?lymph nodes; lungs; liver; left?adrenal  --------  ?  #Molecular markers:  ER positive. ?NV positive. ?HER-2 positive.  BRCA1/2?analysis negative  MMR proficient  NTRK gene fusion negative  ?  ?  #Taxol-induced peripheral neuropathy:  -Mild;?well controlled with?Cymbalta?and gabapentin  ?  ????????  Plan:  -Excellent response to Taxotere  -No disease progression post Taxotere x28  -Continue Taxotere every 3 weeks (25% dose reduced, with Neulasta support)  -Check CBC and CMP every 10 days  -Check CA 15-3 and CA 27.29 level every month  -Restage with contrast-enhanced CT C/A/P and bone scan in 3 months (02/2021)  -Surveillance brain MRI with and without contrast next month  ?  -Has been referred to cardiology for Herceptin/Perjeta induced drop in LVEF (LVEF 40% on TTE: 11/11/2019)  -Order repeat TTE now  ?  -Continue Cymbalta and gabapentin?for Taxotere-induced peripheral neuropathy  ?  -Dental evaluation and preventive dentistry pending before we can start bone  antiresorptive therapy for bone metastasis  ?  Follow-up in 3 weeks; to visit with NP  ?  Above discussed at length with her.? All questions answered.  She understands and agrees this plan.  ------------  ?  ?  Discussion:  Her LVEF remains suppressed?despite?the fact that Herceptin/Perjeta was held?due to drop in LVEF  No?normalization of LVEF?over subsequent?2 months  We discontinued Herceptin/Perjeta for good.  ?  Has been referred to cardiology for evaluation.  ?  Continue?25% dose reduced Taxotere, with Neulasta support every 3 weeks  Excellent tolerance.  ?  -To prevent skeletal events from bone metastasis, need to start?either Xgeva or bisphosphonate therapy.  Need preventive dentistry and dental clearance?to prevent/minimize the likelihood of?osteonecrosis of the jaw before starting these medications.? This is pending.  Concurrent calcium and vitamin D supplementation is recommended (1200 mg of calcium and 800-1000 units of vitamin D3 daily).  Optimal schedule for zoledronic acid is monthly x12, then quarterly  -->  She has been unable to undergo preventive dentistry; therefore, we will hold off on antiresorptive therapy?for now.  ?  -In view of the fact that she has?extensive visceral metastatic disease,?we started treatment with chemotherapy rather than endocrine therapy.  ?  -Since she experienced severe neutropenia, requiring hospitalization, after first cycle of chemotherapy last year?(2018), therefore, we empirically reduced?the dose of docetaxel by 25%?from cycle #1.  She has required Neulasta support from second cycle of chemotherapy onwards  ?  Chemotherapy?schedule:  Pertuzumab 840 mg IV day 1, followed by 420 mg IV;  Trastuzumab 8 mg/kg IV day 1 followed by 6 mg/kg IV;  Docetaxel  mg/m? IV on day 1;  Cycled every 21 days.  Herceptin/Perjeta discontinued from ninth cycle of chemotherapy onwards?(persistent drop in?LVEF)  She was administered docetaxel 75 mg/m? last year?(2018), resulting in  severe neutropenia requiring hospitalization.?  Therefore, on resumption?in March 2019,?we reduced?Taxotere?dose by 25% (55 mg/m?)?from cycle #1  Has required Neulasta support from second cycle of chemotherapy onwards,?subsequently, with no significant cytopenias.  ?  -Check tumor markers monthly?to assess response to treatment.??Normalized as of 06/06/2019. ?  Although all 3 tumor markers are dropping,?we will monitor only CA 27.29.  ?  ?  Trastuzumab induced cardiomyopathy:  -Discontinuation should be strongly considered in patients who develop a clinically significant reduction in LVEF during therapy; treatment with heart failure medications (for example, ACE inhibitors, beta-blockers) should be initiated.  ?  -Withhold treatment for 16% or greater decrease from pretreatment levels or LVEF below normal limits and 10% or greater decrease from baseline.  ?  -Withhold treatment for at least 4 weeks and repeat LVEF every 4 weeks;  may resume trastuzumab treatment if LVEF returns to normal limits within 4-8 weeks and remains at 15% or less?decrease from baseline value;  discontinue permanently for persistent (>8 weeks) LVEF decline or for >3 incidents of treatment interruptions for cardiomyopathy  ?  -Cardiomyopathy due to trastuzumab is generally reversible over the period of 1-3 months after discontinuation  Physical Exam  Vitals & Measurements  T:?36.8? ?C (Oral)? HR:?70(Peripheral)? RR:?18? BP:?117/74? SpO2:?98%?  HT:?156.00?cm? WT:?61.100?kg? BMI:?25.11?   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  Metastatic breast cancer  Metastatic breast cancer  Historical  Pregnant  Pregnant  Pregnant  Shingles  Procedure/Surgical History  Drainage of Buttock Skin, External Approach (07/30/2019)  Incision and drainage of abscess (eg, carbuncle, suppurative  hidradenitis, cutaneous or subcutaneous abscess, cyst, furuncle, or paronychia); simple or single (07/30/2019)  Transfusion of Nonautologous Red Blood Cells into Peripheral Vein, Percutaneous Approach (03/28/2018)  Catheter Insertion Mediport (None) (02/23/2018)  Catheter, infusion, inserted peripherally, centrally or midline (other than hemodialysis) (02/23/2018)  Fluoroscopy of Superior Vena Cava using Low Osmolar Contrast, Guidance (02/23/2018)  Insertion of Infusion Device into Superior Vena Cava, Percutaneous Approach (02/23/2018)  Insertion of tunneled centrally inserted central venous access device, with subcutaneous port; age 5 years or older (02/23/2018)  Fusion of 2 or more Lumbar Vertebral Joints with Synthetic Substitute, Posterior Approach, Posterior Column, Open Approach (12/19/2017)  Fusion of Lumbosacral Joint with Synthetic Substitute, Posterior Approach, Posterior Column, Open Approach (12/19/2017)  Lumbar Foraminotomy MIS (.) (12/17/2017)  Bone biopsy sample (12/08/2017)  Breast biopsy sample (12/07/2017)  Biopsy of breast; percutaneous, needle core, not using imaging guidance (separate procedure) (12/06/2017)  Bone marrow; biopsy, needle or trocar (12/06/2017)  Excision of Left Breast, Percutaneous Approach, Diagnostic (12/06/2017)  Extraction of Iliac Bone Marrow, Percutaneous Approach, Diagnostic (12/06/2017)  Tonsillectomy   Medications  acetaminophen-hydrocodone 325 mg-10 mg oral tablet, 1 tab(s), Oral, QID, PRN  aspirin 81 mg oral Delayed Release (EC) tablet, 81 mg= 1 tab(s), Oral, Daily  aspirin 81 mg oral tablet, 81 mg= 1 tab(s), Oral, Daily, 11 refills  CYCLOBENZAPRINE 10 MG TABLET, Oral, TID  dexamethasone (for IVPB)  dexamethasone 4 mg oral tablet, 8 mg= 2 tab(s), Oral, BID, 12 refills  diphenhdramine (for Inj.), 25 mg= 0.5 mL, IV Push, Once-chemo  DULoxetine 30 mg oral delayed release capsule, 30 mg= 1 cap(s), Oral, Daily  DULoxetine 60 mg oral delayed release capsule, 60 mg= 1 cap(s),  Oral, Daily, 3 refills  GABAPENTIN 300 MG CAPSULE, 300 mg= 1 cap(s), Oral, TID  Heparin Flush 100 U/mL - 5 mL, 500 units= 5 mL, IV Push, Once-chemo  Heparin Flush 100 U/mL - 5 mL, 500 units= 5 mL, IV Push, Once-chemo  Lexapro 20 mg oral tablet, 20 mg= 1 tab(s), Oral, Daily, 11 refills  losartan 25 mg oral tablet, 25 mg= 1 tab(s), Oral, Daily, 6 refills  losartan 50 mg oral tablet, 50 mg= 1 tab(s), Oral, Daily, 6 refills  megestrol 40 mg oral tablet, See Instructions, 8 refills  meloxicam 7.5 mg oral tablet, 7.5 mg= 1 tab(s), Oral, Daily, 11 refills  metoprolol succinate 25 mg oral tablet extended release, 12.5 mg= 0.5 tab(s), Oral, Daily, 6 refills  Pazeo 0.7% ophthalmic solution, 1 drop(s), OPTH, Daily  Phenergan 25 mg Tab, 25 mg= 1 tab(s), Oral, q6hr  potassium chloride 20 mEq oral TABLET extended release, 20 mEq= 1 tab(s), Oral, BID,? ?Not Taking per Prescriber  ProAir HFA 90 mcg/inh inhalation aerosol with adapter, 2 puff(s), INH, q4hr, PRN, 3 refills  traZODone 100 mg oral tablet, See Instructions, 11 refills  Tylenol 325 mg oral tablet, 650 mg= 2 tab(s), Oral, Once-chemo  Zofran (for IVPB) 16 mg + dexamethasone 10 mg/mL injectable solution 10 mg  Zofran ODT 8 mg oral tablet, disintegrating, 8 mg= 1 tab(s), Oral, q8hr, PRN  Allergies  No Known Allergies  Social History  Abuse/Neglect  No, No, Yes, 10/22/2020  Alcohol - Denies Alcohol Use, 08/24/2014  Never, 10/22/2020  Employment/School  homemaker, Work/School description: homemaker. Operates hazardous equipment: No., 04/20/2017  Exercise  Self assessment: Good condition., 04/20/2017  Home/Environment  Lives with Children. Living situation: Home/Independent. Alcohol abuse in household: No. Substance abuse in household: No. Smoker in household: No. Injuries/Abuse/Neglect in household: No. Feels unsafe at home: No. Family/Friends available for support: Yes. Concern for family members at home: No. Major illness in household: No. Financial concerns: No. TV/Computer  concerns: No., 04/20/2017  Nutrition/Health  Regular, Wants to lose weight: No., 04/20/2017  Sexual  Sexually active: No., 11/05/2020  Gender Identity Identifies as female., 02/28/2019  Substance Use  Never, 10/22/2020  Tobacco - Denies Tobacco Use, 03/04/2013  Never (less than 100 in lifetime), No, 10/22/2020  Family History  Cancer: Mother.  Heart failure.: Mother.  Primary malignant neoplasm of breast: Sister.  Primary malignant neoplasm of lung: Father.  Immunizations  Vaccine Date Status   pneumococcal 23-polyvalent vaccine 11/14/2017 Recorded   influenza virus vaccine, inactivated 11/15/2016 Recorded

## 2022-05-04 NOTE — HISTORICAL [facility]
This is a historical note converted from [employer]. Formatting and pictures may have been removed.  Please reference [employer] for original formatting and attached multimedia. Reason for Consultation  breast biopsy  History of Present Illness  53 yo F with PMHx hypertension and anemia admitted for hypercalcemia and multiple myeloma workup. Admission H+P revealed L breast skin changes, nipple retraction, and a palpable mass. [place] consulted for breast biopsy. Patient states mass and skin changes have been present for ~3 weeks. Last mammogram at age 20. Patient has had weakness and leg pain for about a year. Denies CP, SOB, abdominal pain, N/V, headaches. RLE Xray showed lytic bone lesions raising concern for possible metastatic disease to bone in the right ischium and proximal femur  ?  PMHx: HTN, hypokalemia  PSHx: Tonsillectomy  Family Hx: Father: throat cancer, Mother: ovarian cancer, Brother: stomach cancer  Social Hx: Patient denies any tobacco, alcohol, or drug use at present or in the past. Patient lives with 2 of her siblings and has 3 children and 2 grandchildren.  ?  Review of Systems  Negative except as described above  Physical Exam  Vitals & Measurements  T:?36.9? ?C ?(Oral)? TMIN:?36.8? ?C ?(Oral)? TMAX:?37.1? ?C ?(Oral)? HR:?107?(Peripheral)? RR:?18? BP:?95/65? SpO2:?98%? WT:?61.6?kg?  NAD, AOx3  NCAT, MMM  Left breast with skin changes consistent with peau dorange, nipple retraction, and palpable ~5-6 cm irregular breast mass. R breast no palpable masses, skin changes. L axillary lymphadenopathy.  Abd soft, NTND  No edema, scarring, bleeding, bruising  ?  Core punch biopsy of L breast performed at [facility]. R/B/A of biopsy discussed with patient and patient was consented. Skin was prepped in a sterile fashion and lidocaine was injected to achieve local anesthesia. The punch biopsy was them obtained and placed in a formalin solution.  Assessment/Plan  53 yo F with hypercalcemia, RLE lytic bone lesions, and L  breast changes concerning for malignancy  ?  -Core punch biopsy performed at bedside, sample sent to pathology  ?  -Patient will also need B/L mammography, breast U/S, and axillary U/S  ?  -Will see patient in clinic after pathologic diagnosis obtained   Problem List/Past Medical History  Ongoing  Back pain  CRI (chronic renal insufficiency)  HTN (hypertension)  Leg pain  Obesity  Historical  Shingles  Procedure/Surgical History  Tonsillectomy  Medications  Inpatient  aspirin 81 mg oral tablet, 81 mg= 1 tab(s), Oral, Daily  cyclobenzaprine 10 mg oral tablet, 10 mg= 1 tab(s), Oral, TID, PRN  gabapentin 300 mg oral capsule, 300 mg= 1 cap(s), Oral, TID  morphine 2 mg/mL preservative-free injectable solution, 1 mg= 0.5 mL, IV, q3hr, PRN  [id number] 1,000 mL, 1000 mL, IV  traMADol 50 mg oral tablet, 50 mg= 1 tab(s), Oral, q12hr, PRN  traZODONE 50 mg oral tablet ( Desyrel ), 100 mg= 2 tab(s), Oral, Once a day (at bedtime)  triamcinolone 0.1% topical ointment, 1 donna, TOP, BID  Home  aspirin 81 mg oral Delayed Release (EC) tablet, 81 mg= 1 tab(s), Oral, Daily, 11 refills  cyclobenzaprine 10 mg oral tablet, 10 mg= 1 tab(s), Oral, TID, 1 refills  Fergon 240 mg (27 mg elemental iron) oral tablet, 240 mg= 1 tab(s), Oral, TID, 11 refills  GABAPENTIN 300 MG CAPSULE, 300 mg= 1 cap(s), Oral, TID  Lexapro 20 mg oral tablet, 20 mg= 1 tab(s), Oral, Daily, 11 refills  losartan 50 mg oral tablet, 50 mg= 1 tab(s), Oral, Daily, 11 refills  meloxicam 15 mg oral tablet, 15 mg= 1 tab(s), Oral, Daily, 11 refills  Nizoral 2% topical shampoo, 1 donna, TOP, 2x/Wk  potassium chloride 8 mEq oral TABLET extended release, 8 mEq= 1 tab(s), Oral, TID  Promethazine 25 mg ORAL Tab, 25 mg= 1 tab(s), Oral, BID, 1 refills  traMADol 50 mg oral tablet, 50 mg= 1 tab(s), Oral, q12hr, PRN  traZODone 100 mg oral tablet, 100 mg= 1 tab(s), Oral, Once a day (at bedtime), 11 refills  triamcinolone 0.5% topical cream, 1 donna, TOP, BID, 11 refills  Allergies  No Known  Allergies  Social History  Alcohol - Denies Alcohol Use, 04/20/2017  Never  Employment/School - 04/20/2017  homemaker, Work/School description: homemaker. Operates hazardous equipment: No.  Exercise - 04/20/2017  Self assessment: Good condition.  Home/Environment - 04/20/2017  Lives with Children. Living situation: Home/Independent. Alcohol abuse in household: No. Substance abuse in household: No. Smoker in household: No. Injuries/Abuse/Neglect in household: No. Feels unsafe at home: No. Family/Friends available for support: Yes. Concern for family members at home: No. Major illness in household: No. Financial concerns: No. TV/Computer concerns: No.  Nutrition/Health - 04/20/2017  Regular, Wants to lose weight: No.  Substance Abuse - 04/20/2017  Never  Tobacco - Denies Tobacco Use, 04/20/2017  Never smoker  Family History  Cancer: Mother.

## 2022-05-04 NOTE — HISTORICAL [facility] CERNER
This is a historical note converted from [employer]. Formatting and pictures may have been removed.  Please reference [employer] for original formatting and attached multimedia. Chief Complaint  Breast cancer  History of Present Illness  Problem list:  1. Stage IV Inflammatory breast cancer. Biopsy proven (right anterior iliac bone on 12/08/2017 showed metastatic carcinoma, consistent with a breast primary) metastatic left breast cancer, with extensive bone metastases, lung metastasis, possible liver and adrenal metastasis, extensive dulce metastasis, and small intracranial (posterior fossa) metastasis.?  ????  ER 85% positive, CT 45% positive, Ki-67 30% (high proliferation), HER-2 3+ (overexpressed)  Pathologic L4 compression fracture, to a lesser extent at L3 also, epidural extension of tumor at the L4 level resulting in severe canal stenosis, and left lower extremity weakness.  ?   -Germline?BRCA1/2?testing negative as of 08/17/2020.  ???  Current Treatment:?  1. Palliative chemotherapy with Taxotere every 21 days.  ?   C31 completed: 12/28/2020  ?   Dose modifications & interruptions:  Docetaxel reduced by 25%?from cycle #1.  Added Neulasta support from second cycle of chemotherapy onwards  Herceptin/Perjeta dropped starting 9/12/19 for LVEF 45%.  ?   Treatment History:  1. 12/18/17: Dr. Harish Pearson performed L3-L4 bilateral laminectomies and decompression of the posterior elements; L4-L5 bilateral laminectomies and foraminotomies with decompression of the thecal sac; lumbar open reduction and internal fixation; biopsy of L3 and biopsy of L4 vertebral bodies.??  2. Palliative radiation therapy to the lumbar spine post decompression surgery, as well as radiation therapy to the brain metastasis with Dr. Saeed. She completed on 2/2/2018.  3. Herceptin/Perjeta/Taxotere 03/21/2018-09/12/2019, herceptin and perjeta dropped d/t decreased LVEF of 45%.  ?   History of present illness:?  52-year-old Afro-American female who  was seen in internal medicine clinic on 12/06/2017 for left lower extremity pain and was found to have multiple abnormalities on blood tests. ?Was hospitalized for evaluation, and ultimately found to have metastatic breast cancer.  ?   For?a full, detailed note, see the note dated 7/22/2020.  ?  Interval History:  Patient presents today for a scheduled follow-up, alone, prior to C32 of Taxotere. She is currently tolerating therapy well with minimal side effects noted and reported. 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 She?is due to start brain radiation this week per her report.??Denies HA, fever, night sweats, SOB, CP, edema, neuropathy. Denies N/V/C/D, abdominal pain, change in bowel/bladder habits, blood in the urine/stool. Appetite good, weight stable, denies unintentional weight loss/gain. Labs today?reviewed with patient. ANC: 1210. Reviewed patient medications. Patient denies needing any medication refills at this time. Denies questions/needs/concerns.  ?  Review of Systems  A complete 12 point ROS was performed in full with pertinent positives described in interval history. Remainder of ROS done in full and are negative.?  Physical Exam  Vitals & Measurements  T:?36.8? ?C (Oral)? HR:?63(Peripheral)? RR:?18? BP:?110/54?  BMI:?24.24?  Vital Signs Reviewed  General: AAO, NAD noted  Eye: PERRLA, EOMI  Neuro: Normal mentation, strength 5/5 on all 4 extremities, no sensory deficits  HET: Normocephalic, adequate hearing,?no oral ulcers, mucous membranes pink/moist/intact, no pharyngeal erythema, poor dentition  Neck: Supple, non-tender, no lymphadenopathy  Respiratory: Non-labored breathing, symmetrical chest wall expansion, BBS CTA, no crackles/wheezing/rhonchi noted  Cardiovascular: S1S2 noted, RRR, no M/R/G, pulses equal in all extremities, normal peripheral perfusion  Abdomen: Soft, non-tender, non-distended, BS+, no hepatosplenomegaly  Musculoskeletal: presents in  a wheelchair  Integumentary: No rashes, no bruises or purpura, warm and dry, normal for ethnicity  Neurologic: No focal deficits, Cranial nerves II-XII are grossly intact.  Cognition and Speech: Speech clear and coherent, functional cognition intact  Psychiatric: Cooperative, appropriate mood and affect for situation, normal judgement, no suicidal or homicidal ideations  ?  ?   The National Cancer [place] Toxicity Scores:  ?   ECOG Performance Scale: 2 - Ambulatory and capable of all selfcare but unable to carry out any work activities; up and about more than 50% of waking hours.  ?  Assessment/Plan  1.?Metastatic breast cancer?C50.919  #Metastatic breast cancer:  Inflammatory carcinoma of left breast. ?ER positive. ?WA positive. ?HER-2 positive.? Diagnosed 12/2017  Metastases to brain,?lumbar spine,?extensively to bones,?lymph nodes, lungs, liver, and adrenal.?  For lumbar spine metastasis,?status post laminectomy and internal fixation 12/2017  Status post radiation therapy to lumbar spine and brain?02/2018  Noncompliant with follow-up.  Status post Taxotere/Herceptin/Perjeta?x1 on?03/21/2018; she declined further chemotherapy?  Presented?1 year later,?on?02/2019:?Extensive bone metastases stable;?progression of bilateral lung metastasis, bilateral axillary?lymphadenopathy, progressive liver metastasis, progressive left adrenal metastasis  Dose reduced Taxotere/Herceptin/Perjeta started?03/07/2019?(1 year after first cycle of chemotherapy)  Good response post 4 cycles  Excellent response?post 8 cycles  ? Starting?with ninth cycle of chemotherapy,?discontinued Herceptin/Perjeta?due to persistent?drop in LVEF,?without?recovery?post?discontinuation  Continued improvement/stability?post 13 cycles of Taxotere;?dramatic drop in tumor marker level  No brain metastasis?on surveillance brain MRI (02/10/2020)  -Stable metastatic disease?on restaging CTs and bone scan?post 18 cycles of Taxotere?(04/16/2020)  -No brain  metastasis on surveillance brain MRI (05/27/2020)  -No metastatic disease progression?post?Taxotere x23 cycles?(restaging?CTs and bone scan: 07/17/2020)  -Genetic testing negative (07/23/2020)  -Questionable punctate?enhancing foci right cerebellar and right occipital (surveillance brain MRI: 09/09/2020)?(completely asymptomatic)  -No disease progression post Taxotere x28?(CTs, bone scan:?11/10/2020)?(bone scan: SuperScan?representing extensive skeletal metastatic involvement, unchanged)  --------  ?   # ? Sites of disease:  Left breast (inflammatory carcinoma);?brain metastasis; lumbar spine metastasis; extensive bone metastasis;?bilateral axillary?lymph nodes; lungs; liver; left?adrenal  --------  ?   # ? Molecular markers:  ER positive. ?[place] positive. ?HER-2 positive.  BRCA1/2?analysis negative  MMR proficient  NTRK gene fusion negative  ? ????????  ? Plan:  -Excellent response to Taxotere  -No disease progression post Taxotere x28  -Continue Taxotere every 3 weeks (25% dose reduced, with Neulasta support)  -Check CBC and CMP every 10 days  -Check CA 15-3 and CA 27.29 level every month  -Restage with contrast-enhanced CT C/A/P and bone scan in 3 months (02/2021)  ?   -HOLD C#32 Taxotere on 01/18/2021, patient is getting ready to start brain radiation per radiation oncology and ANC: 1210.  -Restaging CT C/A/P + BS scheduled:?02/12/2021  -C/W Ca 27.29, Ca 15-3 monitoring monthly, currently stable  ?  Ordered:  ?  2.?Brain metastasis?C79.31  ? ?Metastases noted to Brain at diagnosis of 12/2017  ? Status post radiation therapy to lumbar spine and brain?02/2018  No brain metastasis?on surveillance brain MRI (02/10/2020)  -No brain metastasis on surveillance brain MRI (05/27/2020)  -MRI Brain 09/09/2020: Questionable punctuate enhancing foci right cerebellar and right occipital attention on follow-up.  -Referred back to radiation oncology in light of new questionable brain lesions, however radiation could never reach  patient to schedule FU and she has not FU with them as instructed  -12/09/2020: MRI Brain: new/progressive millimetric intra-axial metastases.  -12/11/2020: Patient was notified of progression of brain lesions and was once again referred back to radiation oncology, she stated that she had a FU appointment with them scheduled 01/04/2021.  ?   -F/U with Radiation Oncology as instructed  ?  Ordered:  ?  3.?Bone metastasis?C79.51  ? ?? -To prevent skeletal events from bone metastasis, need to start?either Xgeva or bisphosphonate therapy.  Need preventive dentistry and dental clearance?to prevent/minimize the likelihood of?osteonecrosis of the jaw before starting these medications.? This is pending.  Concurrent calcium and vitamin D supplementation is recommended (1200 mg of calcium and 800-1000 units of vitamin D3 daily).  Optimal schedule for zoledronic acid is monthly x12, then quarterly  -->  She has been unable to undergo preventive dentistry; therefore, we will hold off on antiresorptive therapy?for now.  ???? ?  -Denosumab initiation pending dental clearance  ?  ?  RTC 2 weeks with MD (F2F) with labs: CBC, CMP, Mg, Ca 27.29, Ca 15-3  ?  Discussed POC with patient, all questions answered. Instructed to call clinic for any issues or with any questions PRN, patient verbalizes understanding.  ?   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  Metastatic breast cancer  Metastatic breast cancer  Historical  Pregnant  Pregnant  Pregnant  Shingles  Procedure/Surgical History  Drainage of Buttock Skin, External Approach (07/30/2019)  Incision and drainage of abscess (eg, carbuncle, suppurative hidradenitis, cutaneous or subcutaneous abscess, cyst, furuncle, or paronychia); simple or single (07/30/2019)  Transfusion of Nonautologous Red  Blood Cells into Peripheral Vein, Percutaneous Approach (03/28/2018)  Catheter Insertion Mediport (None) (02/23/2018)  Catheter, infusion, inserted peripherally, centrally or midline (other than hemodialysis) (02/23/2018)  Fluoroscopy of Superior Vena Cava using Low Osmolar Contrast, Guidance (02/23/2018)  Insertion of Infusion Device into Superior Vena Cava, Percutaneous Approach (02/23/2018)  Insertion of tunneled centrally inserted central venous access device, with subcutaneous port; age 5 years or older (02/23/2018)  Fusion of 2 or more Lumbar Vertebral Joints with Synthetic Substitute, Posterior Approach, Posterior Column, Open Approach (12/19/2017)  Fusion of Lumbosacral Joint with Synthetic Substitute, Posterior Approach, Posterior Column, Open Approach (12/19/2017)  Lumbar Foraminotomy MIS (.) (12/17/2017)  Bone biopsy sample (12/08/2017)  Breast biopsy sample (12/07/2017)  Biopsy of breast; percutaneous, needle core, not using imaging guidance (separate procedure) (12/06/2017)  Bone marrow; biopsy, needle or trocar (12/06/2017)  Excision of Left Breast, Percutaneous Approach, Diagnostic (12/06/2017)  Extraction of Iliac Bone Marrow, Percutaneous Approach, Diagnostic (12/06/2017)  Tonsillectomy   Medications  acetaminophen-hydrocodone 325 mg-10 mg oral tablet, 1 tab(s), Oral, QID, PRN  aspirin 81 mg oral Delayed Release (EC) tablet, 81 mg= 1 tab(s), Oral, Daily  aspirin 81 mg oral tablet, 81 mg= 1 tab(s), Oral, Daily, 11 refills  CYCLOBENZAPRINE 10 MG TABLET, Oral, TID  dexamethasone (for IVPB)  diphenhdramine (for Inj.), 25 mg= 0.5 mL, IV Push, Once-chemo  DULoxetine 30 mg oral delayed release capsule, 30 mg= 1 cap(s), Oral, Daily  DULoxetine 60 mg oral delayed release capsule, 60 mg= 1 cap(s), Oral, Daily, 3 refills  GABAPENTIN 300 MG CAPSULE, 300 mg= 1 cap(s), Oral, TID  Heparin Flush 100 U/mL - 5 mL, 500 units= 5 mL, IV Push, Once-chemo  Heparin Flush 100 U/mL - 5 mL, 500 units= 5 mL, IV Push,  Once-chemo  Lexapro 20 mg oral tablet, 20 mg= 1 tab(s), Oral, Daily, 11 refills  losartan 25 mg oral tablet, 25 mg= 1 tab(s), Oral, Daily, 6 refills  losartan 50 mg oral tablet, 50 mg= 1 tab(s), Oral, Daily, 6 refills  megestrol 40 mg oral tablet, See Instructions, 8 refills  meloxicam 7.5 mg oral tablet, 7.5 mg= 1 tab(s), Oral, Daily, 11 refills  metoprolol succinate 25 mg oral tablet extended release, 12.5 mg= 0.5 tab(s), Oral, Daily, 6 refills  Pazeo 0.7% ophthalmic solution, 1 drop(s), OPTH, Daily  Phenergan 25 mg Tab, 25 mg= 1 tab(s), Oral, q6hr  potassium chloride 20 mEq oral TABLET extended release, 20 mEq= 1 tab(s), Oral, BID,? ?Not Taking per Prescriber  ProAir HFA 90 mcg/inh inhalation aerosol with adapter, 2 puff(s), INH, q4hr, PRN, 3 refills  traZODone 100 mg oral tablet, See Instructions, 11 refills  Tylenol 325 mg oral tablet, 650 mg= 2 tab(s), Oral, Once-chemo  Zofran (for IVPB) 16 mg + dexamethasone 10 mg/mL injectable solution 10 mg  Zofran ODT 8 mg oral tablet, disintegrating, 8 mg= 1 tab(s), Oral, q8hr, PRN  Allergies  No Known Allergies  Social History  Abuse/Neglect  No, No, Yes, 12/28/2020  Alcohol - Denies Alcohol Use, 08/24/2014  Never, 12/28/2020  Employment/School  homemaker, Work/School description: homemaker. Operates hazardous equipment: No., 04/20/2017  Exercise  Self assessment: Good condition., 04/20/2017  Home/Environment  Lives with Children. Living situation: Home/Independent. Alcohol abuse in household: No. Substance abuse in household: No. Smoker in household: No. Injuries/Abuse/Neglect in household: No. Feels unsafe at home: No. Family/Friends available for support: Yes. Concern for family members at home: No. Major illness in household: No. Financial concerns: No. TV/Computer concerns: No., 04/20/2017  Nutrition/Health  Regular, Wants to lose weight: No., 04/20/2017  Sexual  Sexually active: No., 11/05/2020  Gender Identity Identifies as female., 02/28/2019  Substance  Use  Never, 12/28/2020  Tobacco - Denies Tobacco Use, 03/04/2013  Never (less than 100 in lifetime), No, 12/28/2020  Family History  Cancer: Mother.  Heart failure.: Mother.  Primary malignant neoplasm of breast: Sister.  Primary malignant neoplasm of lung: Father.  Immunizations  Vaccine Date Status   pneumococcal 23-polyvalent vaccine 11/14/2017 Recorded   influenza virus vaccine, inactivated 11/15/2016 Recorded   Health Maintenance  Health Maintenance  ???Pending?(in the next year)  ??? ??OverDue  ??? ? ? ?HF-Heart Failure Education due??08/17/20??and every 1??year(s)  ??? ? ? ?Influenza Vaccine due??10/01/20??and every 1??day(s)  ??? ? ? ?HF-LVEF due??11/10/20??and every 1??year(s)  ??? ??Due?  ??? ? ? ?Obesity Screening due??01/01/21??and every 1??year(s)  ??? ? ? ?Alcohol Misuse Screening due??01/02/21??and every 1??year(s)  ??? ? ? ?Colorectal Screening due??01/14/21??Unknown Frequency  ??? ? ? ?Tetanus Vaccine due??01/14/21??and every 10??year(s)  ??? ? ? ?Zoster Vaccine due??01/14/21??Unknown Frequency  ??? ??Due In Future?  ??? ? ? ?ADL Screening not due until??01/15/21??and every 1??year(s)  ??? ? ? ?Breast Cancer Screening not due until??02/19/21??and every 2??year(s)  ??? ? ? ?Blood Pressure Screening not due until??12/28/21??and every 1??year(s)  ??? ? ? ?Body Mass Index Check not due until??12/28/21??and every 1??year(s)  ??? ? ? ?Depression Screening not due until??12/28/21??and every 1??year(s)  ??? ? ? ?Diabetes Screening not due until??12/28/21??and every 1??year(s)  ??? ? ? ?Hypertension Management-Blood Pressure not due until??12/28/21??and every 1??year(s)  ??? ? ? ?Hypertension Management-BMP not due until??12/28/21??and every 1??year(s)  ???Satisfied?(in the past 1 year)  ??? ??Satisfied?  ??? ? ? ?Alcohol Misuse Screening on??11/05/20.??Satisfied by Claudia Shaffer  ??? ? ? ?Blood Pressure Screening on??12/28/20.??Satisfied by Rosa Maria Kruger  ??? ? ? ?Body Mass Index Check  on??12/28/20.??Satisfied by Maxine CASTILLO, Margoth Ricci  ??? ? ? ?Cervical Cancer Screening on??11/05/20.??Satisfied by Nghia Lovett  ??? ? ? ?Depression Screening on??12/28/20.??Satisfied by Rosa Maria Kruger  ??? ? ? ?Diabetes Screening on??12/28/20.??Satisfied by Jessica Lynch  ??? ? ? ?Hypertension Management-Blood Pressure on??12/28/20.??Satisfied by Rosa Maria Kruger  ??? ? ? ?Influenza Vaccine on??12/04/20.??Satisfied by Rhiannon Pearson  ??? ? ? ?Obesity Screening on??12/28/20.??Satisfied by Maxine CASTILLO, Margoth Ricci  ?  Lab Results  Test Name Test Result Date/Time   Sodium Lvl 137 mmol/L 01/18/2021 07:30 CST   Potassium Lvl 4.1 mmol/L 01/18/2021 07:30 CST   Chloride 106 mmol/L 01/18/2021 07:30 CST   CO2 23 mmol/L 01/18/2021 07:30 CST   Calcium Lvl 8.8 mg/dL 01/18/2021 07:30 CST   Magnesium Lvl 2.22 mg/dL 01/18/2021 07:30 CST   Glucose Lvl 83 mg/dL 01/18/2021 07:30 CST   BUN 31.0 mg/dL (High) 01/18/2021 07:30 CST   Creatinine 0.89 mg/dL 01/18/2021 07:30 CST   eGFR-AA 85 (Low) 01/18/2021 07:30 CST   eGFR-ZEESHAN 71 mL/min/1.73 m2 (Low) 01/18/2021 07:30 CST   Bili Total 0.4 mg/dL 01/18/2021 07:30 CST   Bili Direct 0.1 mg/dL 01/18/2021 07:30 CST   Bili Indirect 0.30 mg/dL 01/18/2021 07:30 CST   AST 18 unit/L 01/18/2021 07:30 CST   ALT 18 unit/L 01/18/2021 07:30 CST   Alk Phos 49 unit/L 01/18/2021 07:30 CST   Total Protein 6.2 gm/dL (Low) 01/18/2021 07:30 CST   Albumin Lvl 3.6 gm/dL 01/18/2021 07:30 CST   Globulin 2.6 gm/dL 01/18/2021 07:30 CST   A/G Ratio 1.4 ratio 01/18/2021 07:30 CST   WBC 2.5 x10(3)/mcL (Low) 01/18/2021 07:30 CST   RBC 3.16 x10(6)/mcL (Low) 01/18/2021 07:30 CST   Hgb 9.4 gm/dL (Low) 01/18/2021 07:30 CST   Hct 29.7 % (Low) 01/18/2021 07:30 CST   Platelet 308 x10(3)/mcL 01/18/2021 07:30 CST   MCV 94.0 fL 01/18/2021 07:30 CST   MCH 29.7 pg 01/18/2021 07:30 CST   MCHC 31.6 gm/dL 01/18/2021 07:30 CST   RDW 16.6 % (High) 01/18/2021 07:30 CST   MPV 10.5 fL (High) 01/18/2021 07:30 CST   Abs Neut  1.21 x10(3)/mcL (Low) 01/18/2021 07:30 CST   Neutro Auto 48 % 01/18/2021 07:30 CST   Lymph Auto 35 % 01/18/2021 07:30 CST   Mono Auto 16 % 01/18/2021 07:30 CST   Basophil Auto 0 % 01/18/2021 07:30 CST   Abs Neutro 1.21 x10(3)/mcL (Low) 01/18/2021 07:30 CST   Abs Lymph 0.9 x10(3)/mcL 01/18/2021 07:30 CST   Abs Mono 0.4 x10(3)/mcL 01/18/2021 07:30 CST   Abs Baso 0.0 x10(3)/mcL 01/18/2021 07:30 CST   IG% 0 % 01/18/2021 07:30 CST   IG# 0.010 01/18/2021 07:30 CST

## 2022-05-04 NOTE — HISTORICAL [facility]ERNER
This is a historical note converted from [place]. Formatting and pictures may have been removed.  Please reference [employer] for original formatting and attached multimedia. History of Present Illness  ?  Problem list  ??1. Stage IV Inflammatory breast cancer. Biopsy proven (right anterior iliac bone on 12/08/2017 showed metastatic carcinoma, consistent with a breast primary) metastatic left breast cancer, with extensive bone metastases, lung metastasis, possible liver and adrenal metastasis, extensive dulce metastasis, and small intracranial (posterior fossa) metastasis?????  ER 85% positive, IN 45% positive, Ki-67 30% (high proliferation), HER-2 3+ (overexpressed)  ??Pathologic L4 compression fracture, to a lesser extent at L3 also, epidural extension of tumor at the L4 level resulting in severe canal stenosis, and left lower extremity weakness.???  ???  Treatment History: Dr. Harish Pearson performed L3-L4 bilateral laminectomies and decompression of the posterior elements; L4-L5 bilateral laminectomies and foraminotomies with decompression of the thecal sac; lumbar open reduction and internal fixation; biopsy of L3 and biopsy of L4 vertebral bodies on 12/18/2017. ?Palliative radiation therapy to the lumbar spine post decompression surgery, as well as radiation therapy to the brain metastasis with Dr. Saeed. She completed on 2/2/2018.  ?   Reason for visit:  Follow-up for?metastatic?breast cancer  ?   Past medical history: Hypertension. ?Anemia. ?Chronic kidney disease. ?Tonsillectomy.  Social history: Denies history of tobacco, alcohol, or illicit drug abuse. ?Lives with 2 of her siblings. ?Has 3 children and 2 grandchildren.  Family history: Father with history of throat cancer in his 50s. Mother with history of ovarian cancer in her 50s. Brother with history of stomach cancer in her 50s  Menstrual history: As of today, 12/12/2017, premenopausal  ?   ?  History of present illness:?  Patient is being followed for  metastatic left breast cancer, ER positive, UT positive, HER-2 positive.  ?  For detailed history of present illness, please see my last note dated 02/19/2020.? Please also refer to assessment and plan section.  ?  ?  Interval History?  ?  04/07/2020:  -17th cycle of chemotherapy on 03/18/2020 (dose reduced Taxotere, with Neulasta support; Herceptin/Perjeta has since been stopped)  -03/17/2020: CA 27.29 level 14.3, normal.? Hemoglobin 10.2.  Presents for follow-up visit. ?Doing very well. ?No side effects with chemotherapy.? No nausea, vomiting, weakness, fatigue, unusual headaches, focal?neurological symptoms, chest pain, cough, dyspnea, significant cytopenias, abdominal pain, nausea, vomiting, bone pains, etc.? Has done exceedingly well with palliative chemotherapy.  ?   ???????  Review of Systems:  All systems reviewed, and found to be negative?except for the symptoms detailed above.  ?  ?   Physical Examination:  VITAL SIGNS: ?Reviewed. ? ?  GENERAL:? In no apparent distress.?  HEAD:? No signs of head trauma.  EYES:? Pupils are equal.? Extraocular motions intact.?  EARS:? Hearing grossly intact.  MOUTH:? Oropharynx is normal.  NECK:? No adenopathy, no JVD.??  CHEST:? Chest with clear breath sounds bilaterally.? No wheezes, rales, or rhonchi.?  CARDIAC:? Regular rate and rhythm.? S1 and S2, without murmurs, gallops, or rubs.  VASCULAR:? No Edema.? Peripheral pulses normal and equal in all extremities.  ABDOMEN:? Soft, without detectable tenderness.? No sign of distention.? No?? rebound or guarding, and no masses palpated.?? Bowel Sounds normal.  MUSCULOSKELETAL:? Good range of motion of all major joints. Extremities without clubbing, cyanosis or edema.?  NEUROLOGIC EXAM:? Alert and oriented x 3.? No focal sensory or strength deficits.?? Speech normal.? Follows commands.  PSYCHIATRIC:? Mood normal.  SKIN:? No rash or lesions.  01/29/2019:?Breast examination performed with her verbal consent, in the presence of  Prenella, over LPN;?deformed left breast is noted,?with diffuse, vague, nontender mass?palpable in the mid breast?as well as lower outer quadrant;?skin retraction is noted;?nipple retraction is noted;?a couple of small erythematous nodules?in the upper part of right breast, a short distance from nipple;?no palpable regional lymphadenopathy; no warmth; no skin ulceration.  06/06/2019:?Chaperoned breast examination was performed?in the presence of Prenella, over LPN.? No mass palpable in left breast.? No lymphadenopathy in axilla on either side.  ?   ?  Assessment:  To summarize:  Inflammatory carcinoma of left breast. ?ER positive. ?MO positive. ?HER-2 positive.? Diagnosed 12/2017  Metastases to brain,?lumbar spine,?extensively to bones,?lymph nodes, lungs, liver, and adrenal.?  For lumbar spine metastasis,?status post laminectomy and internal fixation 12/2017  Status post radiation therapy to lumbar spine and brain?02/2018  Noncompliant with follow-up.  Status post Taxotere/Herceptin/Perjeta?x1 on?03/21/2018; she declined further chemotherapy?  Presented?1 year later,?on?02/2019:?Extensive bone metastases stable;?progression of bilateral lung metastasis, bilateral axillary?lymphadenopathy, progressive liver metastasis, progressive left adrenal metastasis  Dose reduced Taxotere/Herceptin/Perjeta started?03/07/2019  Good response post 4 cycles  Excellent response?post 8 cycles  Starting ninth cycle of chemotherapy,?discontinued Herceptin/Perjeta?due to persistent?drop in LVEF,?without?recovery?post?discontinuation  Continued improvement/stability?post 13 cycles of Taxotere;?dramatic drop in tumor marker level  No brain metastasis?on surveillance brain MRI (02/10/2020)  -------  ?   ????????  Plan:  -Has been referred to cardiology for evaluation of Herceptin/Perjeta induced drop in LVEF  -Continue Taxotere every 3 weeks (25% dose reduced, with Neulasta support)  -Dental evaluation and preventive dentistry pending  before starting bone antiresorptive therapy for bone metastasis  -Check CBC and CMP every 10 days  -Check CA 27.29 level every month  -Restaging CT C/A/P and whole-body nuclear medicine bone scan now  -Surveillance brain MRI with and without gadolinium in May  ?  Follow-up visit in 2 weeks, with restaging scans.  ?  Above discussed at length with her.? All questions answered.? Went over labs and gave her copies for her record.? She understands and agrees this plan.  ------  ?  ?  Her LVEF remains suppressed?despite?the fact that Herceptin/Perjeta was held?due to drop in LVEF  No?normalization of LVEF?over subsequent?2 months  We discontinued Herceptin/Perjeta for good.  ?  Has been referred to cardiology for evaluation.  ?  Continue?25% dose reduced Taxotere, with Neulasta support every 3 weeks  Excellent tolerance so far, without any significant cytopenias.  ?  -To prevent skeletal events from bone metastasis, need to start?either Xgeva or bisphosphonate therapy.  Need preventive dentistry and dental clearance?to prevent/minimize the likelihood of?osteonecrosis of the jaw before starting these medications.? This is pending.  Concurrent calcium and vitamin D supplementation is recommended (1200 mg of calcium and 800-1000 units of vitamin D3 daily).  Optimal schedule for zoledronic acid is monthly x12, then quarterly  -->  She has been unable to undergo preventive dentistry; therefore, we will hold off on antiresorptive therapy?for now.  ?  -In view of the fact that she has?extensive visceral metastatic disease,?we started treatment with chemotherapy rather than endocrine therapy.  ?  -Since she experienced severe neutropenia, requiring hospitalization, after first cycle of chemotherapy last year?(2018), therefore, we empirically reduced?the dose of docetaxel by 25%?from cycle #1.  She has required Neulasta support from second cycle of chemotherapy onwards  ?  Chemotherapy?schedule:  Pertuzumab 840 mg IV day 1,  followed by 420 mg IV;  Trastuzumab 8 mg/kg IV day 1 followed by 6 mg/kg IV;  Docetaxel  mg/m? IV on day 1;  Cycled every 21 days.  Herceptin/Perjeta discontinued from ninth cycle of chemotherapy onwards?(persistent drop in?LVEF)  She was administered docetaxel 75 mg/m? last year?(2018), resulting in severe neutropenia requiring hospitalization.?  Therefore, on resumption?in March 2019,?we reduced?Taxotere?dose by 25% (55 mg/m?)?from cycle #1  Has required Neulasta support from second cycle of chemotherapy onwards,?subsequently, with no significant cytopenias.  ?  -Check CBC and CMP every 10 days or so?to monitor for chemotherapy-induced toxicity.  ?  -Check tumor markers monthly?to assess response to treatment.??Normalized as of 06/06/2019. ?  Although all 3 tumor markers are dropping,?we will monitor only CA 27.29.  ?  -Restage with contrast-enhanced CT scans of chest, abdomen, and pelvis,?and whole-body nuclear medicine bone scan in 3 months?to assess response?(March 2020).  ?  Surveillance brain MRI with and without gadolinium?in 3 months (May).  ?  For generalized bone pains, on Norco 10 mg every 6 hours as needed for pain.?  ?  Trastuzumab induced cardiomyopathy:  -Discontinuation should be strongly considered in patients who develop a clinically significant reduction in LVEF during therapy; treatment with heart failure medications (for example, ACE inhibitors, beta-blockers) should be initiated.  ?  -Withhold treatment for 16% or greater decrease from pretreatment levels or LVEF below normal limits and 10% or greater decrease from baseline.  ?  -Withhold treatment for at least 4 weeks and repeat LVEF every 4 weeks;  may resume trastuzumab treatment if LVEF returns to normal limits within 4-8 weeks and remains at 15% or less?decrease from baseline value;  discontinue permanently for persistent (>8 weeks) LVEF decline or for >3 incidents of treatment interruptions for cardiomyopathy  ?  -Cardiomyopathy  due to trastuzumab is generally reversible over the period of 1-3 months after discontinuation  ?  For ER positive, CO positive, HER-2 positive metastatic disease, treatment options are:  1.? Chemotherapy plus HER-2 targeted therapy with pertuzumab plus trastuzumab plus taxane?(preferred); or, Ado-trastuzumab emtansine (T-DMI); or trastuzumab plus chemotherapy; or,  2.? Endocrine therapy with or without HER-2 targeted therapy (if premenopausal, consider ovarian ablation or suppression); for premenopausal woman, selective ER modulators alone (without ovarian ablation/suppression) plus HER-2 targeted therapy is also an option; or,  3.? Other HER-2 targeted therapies.  Physical Exam  Vitals & Measurements  T:?36.8? ?C (Oral)? HR:?69(Peripheral)? RR:?18? BP:?103/68?  HT:?157?cm? WT:?60.4?kg? BMI:?24.5?   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  Metastatic breast cancer  Historical  Shingles  Procedure/Surgical History  Drainage of Buttock Skin, External Approach (07/30/2019)  Incision and drainage of abscess (eg, carbuncle, suppurative hidradenitis, cutaneous or subcutaneous abscess, cyst, furuncle, or paronychia); simple or single (07/30/2019)  Transfusion of Nonautologous Red Blood Cells into Peripheral Vein, Percutaneous Approach (03/28/2018)  Catheter Insertion Mediport (None) (02/23/2018)  Catheter, infusion, inserted peripherally, centrally or midline (other than hemodialysis) (02/23/2018)  Fluoroscopy of Superior Vena Cava using Low Osmolar Contrast, Guidance (02/23/2018)  Insertion of Infusion Device into Superior Vena Cava, Percutaneous Approach (02/23/2018)  Insertion of tunneled centrally inserted central venous access device, with subcutaneous port; age 5 years or older (02/23/2018)  Fusion of 2 or more Lumbar Vertebral Joints with Synthetic Substitute,  Posterior Approach, Posterior Column, Open Approach (12/19/2017)  Fusion of Lumbosacral Joint with Synthetic Substitute, Posterior Approach, Posterior Column, Open Approach (12/19/2017)  Lumbar Foraminotomy MIS (.) (12/17/2017)  Bone biopsy sample (12/08/2017)  Breast biopsy sample (12/07/2017)  Biopsy of breast; percutaneous, needle core, not using imaging guidance (separate procedure) (12/06/2017)  Bone marrow; biopsy, needle or trocar (12/06/2017)  Excision of Left Breast, Percutaneous Approach, Diagnostic (12/06/2017)  Extraction of Iliac Bone Marrow, Percutaneous Approach, Diagnostic (12/06/2017)  Tonsillectomy   Medications  acetaminophen-hydrocodone 325 mg-10 mg oral tablet, 1 tab(s), Oral, QID, PRN  aspirin 81 mg oral Delayed Release (EC) tablet, 81 mg= 1 tab(s), Oral, Daily  aspirin 81 mg oral tablet, 81 mg= 1 tab(s), Oral, Daily, 11 refills  Colace 100 mg oral capsule, 100 mg= 1 cap(s), Oral, BID, PRN  CYCLOBENZAPRINE 10 MG TABLET, Oral, TID  dexamethasone (for IVPB)  dexamethasone 4 mg oral tablet, 8 mg= 2 tab(s), Oral, BID,? ?Not taking  dexamethasone 4 mg oral tablet, 8 mg= 2 tab(s), Oral, BID  diphenhdramine (for Inj.), 25 mg= 0.5 mL, IV Push, Once-chemo  GABAPENTIN 300 MG CAPSULE, 300 mg= 1 cap(s), Oral, TID  Heparin Flush 100 U/mL - 5 mL, 500 units= 5 mL, IV Push, Once-chemo  Heparin Flush 100 U/mL - 5 mL, 500 units= 5 mL, IV Push, Once-chemo  Heparin Flush 100 U/mL - 5 mL, 500 units= 5 mL, IV Push, Once-chemo  Heparin Flush 100 U/mL - 5 mL, 500 units= 5 mL, IV Push, Once-chemo  Lexapro 20 mg oral tablet, 20 mg= 1 tab(s), Oral, Daily, 11 refills  losartan 50 mg oral tablet, See Instructions, 11 refills  megestrol 40 mg oral tablet, See Instructions, 8 refills  meloxicam 7.5 mg oral tablet, 7.5 mg= 1 tab(s), Oral, Daily, 11 refills  Pazeo 0.7% ophthalmic solution, 1 drop(s), OPTH, Daily  Peridex 0.12% mucous membrane liquid, 0.018 gm= 15 mL, Oral, BID,? ?Not Taking, Completed Rx  Phenergan 25 mg Tab,  25 mg= 1 tab(s), Oral, q6hr  potassium chloride 20 mEq oral TABLET extended release, 20 mEq= 1 tab(s), Oral, BID,? ?Not Taking per Prescriber  ProAir HFA 90 mcg/inh inhalation aerosol with adapter, 2 puff(s), INH, q4hr, PRN, 3 refills  traZODone 100 mg oral tablet, See Instructions, 11 refills  Tylenol 325 mg oral tablet, 650 mg= 2 tab(s), Oral, Once-chemo  Zofran ODT 8 mg oral tablet, disintegrating, 8 mg= 1 tab(s), Oral, q8hr, PRN  Allergies  No Known Allergies  Social History  Abuse/Neglect  No, 04/08/2020  Alcohol - Denies Alcohol Use, 08/24/2014  Never, 04/20/2017  Employment/School  homemaker, Work/School description: homemaker. Operates hazardous equipment: No., 04/20/2017  Exercise  Self assessment: Good condition., 04/20/2017  Home/Environment  Lives with Children. Living situation: Home/Independent. Alcohol abuse in household: No. Substance abuse in household: No. Smoker in household: No. Injuries/Abuse/Neglect in household: No. Feels unsafe at home: No. Family/Friends available for support: Yes. Concern for family members at home: No. Major illness in household: No. Financial concerns: No. TV/Computer concerns: No., 04/20/2017  Nutrition/Health  Regular, Wants to lose weight: No., 04/20/2017  Sexual  Gender Identity Identifies as female., 02/28/2019  Substance Use  Never, 02/12/2018  Never, 04/20/2017  Tobacco - Denies Tobacco Use, 03/04/2013  Never (less than 100 in lifetime), No, 04/08/2020  Family History  Cancer: Mother.  Heart failure.: Mother.  Primary malignant neoplasm of lung: Father.  Immunizations  Vaccine Date Status   pneumococcal 23-polyvalent vaccine 11/14/2017 Recorded   influenza virus vaccine, inactivated 11/15/2016 Recorded

## 2022-05-04 NOTE — HISTORICAL [facility] CERNER
This is a historical note converted from [employer]. Formatting and pictures may have been removed.  Please reference [employer] for original formatting and attached multimedia. Chief Complaint  breast cancer  History of Present Illness  Problem list:  1. Stage IV Inflammatory breast cancer. Biopsy proven (right anterior iliac bone on 12/08/2017 showed metastatic carcinoma, consistent with a breast primary) metastatic left breast cancer, with extensive bone metastases, lung metastasis, possible liver and adrenal metastasis, extensive dulce metastasis, and small intracranial (posterior fossa) metastasis?  ????  ER 85% positive, SC 45% positive, Ki-67 30% (high proliferation), HER-2 3+ (overexpressed)  Pathologic L4 compression fracture, to a lesser extent at L3 also, epidural extension of tumor at the L4 level resulting in severe canal stenosis, and left lower extremity weakness.  ?   -Germline?BRCA1/2?testing negative as of 08/17/2020.  ???  Current Treatment:?  1. Palliative chemotherapy with Taxotere every 21 days.  ?   C30 due: 12/4/2020  ?   Dose modifications & interruptions:  Docetaxel reduced by 25%?from cycle #1.  Added Neulasta support from second cycle of chemotherapy onwards  Herceptin/Perjeta dropped starting 9/12/19 for LVEF 45%.  ?   Treatment History:  1. 12/18/17: Dr. Harish Pearson performed L3-L4 bilateral laminectomies and decompression of the posterior elements; L4-L5 bilateral laminectomies and foraminotomies with decompression of the thecal sac; lumbar open reduction and internal fixation; biopsy of L3 and biopsy of L4 vertebral bodies.??  2. Palliative radiation therapy to the lumbar spine post decompression surgery, as well as radiation therapy to the brain metastasis with Dr. Saeed. She completed on 2/2/2018.  3. Herceptin/Perjeta/Taxotere 03/21/2018-09/12/2019, herceptin and perjeta dropped d/t decreased LVEF of 45%.  ?   History of present illness:?  52-year-old Afro-American female who was seen  in internal medicine clinic on 12/06/2017 for left lower extremity pain and was found to have multiple abnormalities on blood tests. ?Was hospitalized for evaluation, and ultimately found to have metastatic breast cancer.  ?   For?a full, detailed note, see the note dated 7/22/2020.  ?   Interval History  12/4/2020: Patient presents for follow up on Taxotere; she is scheduled to receive cycle #30 today. VS stable. She denies nausea, vomiting, diarrhea, constipation, mouth sores, and abdominal pain. Her main complaint today is that her wheelchair is getting worn out. She presents with black tape holding the right arm in place. Of note, during some visits she exits the wheelchair and?uses the wheelchair to steady her gait while ambulating. However, some days, her legs are too weak for her to ambulate.?Today, she is seen while seated in the wheelchair and does not ambulate during her visit. Electrolytes WNL except CO2 mildly low at 21. BUN elevated at 36; creatinine & eGFR WNL at 0.86 & 89, respectively. Bili & LFTs WNL. Alk Phos WNL. Albumin & globulin WNL. WBC 3.2; H&H 9.8 & 31.6; plts 380k; ANC 1870. Will have her follow up in 3 weeks with labs prior to her next treatment. She is amenable to this plan.  Review of Systems  A complete 12-point?ROS was performed in full with pertinent positives as described in interval history. Remainder of ROS done in full and are negative.  Physical Exam  Vitals & Measurements  T:?37.0? ?C (Oral)? HR:?78(Peripheral)? RR:?20? BP:?108/70?  HT:?156.00?cm? WT:?59.300?kg? BMI:?24.37?  General: ?Alert and oriented, No acute distress.??  Appearance: Well-groomed  HEENT: ?Normocephalic, Oral mucosa is moist.?Pupils are equal, round and reactive to light, Extraocular movements are intact, Normal conjunctiva.?  Neck: ?Supple, Non-tender, No lymphadenopathy, No thyromegaly.??  Respiratory: ?Lungs are clear to auscultation, Respirations are non-labored, Breath sounds are equal, Symmetrical chest  wall expansion.??  Cardiovascular: ?Normal rate, Regular rhythm, No edema.??  Breast:??Breast exam not performed on todays visit.??  Gastrointestinal: Rounded,?Soft, Non-tender, Non-distended, Normal bowel sounds.??  Musculoskeletal: ?Normal strength.??  Integumentary: ?Warm, dry, intact.??Left chest wall mediport palpable.  Neurologic: ?Alert, Oriented, No focal deficits, Cranial Nerves II-XII are grossly intact.??  Cognition and Speech: ?Oriented, Speech clear and coherent.??Wearing face mask.  Psychiatric: ?Cooperative, Appropriate mood & affect. ?  ECOG Performance Scale: 2 - Capable of all self-care but unable to carry out any work activities. Up and about greater than 50 percent of waking hours.?  Assessment/Plan  1.?Metastatic breast cancer?C50.912  #Metastatic breast cancer:  Inflammatory carcinoma of left breast. ?ER positive. ?PA positive. ?HER-2 positive.? Diagnosed 12/2017  Metastases to brain,?lumbar spine,?extensively to bones,?lymph nodes, lungs, liver, and adrenal.?  For lumbar spine metastasis,?status post laminectomy and internal fixation 12/2017  Status post radiation therapy to lumbar spine and brain?02/2018  Noncompliant with follow-up.  Status post Taxotere/Herceptin/Perjeta?x1 on?03/21/2018; she declined further chemotherapy?  Presented?1 year later,?on?02/2019:?Extensive bone metastases stable;?progression of bilateral lung metastasis, bilateral axillary?lymphadenopathy, progressive liver metastasis, progressive left adrenal metastasis  Dose reduced Taxotere/Herceptin/Perjeta started?03/07/2019?(1 year after first cycle of chemotherapy)  Good response post 4 cycles  Excellent response?post 8 cycles  Starting?with ninth cycle of chemotherapy,?discontinued Herceptin/Perjeta?due to persistent?drop in LVEF,?without?recovery?post?discontinuation  Continued improvement/stability?post 13 cycles of Taxotere;?dramatic drop in tumor marker level  No brain metastasis?on surveillance brain MRI  (02/10/2020)  -Stable metastatic disease?on restaging CTs and bone scan?post 18 cycles of Taxotere?(04/16/2020)  -No brain metastasis on surveillance brain MRI (05/27/2020)  -No metastatic disease progression?post?Taxotere x23 cycles?(restaging?CTs and bone scan: 07/17/2020)  -Genetic testing negative (07/23/2020)  -Questionable punctate?enhancing foci right cerebellar and right occipital (surveillance brain MRI: 09/09/2020)?(completely asymptomatic)  -No disease progression post Taxotere x28?(CTs, bone scan:?11/10/2020)?(bone scan: SuperScan?representing extensive skeletal metastatic involvement, unchanged)  --------  ?   #Molecular markers:  ER positive. ?NM positive. ?HER-2 positive.  BRCA1/2?analysis negative  MMR proficient  NTRK gene fusion negative  ?  ?   #Taxol-induced peripheral neuropathy:  -Mild;?well controlled with?Cymbalta?and gabapentin  ?   ????????  Plan:  -Excellent response to Taxotere  -No disease progression post Taxotere x28  -Continue Taxotere every 3 weeks (25% dose reduced, with Neulasta support)  -Check CBC and CMP every 10 days  -Check CA 15-3 and CA 27.29 level every month  -Restage with contrast-enhanced CT C/A/P and bone scan in 3 months (02/2021)  -Surveillance brain MRI with and without contrast next month  ?   -Continue Cymbalta and gabapentin?for Taxotere-induced peripheral neuropathy  ?   Ok to proceed with cycle #30 of Taxotere today.  Follow up in 3 weeks with CBC, CMP, Ca 15-3, & Ca 27.29?prior to cycle #31.  Check CA 15-3 and CA 27.29 level every month  Restage with contrast-enhanced CT C/A/P and bone scan in 3 months (02/2021); scheduled for 2/12/2021.?  ?  ?   Discussion:  Her LVEF remains suppressed?despite?the fact that Herceptin/Perjeta was held?due to drop in LVEF  No?normalization of LVEF?over subsequent?2 months  We discontinued Herceptin/Perjeta for good.  ?  Has been referred to cardiology for evaluation.  ?  Continue?25% dose reduced Taxotere, with Neulasta support  every 3 weeks  Excellent tolerance.  ?  -To prevent skeletal events from bone metastasis, need to start?either Xgeva or bisphosphonate therapy.  Need preventive dentistry and dental clearance?to prevent/minimize the likelihood of?osteonecrosis of the jaw before starting these medications.? This is pending.  Concurrent calcium and vitamin D supplementation is recommended (1200 mg of calcium and 800-1000 units of vitamin D3 daily).  Optimal schedule for zoledronic acid is monthly x12, then quarterly  -->  She has been unable to undergo preventive dentistry; therefore, we will hold off on antiresorptive therapy?for now.  ?  -In view of the fact that she has?extensive visceral metastatic disease,?we started treatment with chemotherapy rather than endocrine therapy.  ?  -Since she experienced severe neutropenia, requiring hospitalization, after first cycle of chemotherapy last year?(2018), therefore, we empirically reduced?the dose of docetaxel by 25%?from cycle #1.  She has required Neulasta support from second cycle of chemotherapy onwards  ?  Chemotherapy?schedule:  Pertuzumab 840 mg IV day 1, followed by 420 mg IV;  Trastuzumab 8 mg/kg IV day 1 followed by 6 mg/kg IV;  Docetaxel  mg/m? IV on day 1;  Cycled every 21 days.  Herceptin/Perjeta discontinued from ninth cycle of chemotherapy onwards?(persistent drop in?LVEF)  She was administered docetaxel 75 mg/m? last year?(2018), resulting in severe neutropenia requiring hospitalization.?  Therefore, on resumption?in March 2019,?we reduced?Taxotere?dose by 25% (55 mg/m?)?from cycle #1  Has required Neulasta support from second cycle of chemotherapy onwards,?subsequently, with no significant cytopenias.  ?  -Check tumor markers monthly?to assess response to treatment.??Normalized as of 06/06/2019. ?  Although all 3 tumor markers are dropping,?we will monitor only CA 27.29.  ?  ?  Trastuzumab induced cardiomyopathy:  -Discontinuation should be strongly considered in  patients who develop a clinically significant reduction in LVEF during therapy; treatment with heart failure medications (for example, ACE inhibitors, beta-blockers) should be initiated.  ?  -Withhold treatment for 16% or greater decrease from pretreatment levels or LVEF below normal limits and 10% or greater decrease from baseline.  ?  -Withhold treatment for at least 4 weeks and repeat LVEF every 4 weeks;  may resume trastuzumab treatment if LVEF returns to normal limits within 4-8 weeks and remains at 15% or less?decrease from baseline value;  discontinue permanently for persistent (>8 weeks) LVEF decline or for >3 incidents of treatment interruptions for cardiomyopathy  ?  -Cardiomyopathy due to trastuzumab is generally reversible over the period of 1-3 months after discontinuation  Ordered:  ?  2.?Bone metastasis?C79.51  #Sites of disease:  Left breast (inflammatory carcinoma);?brain metastasis; lumbar spine metastasis; extensive bone metastasis;?bilateral axillary?lymph nodes; lungs; liver; left?adrenal  ?  -Dental evaluation and preventive dentistry pending before we can start bone antiresorptive therapy for bone metastasis  Ordered:  ?  3.?Brain metastasis?C79.31  Metastases noted to Brain at diagnosis of 12/2017  Status post radiation therapy to lumbar spine and brain?02/2018  No brain metastasis?on surveillance brain MRI (02/10/2020)  -No brain metastasis on surveillance brain MRI (05/27/2020)  -MRI Brain 09/09/2020: Questionable punctuate enhancing foci right cerebellar and right occipital attention on follow-up.  -Referred back to radiation oncology in light of new questionable brain lesions, however radiation could never reach patient to schedule FU and she has not FU with them as instructed?  ?  Surveillance brain MRI with and without contrast next month; scheduled for 12/9/2020.  Ordered:  ?  4.?Encounter for monitoring cardiotoxic drug therapy?Z51.81  -Has been referred to cardiology for  Herceptin/Perjeta induced drop in LVEF (LVEF 40% on TTE: 11/11/2019)  -Order repeat TTE now  Ordered:  ?   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  Metastatic breast cancer  Metastatic breast cancer  Historical  Pregnant  Pregnant  Pregnant  Shingles  Procedure/Surgical History  Drainage of Buttock Skin, External Approach (07/30/2019)  Incision and drainage of abscess (eg, carbuncle, suppurative hidradenitis, cutaneous or subcutaneous abscess, cyst, furuncle, or paronychia); simple or single (07/30/2019)  Transfusion of Nonautologous Red Blood Cells into Peripheral Vein, Percutaneous Approach (03/28/2018)  Catheter Insertion Mediport (None) (02/23/2018)  Catheter, infusion, inserted peripherally, centrally or midline (other than hemodialysis) (02/23/2018)  Fluoroscopy of Superior Vena Cava using Low Osmolar Contrast, Guidance (02/23/2018)  Insertion of Infusion Device into Superior Vena Cava, Percutaneous Approach (02/23/2018)  Insertion of tunneled centrally inserted central venous access device, with subcutaneous port; age 5 years or older (02/23/2018)  Fusion of 2 or more Lumbar Vertebral Joints with Synthetic Substitute, Posterior Approach, Posterior Column, Open Approach (12/19/2017)  Fusion of Lumbosacral Joint with Synthetic Substitute, Posterior Approach, Posterior Column, Open Approach (12/19/2017)  Lumbar Foraminotomy MIS (.) (12/17/2017)  Bone biopsy sample (12/08/2017)  Breast biopsy sample (12/07/2017)  Biopsy of breast; percutaneous, needle core, not using imaging guidance (separate procedure) (12/06/2017)  Bone marrow; biopsy, needle or trocar (12/06/2017)  Excision of Left Breast, Percutaneous Approach, Diagnostic (12/06/2017)  Extraction of Iliac Bone Marrow, Percutaneous Approach, Diagnostic (12/06/2017)  Tonsillectomy    Medications  acetaminophen-hydrocodone 325 mg-10 mg oral tablet, 1 tab(s), Oral, QID, PRN  aspirin 81 mg oral Delayed Release (EC) tablet, 81 mg= 1 tab(s), Oral, Daily  aspirin 81 mg oral tablet, 81 mg= 1 tab(s), Oral, Daily, 11 refills  CYCLOBENZAPRINE 10 MG TABLET, Oral, TID  dexamethasone (for IVPB)  dexamethasone (for IVPB)  dexamethasone 4 mg oral tablet, 8 mg= 2 tab(s), Oral, BID, 12 refills  dexamethasone 4 mg oral tablet, 8 mg= 2 tab(s), Oral, BID, 12 refills  dexamethasone 4 mg oral tablet, 8 mg= 2 tab(s), Oral, BID, 12 refills  diphenhdramine (for Inj.), 25 mg= 0.5 mL, IV Push, Once-chemo  DULoxetine 30 mg oral delayed release capsule, 30 mg= 1 cap(s), Oral, Daily  DULoxetine 60 mg oral delayed release capsule, 60 mg= 1 cap(s), Oral, Daily, 3 refills  GABAPENTIN 300 MG CAPSULE, 300 mg= 1 cap(s), Oral, TID  Heparin Flush 100 U/mL - 5 mL, 500 units= 5 mL, IV Push, Once-chemo  Heparin Flush 100 U/mL - 5 mL, 500 units= 5 mL, IV Push, Once-chemo  Lexapro 20 mg oral tablet, 20 mg= 1 tab(s), Oral, Daily, 11 refills  losartan 25 mg oral tablet, 25 mg= 1 tab(s), Oral, Daily, 6 refills  losartan 50 mg oral tablet, 50 mg= 1 tab(s), Oral, Daily, 6 refills  megestrol 40 mg oral tablet, See Instructions, 8 refills  meloxicam 7.5 mg oral tablet, 7.5 mg= 1 tab(s), Oral, Daily, 11 refills  metoprolol succinate 25 mg oral tablet extended release, 12.5 mg= 0.5 tab(s), Oral, Daily, 6 refills  Pazeo 0.7% ophthalmic solution, 1 drop(s), OPTH, Daily  Phenergan 25 mg Tab, 25 mg= 1 tab(s), Oral, q6hr  potassium chloride 20 mEq oral TABLET extended release, 20 mEq= 1 tab(s), Oral, BID,? ?Not Taking per Prescriber  ProAir HFA 90 mcg/inh inhalation aerosol with adapter, 2 puff(s), INH, q4hr, PRN, 3 refills  traZODone 100 mg oral tablet, See Instructions, 11 refills  Tylenol 325 mg oral tablet, 650 mg= 2 tab(s), Oral, Once-chemo  Zofran (for IVPB)  Zofran (for IVPB) 16 mg + dexamethasone 10 mg/mL injectable solution 10 mg  Zofran  ODT 8 mg oral tablet, disintegrating, 8 mg= 1 tab(s), Oral, q8hr, PRN  Allergies  No Known Allergies  Social History  Abuse/Neglect  No, No, Yes, 12/04/2020  Alcohol - Denies Alcohol Use, 08/24/2014  Never, 12/04/2020  Employment/School  homemaker, Work/School description: homemaker. Operates hazardous equipment: No., 04/20/2017  Exercise  Self assessment: Good condition., 04/20/2017  Home/Environment  Lives with Children. Living situation: Home/Independent. Alcohol abuse in household: No. Substance abuse in household: No. Smoker in household: No. Injuries/Abuse/Neglect in household: No. Feels unsafe at home: No. Family/Friends available for support: Yes. Concern for family members at home: No. Major illness in household: No. Financial concerns: No. TV/Computer concerns: No., 04/20/2017  Nutrition/Health  Regular, Wants to lose weight: No., 04/20/2017  Sexual  Sexually active: No., 11/05/2020  Gender Identity Identifies as female., 02/28/2019  Substance Use  Never, 12/04/2020  Tobacco - Denies Tobacco Use, 03/04/2013  Never (less than 100 in lifetime), N/A, 12/04/2020  Family History  Cancer: Mother.  Heart failure.: Mother.  Primary malignant neoplasm of breast: Sister.  Primary malignant neoplasm of lung: Father.  Immunizations  Vaccine Date Status   pneumococcal 23-polyvalent vaccine 11/14/2017 Recorded   influenza virus vaccine, inactivated 11/15/2016 Recorded   Health Maintenance  Health Maintenance  ???Pending?(in the next year)  ??? ??OverDue  ??? ? ? ?HF-Heart Failure Education due??08/17/20??and every 1??year(s)  ??? ? ? ?Influenza Vaccine due??10/01/20??and every 1??day(s)  ??? ??Due?  ??? ? ? ?HF-LVEF due??11/10/20??and every 1??year(s)  ??? ? ? ?Colorectal Screening due??12/04/20??Unknown Frequency  ??? ? ? ?Tetanus Vaccine due??12/04/20??and every 10??year(s)  ??? ? ? ?Zoster Vaccine due??12/04/20??Unknown Frequency  ??? ??Due In Future?  ??? ? ? ?Obesity Screening not due until??01/01/21??and every  1??year(s)  ??? ? ? ?Alcohol Misuse Screening not due until??01/02/21??and every 1??year(s)  ??? ? ? ?ADL Screening not due until??01/15/21??and every 1??year(s)  ??? ? ? ?Breast Cancer Screening not due until??02/19/21??and every 2??year(s)  ???Satisfied?(in the past 1 year)  ??? ??Satisfied?  ??? ? ? ?ADL Screening on??01/15/20.??Satisfied by Mumtaz Ramirez LPN  ??? ? ? ?Alcohol Misuse Screening on??11/05/20.??Satisfied by Claudia Shaffer  ??? ? ? ?Blood Pressure Screening on??12/04/20.??Satisfied by Bibi Thompson  ??? ? ? ?Body Mass Index Check on??12/04/20.??Satisfied by Bibi Thompson  ??? ? ? ?Cervical Cancer Screening on??11/05/20.??Satisfied by Nghia Lovett  ??? ? ? ?Depression Screening on??12/04/20.??Satisfied by Bibi Thompson  ??? ? ? ?Diabetes Screening on??12/04/20.??Satisfied by Jessica Lynch  ??? ? ? ?Hypertension Management-Blood Pressure on??12/04/20.??Satisfied by Bibi Thompson  ??? ? ? ?Influenza Vaccine on??11/13/20.??Satisfied by Eli Zayas RN  ??? ? ? ?Obesity Screening on??12/04/20.??Satisfied by Bibi Thompson  ?  Lab Results  Test Name Test Result Date/Time   Sodium Lvl 136 mmol/L 12/04/2020 07:40 CST   Potassium Lvl 4.6 mmol/L 12/04/2020 07:40 CST   Chloride 107 mmol/L 12/04/2020 07:40 CST   CO2 21 mmol/L (Low) 12/04/2020 07:40 CST   Calcium Lvl 9.1 mg/dL 12/04/2020 07:40 CST   Glucose Lvl 96 mg/dL 12/04/2020 07:40 CST   BUN 36.0 mg/dL (High) 12/04/2020 07:40 CST   Creatinine 0.86 mg/dL 12/04/2020 07:40 CST   Est Creat Clearance Ser 58.23 mL/min 12/04/2020 08:31 CST   eGFR-AA 89 (Low) 12/04/2020 07:40 CST   eGFR-ZEESHAN 73 mL/min/1.73 m2 (Low) 12/04/2020 07:40 CST   Bili Total 0.3 mg/dL 12/04/2020 07:40 CST   Bili Direct 0.1 mg/dL 12/04/2020 07:40 CST   Bili Indirect 0.20 mg/dL 12/04/2020 07:40 CST   AST 14 unit/L 12/04/2020 07:40 CST   ALT 9 unit/L 12/04/2020 07:40 CST   Alk Phos 56 unit/L 12/04/2020 07:40 CST   Total Protein 6.6 gm/dL 12/04/2020 07:40 CST   Albumin  Lvl 3.7 gm/dL 12/04/2020 07:40 CST   Globulin 2.9 gm/dL 12/04/2020 07:40 CST   A/G Ratio 1.3 ratio 12/04/2020 07:40 CST   WBC 3.2 x10(3)/mcL (Low) 12/04/2020 07:40 CST   RBC 3.34 x10(6)/mcL (Low) 12/04/2020 07:40 CST   Hgb 9.8 gm/dL (Low) 12/04/2020 07:40 CST   Hct 31.6 % (Low) 12/04/2020 07:40 CST   Platelet 380 x10(3)/mcL 12/04/2020 07:40 CST   MCV 94.6 fL 12/04/2020 07:40 CST   MCH 29.3 pg 12/04/2020 07:40 CST   MCHC 31.0 gm/dL 12/04/2020 07:40 CST   RDW 17.0 % (High) 12/04/2020 07:40 CST   MPV 10.1 fL 12/04/2020 07:40 CST   Abs Neut 1.87 x10(3)/mcL (Low) 12/04/2020 07:40 CST   Neutro Auto 59 % 12/04/2020 07:40 CST   Lymph Auto 29 % 12/04/2020 07:40 CST   Mono Auto 11 % 12/04/2020 07:40 CST   Abs Neutro 1.87 x10(3)/mcL (Low) 12/04/2020 07:40 CST   Abs Lymph 0.9 x10(3)/mcL 12/04/2020 07:40 CST   Abs Mono 0.4 x10(3)/mcL 12/04/2020 07:40 CST   IG% 0 % 12/04/2020 07:40 CST   IG# 0.010 12/04/2020 07:40 CST

## 2022-05-04 NOTE — HISTORICAL [facility] CERNER
This is a historical note converted from [employer]. Formatting and pictures may have been removed.  Please reference [employer] for original formatting and attached multimedia. Chief Complaint  C31 taxotere  History of Present Illness  Problem list:  1. Stage IV Inflammatory breast cancer. Biopsy proven (right anterior iliac bone on 12/08/2017 showed metastatic carcinoma, consistent with a breast primary) metastatic left breast cancer, with extensive bone metastases, lung metastasis, possible liver and adrenal metastasis, extensive dulce metastasis, and small intracranial (posterior fossa) metastasis.?  ????  ER 85% positive, GA 45% positive, Ki-67 30% (high proliferation), HER-2 3+ (overexpressed)  Pathologic L4 compression fracture, to a lesser extent at L3 also, epidural extension of tumor at the L4 level resulting in severe canal stenosis, and left lower extremity weakness.  ?   -Germline?BRCA1/2?testing negative as of 08/17/2020.  ???  Current Treatment:?  1. Palliative chemotherapy with Taxotere every 21 days.  ?   C31 due: 12/28/2020  ?   Dose modifications & interruptions:  Docetaxel reduced by 25%?from cycle #1.  Added Neulasta support from second cycle of chemotherapy onwards  Herceptin/Perjeta dropped starting 9/12/19 for LVEF 45%.  ?   Treatment History:  1. 12/18/17: Dr. Harish Pearson performed L3-L4 bilateral laminectomies and decompression of the posterior elements; L4-L5 bilateral laminectomies and foraminotomies with decompression of the thecal sac; lumbar open reduction and internal fixation; biopsy of L3 and biopsy of L4 vertebral bodies.??  2. Palliative radiation therapy to the lumbar spine post decompression surgery, as well as radiation therapy to the brain metastasis with Dr. Saeed. She completed on 2/2/2018.  3. Herceptin/Perjeta/Taxotere 03/21/2018-09/12/2019, herceptin and perjeta dropped d/t decreased LVEF of 45%.  ?   History of present illness:?  52-year-old Afro-American female who was seen  in internal medicine clinic on 12/06/2017 for left lower extremity pain and was found to have multiple abnormalities on blood tests. ?Was hospitalized for evaluation, and ultimately found to have metastatic breast cancer.  ?   For?a full, detailed note, see the note dated 7/22/2020.  ?  Interval History:  Patient presents today for a scheduled follow-up, alone, prior to C28 of Taxotere. She is currently tolerating therapy well with minimal side effects noted and reported. She reports compliance with pre-treatment dexamethasone. 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 She endorses no new problems or concerns today. Denies HA, fever, night sweats, SOB, CP, edema, neuropathy. Denies N/V/C/D, abdominal pain, change in bowel/bladder habits, blood in the urine/stool. Appetite good, weight stable, denies unintentional weight loss/gain. Labs today stable, adequate for treatment, reviewed with patient. Reviewed patient medications. Patient denies needing any medication refills at this time. Denies questions/needs/concerns.  ?  Review of Systems  A complete 12 point ROS was performed in full with pertinent positives described in interval history. Remainder of ROS done in full and are negative.?  Physical Exam  Vitals & Measurements  T:?36.8? ?C (Oral)? HR:?83(Peripheral)? RR:?16? BP:?117/69?  HT:?156.00?cm? WT:?59.400?kg? BMI:?24.41?  Vital Signs Reviewed  General: AAO, NAD noted  Eye: PERRLA, EOMI  Neuro: Normal mentation, strength 5/5 on all 4 extremities, no sensory deficits  HET: Normocephalic, adequate hearing,?no oral ulcers, mucous membranes pink/moist/intact, no pharyngeal erythema, poor dentition  Neck: Supple, non-tender, no lymphadenopathy  Respiratory: Non-labored breathing, symmetrical chest wall expansion, BBS CTA, no crackles/wheezing/rhonchi noted  Cardiovascular: S1S2 noted, RRR, no M/R/G, pulses equal in all extremities, normal peripheral perfusion  Abdomen: Soft,  non-tender, non-distended, BS+, no hepatosplenomegaly  Musculoskeletal: presents in a wheelchair  Integumentary: No rashes, no bruises or purpura, warm and dry, normal for ethnicity  Neurologic: No focal deficits, Cranial nerves II-XII are grossly intact.  Cognition and Speech: Speech clear and coherent, functional cognition intact  Psychiatric: Cooperative, appropriate mood and affect for situation, normal judgement, no suicidal or homicidal ideations  ?  ?   The [employer] Toxicity Scores:  ?   ECOG Performance Scale: 2 - Ambulatory and capable of all selfcare but unable to carry out any work activities; up and about more than 50% of waking hours.  ?  Assessment/Plan  1.?Metastatic breast cancer?C50.919  ? #Metastatic breast cancer:  Inflammatory carcinoma of left breast. ?ER positive. ?ND positive. ?HER-2 positive.? Diagnosed 12/2017  Metastases to brain,?lumbar spine,?extensively to bones,?lymph nodes, lungs, liver, and adrenal.?  For lumbar spine metastasis,?status post laminectomy and internal fixation 12/2017  Status post radiation therapy to lumbar spine and brain?02/2018  Noncompliant with follow-up.  Status post Taxotere/Herceptin/Perjeta?x1 on?03/21/2018; she declined further chemotherapy?  Presented?1 year later,?on?02/2019:?Extensive bone metastases stable;?progression of bilateral lung metastasis, bilateral axillary?lymphadenopathy, progressive liver metastasis, progressive left adrenal metastasis  Dose reduced Taxotere/Herceptin/Perjeta started?03/07/2019?(1 year after first cycle of chemotherapy)  Good response post 4 cycles  Excellent response?post 8 cycles  Starting?with ninth cycle of chemotherapy,?discontinued Herceptin/Perjeta?due to persistent?drop in LVEF,?without?recovery?post?discontinuation  Continued improvement/stability?post 13 cycles of Taxotere;?dramatic drop in tumor marker level  No brain metastasis?on surveillance brain MRI (02/10/2020)  -Stable metastatic disease?on  restaging CTs and bone scan?post 18 cycles of Taxotere?(04/16/2020)  -No brain metastasis on surveillance brain MRI (05/27/2020)  -No metastatic disease progression?post?Taxotere x23 cycles?(restaging?CTs and bone scan: 07/17/2020)  -Genetic testing negative (07/23/2020)  -Questionable punctate?enhancing foci right cerebellar and right occipital (surveillance brain MRI: 09/09/2020)?(completely asymptomatic)  -No disease progression post Taxotere x28?(CTs, bone scan:?11/10/2020)?(bone scan: SuperScan?representing extensive skeletal metastatic involvement, unchanged)  --------  ?   # Sites of disease:  Left breast (inflammatory carcinoma);?brain metastasis; lumbar spine metastasis; extensive bone metastasis;?bilateral axillary?lymph nodes; lungs; liver; left?adrenal  --------  ?   # Molecular markers:  ER positive. ?DE positive. ?HER-2 positive.  BRCA1/2?analysis negative  MMR proficient  NTRK gene fusion negative  ????????  Plan:  -Excellent response to Taxotere  -No disease progression post Taxotere x28  -Continue Taxotere every 3 weeks (25% dose reduced, with Neulasta support)  -Check CBC and CMP every 10 days  -Check CA 15-3 and CA 27.29 level every month  -Restage with contrast-enhanced CT C/A/P and bone scan in 3 months (02/2021)  ?   -Proceed with C#31 Taxotere 12/28/2020  -Restaging CT C/A/P + BS scheduled:?02/12/2021  -C/W Ca 27.29, Ca 15-3 monitoring monthly, currently stable  ?  2.?Bone metastasis?C79.51  ? ?? -To prevent skeletal events from bone metastasis, need to start?either Xgeva or bisphosphonate therapy.  Need preventive dentistry and dental clearance?to prevent/minimize the likelihood of?osteonecrosis of the jaw before starting these medications.? This is pending.  Concurrent calcium and vitamin D supplementation is recommended (1200 mg of calcium and 800-1000 units of vitamin D3 daily).  Optimal schedule for zoledronic acid is monthly x12, then quarterly  -->  She has been unable to undergo  preventive dentistry; therefore, we will hold off on antiresorptive therapy?for now.  ?? ?  -Denosumab initiation pending dental clearance  ?  3.?Brain metastasis?C79.31  Metastases noted to Brain at diagnosis of 12/2017  Status post radiation therapy to lumbar spine and brain?02/2018  No brain metastasis?on surveillance brain MRI (02/10/2020)  -No brain metastasis on surveillance brain MRI (05/27/2020)  -MRI Brain 09/09/2020: Questionable punctuate enhancing foci right cerebellar and right occipital attention on follow-up.  -Referred back to radiation oncology in light of new questionable brain lesions, however radiation could never reach patient to schedule FU and she has not FU with them as instructed  -12/09/2020: MRI Brain: new/progressive millimetric intra-axial metastases.  -12/11/2020: Patient was notified of progression of brain lesions and was once again referred back to radiation oncology, she stated that she had a FU appointment with them scheduled 01/04/2021.  ?   -F/U with Radiation Oncology as instructed, she states that she has a follow-up appointment with them scheduled 1/4/2021.  ?  4.?Encounter for monitoring cardiotoxic drug therapy?Z51.81  ? ? Her LVEF remains suppressed?despite?the fact that Herceptin/Perjeta was held?due to drop in LVEF  No?normalization of LVEF?over subsequent?2 months  We discontinued Herceptin/Perjeta for good.?  -Cardiology visit 08/18/2020: FU with 3 months  ?   ? -C/W cardiology FU as instructed  ?  RTC 01/18/2021 with NP (F2F) with labs: CBC, CMP, Mg, Ca 27.29, Ca 15-3  ?  Discussed POC with patient, all questions answered. Instructed to call clinic for any issues or with any questions PRN, patient verbalizes understanding.  ?   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  Metastatic breast cancer  Metastatic breast cancer  Historical  Pregnant  Pregnant  Pregnant  Shingles  Procedure/Surgical History  Drainage of Buttock Skin, External Approach (07/30/2019)  Incision and drainage of abscess (eg, carbuncle, suppurative hidradenitis, cutaneous or subcutaneous abscess, cyst, furuncle, or paronychia); simple or single (07/30/2019)  Transfusion of Nonautologous Red Blood Cells into Peripheral Vein, Percutaneous Approach (03/28/2018)  Catheter Insertion Mediport (None) (02/23/2018)  Catheter, infusion, inserted peripherally, centrally or midline (other than hemodialysis) (02/23/2018)  Fluoroscopy of Superior Vena Cava using Low Osmolar Contrast, Guidance (02/23/2018)  Insertion of Infusion Device into Superior Vena Cava, Percutaneous Approach (02/23/2018)  Insertion of tunneled centrally inserted central venous access device, with subcutaneous port; age 5 years or older (02/23/2018)  Fusion of 2 or more Lumbar Vertebral Joints with Synthetic Substitute, Posterior Approach, Posterior Column, Open Approach (12/19/2017)  Fusion of Lumbosacral Joint with Synthetic Substitute, Posterior Approach, Posterior Column, Open Approach (12/19/2017)  Lumbar Foraminotomy MIS (.) (12/17/2017)  Bone biopsy sample (12/08/2017)  Breast biopsy sample (12/07/2017)  Biopsy of breast; percutaneous, needle core, not using imaging guidance (separate procedure) (12/06/2017)  Bone marrow; biopsy, needle or trocar (12/06/2017)  Excision of Left Breast, Percutaneous Approach, Diagnostic (12/06/2017)  Extraction of Iliac Bone Marrow, Percutaneous Approach, Diagnostic (12/06/2017)  Tonsillectomy   Medications  acetaminophen-hydrocodone 325 mg-10 mg oral tablet, 1 tab(s), Oral, QID, PRN  aspirin 81 mg oral Delayed Release (EC) tablet, 81 mg= 1 tab(s), Oral, Daily  aspirin 81 mg oral tablet, 81 mg= 1 tab(s), Oral, Daily, 11 refills  CYCLOBENZAPRINE 10 MG TABLET, Oral, TID  dexamethasone (for IVPB)  dexamethasone  (for IVPB)  dexamethasone 4 mg oral tablet, 8 mg= 2 tab(s), Oral, BID, 12 refills  dexamethasone 4 mg oral tablet, 8 mg= 2 tab(s), Oral, BID, 12 refills  dexamethasone 4 mg oral tablet, 8 mg= 2 tab(s), Oral, BID, 12 refills  diphenhdramine (for Inj.), 25 mg= 0.5 mL, IV Push, Once-chemo  DULoxetine 30 mg oral delayed release capsule, 30 mg= 1 cap(s), Oral, Daily  DULoxetine 60 mg oral delayed release capsule, 60 mg= 1 cap(s), Oral, Daily, 3 refills  GABAPENTIN 300 MG CAPSULE, 300 mg= 1 cap(s), Oral, TID  Heparin Flush 100 U/mL - 5 mL, 500 units= 5 mL, IV Push, Once-chemo  Heparin Flush 100 U/mL - 5 mL, 500 units= 5 mL, IV Push, Once-chemo  Lexapro 20 mg oral tablet, 20 mg= 1 tab(s), Oral, Daily, 11 refills  losartan 25 mg oral tablet, 25 mg= 1 tab(s), Oral, Daily, 6 refills  losartan 50 mg oral tablet, 50 mg= 1 tab(s), Oral, Daily, 6 refills  megestrol 40 mg oral tablet, See Instructions, 8 refills  meloxicam 7.5 mg oral tablet, 7.5 mg= 1 tab(s), Oral, Daily, 11 refills  metoprolol succinate 25 mg oral tablet extended release, 12.5 mg= 0.5 tab(s), Oral, Daily, 6 refills  Pazeo 0.7% ophthalmic solution, 1 drop(s), OPTH, Daily  Phenergan 25 mg Tab, 25 mg= 1 tab(s), Oral, q6hr  potassium chloride 20 mEq oral TABLET extended release, 20 mEq= 1 tab(s), Oral, BID,? ?Not Taking per Prescriber  ProAir HFA 90 mcg/inh inhalation aerosol with adapter, 2 puff(s), INH, q4hr, PRN, 3 refills  traZODone 100 mg oral tablet, See Instructions, 11 refills  Tylenol 325 mg oral tablet, 650 mg= 2 tab(s), Oral, Once-chemo  Zofran (for IVPB)  Zofran (for IVPB) 16 mg + dexamethasone 10 mg/mL injectable solution 10 mg  Zofran ODT 8 mg oral tablet, disintegrating, 8 mg= 1 tab(s), Oral, q8hr, PRN  Allergies  No Known Allergies  Social History  Abuse/Neglect  No, No, Yes, 12/04/2020  Alcohol - Denies Alcohol Use, 08/24/2014  Never, 12/04/2020  Employment/School  homemaker, Work/School description: homemaker. Operates hazardous equipment: No.,  04/20/2017  Exercise  Self assessment: Good condition., 04/20/2017  Home/Environment  Lives with Children. Living situation: Home/Independent. Alcohol abuse in household: No. Substance abuse in household: No. Smoker in household: No. Injuries/Abuse/Neglect in household: No. Feels unsafe at home: No. Family/Friends available for support: Yes. Concern for family members at home: No. Major illness in household: No. Financial concerns: No. TV/Computer concerns: No., 04/20/2017  Nutrition/Health  Regular, Wants to lose weight: No., 04/20/2017  Sexual  Sexually active: No., 11/05/2020  Gender Identity Identifies as female., 02/28/2019  Substance Use  Never, 12/04/2020  Tobacco - Denies Tobacco Use, 03/04/2013  Never (less than 100 in lifetime), N/A, 12/04/2020  Family History  Cancer: Mother.  Heart failure.: Mother.  Primary malignant neoplasm of breast: Sister.  Primary malignant neoplasm of lung: Father.  Immunizations  Vaccine Date Status   pneumococcal 23-polyvalent vaccine 11/14/2017 Recorded   influenza virus vaccine, inactivated 11/15/2016 Recorded   Health Maintenance  Health Maintenance  ???Pending?(in the next year)  ??? ??OverDue  ??? ? ? ?HF-Heart Failure Education due??08/17/20??and every 1??year(s)  ??? ? ? ?Influenza Vaccine due??10/01/20??and every 1??day(s)  ??? ??Due?  ??? ? ? ?HF-LVEF due??11/10/20??and every 1??year(s)  ??? ? ? ?Colorectal Screening due??12/26/20??Unknown Frequency  ??? ? ? ?Tetanus Vaccine due??12/26/20??and every 10??year(s)  ??? ? ? ?Zoster Vaccine due??12/26/20??Unknown Frequency  ??? ??Due In Future?  ??? ? ? ?Obesity Screening not due until??01/01/21??and every 1??year(s)  ??? ? ? ?Alcohol Misuse Screening not due until??01/02/21??and every 1??year(s)  ??? ? ? ?ADL Screening not due until??01/15/21??and every 1??year(s)  ??? ? ? ?Breast Cancer Screening not due until??02/19/21??and every 2??year(s)  ??? ? ? ?Blood Pressure Screening not due until??12/04/21??and every  1??year(s)  ??? ? ? ?Body Mass Index Check not due until??12/04/21??and every 1??year(s)  ??? ? ? ?Depression Screening not due until??12/04/21??and every 1??year(s)  ??? ? ? ?Diabetes Screening not due until??12/04/21??and every 1??year(s)  ??? ? ? ?Hypertension Management-Blood Pressure not due until??12/04/21??and every 1??year(s)  ??? ? ? ?Hypertension Management-BMP not due until??12/04/21??and every 1??year(s)  ???Satisfied?(in the past 1 year)  ??? ??Satisfied?  ??? ? ? ?ADL Screening on??01/15/20.??Satisfied by Mumtaz Ramirez LPN  ??? ? ? ?Alcohol Misuse Screening on??11/05/20.??Satisfied by Claudia Shaffer  ??? ? ? ?Blood Pressure Screening on??12/04/20.??Satisfied by Rhiannon Pearson  ??? ? ? ?Body Mass Index Check on??12/04/20.??Satisfied by Rhiannon Pearson  ??? ? ? ?Cervical Cancer Screening on??11/05/20.??Satisfied by Nghia Lovett  ??? ? ? ?Depression Screening on??12/04/20.??Satisfied by Bibi Thompson  ??? ? ? ?Diabetes Screening on??12/04/20.??Satisfied by Jessica Lynch  ??? ? ? ?Hypertension Management-Blood Pressure on??12/04/20.??Satisfied by Rhiannon Pearson  ??? ? ? ?Influenza Vaccine on??12/04/20.??Satisfied by Rhiannon Pearson  ??? ? ? ?Obesity Screening on??12/04/20.??Satisfied by Rhiannon Pearson  ?  Lab Results  Test Name Test Result Date/Time   Sodium Lvl 136 mmol/L 12/28/2020 08:10 CST   Potassium Lvl 4.6 mmol/L 12/28/2020 08:10 CST   Chloride 106 mmol/L 12/28/2020 08:10 CST   CO2 22 mmol/L 12/28/2020 08:10 CST   Calcium Lvl 9.2 mg/dL 12/28/2020 08:10 CST   Glucose Lvl 88 mg/dL 12/28/2020 08:10 CST   BUN 35.0 mg/dL (High) 12/28/2020 08:10 CST   Creatinine 0.91 mg/dL 12/28/2020 08:10 CST   eGFR-AA 83 (Low) 12/28/2020 08:10 CST   eGFR-ZEESHAN 69 mL/min/1.73 m2 (Low) 12/28/2020 08:10 CST   Bili Total 0.4 mg/dL 12/28/2020 08:10 CST   Bili Direct 0.2 mg/dL 12/28/2020 08:10 CST   Bili Indirect 0.20 mg/dL 12/28/2020 08:10 CST   AST 13 unit/L 12/28/2020 08:10 CST   ALT 7 unit/L  12/28/2020 08:10 CST   Alk Phos 50 unit/L 12/28/2020 08:10 CST   Total Protein 6.5 gm/dL 12/28/2020 08:10 CST   Albumin Lvl 3.7 gm/dL 12/28/2020 08:10 CST   Globulin 2.8 gm/dL 12/28/2020 08:10 CST   A/G Ratio 1.3 ratio 12/28/2020 08:10 CST   WBC 3.8 x10(3)/mcL (Low) 12/28/2020 08:10 CST   RBC 3.19 x10(6)/mcL (Low) 12/28/2020 08:10 CST   Hgb 9.3 gm/dL (Low) 12/28/2020 08:10 CST   Hct 29.8 % (Low) 12/28/2020 08:10 CST   Platelet 378 x10(3)/mcL 12/28/2020 08:10 CST   MCV 93.4 fL 12/28/2020 08:10 CST   MCH 29.2 pg 12/28/2020 08:10 CST   MCHC 31.2 gm/dL 12/28/2020 08:10 CST   RDW 16.8 % (High) 12/28/2020 08:10 CST   MPV 10.4 fL 12/28/2020 08:10 CST   Abs Neut 2.80 x10(3)/mcL 12/28/2020 08:10 CST   Neutro Auto 74 % 12/28/2020 08:10 CST   Lymph Auto 18 % 12/28/2020 08:10 CST   Mono Auto 7 % 12/28/2020 08:10 CST   Basophil Auto 0 % 12/28/2020 08:10 CST   Abs Neutro 2.80 x10(3)/mcL 12/28/2020 08:10 CST   Abs Lymph 0.7 x10(3)/mcL 12/28/2020 08:10 CST   Abs Mono 0.3 x10(3)/mcL 12/28/2020 08:10 CST   Abs Baso 0.0 x10(3)/mcL 12/28/2020 08:10 CST   IG% 0 % 12/28/2020 08:10 CST   IG# 0.010 12/28/2020 08:10 CST

## 2022-05-04 NOTE — HISTORICAL [facility] CERNER
This is a historical note converted from [place]. Formatting and pictures may have been removed.  Please reference [place] for original formatting and attached multimedia. Admission Information  Ms. Webb is a 51 yo WF with a pMHx of HTN, anemia, and hypokalemia who presented to Internal Medicine clinic with Dr. Kendall on 12/6 for routine blood work and management of left leg pain. At that appointment, blood was drawn and it was noted that patient had multiple lab abnormalities concerning for malignancy. These labs include Calcium level of 12.2, WBC count of 3.5, and H/H of 7.7/24.2. Of note, X-ray of Right Femur today morning showed lytic bone lesions raising concern for possible metastatic disease to bone in the right ischium and proximal femur. She was called at home after her appointment and told to come back to the hospital. She was then admitted for work up of possible bone malignancy.?  ?  Patient states that since September 2017 she started developing weakness and a constant, 6/10, progressive, stabbing, shooting left leg pain. Tylenol helps with the pain for about 3 hours. Tramadol was given to her by PCP but it didnt help with pain. The weakness has gotten so bad that she is unable to walk unsupported; she used a walker up until October, and since then she has been using a wheelchair. Since September, she has lost about 80 pounds (217 lbs to 136 lbs per patient). She endorses chills and decreased appetite. She had two episodes of vomiting 2 weeks ago. She denies any polyuria, polydipsia, kidney stones, dizziness, constipation, abdominal pain, fever, night sweats, or nausea.  ?  For more information, please refer to full Admission H&P Note.  Hospital Course  Significant Findings  -Admission lab:Calcium level of 12.2, WBC count of 3.5, and H/H of 7.7/24.2  -Breast exam showed peau dorange changes in the bottom half of the Left breast. The skin changes were palpated, it was firm and solid. There was  associated nipple retraction.?  -X-ray of Right Femur showed lytic bone lesions raising concern for possible metastatic disease to bone in the right ischium and proximal femur / X-ray of Left Femur negative  -CXR - Multiple pulmonary nodules consistent with metastatic disease  -Diagnostic Mammogram - Extensive inflammatory change in the left breast with a 4 cm mass located at the 12:00 axis 3 cm from the nipple. There are also possible additional satellite lesions in the left breast with 2 enlarged suspicious left axillary lymph nodes  -U/S of Left breast - There are findings suggestive of inflammatory cancer with skin thickening and edematous fibroglandular breast parenchyma. There is a spiculated mass at 12:00 measuring 4 cm at the 12:00 axis which is highly suspicious and a possible satellite lesion at the 1:00 position of the left breast. There are also abnormal axillary lymph nodes measuring 2.7 cm and 1.2 cm  -CT Chest/Abd/Pelvis - 1.Partially visualized left breast mass compatible with primary malignancy. 2. Presumed sites of metastatic disease include: - Thoracic and abdominopelvic dulce. -?Bilateral pulmonary. - Indeterminate right hepatic and left adrenal. -?Diffuse osseous to the axial and appendicular skeleton. 3. Pathologic L4 compression fracture and to a lesser extent at L3. Epidural extension of tumor at the L4 level results in severe canal stenosis.  -Brain MRI - 1. Small intra-axial metastases in the posterior fossa. No mass effect or shift. 2. Osseous metastases to the calvarium, skull base and upper cervical spine. No scalp or intracranial extension.  -ECHO showed LVEF of 56% with no other significant valvular pathologies  -Elevated LDH/Ferritin  -Anemia s/p 2 units of transfusion  Procedures and Treatment Provided  IVFs and 1 dose of Zometa for hypercalcemia  2 units of pRBC  Metastatic breast cancer work up  Multiple myeloma work up  Patient was first admitted to the hospital from the PCP  clinic because of abnormal lab as mentioned in the Admission Information. On day 1, work up for multiple myeloma was ordered because we had a?high suspicion for that diagnosis. Patient had anemia, hx of renal injuries, bone pain, and hypercalcemia. On day 2, we did a complete physical exam, where we found breast exam changes as above. We immediately consulted surgery for breast biopsy. We asked Dr. Alcantara for advice on management of possible metastatic breast cancer. We obtained labs including reticulocyte count, ferritin/iron level/TIBC, B12/Folate, LDH, flow cytometry of blood for leukemia/lymphoma, copper level, and HIV testing. We also obtained various imaging studies as shown above. During hospitalization, patient received 2 units of pRBC and her H/H responded appropriately. Patient also received IVFs and 1 dose of Zometa for hypercalcemia. Ca normalized to 10.1 on the day of discharge. Patient received breast and bone biopsies during this hospitalization as well. Patients LLE pain was managed on PRN basis. Patient tolerated all the studies/procedures well during hospitalization. Talked to patient/family about lab/imaging findings and what those mean. They all voiced understanding. Patient is staying positive despite the diagnosis. Also explained to the family and the patient that the patient is to follow up with bone scan on 12/11, oncology clinic on 12/12, brain radiation clinic on 12/13, and mediport eval on 12/14. They voiced understanding for that as well. Patient felt good enough to be discharged home.  Physical Exam  Vitals & Measurements  T:?37.2? ?C ?(Oral)? HR:?123?(Peripheral)? HR:?106?(Monitored)? RR:?18? BP:?139/83? SpO2:?96%?  General: Alert and oriented x 3, in no acute distress.  HEENT: Normocephalic, atraumatic.? Extraocular movements intact.?Pupils are equal, round, and reactive to light and accommodation.?No sinus tenderness.? Oropharynx clear.? Mucous membranes are moist.  Neck: Supple. No  lymphadenopathy or thyromegaly.  Chest: Clear to auscultate bilaterally.? No wheezing or?rhonchi.  Breast: Peau dorange changes in the bottom half of the Left breast. The skin changes were palpated, it was firm and solid. There was associated nipple retraction. Lymphadenopathy in the Left axilla.  Heart: S1 and?S2 present.? Regular rate and rhythm. No murmurs/gallops/regurgitations.  Abdomen: Soft, nontender, and nondistended.? Normal active bowel sounds. No hepatosplenomegaly.  Extremities: No cyanosis, clubbing or edema.  Neurologic: CN nerves II - XI grossly intact.? No focal deficit.? No sensory deficit.  Musculoskeletal: TTP diffusely on the left lower extremity.  Integumentary: Moist mucous membranes.? Good skin turgor, intact.  Pyschiatric: Appropriate affect.  Discharge Plan  Breast malignancy with multiple metastasis  -53 yo AAF with extensive cancer Family hx, abnormal laboratory values, and clinical symptoms suggestive of breast malignancy with multiple metastasis  -Lower extremity pain, weakness, immobility, weight loss likely 2/2 this  -X-ray of Right Femur showed lytic bone lesions raising concern for possible metastatic disease to bone in the right ischium and proximal femur / X-ray of Left Femur negative  -CXR - Multiple pulmonary nodules consistent with metastatic disease  -Diagnostic Mammogram - Extensive inflammatory change in the left breast with a 4 cm mass located at the 12:00 axis 3 cm from the nipple. There are also possible additional satellite lesions in the left breast with 2 enlarged suspicious left axillary lymph nodes  -U/S of Left breast - There are findings suggestive of inflammatory cancer with skin thickening and edematous fibroglandular breast parenchyma. There is a spiculated mass at 12:00 measuring 4 cm at the 12:00 axis which is highly suspicious and a possible satellite lesion at the 1:00 position of the left breast. There are also abnormal axillary lymph nodes measuring 2.7 cm  and 1.2 cm  -CT Chest/Abd/Pelvis - 1.Partially visualized left breast mass compatible with primary malignancy. 2. Presumed sites of metastatic disease include: - Thoracic and abdominopelvic dulce. - Bilateral pulmonary. - Indeterminate right hepatic and left adrenal. - Diffuse osseous to the axial and appendicular skeleton. 3. Pathologic L4 compression fracture and to a lesser extent at L3. Epidural extension of tumor at the L4 level results in severe canal stenosis.  -Brain MRI - 1. Small intra-axial metastases in the posterior fossa. No mass effect or shift. 2. Osseous metastases to the calvarium, skull base and upper cervical spine. No scalp or intracranial extension.  -ECHO showed LVEF of 56% with no other significant valvular pathologies  -Pathologic L4 compression fracture and to a lesser extent at L3 and epidural extension of tumor at the L4 level results in severe canal stenosis maybe contributing to LLE pain  -Continue to follow up with rest of breast cancer/multiple myeloma work up  -Follow up with breast biopsy/bone biopsy results  -Follow up with Oncology clinic, Brain radiation?clinic, [facility] surgery eval, and Bone scan appts?next week  ?  Hypercalcemia  -Calcium of 12.2 on admission; Calcium of 10.1 this AM  -Initial therapy included hydration with IVFs  -Gave a 1 dose of Zometa 4 mg IV on 12/7/2017  -Mag normal at 2.2 / PTH low at 6.3; hypercalcemia is likely tertiary in origin  -Plan is to get Zometa with oncology every 4 weeks  ?  Anemia  -H/H 7.7/24.2 with MCV of 96.4 on admission  -S/p 2 units of pRBC transfusion; H/H 9.5/28.5 on 12/7/2017 5 PM  -Likely 2/2 bone malignancy  ?   HTN  -Latest /76  -Follow up with PCP  Patient Discharge Condition  -Patient is in a poor condition at discharge. Patient has a metastatic breast cancer  Discharge Disposition  -Patient is discharged to home  -Patient is to come to ER if any of symptoms gets worse  -Patient is to follow up with [facility] in 2-3  weeks for?a post gonzales evaluation  -Patient is to follow up with Oncology clinic, Brain radiation?clinic, Mediport surgery eval, and Bone scan appts?next week. all these appointments have been made by myself. Personally explained to the patient and the family about this  -Take PO/ambulate as tolerated, provided rolling walker  -Sending patient home on Decadron per oncology recs  -Will most likely get palliative chemo-radiation therapy   Patient is AAF not WF (mistyped on Admission H&P).   Also follow up with Dr. Kendall, not [facility], for post gonzales evaluation. Dr. Kendall is her PCP.   I have seen the patient with the residents, re assessed the historical, physical, laboratory, and radiologic findings.  I have discussed the case with the residents and made recommendations.

## 2022-05-04 NOTE — HISTORICAL [facility]
This is a historical note converted from [employer]. Formatting and pictures may have been removed.  Please reference [employer] for original formatting and attached multimedia. Chief Complaint  MPF  History of Present Illness  Problem list:  1. Stage IV Inflammatory breast cancer. Biopsy proven (right anterior iliac bone on 12/08/2017 showed metastatic carcinoma, consistent with a breast primary) metastatic left breast cancer, with extensive bone metastases, lung metastasis, possible liver and adrenal metastasis, extensive dulce metastasis, and small intracranial (posterior fossa) metastasis.?  ????  ER 85% positive, MS 45% positive, Ki-67 30% (high proliferation), HER-2 3+ (overexpressed)  Pathologic L4 compression fracture, to a lesser extent at L3 also, epidural extension of tumor at the L4 level resulting in severe canal stenosis, and left lower extremity weakness.  ?   -Germline?BRCA1/2?testing negative as of 08/17/2020.  ???  Current Treatment:?  Xeloda (capecitabine)?1000 mg/m??twice daily, days 1-14, cycle every 21 days  Started?3/11/2021  Cycle #7 due 7/19/2021.  ?   Dose modifications & interruptions:  Dose reduced by 25% with cycle #5 d/t grade 3 neutropenia () (2-1/2 pills in the morning and 2 pills in the evening)  -Cycle #7 delayed 2 weeks d/t insurance changes  ?   Treatment History:  1. 12/18/17: Dr. Harish Pearson performed L3-L4 bilateral laminectomies and decompression of the posterior elements; L4-L5 bilateral laminectomies and foraminotomies with decompression of the thecal sac; lumbar open reduction and internal fixation; biopsy of L3 and biopsy of L4 vertebral bodies.??  2. Palliative radiation therapy to the lumbar spine post decompression surgery, as well as radiation therapy to the brain metastasis with Dr. Saeed. She completed on 2/2/2018.  3. Herceptin/Perjeta/Taxotere 03/21/2018-09/12/2019, herceptin and perjeta dropped d/t decreased LVEF of 45%.  4. Palliative chemotherapy with  Taxotere every 21 days. Stopped after C31 on 12/28/2020 d/t progression.  ?   History of present illness:?  52-year-old Afro-American female who was seen in internal medicine clinic on 12/06/2017 for left lower extremity pain and was found to have multiple abnormalities on blood tests. ?Was hospitalized for evaluation, and ultimately found to have metastatic breast cancer.  ?   For?a full, detailed note, see the note dated 7/22/2020.  ?  Interval History  7/21/2021: Patient?presents for follow up on Xeloda; she?started her current?cycle #7 on 7/19/2021. She denies nausea,?vomiting, diarrhea. She does report constipation unresponsive to Miralax, Colace, prune juice. She recently had abdominal imaging done that revealed constipation and was prescribed mag citrate which she states has not been effective. She states she drinks plenty of water and is still able to eat. Advised her to try an enema. CMP, CBC stable. Her main concern today is how to obtain hospice services. She states she is depressed about her sisters death in January and is tired of fighting cancer; of note, she did not say she wanted to stop oral chemotherapy. She is willing to speak with CATRACHO Connolly about palliative care and hospice services. Instructed her to continue the current cycle of Xeloda; will have her follow up in 2 weeks on her off week and again in 5 weeks with Dr. Alcantara to review scan results. She is amenable to this plan.  Review of Systems  A complete 12-point?ROS was performed in full with pertinent positives as described in interval history. Remainder of ROS done in full and are negative.  Physical Exam  Vitals & Measurements  T:?36.8? ?C (Oral)? HR:?79(Peripheral)? RR:?18? BP:?93/69?  HT:?128.00?cm? WT:?53.600?kg? BMI:?32.71?  General: ?Alert and oriented, No acute distress.??  Appearance: Well-groomed; wearing face mask.  HEENT: ?Normocephalic, Oral mucosa is moist.?Pupils are equal, round and reactive to light, Extraocular  movements are intact, Normal conjunctiva.?  Neck: ?Supple, Non-tender, No lymphadenopathy, No thyromegaly.??  Respiratory: ?Lungs are clear to auscultation, Respirations are non-labored, Breath sounds are equal, Symmetrical chest wall expansion.??  Cardiovascular: ?Normal rate, Regular rhythm, No edema.??  Breast:??Breast exam not performed on todays visit.??  Gastrointestinal: Rounded,?Soft, Non-tender, Non-distended, Normal bowel sounds.??  Musculoskeletal: ?Normal strength.??  Integumentary: ?Warm, dry, intact.??  Neurologic: ?Alert, Oriented, No focal deficits, Cranial Nerves II-XII are grossly intact.??  Cognition and Speech: ?Oriented, Speech clear and coherent.??  Psychiatric: ?Cooperative, Appropriate mood & affect. ?  ECOG Performance Scale: 2 - Capable of all self-care but unable to carry out any work activities. Up and about greater than 50 percent of waking hours.?  Assessment/Plan  1.?Metastatic breast cancer?C50.912  #Metastatic breast cancer:  Inflammatory carcinoma of left breast. ?ER positive. ?MN positive. ?HER-2 positive.? Diagnosed 12/2017  Metastases to brain,?lumbar spine,?extensively to bones,?lymph nodes, lungs, liver, and adrenal.?  For lumbar spine metastasis,?status post laminectomy and internal fixation 12/2017  Status post radiation therapy to lumbar spine and brain?02/2018  Noncompliant with follow-up.  Status post Taxotere/Herceptin/Perjeta?x1 on?03/21/2018; she declined further chemotherapy?  Presented?1 year later,?on?02/2019:?Extensive bone metastases stable;?progression of bilateral lung metastasis, bilateral axillary?lymphadenopathy, progressive liver metastasis, progressive left adrenal metastasis  Dose reduced Taxotere/Herceptin/Perjeta started?03/07/2019?(1 year after first cycle of chemotherapy)  Good response post 4 cycles  Excellent response?post 8 cycles  Starting?with ninth cycle of chemotherapy,?discontinued Herceptin/Perjeta?due to persistent?drop in  LVEF,?without?recovery?post?discontinuation  Continued improvement/stability?post 13 cycles of Taxotere;?dramatic drop in tumor marker level  No brain metastasis?on surveillance brain MRI (02/10/2020)  -Stable metastatic disease?on restaging CTs and bone scan?post 18 cycles of Taxotere?(04/16/2020)  -No brain metastasis on surveillance brain MRI (05/27/2020)  -No metastatic disease progression?post?Taxotere x23 cycles?(restaging?CTs and bone scan: 07/17/2020)  -Genetic testing negative (07/23/2020)  -Questionable punctate?enhancing foci right cerebellar and right occipital (surveillance brain MRI: 09/09/2020)?(completely asymptomatic)  -No disease progression post Taxotere x28?(CTs, bone scan:?11/10/2020)?(bone scan: SuperScan?representing extensive skeletal metastatic involvement, unchanged)  >>>  Progression?on Taxotere?(new/progressive brain metastasis)  (no disease progression outside brain?on restaging CTs and bone scan):  -New/progressive brain metastasis post Taxotere x30  -Completed 31 cycles of Taxotere 12/28/2020, then discontinued  -12/09/2020: Surveillance brain MRI with and without contrast (comparison: 09/09/2020): New, progressive millimetric intra-axial metastasis (at least 5); bone metastases have also progressed; no scalp or intracranial extension  -02/25/2021: TTE: LVEF 45% (40% on 11/11/2019)  -History of palliative RT to brain and lumbar spine 01/22/2018-02/02/2018  -S/p SRS to 8 brain metastasis?(01/25/2021-02/01/2021)  -Restaging CTs and bone scan (02/25/2021):?No disease progression  (No disease progression outside brain)  -Surveillance brain MRI (03/10/2021):?Overall stable to minimally less prominent scattered small interval metastasis  -Capecitabine started 03/11/2021  -No extracranial disease progression on restaging CTs and bone scan?(05/20/2021)  ?  ?   #Sites of disease:  Left breast (inflammatory carcinoma);?brain metastasis; lumbar spine metastasis; extensive bone  metastasis;?bilateral axillary?lymph nodes; lungs; liver; left?adrenal  ?  ?   #Molecular markers:  ER positive. ?FL positive. ?HER-2 positive.  BRCA1/2?analysis negative  MMR proficient  NTRK gene fusion negative  ?  ?   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   -06/09/2021: CBC and CMP reviewed; BUN 25.5, chronically irregular; creatinine 1.01, normal; ANC 0.76; WBC 2.0; hemoglobin 9.8; platelets 245,000/mm?  ?  ?  Plan:  -Disease progressed post Taxotere x30, with brain metastasis, without extracranial disease progression on restaging CTs and bone scan  -Received a total of 31 cycles of Taxotere  -Capecitabine started 03/11/2021  -No disease progression on restaging?scans (05/20/2021)  >>>  -Continue?Capecitabine 1000 mg/m? twice daily, days 1-14,?cycle every 21 days  -Check CBC and CMP every 10 days  -We are avoiding adding?neratinib?to the regimen because of the potential of severe diarrhea  -Restage with contrast-enhanced CT scans of C/A/P and whole-body nuclear medicine bone scan in 3 months?(mid August)  -Check CA 15-3 and CA 27.29 level?monthly to assess?disease progress  ?  Adjustment for toxicity:  -06/09/2021: ANC 0.76?(grade 3 neutropenia)  -Grade 3: First appearance: Interrupt until resolved to grade 0-1; dose adjustment for next cycle, 75% of running dose  -Withhold therapy for grade 3 or 4?hematologic toxicity?during treatment  -Patient with baseline platelets <100,000/mm??and/or neutrophils <1500/mm??should not receive Capecitabine  >>>  -06/09/2021:?We will start next?cycle of capecitabine 06/14/2021;?currently,?on Capecitabine?(500mg)?3 pills?p.o. twice daily; will decrease the dose by 25%?(2-1/2 pills in the morning and 2 pills in the evening  ?  -Check tumor for?TMB-H  ?  -LVEF remains suppressed (45%: 02/25/2021), therefore, cannot reintroduce?cardiotoxic agent?(HER-2 targeted therapy)  -Since LVEF remains suppressed, therefore,?cannot  treat her with lapatinib?or?Herceptin-based regimens; will just have to continue with capecitabine  >>>  -Follow-up with cardiology for suppressed LVEF  -06/09/2021:?Repeat TTE at this time?to assess LVEF  ?  ?  -Continue Cymbalta and gabapentin?for Taxotere-induced peripheral neuropathy  ?  -Dental evaluation and preventive dentistry pending before we can start bone antiresorptive therapy for bone metastasis  ?  -Mild hand-foot syndrome?secondary to Capecitabine;?she is applying moisturizing?lotions?to palms and soles; advised to continue;?Capecitabine docetaxel by 25% (for grade 3 neutropenia)?from 06/14/2021, is also expected to help  ?  Patient did not start cycle #7 on 7/8/2021 d/t insurance issues; chose to start on 7/19/2021.  Continue current?cycle #7 of Xeloda started on 7/19/2021.  MP flush today and every 8 weeks.  Check CA 15-3 and CA 27.29 level?monthly to assess?disease progress  Repeat Echo scheduled for 7/2/2021; patient did not do.  Follow up in?2 weeks (F2F on 8/4/2021) with CBC, CMP, Ca 15-3, & Ca 27.29 prior to cycle #8.  Restage with contrast-enhanced CT scans of C/A/P and whole-body nuclear medicine bone scan in 3 months?(mid August); scheduled for 8/11/2021.  Follow up in 5 weeks (F2F on 8/25/2021) with Dr. Alcantara with CBC, CMP, Ca 15-3, Ca 27.29, & CT/BS results prior to cycle #9.  ?  Discussion:  With progressive brain metastasis, systemic therapy options are:  -Capecitabine + lapatinib  -Capecitabine + neratinib  -Paclitaxel + neratinib (category 2B)  -Capecitabine (category 2B)  -Tucatinib + trastuzumab + Capecitabine (if previously treated with 1 or more anti-HER-2 based regimens)  ?  ?  Neratinib + Capecitabine:  -Neratinib 240 mg p.o. daily on days 1-21  -Capecitabine 750 mg/m? p.o. twice daily on days 1-14  Cycle every 21 days  ?  ?  Capecitabine for metastatic breast cancer?(capecitabine monotherapy):  Capecitabine?8529-3743 mg/m? p.o.?twice daily days 1-14  Cycle every 21  days  ?  Standard dose: 1000 mg/m? twice daily  Favorable safety profile  Response rate 34.9%  Median time to disease progression 4 months  ?  (Reference: Rich THAO, Adan G, Ger L, et al. Safety and efficacy of two different doses of capecitabine in the treatment of advanced breast cancer in older women. J Clin Oncol. 2005;23:1312-3916)  Ordered:  ?  2.?Brain metastasis?C79.31  #History of brain metastasis:  -History of palliative RT to brain and lumbar spine 01/22/2018-02/02/2018  -S/p SRS to 8 brain metastasis?(01/25/2021-02/01/2021)??  -Surveillance brain MRI scans deferred to radiation oncology  >>>  06/09/2021:?We will order brain MRI with and without contrast;?continue follow-up with radiation oncology  ?   Patient sees Rad Onc in 8/2021.  Ordered:  ?   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  Metastatic breast cancer  Metastatic breast cancer  Historical  Pregnant  Pregnant  Pregnant  Shingles  Procedure/Surgical History  Drainage of Buttock Skin, External Approach (07/30/2019)  Incision and drainage of abscess (eg, carbuncle, suppurative hidradenitis, cutaneous or subcutaneous abscess, cyst, furuncle, or paronychia); simple or single (07/30/2019)  Transfusion of Nonautologous Red Blood Cells into Peripheral Vein, Percutaneous Approach (03/28/2018)  Catheter Insertion Mediport (None) (02/23/2018)  Catheter, infusion, inserted peripherally, centrally or midline (other than hemodialysis) (02/23/2018)  Fluoroscopy of Superior Vena Cava using Low Osmolar Contrast, Guidance (02/23/2018)  Insertion of Infusion Device into Superior Vena Cava, Percutaneous Approach (02/23/2018)  Insertion of tunneled centrally inserted central venous access device, with subcutaneous port; age 5 years or older (02/23/2018)  Fusion of 2 or more Lumbar Vertebral  Joints with Synthetic Substitute, Posterior Approach, Posterior Column, Open Approach (12/19/2017)  Fusion of Lumbosacral Joint with Synthetic Substitute, Posterior Approach, Posterior Column, Open Approach (12/19/2017)  Lumbar Foraminotomy MIS (.) (12/17/2017)  Bone biopsy sample (12/08/2017)  Breast biopsy sample (12/07/2017)  Biopsy of breast; percutaneous, needle core, not using imaging guidance (separate procedure) (12/06/2017)  Bone marrow; biopsy, needle or trocar (12/06/2017)  Excision of Left Breast, Percutaneous Approach, Diagnostic (12/06/2017)  Extraction of Iliac Bone Marrow, Percutaneous Approach, Diagnostic (12/06/2017)  Tonsillectomy   Medications  acetaminophen-hydrocodone 325 mg-10 mg oral tablet, 1 tab(s), Oral, QID, PRN  acetaminophen-hydrocodone 325 mg-10 mg oral tablet, 1 tab(s), Oral, QID, PRN,? ?Not taking  aspirin 81 mg oral Delayed Release (EC) tablet, 81 mg= 1 tab(s), Oral, Daily  aspirin 81 mg oral Delayed Release (EC) tablet, 81 mg= 1 tab(s), Oral, Daily, 3 refills  aspirin 81 mg oral tablet, 81 mg= 1 tab(s), Oral, Daily, 11 refills  capecitabine 500 mg oral tablet, See Instructions  capecitabine 500 mg oral tablet, 1500 mg= 3 tab(s), Oral, BID,? ?Not taking  CYCLOBENZAPRINE 10 MG TABLET, Oral, TID  Decadron 4 mg oral tablet, 16 mg= 4 tab(s), Oral, Daily,? ?Not taking  dexamethasone (for IVPB)  diphenhdramine (for Inj.), 25 mg= 0.5 mL, IV Push, Once-chemo  DULoxetine 30 mg oral delayed release capsule, 30 mg= 1 cap(s), Oral, Daily, 3 refills,? ?Not taking  DULoxetine 60 mg oral delayed release capsule, 60 mg= 1 cap(s), Oral, Daily, 3 refills  DULoxetine 60 mg oral delayed release capsule, 60 mg= 1 cap(s), Oral, Daily, 3 refills,? ?Not taking  GABAPENTIN 300 MG CAPSULE, 300 mg= 1 cap(s), Oral, TID  Heparin Flush 100 U/mL - 5 mL, 500 units= 5 mL, IV Push, Once-chemo  Heparin Flush 100 U/mL - 5 mL, 500 units= 5 mL, IV Push, Once-chemo  lactulose 10 g/15 mL oral syrup, 20 gm= 30 mL, Oral,  Daily, 2 refills  Lexapro 20 mg oral tablet, 20 mg= 1 tab(s), Oral, Daily, 11 refills  losartan 25 mg oral tablet, 25 mg= 1 tab(s), Oral, Daily, 6 refills  losartan 50 mg oral tablet, 50 mg= 1 tab(s), Oral, Daily, 6 refills  megestrol 40 mg oral tablet, See Instructions, 8 refills  meloxicam 7.5 mg oral tablet, 7.5 mg= 1 tab(s), Oral, Daily, 11 refills  metoprolol succinate 25 mg oral tablet extended release, 12.5 mg= 0.5 tab(s), Oral, Daily, 6 refills  Pazeo 0.7% ophthalmic solution, 1 drop(s), OPTH, Daily  Phenergan 25 mg Tab, 25 mg= 1 tab(s), Oral, q6hr  potassium chloride 20 mEq oral TABLET extended release, 20 mEq= 1 tab(s), Oral, BID,? ?Not Taking per Prescriber  ProAir HFA 90 mcg/inh inhalation aerosol with adapter, 2 puff(s), INH, q4hr, PRN, 3 refills  traZODone 100 mg oral tablet, See Instructions, 11 refills  Tylenol 325 mg oral tablet, 650 mg= 2 tab(s), Oral, Once-chemo  Zofran (for IVPB) 16 mg + dexamethasone 10 mg/mL injectable solution 10 mg  Zofran ODT 8 mg oral tablet, disintegrating, 8 mg= 1 tab(s), Oral, q8hr, PRN, 1 refills  Allergies  No Known Allergies  Social History  Abuse/Neglect  No, No, Yes, 07/21/2021  Alcohol - Denies Alcohol Use, 08/24/2014  Never, 06/30/2021  Employment/School  homemaker, Work/School description: homemaker. Operates hazardous equipment: No., 04/20/2017  Exercise  Self assessment: Good condition., 04/20/2017  Home/Environment  Lives with Children. Living situation: Home/Independent. Alcohol abuse in household: No. Substance abuse in household: No. Smoker in household: No. Injuries/Abuse/Neglect in household: No. Feels unsafe at home: No. Family/Friends available for support: Yes. Concern for family members at home: No. Major illness in household: No. Financial concerns: No. TV/Computer concerns: No., 04/20/2017  Nutrition/Health  Regular, Wants to lose weight: No., 04/20/2017  Sexual  Sexually active: No., 11/05/2020  Gender Identity Identifies as female.,  02/28/2019  Substance Use  Never, 06/30/2021  Tobacco - Denies Tobacco Use, 03/04/2013  Never (less than 100 in lifetime), N/A, 07/21/2021  Family History  Cancer: Mother.  Heart failure.: Mother.  Primary malignant neoplasm of breast: Sister.  Primary malignant neoplasm of lung: Father.  Immunizations  Vaccine Date Status   pneumococcal 23-polyvalent vaccine 11/14/2017 Recorded   influenza virus vaccine, inactivated 11/15/2016 Recorded   Health Maintenance  Health Maintenance  ???Pending?(in the next year)  ??? ??OverDue  ??? ? ? ?Influenza Vaccine due??10/01/20??and every 1??day(s)  ??? ? ? ?Alcohol Misuse Screening due??01/02/21??and every 1??year(s)  ??? ? ? ?Breast Cancer Screening due??02/19/21??and every 2??year(s)  ??? ??Due?  ??? ? ? ?Colorectal Screening due??07/21/21??Unknown Frequency  ??? ? ? ?Tetanus Vaccine due??07/21/21??and every 10??year(s)  ??? ? ? ?Zoster Vaccine due??07/21/21??Unknown Frequency  ??? ??Due In Future?  ??? ? ? ?Obesity Screening not due until??01/01/22??and every 1??year(s)  ??? ? ? ?ADL Screening not due until??01/29/22??and every 1??year(s)  ??? ? ? ?HF-LVEF not due until??02/25/22??and every 1??year(s)  ??? ? ? ?HF-Heart Failure Education not due until??05/12/22??and every 1??year(s)  ???Satisfied?(in the past 1 year)  ??? ??Satisfied?  ??? ? ? ?ADL Screening on??01/29/21.??Satisfied by Mumtaz Ramirez LPN  ??? ? ? ?Alcohol Misuse Screening on??11/05/20.??Satisfied by Claudia Shaffer  ??? ? ? ?Blood Pressure Screening on??07/21/21.??Satisfied by Patricia Jay LPN  ??? ? ? ?Body Mass Index Check on??07/21/21.??Satisfied by Jessika Qiu RN  ??? ? ? ?Cervical Cancer Screening on??11/05/20.??Satisfied by Nghia Lovett  ??? ? ? ?Depression Screening on??07/21/21.??Satisfied by Patricia Jay LPN  ??? ? ? ?Diabetes Screening on??07/21/21.??Satisfied by Deon Louise  ??? ? ? ?Hypertension Management-Blood Pressure on??07/21/21.??Satisfied by Pierre CONWAY,  Patricia Llamas  ??? ? ? ?Influenza Vaccine on??01/29/21.??Satisfied by James CONWAY, Mumtaz Aerial  ??? ? ? ?Obesity Screening on??07/21/21.??Satisfied by Homar Oneill RN, Jessika  ?  Lab Results  Test Name Test Result Date/Time   Sodium Lvl 138 mmol/L 07/21/2021 07:45 CDT   Potassium Lvl 4.4 mmol/L 07/21/2021 07:45 CDT   Chloride 108 mmol/L (High) 07/21/2021 07:45 CDT   CO2 23 mmol/L 07/21/2021 07:45 CDT   Calcium Lvl 9.4 mg/dL 07/21/2021 07:45 CDT   Magnesium Lvl 2.20 mg/dL 07/21/2021 07:45 CDT   Glucose Lvl 89 mg/dL 07/21/2021 07:45 CDT   BUN 22.1 mg/dL (High) 07/21/2021 07:45 CDT   Creatinine 1.21 mg/dL (High) 07/21/2021 07:45 CDT   Est Creat Clearance Ser 19.64 mL/min 07/21/2021 09:02 CDT   eGFR-AA 60 (Low) 07/21/2021 07:45 CDT   eGFR-ZEESHAN 49 mL/min/1.73 m2 (Low) 07/21/2021 07:45 CDT   Bili Total 0.5 mg/dL 07/21/2021 07:45 CDT   Bili Direct 0.2 mg/dL 07/21/2021 07:45 CDT   Bili Indirect 0.30 mg/dL 07/21/2021 07:45 CDT   AST 12 unit/L 07/21/2021 07:45 CDT   ALT 6 unit/L 07/21/2021 07:45 CDT   Alk Phos 65 unit/L 07/21/2021 07:45 CDT   Total Protein 6.7 gm/dL 07/21/2021 07:45 CDT   Albumin Lvl 3.7 gm/dL 07/21/2021 07:45 CDT   Globulin 3.0 gm/dL 07/21/2021 07:45 CDT   A/G Ratio 1.2 ratio 07/21/2021 07:45 CDT   WBC 3.7 x10(3)/mcL (Low) 07/21/2021 07:45 CDT   RBC 2.91 x10(6)/mcL (Low) 07/21/2021 07:45 CDT   Hgb 9.7 gm/dL (Low) 07/21/2021 07:45 CDT   Hct 31.0 % (Low) 07/21/2021 07:45 CDT   Platelet 295 x10(3)/mcL 07/21/2021 07:45 CDT   .5 fL (High) 07/21/2021 07:45 CDT   MCH 33.3 pg 07/21/2021 07:45 CDT   MCHC 31.3 gm/dL 07/21/2021 07:45 CDT   RDW 16.7 % (High) 07/21/2021 07:45 CDT   MPV 9.9 fL 07/21/2021 07:45 CDT   Abs Neut 2.22 x10(3)/mcL 07/21/2021 07:45 CDT   Neutro Auto 61 % 07/21/2021 07:45 CDT   Lymph Auto 25 % 07/21/2021 07:45 CDT   Mono Auto 10 % 07/21/2021 07:45 CDT   Eos Auto 4 % 07/21/2021 07:45 CDT   Abs Eos 0.1 x10(3)/mcL 07/21/2021 07:45 CDT   Basophil Auto 1 % 07/21/2021 07:45 CDT   Abs Neutro 2.22  x10(3)/mcL 07/21/2021 07:45 CDT   Abs Lymph 0.9 x10(3)/mcL 07/21/2021 07:45 CDT   Abs Mono 0.4 x10(3)/mcL 07/21/2021 07:45 CDT   Abs Baso 0.0 x10(3)/mcL 07/21/2021 07:45 CDT   NRBC% 0.0 % 07/21/2021 07:45 CDT   IG% 0 % 07/21/2021 07:45 CDT   IG# 0.010 07/21/2021 07:45 CDT

## 2022-05-04 NOTE — HISTORICAL [facility] CERNER
This is a historical note converted from [employer]. Formatting and pictures may have been removed.  Please reference [employer] for original formatting and attached multimedia. Admission H&P  ?   Patient:???Christiana Webb????????????MRN:?[id number]???????????FIN:?[id number]?????????  Age:???52 years ? ? Sex: ?Female ? ? :??1965  Associated Diagnoses: ? None  Author:?? Luis Enrique Álvarez; [employer]  ?   Chief Complaint:??dizziness, blurry vision, nausea, vomiting  ?  ?  HPI:??  Christiana Webb is a 53 y/o -American woman who presents to the ED with a 3 hour history of dizziness, nausea with one episode of vomiting as well as a fall.? These symptoms arose during a Medical clinic visit for evaluation of a 6 month history of left lower extremity pain.? She denies having loss of consciousness and the vomiting was described as watery.? She is accompanied by her sister who helped with the history.? Ms. Webb reported a prior incident several months ago in which she suffered a fall and transient loss of speech in which she went to an [facility] emergency room.? However, she claims that she was discharged after there was no evidence of a stroke.? She was initially slow to respond to questions but soon became very cooperative.? She is hypertensive upon admission?but?denies headache, fever, loss of sensation, chest pain, shortness of breath,?and any motor deficits.?  ?  ?  Allergies:  NKDA  ?  PM Hx:  - Shingles  - Hypertension  ?  ?  Past Surgical Hx:  - Tonsillectomy  ?  ?  Family History:  - Mother and father had unspecified cancer; Sisiter breast cancer  ?  ?  Social Hx:  - Denies alcohol, tobacco, or illicit drug use  ?  Sexual Hx:  - 3 children  - denies history of STI diagnosis  ?  ?  Medications:  Home meds:? Ketoconazole shampoo, KCl, cyclobenzaprine, losartan,  ?  Scheduled:?amlodipine, acetaminophen, labetalol, Zofran  ?  ?  ?  ROS:  ?  Constitutional: Denies fever and chills; reports a 12 lb  weight loss over the past month  Eye: Blurry vision  Respiratory: Denies shortness of breath, cough, hemoptysis  Cardiovascular: Denies chest pain and palpitations  Gastrointestinal: + Nausea and vomiting; denies constipation, abdominal pain, and diarrhea  Musculoskeletal: + pain in LLE  Neurologic: Per HPI  ?  ?  Vital Signs (last 24 hrs)_____  Tm: 36.7 C  HR: 77  RR: 16  SpO2: 99% on room air  BP: 172/85  Weight: 185.2 lbs  Ht: 170cm  BMI: 35.4  ?  ?  Physical Exam:  ?  General: ?A&O x 4; accompanied by sister; in moderate distress  Eye: ?Pupils are equal, round and reactive to light; Lateral nystagmus present in left eye?  Neck:??Supple and nontender; no evidence of lymphadenopathy  Respiratory:??CTAB; mildly labored breathing; chest wall expansion equal bilaterally; neg for rhonchi, wheezes, and crackles??  Cardiovascular:??Regular rate without murmurs or rubs; pulses +2 bilaterally in upper and lower extremities?  Gastrointestinal:??Soft, NT, ND  Musculoskeletal:??Strength +5 upon flexion and extension bilaterally in upper and lower extremities; pain in L leg?  Integumentary:??No rash or pallor  Neurologic:??CN II-XII intact; + dizziness per HPI; sensation present in all extremities  Cognition and Speech:??Coherent  ?  ?  Labs:  ?Labs Last 24 Hours  ?Chemistry Hematology/Coagulation   Sodium Lvl: 143 mmol/L (07/21/17 09:46:59) WBC: 9.3 x10(3)/mcL (07/21/17 09:10:55)   Potassium Lvl: 3.2 mmol/L Low (07/21/17 09:46:59) RBC: 3.81 x10(6)/mcL Low (07/21/17 09:10:55)   Chloride: 105 mmol/L (07/21/17 09:46:59) Hgb: 10.7 gm/dL Low (07/21/17 09:10:55)   CO2: 24 mmol/L (07/21/17 09:46:59) Hct: 32.7 % Low (07/21/17 09:10:55)   Calcium Lvl: 11 mg/dL High (07/21/17 09:46:59) Platelet: 342 x10(3)/mcL (07/21/17 09:10:55)   Magnesium Lvl: 2.1 mg/dL (07/21/17 10:07:46) MCV: 85.8 fL (07/21/17 09:10:55)   Glucose Lvl: 122 mg/dL High (07/21/17 09:46:59) MCH: 28.1 pg (07/21/17 09:10:55)   BUN: 21 mg/dL High (07/21/17 09:46:59)  MCHC: 32.7 gm/dL (07/21/17 09:10:55)   Creatinine: 1.7 mg/dL High (07/21/17 09:46:59) RDW: 16.4 % High (07/21/17 09:10:55)   eGFR-AA: 41 mL/min Low (07/21/17 09:47:00) MPV: 10.6 fL High (07/21/17 09:10:55)   eGFR-ZEESHAN: 34 mL/min Low (07/21/17 09:47:01) Abs Neut: 5.15 x10(3)/mcL (07/21/17 09:10:55)   Amylase Lvl: 45 unit/L (07/21/17 10:07:43) Neutro Auto: 55 x10(3)/mcL (07/21/17 09:10:56)   Bili Total: 0.3 mg/dL (07/21/17 09:46:59) Lymph Auto: 32 % (07/21/17 09:10:56)   Bili Direct: 0.1 mg/dL (07/21/17 09:46:59) Mono Auto: 7 % (07/21/17 09:10:56)   Bili Indirect: 0.2 mg/dL (07/21/17 09:46:59) Eos Auto: 3 % (07/21/17 09:10:56)   AST: 84 unit/L High (07/21/17 09:46:59) Abs Eos: 0.24 (07/21/17 09:10:56)   ALT: 68 unit/L (07/21/17 09:46:59) Basophil Auto: 0 % (07/21/17 09:10:56)   Alk Phos: 103 unit/L (07/21/17 09:46:59) Abs Neutro: 5.15 x10(3)/mcL (07/21/17 09:10:56)   Total Protein: 8.6 gm/dL High (07/21/17 09:46:59) Abs Lymph: 3.02 x10(3)/mcL (07/21/17 09:10:56)   Albumin Lvl: 3.8 gm/dL (07/21/17 09:46:59) Abs Mono: 0.64 x10(3)/mcL (07/21/17 09:10:56)   Globulin: 4.8 gm/mL High (07/21/17 09:46:59) Abs Baso: 0.05 x10(3)/mcL (07/21/17 09:10:56)   A/G Ratio: 1 ratio (07/21/17 09:46:59) IG%: 2 % (07/21/17 09:10:56)   Lactic Acid Lvl: 2.6 mmol/L Critical (07/21/17 15:12:38) IG#: 0.2 (07/21/17 09:10:56)   Lipase Lvl: 109 unit/L (07/21/17 10:07:45)    Total CK: 102 unit/L (07/21/17 09:41:39)    CK MB: <1.0 (07/21/17 09:41:40)    POC Troponin: 0 ng/mL (07/21/17 09:50:32)    U pH: 6 (07/21/17 10:43:10)    ?  ?  ?  Radiology:  - 7/21: MRI brain: no evidence of CNS abnormalities  - 7/21: Left lower extremity US: no evidence of DVT  - 7/21: CXR: negative  ?  Micro:  7/21:?Urine Cx Pending.  ?  Assessment & Plan:  ?  1. Dizziness: Exploring etiology  - Bed rest and LR infusions  - Neurology consulted  ?  2. Hypertension - Uncontrolled  - BP on admission is 172/85.  - Labetalol and amlodipine for control  ?  3. Lower extremity  pain  - no evidence of DVT per US  - acetaminophen PRN  ?  4. Nausea/vomiting  - seemingly resolved  - treating with Zofran  ?  Disposition:??Christiana Webb is a 51 y/o -American female who presents with dizziness, nausea, vomiting, and left lower extremity pain.? DVT and TIA are unlikely per labs.??Urine culture is pending yet?UTI is unlikely due to normal WBC count.? Will continue to monitor her for the next few days while?providing her with fluids and controlling BP, N/V, and pain.?Awaiting Neurology consult.  ?  ?  ?  ?  ?  ?  ?  ?  ?  I have seen the patient with the residents, re assessed the historical, physical, laboratory, and radiologic findings.  I have discussed the case with the residents and made recommendations.

## 2022-05-04 NOTE — HISTORICAL [facility] C[facility]ER
This is a historical note converted from [employer]. Formatting and pictures may have been removed.  Please reference [employer] for original formatting and attached multimedia. Patient seen and examined this morning, no changes to the H&P documented 2/6/18  - NPO since MN  - OR today for mediport  ?   Bebo Burn  PGY1 [employer] [facility]  ?  ?  ?  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 Patient noticed mass and skin changes starting in Nov 2017. A bilateral MMG on 12/07/2017 showed inflammatory changes of the breast with a 4cm mass located at the 12 oclock position and 2 suspicious lymph nodes. U/S confirmed these results. Punch biopsy on 12/12/2017 diagnosed ?invasive mammary carcinoma, grade 2, moderately differentiated, without definitive dermal lymphatic invasion. Subsequent CT and bone scans have shown widespread metastasis to liver, lung, adrenal, axial and appendicular skeleton. Patient underwent a L3-L4 bilateral laminectomies and decompression of the posterior elements.?She has lost 80lbs since September of 2017. Oncology wants to start chemotherapy on 2/15/2018. Patient has never had any central lines in the past. Patient is not on any blood thinners and has no history of heart disease per patient.  Review of Systems  Negative except as stated in HPI  Physical Exam  ??Vitals & Measurements  ??T:?36.5? ?C ?(Oral)? HR:?110?(Peripheral)? RR:?20? BP:?137/82?  ??HT:?152?cm? HT:?152?cm? WT:?64?kg? WT:?64?kg? BMI:?27.7?  Gen: NAD  CV: RRR, no murmur, no S3/S4  Breast: Left breast with peau dorange dimpling, palpable mass at the 12 oclock position. Skin darkening evident in Left breast. Nipple retraction of the left breast. ?Right breast exam benign  Pulm: CTAB, no wheezes or crackles  Abd: s/ nt/nd/+BS  ?  Assessment/Plan  ?  53yo F?diagnosed with metastatic inflammatory  carcinoma of the Left breast?referred for evaluation for Mediport insertion ?  -The r/b/a of the procedure were discussed with the patient and consent was obtained  -Patient scheduled for Mediport insertion on 2/14/2018   Agree with the above assessment and plan

## 2022-05-04 NOTE — HISTORICAL [facility]ERNER
This is a historical note converted from [place]. Formatting and pictures may have been removed.  Please reference [facility] for original formatting and attached multimedia. Chief Complaint  Metastatic breast cancer  History of Present Illness  Problem list:  1. Stage IV Inflammatory breast cancer. Biopsy proven (right anterior iliac bone on 12/08/2017 showed metastatic carcinoma, consistent with a breast primary) metastatic left breast cancer, with extensive bone metastases, lung metastasis, possible liver and adrenal metastasis, extensive dulce metastasis, and small intracranial (posterior fossa) metastasis?  ????  ER 85% positive, WV 45% positive, Ki-67 30% (high proliferation), HER-2 3+ (overexpressed)  Pathologic L4 compression fracture, to a lesser extent at L3 also, epidural extension of tumor at the L4 level resulting in severe canal stenosis, and left lower extremity weakness.  ?  -Germline?BRCA1/2?testing negative as of 08/17/2020.  ???  Current Treatment:?  1. Palliative chemotherapy with Taxotere every 21 days.  ?   C26 due: 09/02/2020, however she rescheduled, no due 09/10/2020  ?   Dose modifications & interruptions:  Docetaxel reduced by 25%?from cycle #1.  Added Neulasta support from second cycle of chemotherapy onwards  Herceptin/Perjeta dropped starting 9/12/19 for LVEF 45%.  ?   Treatment History:  1. 12/18/17: Dr. Harish Pearson performed L3-L4 bilateral laminectomies and decompression of the posterior elements; L4-L5 bilateral laminectomies and foraminotomies with decompression of the thecal sac; lumbar open reduction and internal fixation; biopsy of L3 and biopsy of L4 vertebral bodies.??  2. Palliative radiation therapy to the lumbar spine post decompression surgery, as well as radiation therapy to the brain metastasis with Dr. Saeed. She completed on 2/2/2018.  3. Herceptin/Perjeta/Taxotere 03/21/2018-09/12/2019, herceptin and perjeta dropped d/t decreased LVEF of 45%.  ?   History of present  illness:?  52-year-old Afro-American female who was seen in internal medicine clinic on 12/06/2017 for left lower extremity pain and was found to have multiple abnormalities on blood tests. ?Was hospitalized for evaluation, and ultimately found to have metastatic breast cancer.  ?   For?a full, detailed note, see the note dated 7/22/2020.  ?   Interval History:  Patient presents today for a scheduled follow-up, alone prior to cycle 26 of taxotere. She is currently tolerating therapy?well with no intolerable effects noted or reported, no new complaints. She endorses no new problems or concerns today. She completed surveillance brain MRI on 09/09/2020 which showed?2 new questionable areas for possible metastasis. She recently saw [facility] for radiation oncology.?Denies HA, fever, night sweats, SOB, CP, edema, neuropathy. Denies N/V/C/D, abdominal pain, change in bowel/bladder habits, blood in the urine/stool. Appetite good, weight stable, denies unintentional weight loss/gain. Labs today stable, adequate for treatment, reviewed with patient. Reviewed patient medications. Patient denies needing any medication refills at this time. Denies questions/needs/concerns.  ?  Review of Systems  A complete 12 point ROS was performed in full with pertinent positives described in interval history. Remainder of ROS done in full and are negative.?  Physical Exam  Vitals & Measurements  T:?36.5? ?C (Oral)? HR:?62(Peripheral)? RR:?16? BP:?118/81?  HT:?157.00?cm? WT:?60.700?kg? BMI:?24.63?  Vital Signs Reviewed  General: AAO, NAD noted  Eye: PERRLA, EOMI  Neuro: Normal mentation, strength 5/5 on all 4 extremities, no sensory deficits  HET: Normocephalic, adequate hearing,?no oral ulcers, mucous membranes pink/moist/intact, no pharyngeal erythema, poor dentition  Neck: Supple, non-tender, no lymphadenopathy  Respiratory: Non-labored breathing, symmetrical chest wall expansion, BBS CTA, no crackles/wheezing/rhonchi  noted  Cardiovascular: S1S2 noted, RRR, no M/R/G, pulses equal in all extremities, normal peripheral perfusion  Abdomen: Soft, non-tender, non-distended, BS+, no hepatosplenomegaly  Musculoskeletal: Normal ROM, normal gait, ambulates without assistance  Integumentary: No rashes, no bruises or purpura, warm and dry, normal for ethnicity  Neurologic: No focal deficits, Cranial nerves II-XII are grossly intact.  Cognition and Speech: Speech clear and coherent, functional cognition intact  Psychiatric: Cooperative, appropriate mood and affect for situation, normal judgement, no suicidal or homicidal ideations  ?  ?   The National Cancer [place] Toxicity Scores:  ?   ECOG Performance Scale: 1 - Strenuous physical activity restricted; fully ambulatory and able to carry out light work.  ?  Assessment/Plan  1.?Metastatic breast cancer?C50.912  ? #Metastatic breast cancer:  Inflammatory carcinoma of left breast. ?ER positive. ?ND positive. ?HER-2 positive.? Diagnosed 12/2017  Metastases to brain,?lumbar spine,?extensively to bones,?lymph nodes, lungs, liver, and adrenal.?  For lumbar spine metastasis,?status post laminectomy and internal fixation 12/2017  Status post radiation therapy to lumbar spine and brain?02/2018  Noncompliant with follow-up.  Status post Taxotere/Herceptin/Perjeta?x1 on?03/21/2018; she declined further chemotherapy?  Presented?1 year later,?on?02/2019:?Extensive bone metastases stable;?progression of bilateral lung metastasis, bilateral axillary?lymphadenopathy, progressive liver metastasis, progressive left adrenal metastasis  Dose reduced Taxotere/Herceptin/Perjeta started?03/07/2019?(1 year after first cycle of chemotherapy)  Good response post 4 cycles  Excellent response?post 8 cycles  Starting?with ninth cycle of chemotherapy,?discontinued Herceptin/Perjeta?due to persistent?drop in LVEF,?without?recovery?post?discontinuation  Continued improvement/stability?post 13 cycles of Taxotere;?dramatic  drop in tumor marker level  No brain metastasis?on surveillance brain MRI (02/10/2020)  -Stable metastatic disease?on restaging CTs and bone scan?post 18 cycles of Taxotere?(04/16/2020)  -No brain metastasis on surveillance brain MRI (05/27/2020)  -No metastatic disease progression?post?Taxotere x23 cycles?(restaging?CTs and bone scan: 07/17/2020)  --------  ?   # Sites of disease:  Left breast (inflammatory carcinoma);?brain metastasis; lumbar spine metastasis; extensive bone metastasis;?bilateral axillary?lymph nodes; lungs; liver; left?adrenal  --------  ?   # Molecular markers:  ER positive. ?W[place] positive. ?HER-2 positive.  MMR proficient  NTRK gene fusion negative  ?   Plan:  -Excellent response to Taxotere  -No disease progression post?23 cycles of Taxotere  -Continue Taxotere every 3 weeks (25% dose reduced, with Neulasta support)  -Check CBC and CMP every 10 days  -Check?CA 15-3 and CA 27.29 level every month  -Restage with CT C/A/P with contrast?and bone scan in 3 months (mid October)  -Germline?BRCA1/2?testing negative as of 08/17/2020.  ?  -In view of the fact that she has?extensive visceral metastatic disease,?we started treatment with chemotherapy rather than endocrine therapy.  ?   -Since she experienced severe neutropenia, requiring hospitalization, after first cycle of chemotherapy last year?(2018), therefore, we empirically reduced?the dose of docetaxel by 25%?from cycle #1.  She has required Neulasta support from second cycle of chemotherapy onwards  ?   Chemotherapy?schedule:  Pertuzumab 840 mg IV day 1, followed by 420 mg IV;  Trastuzumab 8 mg/kg IV day 1 followed by 6 mg/kg IV;  Docetaxel  mg/m? IV on day 1;  Cycled every 21 days.  Herceptin/Perjeta discontinued from ninth cycle of chemotherapy onwards?(persistent drop in?LVEF)  She was administered docetaxel 75 mg/m? last year?(2018), resulting in severe neutropenia requiring hospitalization.?  Therefore, on resumption?in March 2019,?we  reduced?Taxotere?dose by 25% (55 mg/m?)?from cycle #1  Has required Neulasta support from second cycle of chemotherapy onwards,?subsequently, with no significant cytopenias.  ?   Surveillance brain MRI with and without gadolinium?in 3 months (May).  ?   For ER positive, IL positive, HER-2 positive metastatic disease, treatment options are:  1.? Chemotherapy plus HER-2 targeted therapy with pertuzumab plus trastuzumab plus taxane?(preferred); or, Ado-trastuzumab emtansine (T-DMI); or trastuzumab plus chemotherapy; or,  2.? Endocrine therapy with or without HER-2 targeted therapy (if premenopausal, consider ovarian ablation or suppression); for premenopausal woman, selective ER modulators alone (without ovarian ablation/suppression) plus HER-2 targeted therapy is also an option; or,  3.? Other HER-2 targeted therapies.  ?   -Proceed with C#26 Taxotere with 25% dose reduction and Neulasta support 09/10/2020  -Restaging CT C/A/P + BS scheduled:?10/22/2020  -C/W monthly Ca 27.29 and Ca 15-3 monitoring  -Refer back to [facility] for evaluation of new questionable areas on recent Brain MRI.  ?  2.?Encounter for monitoring cardiotoxic drug therapy?Z51.81  Her LVEF remains suppressed?despite?the fact that Herceptin/Perjeta was held?due to drop in LVEF  No?normalization of LVEF?over subsequent?2 months  We discontinued Herceptin/Perjeta for good.?  ?  -Has been referred to cardiology for evaluation of Herceptin/Perjeta induced drop in LVEF  ?  3.?Bone metastasis?C79.51  -To prevent skeletal events from bone metastasis, need to start?either Xgeva or bisphosphonate therapy.  Need preventive dentistry and dental clearance?to prevent/minimize the likelihood of?osteonecrosis of the jaw before starting these medications.? This is pending.  Concurrent calcium and vitamin D supplementation is recommended (1200 mg of calcium and 800-1000 units of vitamin D3 daily).  Optimal schedule for zoledronic acid is monthly x12, then  quarterly  -->  She has been unable to undergo preventive dentistry; therefore, we will hold off on antiresorptive therapy?for now.  ?  -Denosumab initiation pending dental clearance  ?  RTC 10/01/2020 with NP with labs: CBC, CMP, Mg, Ca 27.29, Ca 15-3  ?  Discussed POC with patient, all questions answered. Instructed to call clinic for any issues or with any questions PRN, patient verbalizes understanding.  ?   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  Metastatic breast cancer  Historical  Shingles  Procedure/Surgical History  Drainage of Buttock Skin, External Approach (07/30/2019)  Incision and drainage of abscess (eg, carbuncle, suppurative hidradenitis, cutaneous or subcutaneous abscess, cyst, furuncle, or paronychia); simple or single (07/30/2019)  Transfusion of Nonautologous Red Blood Cells into Peripheral Vein, Percutaneous Approach (03/28/2018)  Catheter Insertion Mediport (None) (02/23/2018)  Catheter, infusion, inserted peripherally, centrally or midline (other than hemodialysis) (02/23/2018)  Fluoroscopy of Superior Vena Cava using Low Osmolar Contrast, Guidance (02/23/2018)  Insertion of Infusion Device into Superior Vena Cava, Percutaneous Approach (02/23/2018)  Insertion of tunneled centrally inserted central venous access device, with subcutaneous port; age 5 years or older (02/23/2018)  Fusion of 2 or more Lumbar Vertebral Joints with Synthetic Substitute, Posterior Approach, Posterior Column, Open Approach (12/19/2017)  Fusion of Lumbosacral Joint with Synthetic Substitute, Posterior Approach, Posterior Column, Open Approach (12/19/2017)  Lumbar Foraminotomy MIS (.) (12/17/2017)  Bone biopsy sample (12/08/2017)  Breast biopsy sample (12/07/2017)  Biopsy of breast; percutaneous, needle core, not using imaging guidance  (separate procedure) (12/06/2017)  Bone marrow; biopsy, needle or trocar (12/06/2017)  Excision of Left Breast, Percutaneous Approach, Diagnostic (12/06/2017)  Extraction of Iliac Bone Marrow, Percutaneous Approach, Diagnostic (12/06/2017)  Tonsillectomy   Medications  acetaminophen-hydrocodone 325 mg-10 mg oral tablet, 1 tab(s), Oral, QID, PRN  aspirin 81 mg oral Delayed Release (EC) tablet, 81 mg= 1 tab(s), Oral, Daily  aspirin 81 mg oral tablet, 81 mg= 1 tab(s), Oral, Daily, 11 refills  Colace 100 mg oral capsule, 100 mg= 1 cap(s), Oral, BID, PRN  CYCLOBENZAPRINE 10 MG TABLET, Oral, TID  dexamethasone (for IVPB)  dexamethasone 4 mg oral tablet, 8 mg= 2 tab(s), Oral, BID, 12 refills  dexamethasone 4 mg oral tablet, 8 mg= 2 tab(s), Oral, BID, 12 refills  dexamethasone 4 mg oral tablet, 8 mg= 2 tab(s), Oral, BID, 12 refills  dexamethasone 4 mg oral tablet, 8 mg= 2 tab(s), Oral, BID,? ?Not taking  dexamethasone 4 mg oral tablet, 8 mg= 2 tab(s), Oral, BID, 12 refills  dexamethasone 4 mg oral tablet, 8 mg= 2 tab(s), Oral, BID, 12 refills  diphenhdramine (for Inj.), 25 mg= 0.5 mL, IV Push, Once-chemo  GABAPENTIN 300 MG CAPSULE, 300 mg= 1 cap(s), Oral, TID  Heparin Flush 100 U/mL - 5 mL, 500 units= 5 mL, IV Push, Once-chemo  Heparin Flush 100 U/mL - 5 mL, 500 units= 5 mL, IV Push, Once-chemo  Lexapro 20 mg oral tablet, 20 mg= 1 tab(s), Oral, Daily, 11 refills  losartan 25 mg oral tablet, 25 mg= 1 tab(s), Oral, Daily, 6 refills  megestrol 40 mg oral tablet, See Instructions, 8 refills  meloxicam 7.5 mg oral tablet, 7.5 mg= 1 tab(s), Oral, Daily, 11 refills  metoprolol succinate 25 mg oral tablet extended release, 12.5 mg= 0.5 tab(s), Oral, Daily, 6 refills  Pazeo 0.7% ophthalmic solution, 1 drop(s), OPTH, Daily  Peridex 0.12% mucous membrane liquid, 0.018 gm= 15 mL, Oral, BID,? ?Not Taking, Completed Rx  Phenergan 25 mg Tab, 25 mg= 1 tab(s), Oral, q6hr  potassium chloride 20 mEq oral TABLET extended release, 20 mEq= 1  tab(s), Oral, BID,? ?Not Taking per Prescriber  ProAir HFA 90 mcg/inh inhalation aerosol with adapter, 2 puff(s), INH, q4hr, PRN, 3 refills  traZODone 100 mg oral tablet, See Instructions, 11 refills  Tylenol 325 mg oral tablet, 650 mg= 2 tab(s), Oral, Once-chemo  Zofran (for IVPB) 16 mg + dexamethasone 10 mg/mL injectable solution 10 mg  Zofran ODT 8 mg oral tablet, disintegrating, 8 mg= 1 tab(s), Oral, q8hr, PRN  Allergies  No Known Allergies  Social History  Abuse/Neglect  No, No, Yes, 08/17/2020  Alcohol - Denies Alcohol Use, 08/24/2014  Never, 08/17/2020  Employment/School  homemaker, Work/School description: homemaker. Operates hazardous equipment: No., 04/20/2017  Exercise  Self assessment: Good condition., 04/20/2017  Home/Environment  Lives with Children. Living situation: Home/Independent. Alcohol abuse in household: No. Substance abuse in household: No. Smoker in household: No. Injuries/Abuse/Neglect in household: No. Feels unsafe at home: No. Family/Friends available for support: Yes. Concern for family members at home: No. Major illness in household: No. Financial concerns: No. TV/Computer concerns: No., 04/20/2017  Nutrition/Health  Regular, Wants to lose weight: No., 04/20/2017  Sexual  Gender Identity Identifies as female., 02/28/2019  Substance Use  Never, 08/17/2020  Tobacco - Denies Tobacco Use, 03/04/2013  Never (less than 100 in lifetime), N/A, 08/17/2020  Family History  Cancer: Mother.  Heart failure.: Mother.  Primary malignant neoplasm of lung: Father.  Immunizations  Vaccine Date Status   pneumococcal 23-polyvalent vaccine 11/14/2017 Recorded   influenza virus vaccine, inactivated 11/15/2016 Recorded   Health Maintenance  Health Maintenance  ???Pending?(in the next year)  ??? ??OverDue  ??? ? ? ?Alcohol Misuse Screening due??01/02/20??and every 1??year(s)  ??? ? ? ?HF-Heart Failure Education due??08/17/20??and every 1??year(s)  ??? ??Due?  ??? ? ? ?Cervical Cancer Screening  due??09/09/20??and every?  ??? ? ? ?Colorectal Screening due??09/09/20??and every?  ??? ? ? ?Influenza Vaccine due??09/09/20??and every?  ??? ? ? ?Tetanus Vaccine due??09/09/20??and every 10??year(s)  ??? ? ? ?Zoster Vaccine due??09/09/20??and every?  ??? ??Due In Future?  ??? ? ? ?HF-LVEF not due until??11/10/20??and every 1??year(s)  ??? ? ? ?Obesity Screening not due until??01/01/21??and every 1??year(s)  ??? ? ? ?ADL Screening not due until??01/15/21??and every 1??year(s)  ??? ? ? ?Breast Cancer Screening not due until??02/19/21??and every 2??year(s)  ??? ? ? ?Diabetes Screening not due until??08/12/21??and every 1??year(s)  ??? ? ? ?Hypertension Management-BMP not due until??08/12/21??and every 1??year(s)  ??? ? ? ?Depression Screening not due until??08/17/21??and every 1??year(s)  ??? ? ? ?Blood Pressure Screening not due until??08/18/21??and every 1??year(s)  ??? ? ? ?Body Mass Index Check not due until??08/18/21??and every 1??year(s)  ??? ? ? ?Hypertension Management-Blood Pressure not due until??08/18/21??and every 1??year(s)  ???Satisfied?(in the past 1 year)  ??? ??Satisfied?  ??? ? ? ?ADL Screening on??01/15/20.??Satisfied by Mumtaz Ramirez LPN  ??? ? ? ?Blood Pressure Screening on??08/18/20.??Satisfied by Atiya CASTILLO, Anastacia BELTRAN  ??? ? ? ?Body Mass Index Check on??08/18/20.??Satisfied by Anastacia Rajput RN  ??? ? ? ?Depression Screening on??08/17/20.??Satisfied by Stacie Fierro  ??? ? ? ?Diabetes Screening on??08/12/20.??Satisfied by Jessica Lynch  ??? ? ? ?Hypertension Management-Blood Pressure on??08/18/20.??Satisfied by Anastacia Rajput RN  ??? ? ? ?Influenza Vaccine on??03/18/20.??Satisfied by Reg Montoya RN  ??? ? ? ?Obesity Screening on??08/18/20.??Satisfied by Anastacia Rajput RN  ?  Lab Results  Test Name Test Result Date/Time   Sodium Lvl 138 mmol/L 09/10/2020 09:00 CDT   Potassium Lvl 4.3 mmol/L 09/10/2020 09:00 CDT   Chloride 108 mmol/L (High) 09/10/2020 09:00 CDT   CO2 24 mmol/L  09/10/2020 09:00 CDT   Calcium Lvl 9.1 mg/dL 09/10/2020 09:00 CDT   Glucose Lvl 96 mg/dL 09/10/2020 09:00 CDT   BUN 21 mg/dL (High) 09/10/2020 09:00 CDT   Creatinine 1.20 mg/dL 09/10/2020 09:00 CDT   eGFR-AA 60 mL/min (Low) 09/10/2020 09:00 CDT   eGFR-ZEESHAN 50 mL/min (Low) 09/10/2020 09:00 CDT   Bili Total 0.3 mg/dL 09/10/2020 09:00 CDT   Bili Direct <0.1 mg/dL 09/10/2020 09:00 CDT   Bili Indirect Calc. not valid 09/10/2020 09:00 CDT   AST 16 unit/L 09/10/2020 09:00 CDT   ALT 10 unit/L (Low) 09/10/2020 09:00 CDT   Alk Phos 49 unit/L 09/10/2020 09:00 CDT   Total Protein 6.8 gm/dL 09/10/2020 09:00 CDT   Albumin Lvl 3.6 gm/dL 09/10/2020 09:00 CDT   Globulin 3.20 gm/mL 09/10/2020 09:00 CDT   A/G Ratio 1.1 ratio 09/10/2020 09:00 CDT   WBC 6.4 x10(3)/mcL 09/10/2020 09:00 CDT   RBC 3.65 x10(6)/mcL (Low) 09/10/2020 09:00 CDT   Hgb 11.0 gm/dL (Low) 09/10/2020 09:00 CDT   Hct 34.8 % (Low) 09/10/2020 09:00 CDT   Platelet 319 x10(3)/mcL 09/10/2020 09:00 CDT   MCV 95.3 fL 09/10/2020 09:00 CDT   MCH 30.1 pg 09/10/2020 09:00 CDT   MCHC 31.6 gm/dL 09/10/2020 09:00 CDT   RDW 17.2 % (High) 09/10/2020 09:00 CDT   MPV 10.8 fL (High) 09/10/2020 09:00 CDT   Abs Neut 5.67 x10(3)/mcL 09/10/2020 09:00 CDT   Neutro Auto 88 % 09/10/2020 09:00 CDT   Lymph Auto 9 % 09/10/2020 09:00 CDT   Mono Auto 2 % 09/10/2020 09:00 CDT   Basophil Auto 0 % 09/10/2020 09:00 CDT   Abs Neutro 5.67 x10(3)/mcL 09/10/2020 09:00 CDT   Abs Lymph 0.6 x10(3)/mcL 09/10/2020 09:00 CDT   Abs Mono 0.2 x10(3)/mcL 09/10/2020 09:00 CDT   Abs Baso 0.0 x10(3)/mcL 09/10/2020 09:00 CDT   IG% 0 % 09/10/2020 09:00 CDT   IG# 0.0200 09/10/2020 09:00 CDT

## 2022-05-04 NOTE — HISTORICAL [facility] CERNER
This is a historical note converted from [employer]. Formatting and pictures may have been removed.  Please reference [employer] for original formatting and attached multimedia. Chief Complaint  C27 Taxotere  History of Present Illness  Problem list:  1. Stage IV Inflammatory breast cancer. Biopsy proven (right anterior iliac bone on 12/08/2017 showed metastatic carcinoma, consistent with a breast primary) metastatic left breast cancer, with extensive bone metastases, lung metastasis, possible liver and adrenal metastasis, extensive dulce metastasis, and small intracranial (posterior fossa) metastasis?  ????  ER 85% positive, OH 45% positive, Ki-67 30% (high proliferation), HER-2 3+ (overexpressed)  Pathologic L4 compression fracture, to a lesser extent at L3 also, epidural extension of tumor at the L4 level resulting in severe canal stenosis, and left lower extremity weakness.  ?   -Germline?BRCA1/2?testing negative as of 08/17/2020.  ???  Current Treatment:?  1. Palliative chemotherapy with Taxotere every 21 days.  ?   C27 due: 10/1/2020  ?   Dose modifications & interruptions:  Docetaxel reduced by 25%?from cycle #1.  Added Neulasta support from second cycle of chemotherapy onwards  Herceptin/Perjeta dropped starting 9/12/19 for LVEF 45%.  ?   Treatment History:  1. 12/18/17: Dr. Harish Pearson performed L3-L4 bilateral laminectomies and decompression of the posterior elements; L4-L5 bilateral laminectomies and foraminotomies with decompression of the thecal sac; lumbar open reduction and internal fixation; biopsy of L3 and biopsy of L4 vertebral bodies.??  2. Palliative radiation therapy to the lumbar spine post decompression surgery, as well as radiation therapy to the brain metastasis with Dr. Saeed. She completed on 2/2/2018.  3. Herceptin/Perjeta/Taxotere 03/21/2018-09/12/2019, herceptin and perjeta dropped d/t decreased LVEF of 45%.  ?   History of present illness:?  52-year-old Afro-American female who was seen  in internal medicine clinic on 12/06/2017 for left lower extremity pain and was found to have multiple abnormalities on blood tests. ?Was hospitalized for evaluation, and ultimately found to have metastatic breast cancer.  ?   For?a full, detailed note, see the note dated 7/22/2020.  ?   Interval History  10/1/2020: Patient presents for follow up on Taxotere; she is scheduled to receive cycle #27 today. She denies nausea, vomiting, diarrhea, constipation, mouth sores, abdominal pain. VS stable. K elevated at 5.3; will give Kayexalate during infusion. Patient states she was taken off potassium supplementation and has not taken any lately. Mg elevated at 2.7. BUN elevated at 35; creatinine 1.00; eGFR 75. Bili & LFTs WNL. WBc 3.5; H&H 10.2 & 32.7; plts 358k; ANC 2810. Her main complaint today is bilateral leg pain. She has not seen [facility] for palliative XRT despite their multiple attempts to get her scheduled. She complains of neuropathy in hands and feet. Will increase Cymbalta to 60mg daily; advised her to report efficacy as gabapentin dose can be increased as well. Explained to her that I did not want to change the dose of both medications at once. Will have her follow up in 3 weeks with labs prior to her next treatment. Will have her follow up in 6 weeks with Dr. Alcantara with CT scan results. She is amenable to this plan.  Review of Systems  A complete 12-point?ROS was performed in full with pertinent positives as described in interval history. Remainder of ROS done in full and are negative.  Physical Exam  Vitals & Measurements  T:?36.7? ?C (Oral)? HR:?69(Peripheral)? RR:?20? BP:?130/70? SpO2:?100%?  HT:?157.00?cm? WT:?61.100?kg? BMI:?24.79?  General: ?Alert and oriented, No acute distress.??  Appearance: Well-groomed  HEENT: ?Normocephalic, Oral mucosa is moist.?Pupils are equal, round and reactive to light, Extraocular movements are intact, Normal conjunctiva.?  Neck: ?Supple, Non-tender, No  lymphadenopathy, No thyromegaly.??  Respiratory: ?Lungs are clear to auscultation, Respirations are non-labored, Breath sounds are equal, Symmetrical chest wall expansion.??  Cardiovascular: ?Normal rate, Regular rhythm, No edema.??  Breast:??Breast exam not performed on todays visit.??  Gastrointestinal: Rounded,?Soft, Non-tender, Non-distended, Normal bowel sounds.??  Musculoskeletal: ?Normal strength.??Presents in wheelchair.  Integumentary: ?Warm, dry, intact.??  Neurologic: ?Alert, Oriented, No focal deficits, Cranial Nerves II-XII are grossly intact.??  Cognition and Speech: ?Oriented, Speech clear and coherent.??Wearing face mask.  Psychiatric: ?Cooperative, Appropriate mood & affect. ?  ECOG Performance Scale:?2 - Capable of all self-care but unable to carry out any work activities. Up and about greater than 50 percent of waking hours.?  Assessment/Plan  1.?Metastatic breast cancer?C50.912  #Metastatic breast cancer:  Inflammatory carcinoma of left breast. ?ER positive. ?SD positive. ?HER-2 positive.? Diagnosed 12/2017  Metastases to brain,?lumbar spine,?extensively to bones,?lymph nodes, lungs, liver, and adrenal.?  For lumbar spine metastasis,?status post laminectomy and internal fixation 12/2017  Status post radiation therapy to lumbar spine and brain?02/2018  Noncompliant with follow-up.  Status post Taxotere/Herceptin/Perjeta?x1 on?03/21/2018; she declined further chemotherapy?  Presented?1 year later,?on?02/2019:?Extensive bone metastases stable;?progression of bilateral lung metastasis, bilateral axillary?lymphadenopathy, progressive liver metastasis, progressive left adrenal metastasis  Dose reduced Taxotere/Herceptin/Perjeta started?03/07/2019?(1 year after first cycle of chemotherapy)  Good response post 4 cycles  Excellent response?post 8 cycles  Starting?with ninth cycle of chemotherapy,?discontinued Herceptin/Perjeta?due to persistent?drop in  LVEF,?without?recovery?post?discontinuation  Continued improvement/stability?post 13 cycles of Taxotere;?dramatic drop in tumor marker level  No brain metastasis?on surveillance brain MRI (02/10/2020)  -Stable metastatic disease?on restaging CTs and bone scan?post 18 cycles of Taxotere?(04/16/2020)  -No brain metastasis on surveillance brain MRI (05/27/2020)  -No metastatic disease progression?post?Taxotere x23 cycles?(restaging?CTs and bone scan: 07/17/2020)  --------  ?   # ?Sites of disease:  Left breast (inflammatory carcinoma);?brain metastasis; lumbar spine metastasis; extensive bone metastasis;?bilateral axillary?lymph nodes; lungs; liver; left?adrenal  --------  ?   # ?Molecular markers:  ER positive. ?NH positive. ?HER-2 positive.  MMR proficient  NTRK gene fusion negative  ?   Plan:  -Excellent response to Taxotere  -No disease progression post?23 cycles of Taxotere  -Continue Taxotere every 3 weeks (25% dose reduced, with Neulasta support)  -Check CBC and CMP every 10 days  -Check?CA 15-3 and CA 27.29 level every month  -Restage with CT C/A/P with contrast?and bone scan in 3 months (mid October)  -Germline?BRCA1/2?testing negative as of 08/17/2020.  ?   -In view of the fact that she has?extensive visceral metastatic disease,?we started treatment with chemotherapy rather than endocrine therapy.  ?   -Since she experienced severe neutropenia, requiring hospitalization, after first cycle of chemotherapy last year?(2018), therefore, we empirically reduced?the dose of docetaxel by 25%?from cycle #1.  She has required Neulasta support from second cycle of chemotherapy onwards  ?   Chemotherapy?schedule:  Pertuzumab 840 mg IV day 1, followed by 420 mg IV;  Trastuzumab 8 mg/kg IV day 1 followed by 6 mg/kg IV;  Docetaxel  mg/m? IV on day 1;  Cycled every 21 days.  Herceptin/Perjeta discontinued from ninth cycle of chemotherapy onwards?(persistent drop in?LVEF)  She was administered docetaxel 75 mg/m? last  year?(2018), resulting in severe neutropenia requiring hospitalization.?  Therefore, ?on resumption?in March 2019,?we reduced?Taxotere?dose by 25% (55 mg/m?)?from cycle #1  Has required Neulasta support from second cycle of chemotherapy onwards,?subsequently, with no significant cytopenias.  ?   Surveillance brain MRI with and without gadolinium?in 3 months (May).  ?  For ER positive, WI positive, HER-2 positive metastatic disease, treatment options are:  1.? Chemotherapy plus HER-2 targeted therapy with pertuzumab plus trastuzumab plus taxane?(preferred); or, Ado-trastuzumab emtansine (T-DMI); or trastuzumab plus chemotherapy; or,  2.? Endocrine therapy with or without HER-2 targeted therapy (if premenopausal, consider ovarian ablation or suppression); for premenopausal woman, selective ER modulators alone (without ovarian ablation/suppression) plus HER-2 targeted therapy is also an option; or,  3.? Other HER-2 targeted therapies.  ?  Ok to proceed with cycle #27 of?Taxotere with 25% dose reduction and Neulasta support today.  Follow up in 3 weeks?(10/21/2020)?with CBC, CMP, Mg, Ca 27.29, Ca 15-3.  Restaging CT C/A/P + BS scheduled for?10/22/2020; will move back 2 weeks.  Follow up in 6 weeks with Dr. Alcantara with CBC, CMP, Ca 15-3, Ca 27.29, & CT scan results.  Continue monthly Ca 27.29 and Ca 15-3 monitoring.  Refer back to [facility] for evaluation of new questionable areas on recent Brain MRI.  Ordered:  ?  2.?Bone metastasis?C79.51  -To prevent skeletal events from bone metastasis, need to start?either Xgeva or bisphosphonate therapy.  Need preventive dentistry and dental clearance?to prevent/minimize the likelihood of?osteonecrosis of the jaw before starting these medications.? This is pending.  Concurrent calcium and vitamin D supplementation is recommended (1200 mg of calcium and 800-1000 units of vitamin D3 daily).  Optimal schedule for zoledronic acid is monthly x12, then quarterly  -->  She has been  unable to undergo preventive dentistry; therefore, we will hold off on antiresorptive therapy?for now.  ??  Denosumab initiation pending dental clearance  Ordered:  ?  3.?Encounter for monitoring cardiotoxic drug therapy?Z51.81  Her LVEF remains suppressed?despite?the fact that Herceptin/Perjeta was held?due to drop in LVEF  No?normalization of LVEF?over subsequent?2 months  We discontinued Herceptin/Perjeta for good.?  ?   Has been referred to cardiology for evaluation of Herceptin/Perjeta induced drop in LVEF  Ordered:  ?   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  Metastatic breast cancer  Historical  Shingles  Procedure/Surgical History  Drainage of Buttock Skin, External Approach (07/30/2019)  Incision and drainage of abscess (eg, carbuncle, suppurative hidradenitis, cutaneous or subcutaneous abscess, cyst, furuncle, or paronychia); simple or single (07/30/2019)  Transfusion of Nonautologous Red Blood Cells into Peripheral Vein, Percutaneous Approach (03/28/2018)  Catheter Insertion Mediport (None) (02/23/2018)  Catheter, infusion, inserted peripherally, centrally or midline (other than hemodialysis) (02/23/2018)  Fluoroscopy of Superior Vena Cava using Low Osmolar Contrast, Guidance (02/23/2018)  Insertion of Infusion Device into Superior Vena Cava, Percutaneous Approach (02/23/2018)  Insertion of tunneled centrally inserted central venous access device, with subcutaneous port; age 5 years or older (02/23/2018)  Fusion of 2 or more Lumbar Vertebral Joints with Synthetic Substitute, Posterior Approach, Posterior Column, Open Approach (12/19/2017)  Fusion of Lumbosacral Joint with Synthetic Substitute, Posterior Approach, Posterior Column, Open Approach (12/19/2017)  Lumbar Foraminotomy MIS (.) (12/17/2017)  Bone biopsy sample (12/08/2017)  Breast biopsy sample  (12/07/2017)  Biopsy of breast; percutaneous, needle core, not using imaging guidance (separate procedure) (12/06/2017)  Bone marrow; biopsy, needle or trocar (12/06/2017)  Excision of Left Breast, Percutaneous Approach, Diagnostic (12/06/2017)  Extraction of Iliac Bone Marrow, Percutaneous Approach, Diagnostic (12/06/2017)  Tonsillectomy   Medications  acetaminophen-hydrocodone 325 mg-10 mg oral tablet, 1 tab(s), Oral, QID, PRN  aspirin 81 mg oral Delayed Release (EC) tablet, 81 mg= 1 tab(s), Oral, Daily  aspirin 81 mg oral tablet, 81 mg= 1 tab(s), Oral, Daily, 11 refills  Colace 100 mg oral capsule, 100 mg= 1 cap(s), Oral, BID, PRN  CYCLOBENZAPRINE 10 MG TABLET, Oral, TID  dexamethasone (for IVPB)  dexamethasone 4 mg oral tablet, 8 mg= 2 tab(s), Oral, BID, 12 refills  dexamethasone 4 mg oral tablet, 8 mg= 2 tab(s), Oral, BID, 12 refills  diphenhdramine (for Inj.), 25 mg= 0.5 mL, IV Push, Once-chemo  DULoxetine 60 mg oral delayed release capsule, 60 mg= 1 cap(s), Oral, Daily, 3 refills  GABAPENTIN 300 MG CAPSULE, 300 mg= 1 cap(s), Oral, TID  Heparin Flush 100 U/mL - 5 mL, 500 units= 5 mL, IV Push, Once-chemo  Heparin Flush 100 U/mL - 5 mL, 500 units= 5 mL, IV Push, Once-chemo  Heparin Flush 100 U/mL - 5 mL, 500 units= 5 mL, IV Push, Once-chemo  Kayexalate, 30 gm= 120 mL, Oral, Once  Lexapro 20 mg oral tablet, 20 mg= 1 tab(s), Oral, Daily, 11 refills  losartan 25 mg oral tablet, 25 mg= 1 tab(s), Oral, Daily, 6 refills  losartan 50 mg oral tablet, 50 mg= 1 tab(s), Oral, Daily, 6 refills  megestrol 40 mg oral tablet, See Instructions, 8 refills  meloxicam 7.5 mg oral tablet, 7.5 mg= 1 tab(s), Oral, Daily, 11 refills  metoprolol succinate 25 mg oral tablet extended release, 12.5 mg= 0.5 tab(s), Oral, Daily, 6 refills  Neulasta 6mg/0.6ml delivery kit, 6 mg= 0.6 mL, Subcutaneous, Once-chemo  [place] 0.7% ophthalmic solution, 1 drop(s), OPTH, Daily  Peridex 0.12% mucous membrane liquid, 0.018 gm= 15 mL, Oral, BID,? ?Not  Taking, Completed Rx  Phenergan 25 mg Tab, 25 mg= 1 tab(s), Oral, q6hr  potassium chloride 20 mEq oral TABLET extended release, 20 mEq= 1 tab(s), Oral, BID,? ?Not Taking per Prescriber  ProAir HFA 90 mcg/inh inhalation aerosol with adapter, 2 puff(s), INH, q4hr, PRN, 3 refills  traZODone 100 mg oral tablet, See Instructions, 11 refills  Tylenol 325 mg oral tablet, 650 mg= 2 tab(s), Oral, Once-chemo  Zofran (for IVPB) 16 mg + dexamethasone 10 mg/mL injectable solution 10 mg  Zofran ODT 8 mg oral tablet, disintegrating, 8 mg= 1 tab(s), Oral, q8hr, PRN  Allergies  No Known Allergies  Social History  Abuse/Neglect  No, No, Yes, 10/01/2020  Alcohol - Denies Alcohol Use, 08/24/2014  Never, 10/01/2020  Employment/School  homemaker, Work/School description: homemaker. Operates hazardous equipment: No., 04/20/2017  Exercise  Self assessment: Good condition., 04/20/2017  Home/Environment  Lives with Children. Living situation: Home/Independent. Alcohol abuse in household: No. Substance abuse in household: No. Smoker in household: No. Injuries/Abuse/Neglect in household: No. Feels unsafe at home: No. Family/Friends available for support: Yes. Concern for family members at home: No. Major illness in household: No. Financial concerns: No. TV/Computer concerns: No., 04/20/2017  Nutrition/Health  Regular, Wants to lose weight: No., 04/20/2017  Sexual  Gender Identity Identifies as female., 02/28/2019  Substance Use  Never, 10/01/2020  Tobacco - Denies Tobacco Use, 03/04/2013  Never (less than 100 in lifetime), No, 10/01/2020  Family History  Cancer: Mother.  Heart failure.: Mother.  Primary malignant neoplasm of lung: Father.  Immunizations  Vaccine Date Status   pneumococcal 23-polyvalent vaccine 11/14/2017 Recorded   influenza virus vaccine, inactivated 11/15/2016 Recorded   Health Maintenance  Health Maintenance  ???Pending?(in the next year)  ??? ??OverDue  ??? ? ? ?Alcohol Misuse Screening due??01/02/20??and every  1??year(s)  ??? ? ? ?HF-Heart Failure Education due??08/17/20??and every 1??year(s)  ??? ??Due?  ??? ? ? ?Cervical Cancer Screening due??10/01/20??and every?  ??? ? ? ?Colorectal Screening due??10/01/20??and every?  ??? ? ? ?Influenza Vaccine due??10/01/20??and every?  ??? ? ? ?Tetanus Vaccine due??10/01/20??and every 10??year(s)  ??? ? ? ?Zoster Vaccine due??10/01/20??and every?  ??? ??Due In Future?  ??? ? ? ?HF-LVEF not due until??11/10/20??and every 1??year(s)  ??? ? ? ?Obesity Screening not due until??01/01/21??and every 1??year(s)  ??? ? ? ?ADL Screening not due until??01/15/21??and every 1??year(s)  ??? ? ? ?Breast Cancer Screening not due until??02/19/21??and every 2??year(s)  ???Satisfied?(in the past 1 year)  ??? ??Satisfied?  ??? ? ? ?ADL Screening on??01/15/20.??Satisfied by Mumtaz Ramirez LPN  ??? ? ? ?Blood Pressure Screening on??10/01/20.??Satisfied by Rhiannon Pearson  ??? ? ? ?Body Mass Index Check on??10/01/20.??Satisfied by Rhiannon Pearson  ??? ? ? ?Depression Screening on??10/01/20.??Satisfied by Marbella Lane LPN  ??? ? ? ?Diabetes Screening on??10/01/20.??Satisfied by Dagmar Bradley  ??? ? ? ?Hypertension Management-Blood Pressure on??10/01/20.??Satisfied by Rhiannon Pearson  ??? ? ? ?Influenza Vaccine on??03/18/20.??Satisfied by Reg Montoya RN  ??? ? ? ?Obesity Screening on??10/01/20.??Satisfied by Rhiannon Pearson  ?  Lab Results  Test Name Test Result Date/Time Comments   Sodium Lvl 137 mmol/L 10/01/2020 08:19 CDT    Potassium Lvl 5.3 mmol/L (High) 10/01/2020 08:19 CDT slightly hemolyzed   Chloride 106 mmol/L 10/01/2020 08:19 CDT    CO2 23 mmol/L 10/01/2020 08:19 CDT    Calcium Lvl 9.3 mg/dL 10/01/2020 08:19 CDT    Magnesium Lvl 2.7 mg/dL (High) 10/01/2020 08:19 CDT    Glucose Lvl 118 mg/dL (High) 10/01/2020 08:19 CDT    BUN 35 mg/dL (High) 10/01/2020 08:19 CDT    Creatinine 1.00 mg/dL 10/01/2020 08:19 CDT    eGFR-AA 75 mL/min (Low) 10/01/2020 08:19 CDT    eGFR-ZEESHAN 62  mL/min (Low) 10/01/2020 08:19 CDT    Bili Total 0.2 mg/dL 10/01/2020 08:19 CDT    Bili Direct <0.1 mg/dL 10/01/2020 08:19 CDT    Bili Indirect Calc. not valid 10/01/2020 08:19 CDT    AST 19 unit/L 10/01/2020 08:19 CDT    ALT 21 unit/L 10/01/2020 08:19 CDT    Alk Phos 53 unit/L 10/01/2020 08:19 CDT    Total Protein 6.8 gm/dL 10/01/2020 08:19 CDT    Albumin Lvl 3.4 gm/dL 10/01/2020 08:19 CDT    Globulin 3.40 gm/mL 10/01/2020 08:19 CDT    A/G Ratio 1.0 ratio (Low) 10/01/2020 08:19 CDT    WBC 3.5 x10(3)/mcL (Low) 10/01/2020 08:19 CDT    RBC 3.40 x10(6)/mcL (Low) 10/01/2020 08:19 CDT    Hgb 10.2 gm/dL (Low) 10/01/2020 08:19 CDT    Hct 32.7 % (Low) 10/01/2020 08:19 CDT    Platelet 358 x10(3)/mcL 10/01/2020 08:19 CDT    MCV 96.2 fL 10/01/2020 08:19 CDT    MCH 30.0 pg 10/01/2020 08:19 CDT    MCHC 31.2 gm/dL 10/01/2020 08:19 CDT    RDW 17.0 % (High) 10/01/2020 08:19 CDT    MPV 10.9 fL (High) 10/01/2020 08:19 CDT    Abs Neut 2.81 x10(3)/mcL 10/01/2020 08:19 CDT    Segs Man 82 % (High) 10/01/2020 08:19 CDT    Lymph Man 18.0 % (Low) 10/01/2020 08:19 CDT    Monocyte Man 0 % (Low) 10/01/2020 08:19 CDT    Eos Man 0 % 10/01/2020 08:19 CDT    Basophil Man 0 % 10/01/2020 08:19 CDT

## 2022-05-04 NOTE — HISTORICAL [facility]ER
This is a historical note converted from [place]. Formatting and pictures may have been removed.  Please reference [employer] for original formatting and attached multimedia. History of Present Illness  Ms. Webb is a 53 yo WF with a pMHx of HTN, anemia, and hypokalemia who presented to Internal Medicine clinic with Dr. Kendall on 12/6 for routine blood work and management of left leg pain. At that appointment, blood was drawn and it was noted that patient had multiple lab abnormalities concerning for malignancy.?These labs include Calcium level of 12.2, ?WBC count of 3.5, and H/H of 7.7/24.2. Of note, X-ray of Right Femur today morning showed lytic bone lesions raising concern for possible metastatic disease to bone in the right ischium and proximal femur. She was called at home after her appointment and told to come back to the hospital. She was then admitted for work up of possible bone malignancy.?  ?   Patient states that since September 2017 she started developing weakness and a constant, 6/10, progressive, stabbing, shooting left leg pain. Tylenol helps with the pain for about 3 hours. Tramadol was given to her by PCP but it didnt help with pain. The weakness has gotten so bad that she is unable to walk unsupported; she used a walker up until October, and since then she has been using a wheelchair. Since September, she has lost about 80 pounds (217 lbs to 136 lbs per patient). She endorses chills and decreased appetite. She had?two episodes of vomiting 2 weeks ago. She denies any polyuria, polydipsia, kidney stones,?dizziness, constipation, abdominal pain, fever, night sweats, or nausea.  ?   Allergies: NKDA  pMHx:HTN, hypokalemia  Sx: Tonsillectomy  Family Hx: Father: throat cancer, Mother: ovarian cancer, Brother: stomach cancer  Social Hx: Patient denies any tobacco, alcohol, or drug use at present or in the past. Patient lives with 2 of her siblings and has 3 children and 2 grandchildren.  Review of  Systems  Constitutional: per HPI.  HEENT: Eye: No visual loss, eye pain,?blurred vision, double vision or yellow sclerae. ENMT: No hearing loss, sneezing, congestion, runny nose or sore throat.  Respiratory: No shortness of breath, cough or sputum.  Cardiovascular: No chest pain, chest pressure or chest discomfort. No palpitations or edema.  Gastrointestinal: per HPI.  Genitourinary: per HPI.  Hematologic/Lymph: +Anemia. No?bleeding or bruising. No enlarged nodes. No history of splenectomy.  Endocrine: No reports of sweating, cold or heat intolerance. No polyuria or polydipsia.  Immunologic: No pruritus?or?swelling.  Musculoskeletal: per HPI.  Integumentary: No rash or itching.  Neurologic: No headache, dizziness, syncope, paralysis, ataxia, numbness or tingling in the extremities. No change in bowel or bladder control.  All Other ROS: No history of asthma, hives, eczema or rhinitis. No history of depression or anxiety.  Physical Exam  Vitals & Measurements  T:?36.9? ?C ?(Oral)? HR:?116?(Peripheral)? RR:?18? BP:?136/86? SpO2:?99%? WT:?61.6?kg?  General: Alert and oriented x 3, in no acute distress.  HEENT: +Pallor. Normocephalic, atraumatic.? Extraocular movements intact.?Pupils are equal, round, and reactive to light and accommodation.?No sinus tenderness.? Oropharynx clear.? Mucous membranes are moist.  Neck: Supple. No lymphadenopathy or thyromegaly.  Chest: Clear to auscultate bilaterally.? No wheezing or?rhonchi.  Heart: S1 and?S2 present.? Tachycardia. No murmurs/gallops/regurgitations.  Abdomen: Soft, nontender, and nondistended.? Normal active bowel sounds. No hepatosplenomegaly.  Musculoskeletal: TTP diffusely on the left lower extremity.  Extremities: No cyanosis, clubbing or edema.  Neurologic: CN nerves II - XI grossly intact.? No focal deficit.? No sensory deficit.  Integumentary: Moist mucous membranes.? Good skin turgor, intact.  Pyschiatric: Appropriate affect.  Assessment/Plan  Possible bone  malignancy  -53 yo AAF with extensive cancer Family hx, abnormal laboratory values, and clinical symptoms suggestive of bone malignancy  -Lower extremity pain, weakness, immobility, weight loss?likely 2/2 this  -X-ray of Right Femur today morning showed lytic bone lesions raising concern for possible metastatic disease to bone in the right ischium and proximal femur  -X-ray of Left Femur pending  -Possibility of multiple myeloma with Proteinuria, Anemia, and Bone pain  -Will obtain Magnesium, Vitamin D levels, SPEP/UPEP, PTH, PTHrP, peripheral smear; continue to follow up  -Continue to monitor with CBC/CMP  ?  Hypercalcemia  -Calcium of 12.2  -Likely 2/2 bone malignancy  -Giving him IVFs; will consider bisphosphonate if it doesnt get better  ?  Anemia  -H/H 7.7/24.2 with MCV of 96.4  -Likely 2/2 bone malignancy  -Will continue to monitor H/Hs, will transfusion as needed  ?  HTN  -Latest /86  -Continue with home med  ?  DVT Prophylaxis with SCD  ?  Dispo: Patient is being admitted to Medical Unit. Continue to fix hypercalcemia with IVFs while watching H/H. Bone malignancy work up likely can be followed up on outpatient basis   Medications  Home  aspirin 81 mg oral Delayed Release (EC) tablet, 81 mg= 1 tab(s), Oral, Daily, 11 refills  cyclobenzaprine 10 mg oral tablet, 10 mg= 1 tab(s), Oral, TID, 1 refills  Fergon 240 mg (27 mg elemental iron) oral tablet, 240 mg= 1 tab(s), Oral, TID, 11 refills  GABAPENTIN 300 MG CAPSULE, 300 mg= 1 cap(s), Oral, TID  Lexapro 20 mg oral tablet, 20 mg= 1 tab(s), Oral, Daily, 11 refills  losartan 50 mg oral tablet, 50 mg= 1 tab(s), Oral, Daily, 11 refills  meloxicam 15 mg oral tablet, 15 mg= 1 tab(s), Oral, Daily, 11 refills  Nizoral 2% topical shampoo, 1 donna, TOP, 2x/Wk  potassium chloride 8 mEq oral TABLET extended release, 8 mEq= 1 tab(s), Oral, TID  Promethazine 25 mg ORAL Tab, 25 mg= 1 tab(s), Oral, BID, 1 refills  traMADol 50 mg oral tablet, 50 mg= 1 tab(s), Oral, q12hr,  PRN  traZODone 100 mg oral tablet, 100 mg= 1 tab(s), Oral, Once a day (at bedtime), 11 refills  triamcinolone 0.5% topical cream, 1 donna, TOP, BID, 11 refills  Lab Results  Labs Last 24 Hours?  No qualifying data available.  ?  Diagnostic Results  ?None      I agree with the above HO-1 assessment. I have reviewed the patients laboratory tests, imaging, and diagnostic work-up. I have personally seen and examined the patient, and was actively involved in placement of orders and management plan in conjunction with the intern.  Patient with significant weight loss,?bone pain, progressive immobility, weakness?with?anemia,?hypercalcemia,?proteinuria,?lytic bone lesions on right femur x-ray?admitted for cancer workup.? PTH?intact was ordered upon admission, however it is still not back.? Further labs were ordered?assuming the PTH will be normal or low.? Follow-up with?repeat calcium level, intact PTH,?PTH related?peptide, peripheral smear, SPEP and UPEP, vitamin D levels.? Patient receiving IV fluids at this time for hypercalcemia.? Patient will likely need IV?zoledronic acid. ?Will wait for?further?labs to return.   I have seen the patient with the residents, re assessed the historical, physical, laboratory, and radiologic findings.  I have discussed the case with the residents and made recommendations.? Because of the patients family history?I have recommended that the residents reexamine the patient carefully?focusing on lymph nodes,?rectal examination,?breast examination.  In addition it would seem reasonable to?get a nuclear medicine bone scan,?plain film of the chest, as well as a CT?of the chest abdomen and pelvis.

## 2022-05-04 NOTE — HISTORICAL [facility]
This is a historical note converted from [employer]. Formatting and pictures may have been removed.  Please reference [employer] for original formatting and attached multimedia. Patient phoned to inform her regarding 4 new brain lesions seen on recent MRI. Patient verbalized understanding and verbalized that she has an upcoming appointment with [facility] on 12/28/2020. I encouraged the patient that this was an extremely important appointment and that she really needed to attend this, patient [facility]. I informed her on ED precautions and any new issues to inform us about. We will ensure that Referral is replaced to Radiation Oncology regarding new lesions.  ?  Madeline Su, MARIA ALEJANDRA

## 2022-05-04 NOTE — HISTORICAL [facility] CERNER
This is a historical note converted from [employer]. Formatting and pictures may have been removed.  Please reference [employer] for original formatting and attached multimedia. Chief Complaint  Breast cancer  History of Present Illness  Problem list:  1. Stage IV Inflammatory breast cancer. Biopsy proven (right anterior iliac bone on 12/08/2017 showed metastatic carcinoma, consistent with a breast primary) metastatic left breast cancer, with extensive bone metastases, lung metastasis, possible liver and adrenal metastasis, extensive dulce metastasis, and small intracranial (posterior fossa) metastasis?  ????  ER 85% positive, MS 45% positive, Ki-67 30% (high proliferation), HER-2 3+ (overexpressed)  Pathologic L4 compression fracture, to a lesser extent at L3 also, epidural extension of tumor at the L4 level resulting in severe canal stenosis, and left lower extremity weakness.  ?   -Germline?BRCA1/2?testing negative as of 08/17/2020.  ???  Current Treatment:?  1. Palliative chemotherapy with Taxotere every 21 days.  ?   C28 due: 10/22/2020  ?   Dose modifications & interruptions:  Docetaxel reduced by 25%?from cycle #1.  Added Neulasta support from second cycle of chemotherapy onwards  Herceptin/Perjeta dropped starting 9/12/19 for LVEF 45%.  ?   Treatment History:  1. 12/18/17: Dr. Harish Pearson performed L3-L4 bilateral laminectomies and decompression of the posterior elements; L4-L5 bilateral laminectomies and foraminotomies with decompression of the thecal sac; lumbar open reduction and internal fixation; biopsy of L3 and biopsy of L4 vertebral bodies.??  2. Palliative radiation therapy to the lumbar spine post decompression surgery, as well as radiation therapy to the brain metastasis with Dr. Saeed. She completed on 2/2/2018.  3. Herceptin/Perjeta/Taxotere 03/21/2018-09/12/2019, herceptin and perjeta dropped d/t decreased LVEF of 45%.  ?   History of present illness:?  52-year-old Afro-American female who was seen  in internal medicine clinic on 12/06/2017 for left lower extremity pain and was found to have multiple abnormalities on blood tests. ?Was hospitalized for evaluation, and ultimately found to have metastatic breast cancer.  ?   For?a full, detailed note, see the note dated 7/22/2020.  ?   Interval History:  Patient presents today for a scheduled follow-up, alone, prior to C28 of Taxotere. She is currently tolerating therapy well with minimal side effects noted and reported. She reports compliance with pre-treatment dexamethasone. 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 She endorses no new problems or concerns today. Denies HA, fever, night sweats, SOB, CP, edema, neuropathy. Denies N/V/C/D, abdominal pain, change in bowel/bladder habits, blood in the urine/stool. Appetite good, weight stable, denies unintentional weight loss/gain. Labs today stable, adequate for treatment, reviewed with patient. Reviewed patient medications. Patient denies needing any medication refills at this time. Denies questions/needs/concerns.  ?  Review of Systems  A complete 12 point ROS was performed in full with pertinent positives described in interval history. Remainder of ROS done in full and are negative.?  Physical Exam  Vitals & Measurements  T:?36.8? ?C (Oral)? HR:?68(Peripheral)? RR:?16? BP:?119/83?  BMI:?24.79?  Vital Signs Reviewed  General: AAO, NAD noted  Eye: PERRLA, EOMI  Neuro: Normal mentation, strength 5/5 on all 4 extremities, no sensory deficits  HET: Normocephalic, adequate hearing,?no oral ulcers, mucous membranes pink/moist/intact, no pharyngeal erythema, poor dentition  Neck: Supple, non-tender, no lymphadenopathy  Respiratory: Non-labored breathing, symmetrical chest wall expansion, BBS CTA, no crackles/wheezing/rhonchi noted  Cardiovascular: S1S2 noted, RRR, no M/R/G, pulses equal in all extremities, normal peripheral perfusion  Abdomen: Soft, non-tender, non-distended,  BS+, no hepatosplenomegaly  Musculoskeletal: presents in a wheelchair  Integumentary: No rashes, no bruises or purpura, warm and dry, normal for ethnicity  Neurologic: No focal deficits, Cranial nerves II-XII are grossly intact.  Cognition and Speech: Speech clear and coherent, functional cognition intact  Psychiatric: Cooperative, appropriate mood and affect for situation, normal judgement, no suicidal or homicidal ideations  ?  ?   The National [employer] [place] Toxicity Scores:  ?   ECOG Performance Scale: 2 - Ambulatory and capable of all selfcare but unable to carry out any work activities; up and about more than 50% of waking hours.  ?  Assessment/Plan  1.?Metastatic breast cancer?C50.919  ? ? #Metastatic breast cancer:  Inflammatory carcinoma of left breast. ?ER positive. ?TX positive. ?HER-2 positive.? Diagnosed 12/2017  Metastases to brain,?lumbar spine,?extensively to bones,?lymph nodes, lungs, liver, and adrenal.?  For lumbar spine metastasis,?status post laminectomy and internal fixation 12/2017  Status post radiation therapy to lumbar spine and brain?02/2018  Noncompliant with follow-up.  Status post Taxotere/Herceptin/Perjeta?x1 on?03/21/2018; she declined further chemotherapy?  Presented?1 year later,?on?02/2019:?Extensive bone metastases stable;?progression of bilateral lung metastasis, bilateral axillary?lymphadenopathy, progressive liver metastasis, progressive left adrenal metastasis  Dose reduced Taxotere/Herceptin/Perjeta started?03/07/2019?(1 year after first cycle of chemotherapy)  Good response post 4 cycles  Excellent response?post 8 cycles  Starting?with ninth cycle of chemotherapy,?discontinued Herceptin/Perjeta?due to persistent?drop in LVEF,?without?recovery?post?discontinuation  Continued improvement/stability?post 13 cycles of Taxotere;?dramatic drop in tumor marker level  No brain metastasis?on surveillance brain MRI (02/10/2020)  -Stable metastatic disease?on restaging CTs and bone  scan?post 18 cycles of Taxotere?(04/16/2020)  -No brain metastasis on surveillance brain MRI (05/27/2020)  -No metastatic disease progression?post?Taxotere x23 cycles?(restaging?CTs and bone scan: 07/17/2020)  -09/09/2020: MRI Brain: new questionable foci, referred back to radiation oncology  ?   # ? Sites of disease:  Left breast (inflammatory carcinoma);?brain metastasis; lumbar spine metastasis; extensive bone metastasis;?bilateral axillary?lymph nodes; lungs; liver; left?adrenal  --------  ?   # ? Molecular markers:  ER positive. ?SC positive. ?HER-2 positive.  MMR proficient  NTRK gene fusion negative  ?   ? Plan:  ? -Excellent response to Taxotere  -No disease progression post?23 cycles of Taxotere  -Continue Taxotere every 3 weeks (25% dose reduced, with Neulasta support)  -Check CBC and CMP every 10 days  -Check?CA 15-3 and CA 27.29 level every month  -Restage with CT C/A/P with contrast?and bone scan in 3 months (mid October)  ?-Germline?BRCA1/2?testing negative as of 08/17/2020.  ?   ?-In view of the fact that she has?extensive visceral metastatic disease,?we started treatment with chemotherapy rather than endocrine therapy.  ?   -Since she experienced severe neutropenia, requiring hospitalization, after first cycle of chemotherapy last year?(2018), therefore, we empirically reduced?the dose of docetaxel by 25%?from cycle #1.  She has required Neulasta support from second cycle of chemotherapy onwards  ?   ? Chemotherapy?schedule:  Pertuzumab 840 mg IV day 1, followed by 420 mg IV;  Trastuzumab 8 mg/kg IV day 1 followed by 6 mg/kg IV;  Docetaxel  mg/m? IV on day 1;  Cycled every 21 days.  ? Herceptin/Perjeta discontinued from ninth cycle of chemotherapy onwards?(persistent drop in?LVEF)  She was administered docetaxel 75 mg/m? last year?(2018), resulting in severe neutropenia requiring hospitalization.?  Therefore, ? on resumption?in March 2019,?we reduced?Taxotere?dose by 25% (55 mg/m?)?from cycle  #1  Has required Neulasta support from second cycle of chemotherapy onwards,?subsequently, with no significant cytopenias.  ?   ? For ER positive, MD positive, HER-2 positive metastatic disease, treatment options are:  1.? Chemotherapy plus HER-2 targeted therapy with pertuzumab plus trastuzumab plus taxane?(preferred); or, Ado-trastuzumab emtansine (T-DMI); or trastuzumab plus chemotherapy; or,  2.? Endocrine therapy with or without HER-2 targeted therapy (if premenopausal, consider ovarian ablation or suppression); for premenopausal woman, selective ER modulators alone (without ovarian ablation/suppression) plus HER-2 targeted therapy is also an option; or,  3.? Other HER-2 targeted therapies.  ?  -Proceed with C#28 Taxotere with 25% dose reduction and Neulasta support?  -Restaging CT C/A/P + BS scheduled:?11/06/2020  -C/W monthly Ca 27.29 and Ca 15-3 monitoring  -C/W Cymbalta and Gabapentin for CIPN  -Dexamethasone 8mg PO BID x3 days, day prior, day of, and day after chemotherapy with Taxotere.  ?  2.?Bone metastasis?C79.51  ? ? -To prevent skeletal events from bone metastasis, need to start?either Xgeva or bisphosphonate therapy.  Need preventive dentistry and dental clearance?to prevent/minimize the likelihood of?osteonecrosis of the jaw before starting these medications.? This is pending.  Concurrent calcium and vitamin D supplementation is recommended (1200 mg of calcium and 800-1000 units of vitamin D3 daily).  Optimal schedule for zoledronic acid is monthly x12, then quarterly  -->  She has been unable to undergo preventive dentistry; therefore, we will hold off on antiresorptive therapy?for now.  ? ?  -Denosumab initiation pending dental clearance  ?  3.?Brain metastasis?C79.31  ?Metastases noted to Brain at diagnosis of 12/2017  Status post radiation therapy to lumbar spine and brain?02/2018  No brain metastasis?on surveillance brain MRI (02/10/2020)  -No brain metastasis on surveillance brain MRI  (05/27/2020)  -MRI Brain 09/09/2020: Questionable punctuate enhancing foci right cerebellar and right occipital attention on follow-up.  -Referred back to radiation oncology in light of new questionable brain lesions, however radiation could never reach patient to schedule FU and she has not FU with them as instructed  ?  -Repeat MRI Brain 12/09/2020 d/t no FU with radiation oncology as instructed  ?  4.?Encounter for monitoring cardiotoxic drug therapy?Z51.81  ? ? Her LVEF remains suppressed?despite?the fact that Herceptin/Perjeta was held?due to drop in LVEF  No?normalization of LVEF?over subsequent?2 months  We discontinued Herceptin/Perjeta for good.?  -Cardiology visit 08/18/2020: FU with 3 months  ?   -C/W cardiology FU as instructed  ?  RTC 11/12/2020 with MD as scheduled with labs: CBC, CMP, Mg, Ca 27.29, Ca 15-3  ?  Discussed POC with patient, all questions answered. Instructed to call clinic for any issues or with any questions PRN, patient verbalizes understanding.  ?   ANDERSON Kenney, FNP-C  ?   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  Metastatic breast cancer  Metastatic breast cancer  Historical  Shingles  Procedure/Surgical History  Drainage of Buttock Skin, External Approach (07/30/2019)  Incision and drainage of abscess (eg, carbuncle, suppurative hidradenitis, cutaneous or subcutaneous abscess, cyst, furuncle, or paronychia); simple or single (07/30/2019)  Transfusion of Nonautologous Red Blood Cells into Peripheral Vein, Percutaneous Approach (03/28/2018)  Catheter Insertion Mediport (None) (02/23/2018)  Catheter, infusion, inserted peripherally, centrally or midline (other than hemodialysis) (02/23/2018)  Fluoroscopy of Superior Vena Cava using Low Osmolar Contrast, Guidance (02/23/2018)  Insertion of Infusion Device into Superior Vena Cava,  Percutaneous Approach (02/23/2018)  Insertion of tunneled centrally inserted central venous access device, with subcutaneous port; age 5 years or older (02/23/2018)  Fusion of 2 or more Lumbar Vertebral Joints with Synthetic Substitute, Posterior Approach, Posterior Column, Open Approach (12/19/2017)  Fusion of Lumbosacral Joint with Synthetic Substitute, Posterior Approach, Posterior Column, Open Approach (12/19/2017)  Lumbar Foraminotomy MIS (.) (12/17/2017)  Bone biopsy sample (12/08/2017)  Breast biopsy sample (12/07/2017)  Biopsy of breast; percutaneous, needle core, not using imaging guidance (separate procedure) (12/06/2017)  Bone marrow; biopsy, needle or trocar (12/06/2017)  Excision of Left Breast, Percutaneous Approach, Diagnostic (12/06/2017)  Extraction of Iliac Bone Marrow, Percutaneous Approach, Diagnostic (12/06/2017)  Tonsillectomy   Medications  acetaminophen-hydrocodone 325 mg-10 mg oral tablet, 1 tab(s), Oral, QID, PRN  aspirin 81 mg oral Delayed Release (EC) tablet, 81 mg= 1 tab(s), Oral, Daily  aspirin 81 mg oral tablet, 81 mg= 1 tab(s), Oral, Daily, 11 refills  Colace 100 mg oral capsule, 100 mg= 1 cap(s), Oral, BID, PRN  CYCLOBENZAPRINE 10 MG TABLET, Oral, TID  dexamethasone (for IVPB)  dexamethasone 4 mg oral tablet, 8 mg= 2 tab(s), Oral, BID, 12 refills  dexamethasone 4 mg oral tablet, 8 mg= 2 tab(s), Oral, BID, 12 refills  diphenhdramine (for Inj.), 25 mg= 0.5 mL, IV Push, Once-chemo  DULoxetine 60 mg oral delayed release capsule, 60 mg= 1 cap(s), Oral, Daily, 3 refills  GABAPENTIN 300 MG CAPSULE, 300 mg= 1 cap(s), Oral, TID  Heparin Flush 100 U/mL - 5 mL, 500 units= 5 mL, IV Push, Once-chemo  Heparin Flush 100 U/mL - 5 mL, 500 units= 5 mL, IV Push, Once-chemo  Lexapro 20 mg oral tablet, 20 mg= 1 tab(s), Oral, Daily, 11 refills  losartan 25 mg oral tablet, 25 mg= 1 tab(s), Oral, Daily, 6 refills  losartan 50 mg oral tablet, 50 mg= 1 tab(s), Oral, Daily, 6 refills  megestrol 40 mg oral  tablet, See Instructions, 8 refills  meloxicam 7.5 mg oral tablet, 7.5 mg= 1 tab(s), Oral, Daily, 11 refills  metoprolol succinate 25 mg oral tablet extended release, 12.5 mg= 0.5 tab(s), Oral, Daily, 6 refills  Pazeo 0.7% ophthalmic solution, 1 drop(s), OPTH, Daily  Peridex 0.12% mucous membrane liquid, 0.018 gm= 15 mL, Oral, BID,? ?Not Taking, Completed Rx  Phenergan 25 mg Tab, 25 mg= 1 tab(s), Oral, q6hr  potassium chloride 20 mEq oral TABLET extended release, 20 mEq= 1 tab(s), Oral, BID,? ?Not Taking per Prescriber  ProAir HFA 90 mcg/inh inhalation aerosol with adapter, 2 puff(s), INH, q4hr, PRN, 3 refills  traZODone 100 mg oral tablet, See Instructions, 11 refills  Tylenol 325 mg oral tablet, 650 mg= 2 tab(s), Oral, Once-chemo  Zofran (for IVPB) 16 mg + dexamethasone 10 mg/mL injectable solution 10 mg  Zofran ODT 8 mg oral tablet, disintegrating, 8 mg= 1 tab(s), Oral, q8hr, PRN  Allergies  No Known Allergies  Social History  Abuse/Neglect  No, No, Yes, 10/01/2020  Alcohol - Denies Alcohol Use, 08/24/2014  Never, 10/01/2020  Employment/School  homemaker, Work/School description: homemaker. Operates hazardous equipment: No., 04/20/2017  Exercise  Self assessment: Good condition., 04/20/2017  Home/Environment  Lives with Children. Living situation: Home/Independent. Alcohol abuse in household: No. Substance abuse in household: No. Smoker in household: No. Injuries/Abuse/Neglect in household: No. Feels unsafe at home: No. Family/Friends available for support: Yes. Concern for family members at home: No. Major illness in household: No. Financial concerns: No. TV/Computer concerns: No., 04/20/2017  Nutrition/Health  Regular, Wants to lose weight: No., 04/20/2017  Sexual  Gender Identity Identifies as female., 02/28/2019  Substance Use  Never, 10/01/2020  Tobacco - Denies Tobacco Use, 03/04/2013  Never (less than 100 in lifetime), No, 10/01/2020  Family History  Cancer: Mother.  Heart failure.: Mother.  Primary  malignant neoplasm of lung: Father.  Immunizations  Vaccine Date Status   pneumococcal 23-polyvalent vaccine 11/14/2017 Recorded   influenza virus vaccine, inactivated 11/15/2016 Recorded   Health Maintenance  Health Maintenance  ???Pending?(in the next year)  ??? ??OverDue  ??? ? ? ?Influenza Vaccine due??10/01/19??and every 1??day(s)  ??? ? ? ?Alcohol Misuse Screening due??01/02/20??and every 1??year(s)  ??? ? ? ?HF-Heart Failure Education due??08/17/20??and every 1??year(s)  ??? ??Due?  ??? ? ? ?Cervical Cancer Screening due??10/21/20??and every?  ??? ? ? ?Colorectal Screening due??10/21/20??and every?  ??? ? ? ?Tetanus Vaccine due??10/21/20??and every 10??year(s)  ??? ? ? ?Zoster Vaccine due??10/21/20??and every?  ??? ??Due In Future?  ??? ? ? ?HF-LVEF not due until??11/10/20??and every 1??year(s)  ??? ? ? ?Obesity Screening not due until??01/01/21??and every 1??year(s)  ??? ? ? ?ADL Screening not due until??01/15/21??and every 1??year(s)  ??? ? ? ?Breast Cancer Screening not due until??02/19/21??and every 2??year(s)  ??? ? ? ?Blood Pressure Screening not due until??10/01/21??and every 1??year(s)  ??? ? ? ?Body Mass Index Check not due until??10/01/21??and every 1??year(s)  ??? ? ? ?Depression Screening not due until??10/01/21??and every 1??year(s)  ??? ? ? ?Diabetes Screening not due until??10/01/21??and every 1??year(s)  ??? ? ? ?Hypertension Management-Blood Pressure not due until??10/01/21??and every 1??year(s)  ??? ? ? ?Hypertension Management-BMP not due until??10/01/21??and every 1??year(s)  ???Satisfied?(in the past 1 year)  ??? ??Satisfied?  ??? ? ? ?ADL Screening on??01/15/20.??Satisfied by Mumtaz Ramirez LPN  ??? ? ? ?Blood Pressure Screening on??10/01/20.??Satisfied by Rhiannon Pearson  ??? ? ? ?Body Mass Index Check on??10/01/20.??Satisfied by Rhiannon Pearson  ??? ? ? ?Depression Screening on??10/01/20.??Satisfied by Marbella Lane LPN  ??? ? ? ?Diabetes Screening  on??10/01/20.??Satisfied by Dagmar Bradley  ??? ? ? ?Hypertension Management-Blood Pressure on??10/01/20.??Satisfied by Rhiannon Pearson  ??? ? ? ?Influenza Vaccine on??03/18/20.??Satisfied by Reg Montoay RN  ??? ? ? ?Obesity Screening on??10/01/20.??Satisfied by Rhiannon Pearson  ?  Lab Results  Test Name Test Result Date/Time   Sodium Lvl 133 mmol/L (Low) 10/22/2020 08:25 CDT   Potassium Lvl 5.2 mmol/L (High) 10/22/2020 08:25 CDT   Chloride 105 mmol/L 10/22/2020 08:25 CDT   CO2 22 mmol/L 10/22/2020 08:25 CDT   Calcium Lvl 9.3 mg/dL 10/22/2020 08:25 CDT   Glucose Lvl 104 mg/dL (High) 10/22/2020 08:25 CDT   BUN 39.0 mg/dL (High) 10/22/2020 08:25 CDT   Creatinine 0.95 mg/dL 10/22/2020 08:25 CDT   eGFR-AA >60 10/22/2020 08:25 CDT   eGFR-ZEESHAN >60 10/22/2020 08:25 CDT   Bili Total 0.2 mg/dL 10/22/2020 08:25 CDT   Bili Direct 0.1 mg/dL 10/22/2020 08:25 CDT   Bili Indirect 0.10 mg/dL 10/22/2020 08:25 CDT   AST 16 unit/L 10/22/2020 08:25 CDT   ALT 11 unit/L 10/22/2020 08:25 CDT   Alk Phos 51 unit/L 10/22/2020 08:25 CDT   Total Protein 6.7 gm/dL 10/22/2020 08:25 CDT   Albumin Lvl 3.6 gm/dL 10/22/2020 08:25 CDT   Globulin 3.1 gm/dL 10/22/2020 08:25 CDT   A/G Ratio 1.2 ratio 10/22/2020 08:25 CDT   WBC 2.8 x10(3)/mcL (Low) 10/22/2020 08:25 CDT   RBC 3.37 x10(6)/mcL (Low) 10/22/2020 08:25 CDT   Hgb 10.0 gm/dL (Low) 10/22/2020 08:25 CDT   Hct 31.8 % (Low) 10/22/2020 08:25 CDT   Platelet 347 x10(3)/mcL 10/22/2020 08:25 CDT   MCV 94.4 fL 10/22/2020 08:25 CDT   MCH 29.7 pg 10/22/2020 08:25 CDT   MCHC 31.4 gm/dL 10/22/2020 08:25 CDT   RDW 17.1 % (High) 10/22/2020 08:25 CDT   MPV 10.9 fL (High) 10/22/2020 08:25 CDT   Abs Neut 2.16 x10(3)/mcL 10/22/2020 08:25 CDT   Neutro Auto 77 % 10/22/2020 08:25 CDT   Lymph Auto 20 % 10/22/2020 08:25 CDT   Mono Auto 2 % 10/22/2020 08:25 CDT   Abs Neutro 2.16 x10(3)/mcL 10/22/2020 08:25 CDT   Abs Lymph 0.6 x10(3)/mcL 10/22/2020 08:25 CDT   Abs Mono 0.1 x10(3)/mcL 10/22/2020 08:25 CDT   IG% 0 %  10/22/2020 08:25 CDT   # 0.0100 10/22/2020 08:25 CDT

## 2022-05-17 PROBLEM — S82.402B: Status: ACTIVE | Noted: 2022-01-01

## 2022-05-17 PROBLEM — S82.202B: Status: ACTIVE | Noted: 2022-01-01

## 2022-05-17 PROBLEM — S72.321D: Status: ACTIVE | Noted: 2022-01-01

## 2022-05-17 PROBLEM — S72.322D: Status: ACTIVE | Noted: 2022-01-01

## 2022-05-17 NOTE — ED NOTES
Pt Log Rolled, C Spine maintained, lower exts supported, Spinal board removed, no neuro changes noted. After Log roll pt Right Lower ext appears, bizarre and  exteranally rotated. Recommended to MD to upgrade to lever 1 due to possible bilateral femur fractures, Dr. Wolfe wanted to confirm with XR for Right femur fracture.

## 2022-05-17 NOTE — PROCEDURES
Arterial Catheter Insertion Procedure Note     Procedure: Insertion of Arterial Catheter     Indications: Hemodynamic Monitoring     Procedure Details     Maximum sterile technique was used including antiseptics, cap, sterile gloves, sterile gown, hand hygiene, mask and sterile maximum barrier drape.     Under sterile conditions the skin above the left radial artery was prepped with Chloroprep and covered with a sterile drape. Local anesthesia was applied to the skin and subcutaneous tissues. Ultrasound guidance was used to identify the artery. A 20 -gauge catheter over a needle was then inserted into the artery. A guide wire was then passed easily through the needle. The needle was then withdrawn. A arterial catheter was then inserted into the vessel over the guide wire. The needle was then withdrawn and was connected to the monitor to ensure a proper waveform. The catheter was sutured into place and a sterile dressing was applied.     Ultrasound/Sonosite was used during the procedure.      Estimated blood loss: 5 ml    Patient tolerated procedure well.

## 2022-05-17 NOTE — ED NOTES
Pt transported to CT on the monitor RN x 2 ERT x 2. Pt transferred from Trauma stretcher to CT table. C Spined Maintained. No Neuro changes noted.

## 2022-05-17 NOTE — PROGRESS NOTES
Pt chart and imaging reviewed. Left open tibia fx, multiple long bone injuries. Imaging pending. Will plan to take pt to OR in AM.    Full consult to follow    Adonis
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
## 2022-05-17 NOTE — TRANSFER OF CARE
"Anesthesia Transfer of Care Note    Patient: Christaina Webb    Procedure(s) Performed: Procedure(s) (LRB):  INSERTION, INTRAMEDULLARY GRAHAM, TIBIA (Left)  INSERTION, INTRAMEDULLARY GRAHAM, FEMUR (Bilateral)    Post pain: adequate analgesia    Post assessment: no apparent anesthetic complications    Post vital signs: stable    Nausea/Vomiting: no nausea/vomiting    Transfer of care protocol was followed      Last vitals:   Visit Vitals  BP (!) 119/92   Pulse 78   Temp (!) 35.5 °C (95.9 °F) (Core Bladder)   Resp 16   Ht 5' 4" (1.626 m)   Wt 48.1 kg (106 lb)   SpO2 100%   BMI 18.19 kg/m²     "
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
## 2022-05-17 NOTE — PROGRESS NOTES
Certification of Assistant at Surgery       Surgery Date: 5/17/2022     Participating Surgeons:  Surgeon(s) and Role:     * Vimal Lamb,  - Primary    Procedures:  Procedure(s) (LRB):  INSERTION, INTRAMEDULLARY GRAHAM, TIBIA (Left)  INSERTION, INTRAMEDULLARY GRAHAM, FEMUR (Bilateral)    Assistant Surgeon's Certification of Necessity:  I understand that section 1842 (b) (6) (d) of the Social Security Act generally prohibits Medicare Part B reasonable charge payment for the services of assistants at surgery in teaching hospitals when qualified residents are available to furnish such services. I certify that the services for which payment is claimed were medically necessary, and that no qualified resident was available to perform the services. I further understand that these services are subject to post-payment review by the Medicare carrier.      ELAN Cornelius    05/17/2022  1:19 PM
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
## 2022-05-17 NOTE — ANESTHESIA PROCEDURE NOTES
Intubation    Date/Time: 5/17/2022 10:30 AM  Performed by: Mumtaz Sanchez CRNA  Authorized by: Madison Ibanez MD     Intubation:     Induction:  Intravenous    Intubated:  Postinduction    Mask Ventilation:  Easy mask    Attempts:  2    Attempted By:  CRNA    Method of Intubation:  Direct    Blade:  [place] 2    Laryngeal View Grade: Grade IV - neither epiglottis nor glottis seen      Attempted By (2nd Attempt):  CRNA    Method of Intubation (2nd Attempt):  Video laryngoscopy    Blade (2nd Attempt):  Coy 3    Laryngeal View Grade (2nd Attempt): Grade I - full view of cords      Difficult Airway Encountered?: No      Complications:  None    Airway Device:  Oral endotracheal tube    Airway Device Size:  7.0    Style/Cuff Inflation:  Cuffed (inflated to minimal occlusive pressure)    Inflation Amount (mL):  6    Tube secured:  21    Secured at:  The lips    Placement Verified By:  Capnometry    Complicating Factors:  None    Findings Post-Intubation:  BS equal bilateral  Notes:      1st attempt: mouth bloody, no view of vocal cords; suctioned; 2nd attempt easy with Coy
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
## 2022-05-17 NOTE — ANESTHESIA POSTPROCEDURE EVALUATION
Anesthesia Post Evaluation    Patient: Christiana Webb    Procedure(s) Performed: Procedure(s) (LRB):  INSERTION, INTRAMEDULLARY GRAHAM, TIBIA (Left)  INSERTION, INTRAMEDULLARY GRAHAM, FEMUR (Bilateral)    Final Anesthesia Type: general      Patient location during evaluation: PACU  Patient participation: Yes- Able to Participate  Level of consciousness: awake and alert, awake and oriented (sleeping)  Post-procedure vital signs: reviewed and stable  Pain management: adequate  Airway patency: patent    PONV status at discharge: No PONV  Anesthetic complications: no      Cardiovascular status: blood pressure returned to baseline, hemodynamically stable and stable  Respiratory status: unassisted, spontaneous ventilation and nasal cannula  Hydration status: euvolemic  Follow-up not needed.          Vitals Value Taken Time   /71 05/17/22 1526   Temp  05/17/22 1530   Pulse 80 05/17/22 1530   Resp 15 05/17/22 1530   SpO2 90 % 05/17/22 1530   Vitals shown include unvalidated device data.      No case tracking events are documented in the log.      Pain/Jose Score: Pain Rating Prior to Med Admin: 7 (5/17/2022  7:59 AM)  Jose Score: 7 (5/17/2022  3:04 PM)

## 2022-05-17 NOTE — TRANSFER OF CARE
"Anesthesia Transfer of Care Note    Patient: Christiana Webb    Procedure(s) Performed: Procedure(s) (LRB):  INSERTION, INTRAMEDULLARY GRAHAM, TIBIA (Left)  INSERTION, INTRAMEDULLARY GRAHAM, FEMUR (Bilateral)    Anesthesia PACU Handoff    Last vitals:   Visit Vitals  BP (!) 119/92   Pulse 78   Temp (!) 35.5 °C (95.9 °F) (Core Bladder)   Resp 16   Ht 5' 4" (1.626 m)   Wt 48.1 kg (106 lb)   SpO2 100%   BMI 18.19 kg/m²     "
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
## 2022-05-18 PROBLEM — T45.1X5A CHEMOTHERAPY-INDUCED NEUROPATHY: Status: ACTIVE | Noted: 2022-01-01

## 2022-05-18 PROBLEM — C78.00 LUNG METASTASIS: Status: ACTIVE | Noted: 2022-01-01

## 2022-05-18 PROBLEM — C50.912: Status: ACTIVE | Noted: 2022-01-01

## 2022-05-18 PROBLEM — C79.51 BONE METASTASIS: Status: ACTIVE | Noted: 2022-01-01

## 2022-05-18 PROBLEM — G62.0 CHEMOTHERAPY-INDUCED NEUROPATHY: Status: ACTIVE | Noted: 2022-01-01

## 2022-05-18 PROBLEM — C78.7 LIVER METASTASIS: Status: ACTIVE | Noted: 2022-01-01

## 2022-05-18 PROBLEM — Z17.0: Status: ACTIVE | Noted: 2022-01-01

## 2022-05-18 PROBLEM — C79.31 BRAIN METASTASIS: Status: ACTIVE | Noted: 2022-01-01

## 2022-05-18 PROBLEM — C79.70 METASTASIS TO ADRENAL GLAND: Status: ACTIVE | Noted: 2022-01-01
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
## 2022-05-18 NOTE — PROGRESS NOTES
[facility] - [facility]  Orthopedics  Progress Note    Patient Name: Christiana Webb  MRN: [id number]  Admission Date: 5/16/2022  Hospital Length of Stay: 2 days  Attending Provider: Guerrero Mcclure MD  Primary Care Provider: Primary Doctor No  Follow-up For: Procedure(s) (LRB):  INSERTION, INTRAMEDULLARY GRAHAM, TIBIA (Left)  INSERTION, INTRAMEDULLARY GRAHAM, FEMUR (Bilateral)    Post-Operative Day: 1 Day Post-Op  Subjective:     Principal Problem:Fracture of left tibia and fibula, open type I or II, initial encounter    Principal Orthopedic Problem: 1 Day Post-Op IMN bilateral femur; IMN left opten tibia fracture    Interval History: Patient is resting comfortably in bed this morning. Nursing staff states that she had some bleeding from her traction pin site overnight. States her pain has been well controlled. Appears to be having some high blood pressure this morning. Denies numbness and tingling distally.     Review of patient's allergies indicates:  No Known Allergies    Current Facility-Administered Medications   Medication    0.9%  NaCl infusion (for blood administration)    0.9%  NaCl infusion (for blood administration)    0.9%  NaCl infusion (for blood administration)    0.9%  NaCl infusion (for blood administration)    0.9%  NaCl infusion (for blood administration)    0.9%  NaCl infusion (for blood administration)    0.9%  NaCl infusion (for blood administration)    0.9%  NaCl infusion (for blood administration)    acetaminophen tablet 650 mg    acetaminophen tablet 650 mg    albumin human 5% bottle 12.5 g    ceFAZolin in dextrose (iso-os) 2 gram/100 mL PgBk 2,000 mg    enoxaparin injection 30 mg    gabapentin capsule 300 mg    lactated ringers bolus 1,000 mL    lactated ringers bolus 1,000 mL    lactated ringers infusion    LIDOcaine (PF) 10 mg/ml (1%) injection 10 mg    methocarbamoL injection 500 mg    morphine injection 2 mg    morphine injection 4 mg    ondansetron  "injection 4 mg    oxyCODONE immediate release tablet 5 mg    phenylephrine (LUIS-SYNEPHRINE) 10 mg/mL injection    phenylephrine (LUIS-SYNEPHRINE) 20 mg in sodium chloride 0.9% 250 mL infusion    polyethylene glycol packet 17 g    senna tablet 8.6 mg    sodium chloride 0.9% flush 10 mL    sodium chloride 0.9% flush 10 mL     Objective:     Vital Signs (Most Recent):  Temp: 97.9 °F (36.6 °C) (05/18/22 0637)  Pulse: 84 (05/18/22 0637)  Resp: 18 (05/18/22 0637)  BP: (!) 141/107 (05/18/22 0637)  SpO2: 100 % (05/18/22 0637) Vital Signs (24h Range):  Temp:  [93.6 °F (34.2 °C)-98.1 °F (36.7 °C)] 97.9 °F (36.6 °C)  Pulse:  [] 84  Resp:  [14-32] 18  SpO2:  [92 %-100 %] 100 %  BP: ()/() 141/107     Weight: 48.1 kg (106 lb)  Height: 5' 4" (162.6 cm)  Body mass index is 18.19 kg/m².      Intake/Output Summary (Last 24 hours) at 5/18/2022 0718  Last data filed at 5/18/2022 0637  Gross per 24 hour   Intake 2311.68 ml   Output 1560 ml   Net 751.68 ml       Physical Exam:                LLE: -Skin: Dressing CDI           -MSK: Hip and Knee F/E, EHL/FHL, Gastroc/Tib anterior Strength 5/5           -Neuro:  Sensation intact to light touch L3-S1 dermatomes           -Lymphatic: No signs of lymphadenopathy           -CV: Capillary refill is less than 2 seconds. DP/PT pulses 2/4. Compartments soft and compressible                      RLE: -Skin: Dressing CDI, mild oozing from distal femur traction pin site           -MSK: : Hip and Knee F/E, EHL/FHL, Gastroc/Tib anterior Strength 5/5           -Neuro:  Sensation intact to light touch L3-S1 dermatomes           -Lymphatic: No signs of lymphadenopathy           -CV: Capillary refill is less than 2 seconds. DP/PT pulses  2/4. Compartments soft and compressible.     Diagnostic Findings:   Significant Labs:   Recent Lab Results  (Last 5 results in the past 72 hours)      05/18/22  0226   05/17/22  1812   05/17/22  0535   05/17/22  0530   05/17/22  0255        Unit " Blood Type Code         6200  [P]                5100  [P]                5100  [P]                5100                5100                5100                5100                9500                9500                9500                5100                6200                6200                6200       Unit Expiration         [id number]  [P]                [id number]  [P]                [id number]  [P]                [id number]       Albumin/Globulin Ratio 1.6       1.3         Albumin 2.9       2.8         Alkaline Phosphatase 77       99         ALT 97       27         Anion Gap         9.0       Aniso         1+       aPTT 37.7  Comment: For Minimal Heparin Infusion, the goal aPTT 64-85 seconds corresponds to an anti-Xa of 0.3-0.5.    For Low Intensity and High Intensity Heparin, the goal aPTT  seconds corresponds to an anti-Xa of 0.3-0.7                      86         Baso #   0.02   0.03           Basophil %   0.2   0.3           BILIRUBIN TOTAL 1.2       0.7         BUN 18.1       17.3   17.6       BUN/CREAT RATIO         24       [place]/Echinocytes         1+       Calcium 7.9       7.6   7.7       Chloride 113       116   113       CO2 13       17   17       Creatinine 0.87       0.79   0.74       CROSSMATCH INTERPRETATION         Not required  [P]                Not required  [P]                Not required  [P]                Compatible                Compatible                Compatible                Compatible                Compatible                Compatible                Compatible                Compatible                Not required                Not required                Not required        DISPENSE STATUS         Issued  [P]                Issued  [P]                Selected  [P]                Transfused                Transfused                Transfused                Transfused                Transfused                Transfused                Transfused                Transfused                Transfused                Transfused                Transfused       eGFR if  >60       >60   >60       Eos #   0.00   0.00           Eosinophil %   0.0   0.0           Globulin, Total 1.8       2.2         Glucose 136       118   111       Group & Rh         O POS       Hematocrit 43.6   15.8   29.4     25.6       Hemoglobin 14.6   5.2   9.4     7.9       Immature Grans (Abs)   0.09   0.08     0.10       Immature Granulocytes   1.0   0.9     0.9       Indirect Josue GEL         NEG       INR 1.58               Lactate, Kike 9.9  Comment: Critical Result called and verified by verbal readback to:  nate munoz on 05/18/2022 at 03:02. Reported by [id number].  A repeat order for Lactic Acid has been placed for collection.   9.2  Comment: Critical Result called and verified by verbal readback to: Syed Rodas on 05/17/2022 at 19:56. Reported by [id number].  A repeat order for Lactic Acid has been placed for collection.             Lymph #   1.29   0.66           LYMPH %   14.6   7.6           Lymph Man         6       Macrocyte         1+       MCH   29.1   28.5     29.2       MCHC   32.9   32.0     30.9       MCV   88.3   89.1     94.5       Mono #   0.53   0.68           Mono %   6.0   7.9     2       MPV   10.9   9.6     8.9       Neut #   6.9   7.2           Neut %   78.2   83.3           Neutrophils Relative         92       nRBC   0.0   0.0     0.0       Platelet Estimate         Adequate       Platelets   29   59     107       Poikilocytosis         1+       Potassium 4.4       4.3   3.7       Product Code         [id number]  [P]                [id number]  [P]                [id number]  [P]                 A2319N21                Z7576J85                V9037S36                O5084Z44                I9479F88                G8575A52                O8325S89                E6739U23                C3323J51                P1822W84                U2057U47       PROTEIN TOTAL 4.7       5.0         Protime 18.5               RBC   1.79   3.30     2.71       RBC Morph         Abnormal       RDW   15.5   15.4     14.7       Sodium 145       142   139       Unit ABO/Rh         A POS  [P]                O POS  [P]                O POS  [P]                O POS                O POS                O POS                O POS                O NEG                O NEG                O NEG                O POS                A POS                A POS                A POS       UNIT NUMBER         [id number]  [P]                [id number]  [P]                [id number]  [P]                [id number]                [id number]                [id number]                [id number]                [id number]                [id number]                [id number]                [id number]                [id number]                [id number]                [id number]       WBC   8.9   8.7     11.0                             [P] - Preliminary Result              Significant Imaging: I have reviewed and interpreted all pertinent imaging results/findings.     Assessment/Plan:     Active Diagnoses:    Diagnosis Date Noted POA    PRINCIPAL PROBLEM:  Fracture of left tibia and fibula, open type I or II, initial encounter [S82.202B, S82.402B] 05/17/2022 Yes    Closed disp transvrs fx of shaft of left femur with routine healing [S72.322D] 05/17/2022 Not Applicable    Closed disp transvrs fx of shaft of right femur with routine healing [S72.321D] 05/17/2022 Not Applicable      Problems Resolved During this Admission:     Patient stable this morning. H&H improved. Received blood products intraoperatively  yesterday.   WBAT to bilateral lower extremities. ROMAT  Dressing down POD 2 vs 3 for monitoring of open injury site to the LLE.   Begin daily dry dressing changes as needed tomorrow.   Muñoz out from ortho standpoint once mobilizing.   Lovenox for DVT prophylaxis during stay and forr 30 days upon discharge   We will continue to follow. She will need follow up in our office in approx 3 weeks for repeat x-rays and evaluation. Will likely benefit from a short rehab stay.      The above findings, diagnostics, and treatment plan were discussed with Dr. Lamb who is in agreement with the plan of care except as stated in additional documentation.      Luisana Tomlinson PA-C  Orthopedic Trauma Surgery  [facility]

## 2022-05-18 NOTE — TERTIARY TRAUMA SURVEY NOTE
TERTIARY TRAUMA SURVEY (TTS)    List Injuries Identified to Date:  1. Bilateral femur fractures  2. Left tibia/fibula fracture  3. Left pneumothorax s/p chest tube insertion  4. Left psoas hematoma  5. Metastatic lesions  6. C5 compression deformity  7. Ribs 6-9 displaced  8. Multiple lumbar and thoracic compression deformities  9. Lumbosacral fusion and lumbar hemilaminectomy noted.    List Operative and Procedures:  1.  No past surgical history on file.    Physical Exam  Vitals reviewed.   HENT:      Head: Normocephalic.      Comments: Abrasions/laceration to scalp and face     Right Ear: External ear normal.      Left Ear: External ear normal.      Nose: Nose normal.      Mouth/Throat:      Mouth: Mucous membranes are moist.      Pharynx: Oropharynx is clear.   Eyes:      Extraocular Movements: Extraocular movements intact.      Conjunctiva/sclera: Conjunctivae normal.      Pupils: Pupils are equal, round, and reactive to light.   Cardiovascular:      Rate and Rhythm: Normal rate and regular rhythm.      Pulses: Normal pulses.      Heart sounds: Normal heart sounds.   Pulmonary:      Effort: Pulmonary effort is normal.      Breath sounds: Normal breath sounds.      Comments: Left chest tube in place  Abdominal:      General: Abdomen is flat. Bowel sounds are normal.      Palpations: Abdomen is soft.   Genitourinary:     General: Normal vulva.   Musculoskeletal:         General: Swelling, tenderness and signs of injury present. Normal range of motion.      Cervical back: Normal range of motion and neck supple.      Comments: Multiple abrasions to bilateral upper and lower extremities.  Left arm chronically swollen.  Right shoulder abrasion noted.  Bilateral lower extremities in post operative dressings   Skin:     General: Skin is warm and dry.      Capillary Refill: Capillary refill takes less than 2 seconds.      Findings: Bruising and lesion present.   Neurological:      General: No focal deficit present.       Mental Status: She is alert and oriented to person, place, and time.   Psychiatric:         Mood and Affect: Mood normal.             Imaging Findings Review:  X-Ray Chest 1 View    Result Date: 5/18/2022  EXAMINATION:  XR CHEST 1 VIEW    CPT 05683    CLINICAL HISTORY:  Chest Tube;    TECHNIQUE:  Portable chest    COMPARISON:  May 17, 2022    FINDINGS:  Examination reveals cardiomediastinal silhouette improved parenchymal changes to be essentially unchanged as compared with the previous exam    Chest tube remains in place no clear evidence of pneumothorax.    Hiatus hernia is identified        X-Ray Chest 1 View    Result Date: 5/17/2022  EXAMINATION:  XR CHEST 1 VIEW    CPT 79510    CLINICAL HISTORY:  Chest Tube Placement;    TECHNIQUE:  Portable chest    COMPARISON:  May 16, 2022    FINDINGS:  Examination reveals cardiomediastinal silhouette to be unchanged as compared with previous exam.    Interval insertion of left-sided chest tube no clear residual pneumothorax identified.    Persistent opacities in the left perihilar region but also at the left base in the infrahilar region some of which might be related to atelectatic changes, however, this may also represent changes of pulmonary contusion.    No other focal consolidative changes identified        X-Ray Chest 1 View    Result Date: 5/17/2022  EXAMINATION:  XR CHEST 1 VIEW    CPT 05082    CLINICAL HISTORY:  r/o bleeding or hemorrhage;    COMPARISON:  None    FINDINGS:  Examination is slightly rotated mediastinal silhouette is within normal limits cardiac silhouette is not enlarged right lung field is clear and free of gross infiltrates atelectasis or effusions.    Confluent airspace opacities identified in the left perihilar region in a patient with trauma these might be related to pulmonary contusion.    There is evidence of a pneumothorax on the left including a basilar component.    Although not well seen there might be some minimally displaced fractures  of left-sided ribs        X-Ray Chest 1 View    Result Date: 5/17/2022  EXAMINATION:  XR CHEST 1 VIEW    CLINICAL HISTORY:  chest tube placement;    COMPARISON:  Yesterday    FINDINGS:  Portable frontal view of the chest was obtained. Left-sided Port-A-Cath and chest tube stable.  The heart is not significantly enlarged.  Similar hazy opacities in the left lung.  No significant pneumothorax seen        X-Ray Humerus 2 View Left    Result Date: 5/17/2022  EXAMINATION:  XR HUMERUS 2 VIEW LEFT    CLINICAL HISTORY:  TRAUMA;    COMPARISON:  None.    FINDINGS:  No acute displaced fractures or dislocations.    There are some degenerative changes of the acromioclavicular joint and glenohumeral joint articular spaces otherwise preserved with smooth articular surfaces    No blastic or lytic lesions.    Soft tissues within normal limits.        X-Ray Femur 2 View Left    Result Date: 5/17/2022  EXAMINATION:  XR FEMUR 2 VIEW LEFT    CLINICAL HISTORY:  Trauma;    COMPARISON:  None.    FINDINGS:  Fracture of the midshaft of the femur is identified with displacement and over riding of fracture fragments    Joint spaces preserved.    No blastic or lytic lesions.    Extensive surgical changes identified in the lumbosacral spine        X-Ray Femur 2 View Right    Result Date: 5/17/2022  EXAMINATION:  XR FEMUR 2 VIEW RIGHT    CLINICAL HISTORY:  orif sx right femur;    TECHNIQUE:  14.86mGy of fluoroscopic support was utilized for procedural support during procedure.  Please refer to performing provider dictation.    COMPARISON:  None    FINDINGS:  Fluoroscopic support. Please refer to procedure of this dictation.        X-Ray Femur 2 View Right    Result Date: 5/17/2022  EXAMINATION:  XR FEMUR 2 VIEW RIGHT    CLINICAL HISTORY:  Trauma;    COMPARISON:  None.    FINDINGS:  Fracture of the midshaft of the femur with significant over riding of fracture fragments    Joint spaces preserved.    No blastic or lytic lesions.    Extensive  surgical changes of the lumbosacral spine        X-Ray Tibia Fibula 2 View Left    Result Date: 5/17/2022  EXAMINATION:  XR TIBIA FIBULA 2 VIEW LEFT    CLINICAL HISTORY:  TRAUMA;    COMPARISON:  None.        CT Head Without Contrast    Result Date: 5/17/2022  EXAMINATION:  CT HEAD WITHOUT CONTRAST    CLINICAL HISTORY:  Trauma;    TECHNIQUE:  Axial scans were obtained from skull base to the vertex.    Coronal and sagittal reconstructions obtained from the axial data.    Automatic exposure control was utilized to limit radiation dose.    Contrast: None    Radiation Dose:    Total DLP:  mGy*cm    COMPARISON:  None    FINDINGS:  There is no acute intracranial hemorrhage.  There is a 1.7 cm hypodense lesion in the right parietal lobe with surrounding edema.    There is local mass effect with partial effacement of the right occipital horn.  There is no midline shift or herniation the basal cisterns are patent. There is no abnormal extra-axial fluid collection.    The calvarium and skull base are intact.  There is near complete opacification of the paranasal sinuses with bony remodeling.  There are bilateral mastoid effusions.  There is a hematoma in the right scalp.        CT Cervical Spine Without Contrast    Result Date: 5/17/2022  EXAMINATION:  CT CERVICAL SPINE WITHOUT CONTRAST    CLINICAL HISTORY:  Trauma;    TECHNIQUE:  Noncontrast CT images of the cervical spine. Axial, coronal, and sagittal reformatted images were obtained. Dose length product is 336 mGycm. Automatic exposure control, adjustment of mA/kV or iterative reconstruction technique was used to limit radiation dose.    COMPARISON:  None    FINDINGS:  The cervical spine is visualized to the level of T1.    There is no acute cervical spine fracture identified.  There is a chronic appearing C5 compression deformity with moderate loss of height.  There are diffuse sclerotic and lucent bone lesions throughout the cervical spine, concerning for metastatic  disease or myeloma.  There is no paraspinal hematoma.  A left-sided pneumothorax is noted.  Please see CT chest for additional findings.        CT Maxillofacial Without Contrast    Result Date: 5/17/2022  EXAMINATION:  CT MAXILLOFACIAL WITHOUT CONTRAST    CLINICAL HISTORY:  Maxillofacial pain;    TECHNIQUE:  Volumetric CT acquisition of the facial bones without contrast. Axial, coronal and sagittal reconstructions.    Automatic exposure control was utilized to limit radiation dose.    DLP: 684 mGy-cm    COMPARISON:  None    FINDINGS:  There is no acute fracture identified.  There is near complete opacification of the paranasal sinuses with bony remodeling.  There are bilateral mastoid effusions.    There is soft tissue swelling in the right periorbital soft tissues.  The orbits are unremarkable.        X-Ray Pelvis Routine AP    Result Date: 5/17/2022  EXAMINATION:  XR PELVIS ROUTINE AP    CLINICAL HISTORY:  r/o bleeding or hemorrhage;, .    FINDINGS:  There is evidence of extensive surgical manipulation of the lumbosacral spine would bars and pedicle screws at multiple levels extending into the iliac wings.    Significant deformity of at least 2 of the lower lumbar vertebral bodies with compression deformities some sclerotic changes are also identified.    No definite fractures or dislocations identified no other bony pathology is seen        SURG FL Surgery Fluoro Usage    Result Date: 5/17/2022  See OP Notes for results.     IMPRESSION: See OP Notes for results.             This procedure was auto-finalized by: Virtual Radiologist      CT Leg (Tibia-Fibula) Without Contrast Left    Result Date: 5/17/2022  EXAMINATION:  CT LEG (TIBIA-FIBULA) WITHOUT CONTRAST LEFT    CLINICAL HISTORY:  ; Fracture, tib/fib;    TECHNIQUE:  Helical-acquisition CT images of the left leg were obtained without the intravenous administration of iodinated contrast media.    Multiplanar and volume-rendered (3D) reconstructions were  accomplished by a CT technologist at a separate workstation and pushed to PACS for physician review.    Automated tube current modulation, weight-based exposure dosing, and/or iterative reconstruction technique utilized to reach lowest reasonably achievable exposure rate.    DLP: 926 mGy*cm    COMPARISON:  No prior cross-sectional imaging is available at the time of exam interpretation.    Pertinent radiographs performed earlier the same day were reviewed.    FINDINGS:  Images were reviewed in bone and soft tissue windows.    Exam quality: adequate for evaluation    Knee: The visualized bony structures of the knee appear unremarkable.    Tibia: There is a transverse fracture of the distal shaft of the left tibia with medial displacement of the distal fracture segment and slight overriding of the bones. There is contrast blush in the soft tissues at the posteromedial aspect of the fracture (series 6 image 107). Contrast blush with pooling of contrast is also seen in the subcutaneous tissue at the anterior aspect of the distal tibia and fibula (series 6 image 124). These findings are suggestive of active bleed.    Fibula: There is a comminuted mildly displaced oblique fracture of the distal shaft of the fibula with mild medial displacement of the distal fracture segment.    Ankle: The visualized bony structures of the ankle appear unremarkable.        CT Chest Abdomen Pelvis With Contrast (xpd)    Result Date: 5/17/2022  EXAMINATION:  CT CHEST ABDOMEN PELVIS WITH CONTRAST (XPD)    CLINICAL HISTORY:  Trauma;    TECHNIQUE:  CT of the chest, abdomen and pelvis WITH intravenous contrast. The chest, abdomen and pelvis were scanned utilizing a multidetector helical scanner from the lung apex to the lesser trochanter after the administration of intravenous contrast.    Coronal and sagittal reconstructions were obtained from the axial data set.    Automatic exposure control was utilized to limit radiation dose.    Radiation  Dose:    Total DLP: 910 mGy*cm    COMPARISON:  None    FINDINGS:  Soft Tissues: A soft tissue laceration is seen in the left proximal upper arm with locules of subcutaneous gas (series 2, image 5).    Mediastinum: No mediastinal hematoma is seen. A small hiatal hernia is seen.    Heart: The heart size is within normal limits.    Aorta: No aortic dissection or aneurysm is seen.    Pulmonary Arteries: Unremarkable.    Lungs: There is compressive atelectasis of the left lung with patchy hazy opacity within the left lung. The possibility of underlying lung contusion cannot be excluded. There are a few scattered subcentimeter nodules in the right lung.    Pleura: There is a large left pneumothorax about 60% severity with compressive atelectasis of the left lung and mild rightward mediastinal shift indicating tension component.    Bony Structures: The bones demonstrate marked heterogeneous attenuation with numerous lucencies which is consistent with marrow infiltrative disorder like metastasis or myeloma.    Ribs: There are minimally displaced fractures of the left 6th and 9th ribs posteriorly (series 2, images 23 and 36).    Abdomen:    Abdominal Wall: No abdominal wall pathology is seen.    Liver: There are two 11 mm hypodensities in the right lobe of the liver (series 3, images 48 and 56). These may reflect metastases.    Biliary System: No extrahepatic biliary duct dilatation is seen.    Gallbladder: The gallbladder appears unremarkable.    Pancreas: The pancreas appears unremarkable.    Spleen: There are two subcentimeter hypodensities in the spleen (series 3, image 52) of unknown significance. In the appropriate clinical setting metastases cannot be excluded.    Adrenals: The adrenal glands appear unremarkable.    Kidneys: The kidneys appear unremarkable with no stones cysts masses or hydronephrosis.    Aorta: The abdominal aorta appears unremarkable.    IVC: Unremarkable.    Bowel:    Stomach: The stomach appears  unremarkable.    Duodenum: Unremarkable appearing duodenum.    Small Bowel: The small bowel appears unremarkable.    Colon: There is moderate stool in the colon which could reflect an element of constipation.    Appendix: No appendix is identified.    Peritoneum: No intraperitoneal free air or ascites is seen.    Pelvis:    Bladder: The bladder appears unremarkable.    Female:    Uterus: The uterus appears unremarkable.    Ovaries: No adnexal masses are seen.    Bony structures:    Dorsal Spine: There is mild spondylosis of the visualized dorsal spine. There is a chronic compression deformity of the L4 vertebra. There is left hemilaminectomy at L3-L4. There is also posterior fusion of L1 through S1 with orthopedic screws and rods in place.    Miscellaneous: The left psoas muscle appears bulky with specks of calcification.    Notifications: The results were discussed with the emergency room physician Dr. Penn prior to dictation at 2022-05-16 23:15:45 CDT.      Jeffery Chan MD  [facility]
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
## 2022-05-19 NOTE — PROGRESS NOTES
[facility] - [facility] ICU  Orthopedics  Progress Note    Patient Name: Christiana Webb  MRN: [id number]  Admission Date: 5/16/2022  Hospital Length of Stay: 3 days  Attending Provider: Guerrero Mcclure MD  Primary Care Provider: Primary Doctor No  Follow-up For: Procedure(s) (LRB):  INSERTION, INTRAMEDULLARY GRAHAM, TIBIA (Left)  INSERTION, INTRAMEDULLARY GRAHAM, FEMUR (Bilateral)    Post-Operative Day: 2 Day Post-Op  Subjective:     Principal Problem:Fracture of left tibia and fibula, open type I or II, initial encounter    Principal Orthopedic Problem: 2 Day Post-Op IMN bilateral femur; IMN left opten tibia fracture    Interval History: Patient is resting comfortably in bed this morning. Appears to be some bleeding form pin site from yesterday to dressing today. States her pain has been well controlled. States she is doing okay this morning. Awake and alert. Denies numbness and tingling distally.     Review of patient's allergies indicates:  No Known Allergies    Current Facility-Administered Medications   Medication    0.9%  NaCl infusion (for blood administration)    0.9%  NaCl infusion (for blood administration)    0.9%  NaCl infusion (for blood administration)    0.9%  NaCl infusion (for blood administration)    0.9%  NaCl infusion (for blood administration)    0.9%  NaCl infusion (for blood administration)    0.9%  NaCl infusion (for blood administration)    0.9%  NaCl infusion (for blood administration)    0.9%  NaCl infusion (for blood administration)    0.9%  NaCl infusion (for blood administration)    0.9%  NaCl infusion (for blood administration)    0.9%  NaCl infusion (for blood administration)    0.9%  NaCl infusion (for blood administration)    0.9%  NaCl infusion (for blood administration)    acetaminophen tablet 650 mg    albumin human 5% bottle 12.5 g    gabapentin capsule 300 mg    hydrocortisone sodium succinate injection 100 mg    ketorolac injection 15 mg    lactated  "ringers bolus 1,000 mL    lactated ringers infusion    LIDOcaine (PF) 10 mg/ml (1%) injection 10 mg    methocarbamoL injection 500 mg    morphine injection 2 mg    morphine injection 4 mg    ondansetron injection 4 mg    oxyCODONE immediate release tablet 5 mg    polyethylene glycol packet 17 g    senna tablet 8.6 mg    sodium chloride 0.9% flush 10 mL    sodium chloride 0.9% flush 10 mL     Objective:     Vital Signs (Most Recent):  Temp: 98.4 °F (36.9 °C) (05/19/22 0400)  Pulse: 84 (05/19/22 0801)  Resp: (!) 29 (05/19/22 0801)  BP: 116/76 (05/19/22 0802)  SpO2: 95 % (05/19/22 0801) Vital Signs (24h Range):  Temp:  [96.5 °F (35.8 °C)-98.4 °F (36.9 °C)] 98.4 °F (36.9 °C)  Pulse:  [] 84  Resp:  [12-34] 29  SpO2:  [86 %-100 %] 95 %  BP: ()/(45-95) 116/76  Arterial Line BP: (109-131)/(54-65) 122/64     Weight: 48.1 kg (106 lb)  Height: 5' 4" (162.6 cm)  Body mass index is 18.19 kg/m².      Intake/Output Summary (Last 24 hours) at 5/19/2022 0816  Last data filed at 5/19/2022 0600  Gross per 24 hour   Intake 3789.75 ml   Output 1290 ml   Net 2499.75 ml       Physical Exam:                LLE: -Skin: Dressing CDI           -MSK: Hip and Knee F/E, EHL/FHL, Gastroc/Tib anterior Strength 5/5           -Neuro:  Sensation intact to light touch L3-S1 dermatomes           -Lymphatic: No signs of lymphadenopathy           -CV: Capillary refill is less than 2 seconds. DP/PT pulses 2/4. Compartments soft and compressible                      RLE: -Skin: Dressing CDI, no active bleeding from traction pin site today. Dressing with some drainage noted overnight.            -MSK: : Hip and Knee F/E, EHL/FHL, Gastroc/Tib anterior Strength 5/5           -Neuro:  Sensation intact to light touch L3-S1 dermatomes           -Lymphatic: No signs of lymphadenopathy           -CV: Capillary refill is less than 2 seconds. DP/PT pulses  2/4. Compartments soft and compressible.     Diagnostic Findings:   Significant Labs: "   Recent Lab Results  (Last 5 results in the past 72 hours)      05/19/22  0705   05/19/22  0702   05/19/22  0622   05/19/22  0210   05/18/22  2046        Base Deficit -1.0         -0.9       Correct Temperature (PH)         7.44       Corrected Temperature (pCO2)         34  Comment: Result is outside patient normal (reference) range.       Corrected Temperature (pO2)         54  Comment: Result is outside panic range (critical limits).       POC COHb         3.6       POC MetHb         0.8       POC O2Hb         90.6  Comment: Result is outside patient normal (reference) range.       Specimen source Arterial sample         Arterial sample       Albumin/Globulin Ratio     1.4           Albumin     2.7           Alkaline Phosphatase     72           ALT     34           AST     119           Base Excess, Arterial   -1.0             Baso #       0.01         Basophil %       0.4         BILIRUBIN TOTAL     0.8           BUN     18.7           Calcium     8.0           Chloride     112           CO2     23           Creatinine     0.76           eGFR if      >60           Eos #       0.00         Eosinophil %       0.0         Globulin, Total     1.9           Glucose     77           HCO3 ART   21.8             Hematocrit       19.0         Hemoglobin       6.5         Immature Grans (Abs)       0.01         Immature Granulocytes       0.4         Lymph #       0.24         LYMPH %       10.1         MCH       30.0         MCHC       34.2         MCV       87.6         Mono #       0.08         Mono %       3.4         MPV       9.7         Neut #       2.0         Neut %       85.7         nRBC       0.8         O2 Sat, Arterial               Platelets       97         POC HCO3 21.8         23.1       POC HEMOGLOBIN         6.8  Comment: Result is outside patient normal (reference) range.       POC Ionized Calcium 1.12         1.13       POC PCO2 30  Comment: Result is outside patient normal  (reference) range.         34  Comment: Result is outside patient normal (reference) range.       POC PH 7.47  Comment: Result is outside patient normal (reference) range.         7.44       POC PO2 79  Comment: Result is outside patient normal (reference) range.         54  Comment: Result is outside panic range (critical limits).       POC Potassium 3.5         4.0       POC SATURATED O2 96         88.9       POC Sodium 141         138       POC Temp 37.0         37.0       Potassium     3.6           PROTEIN TOTAL     4.6           RBC       2.17         RDW       15.1         Sodium     142           WBC       2.4                               Significant Imaging: I have reviewed and interpreted all pertinent imaging results/findings.     Assessment/Plan:     Active Diagnoses:    Diagnosis Date Noted POA    PRINCIPAL PROBLEM:  Fracture of left tibia and fibula, open type I or II, initial encounter [S82.202B, S82.402B] 05/17/2022 Yes    Closed disp transvrs fx of shaft of left femur with routine healing [S72.322D] 05/17/2022 Not Applicable    Closed disp transvrs fx of shaft of right femur with routine healing [S72.321D] 05/17/2022 Not Applicable      Problems Resolved During this Admission:     Patient stable this morning. H&H improved. Received blood products intraoperatively yesterday.   WBAT to bilateral lower extremities. ROMAT. Work with therapy as much as possible once able  Daily dry dressing changes beginning today  Muñoz out from ortho standpoint once mobilizing.   Lovenox for DVT prophylaxis during stay and for 30 days upon discharge   We will continue to follow. She will need follow up in our office in approx 3 weeks for repeat x-rays and evaluation. Will likely benefit from a short rehab stay.      The above findings, diagnostics, and treatment plan were discussed with Dr. Lamb who is in agreement with the plan of care except as stated in additional documentation.      Luisana Tomlinson,  JON  Orthopedic Trauma Surgery  [facility]

## 2022-05-19 NOTE — PROCEDURES
[facility]   Endotracheal Intubation  Procedure Note    SUMMARY     Date of Procedure: 5/19/2022     Time of Procedure: 1330    Procedure: Endotracheal Intubation    Perfomed by: Deon Penn MD    Assistant: Clarissa Woo MD    Indication: respiratory failure.    Pre-Procedure Diagnosis: Acute hypoxemic respiratory failure    Post-Procedure Diagnosis: Acute hypoxemic respiratory failure    Description of the Findings of the Procedure:     Sedation: etomidate.  Paralytic: rocuronium.  Lidocaine: no.  Atropine: no.  Equipment: Jessica 3 laryngoscope blade and 7.5mm cuffed endotracheal tube.  Cricoid Pressure: yes.  Number of attempts: 1.  ETT location confirmed by by auscultation, by CXR and ETCO2 monitor.    Significant Surgical Tasks Conducted by the Assistant(s), if Applicable: None    Complications: None; patient tolerated the procedure well.           Disposition: ICU - intubated and critically ill.      Deon Penn MD  [employer] [facility], PGY1

## 2022-05-19 NOTE — PT/OT/SLP PROGRESS
Physical Therapy      Patient Name:  Christiana Webb   MRN:  [id number]    Patient not seen today secondary to low H&H and Blood transfusion . Will follow-up tomorrow 5/20/22.

## 2022-05-19 NOTE — PT/OT/SLP PROGRESS
Occupational Therapy      Patient Name:  Christiana Webb   MRN:  [id number]    Patient not seen today secondary to decline in respiratory status and low H&H requiring transfusion. Will follow-up 5/20.    5/19/2022

## 2022-05-19 NOTE — PT/OT/SLP PROGRESS
Pt presents with tenuous respiratory status. PO intake is unsafe at this time. SLP to reattempt tomorrow as appropriate.
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
## 2022-05-19 NOTE — PROGRESS NOTES
Arterial Catheter Insertion Procedure Note     Procedure: Insertion of Arterial Catheter     Indications: Hemodynamic Monitoring     Procedure Details   Informed consent was obtained for the procedure: due to emergency circumstance.    Maximum sterile technique was used including antiseptics, cap, sterile gloves, sterile gown, hand hygiene, mask and sterile maximum barrier drape.     Under sterile conditions the skin above the R brachial artery was prepped with Chloroprep and covered with a sterile drape. Local anesthesia was applied to the skin and subcutaneous tissues. A 22-gauge needle was used to identify the artery. An 18-gauge needle was then inserted into the artery. A guide wire was then passed easily through the needle. The needle was then withdrawn. A 20 gauge 12 cm arterial catheter was then inserted into the vessel over the guide wire. The catheter was secured with transparent CHG dressing. Estimated blood loss of 5cc.     Ultrasound/Sonosite was used during the procedure.     Findings:   There were no changes to vital signs. Catheter was flushed with 10 cc NS. Patient tolerated procedure well.

## 2022-05-19 NOTE — PROGRESS NOTES
Patient no showed 05/19/2022      Reason for follow-up:   -Metastatic left breast cancer, ER positive, MS positive, HER-2 positive   -Brain metastasis, lumbar spine metastasis, bone metastasis, bilaterally axillary lymph node metastasis, lung metastasis, liver metastasis, left adrenal metastasis   -Taxotere-induced peripheral neuropathy      Past medical history: Hypertension.  Anemia.  Chronic kidney disease.  Tonsillectomy.  Social history: Denies history of tobacco, alcohol, or illicit drug abuse.  Lives with 2 of her siblings.  Has 3 children and 2 grandchildren.  Family history: Father with history of throat cancer in his 50s. Mother with history of ovarian cancer in her 50s. Brother with history of stomach cancer in her 50s  Menstrual history: As of today, 12/12/2017, premenopausal        History of present illness:    Patient is being followed for metastatic left breast cancer, ER positive, MS positive, HER-2 positive.     For detailed history of present illness, please see my last note dated 02/19/2020.  Please also refer to assessment and plan section.    Metastatic breast cancer:   Inflammatory carcinoma of left breast.  ER positive.  MS positive.  HER-2 positive.  Diagnosed 12/2017   Metastases to brain, lumbar spine, extensively to bones, lymph nodes, lungs, liver, and adrenal.    For lumbar spine metastasis, status post laminectomy and internal fixation 12/2017   Status post radiation therapy to lumbar spine and brain 02/2018   Noncompliant with follow-up.   Status post Taxotere/Herceptin/Perjeta x1 on 03/21/2018; she declined further chemotherapy    Presented 1 year later, on 02/2019: Extensive bone metastases stable; progression of bilateral lung metastasis, bilateral axillary lymphadenopathy, progressive liver metastasis, progressive left adrenal metastasis   Dose reduced Taxotere/Herceptin/Perjeta started 03/07/2019 (1 year after first cycle of chemotherapy)   Good response post 4 cycles   Excellent  response post 8 cycles  Starting with ninth cycle of chemotherapy, discontinued Herceptin/Perjeta due to persistent drop in LVEF, without recovery post discontinuation   Continued improvement/stability post 13 cycles of Taxotere; dramatic drop in tumor marker level   No brain metastasis on surveillance brain MRI (02/10/2020)   -Stable metastatic disease on restaging CTs and bone scan post 18 cycles of Taxotere (04/16/2020)   -No brain metastasis on surveillance brain MRI (05/27/2020)   -No metastatic disease progression post Taxotere x23 cycles (restaging CTs and bone scan: 07/17/2020)   -Genetic testing negative (07/23/2020)   -Questionable punctate enhancing foci right cerebellar and right occipital (surveillance brain MRI: 09/09/2020) (completely asymptomatic)   -No disease progression post Taxotere x28 (CTs, bone scan: 11/10/2020) (bone scan: SuperScan representing extensive skeletal metastatic involvement, unchanged)  >>>  Progression on Taxotere (new/progressive brain metastasis)   (no disease progression outside brain on restaging CTs and bone scan):   -New/progressive brain metastasis post Taxotere x30   -Completed 31 cycles of Taxotere 12/28/2020, then discontinued  -12/09/2020: Surveillance brain MRI with and without contrast (comparison: 09/09/2020): New, progressive millimetric intra-axial metastasis (at least 5); bone metastases have also progressed; no scalp or intracranial extension   -02/25/2021: TTE: LVEF 45% (40% on 11/11/2019)   -History of palliative RT to brain and lumbar spine 01/22/2018-02/02/2018   -S/p SRS to 8 brain metastasis (01/25/2021-02/01/2021)   -Restaging CTs and bone scan (02/25/2021): No disease progression  (No disease progression outside brain)   -Surveillance brain MRI (03/10/2021): Overall stable to minimally less prominent scattered small interval metastasis   -Capecitabine started 03/11/2021   -No extracranial disease progression on restaging CTs and bone scan (05/20/2021)    -Capecitabine dose decreased by 25% from cycle #5 onwards (neutropenia)   -06/29/2021: Surveillance/restaging brain MRI with and without contrast (comparison: 03/10/2021): Overall stable to decreased size of small residual enhancing intracranial lesions, with minimal increase in size of a single right periventricular nodule (3 mm, previously <2 mm)   -Cycle #7 of Capecitabine started 07/19/2021   -08/11/2021: Restaging CTs C/A/P with contrast (comparison: 05/21/2021): No progression; stable extensive bone metastasis; no lung parenchymal or solid organ metastasis   -08/11/2021: Restaging bone scan (comparison: 05/20/2021): Widespread bone metastasis, stable to minimally improved   -08/04/2021: CA 27.29 level 83.8, rising; CA 15-3 level 60.8, rising   -09/30/2021: Brain MRI with and without contrast (comparison: 06/29/2021): Minimal increase in size of multiple small enhancing intracranial lesions   -As of 08/27/2021, tumor markers are rising   -10/07/2021: LVEF 45%   -No progression on restaging CTs and bone scan (11/29/2021)  >>>  Progression on Capecitabine (brain metastasis; liver metastasis):   -01/13/2022: Brain MRI with and without contrast (comparison: Brain MRI 09/30/2021): New/progressive intra-axial metastasis; minimal local mass-effect; no shift   (Evaluated by Dr. Malik Prellop on 02/07/2022; SRS planned)   -02/02/2022: TTE: LVEF 64%   -02/02/2022: DEXA scan: Normal BMD femurs; BMD of left distal forearm consistent with osteoporosis   -02/02/2022: Restaging CT C/A/P with contrast (comparison: 11/29/2021): Right inferior hepatic nodule 15 x 18 mm, slightly larger; diffuse bone metastasis unchanged  (Progressive brain metastasis on brain MRI 01/13/2022)   (Enlarging liver metastasis on restaging CTs 02/02/2022)   -Kadcyla started 03/04/2022   -S/p C3: SRS to brain metastasis 02/16/2022-03/17/2022   -03/11/2022: CEA level 106.04   -CA 27.29 level: 761, rising   -CA 15-3 level: 582, rising   -04/13/2022: Labs  reviewed; alk phos 240, elevated since 03/11/2022; WBC 2.9, hemoglobin 10.5, platelets 345K, ANC 0.92   -Cycle #2 of Kadcyla on 03/25/2022       Interval History     04/13/2022:   -04/13/2022: Labs reviewed; alk phos 240, elevated since 03/11/2022; WBC 2.9, hemoglobin 10.5, platelets 345K, ANC 0.92   -Cycle #2 of Kadcyla on 03/25/2022  Presents for follow visit.  Doing well.  No side effects with Kadcyla so far.  Grade 2 neutropenia today.  No weakness, fatigue, fevers, or chills.  No complaints.  Good appetite.  Reasonably good energy.  No unusual headaches, focal neurological symptoms, chest pain, cough, dyspnea, abdominal pain, etc.  Will recheck CBC next week; will proceed with cycle #3 of Kadcyla once ANC >1000/mm³.  Platelet count is normal.    05/19/2022:  -Cycle #3 of Kadcyla on 04/21/2022  -    Review of Systems:   All systems reviewed, and found to be negative except for the symptoms detailed above.      Physical Examination:  VITAL SIGNS:  Reviewed.       GENERAL:  In no apparent distress.    HEAD:  No signs of head trauma.   EYES:  Pupils are equal.  Extraocular motions intact.    EARS:  Hearing grossly intact.   MOUTH:  Oropharynx is normal.   NECK:  No adenopathy, no JVD.     CHEST:  Chest with clear breath sounds bilaterally.  No wheezes, rales, or rhonchi.    CARDIAC:  Regular rate and rhythm.  S1 and S2, without murmurs, gallops, or rubs.   VASCULAR:  No Edema.  Peripheral pulses normal and equal in all extremities.   ABDOMEN:  Soft, without detectable tenderness.  No sign of distention.  No   rebound or guarding, and no masses palpated.   Bowel Sounds normal.   MUSCULOSKELETAL:  Good range of motion of all major joints. Extremities without clubbing, cyanosis or edema.    NEUROLOGIC EXAM:  Alert and oriented x 3.  No focal sensory or strength deficits.   Speech normal.  Follows commands.   PSYCHIATRIC:  Mood normal.   SKIN:  No rash or lesions.  01/29/2019: Breast examination performed with her  verbal consent, in the presence of Prenella, over LPN; deformed left breast is noted, with diffuse, vague, nontender mass palpable in the mid breast as well as lower outer quadrant; skin retraction is noted; nipple retraction is noted; a couple of small erythematous nodules in the upper part of right breast, a short distance from nipple; no palpable regional lymphadenopathy; no warmth; no skin ulceration.  06/06/2019: Chaperoned breast examination was performed in the presence of Prenella, over LPN.  No mass palpable in left breast.  No lymphadenopathy in axilla on either side.  07/22/2020: Not examined; it was a telephone visit.  11/13/2020: In no acute discomfort.  In a wheelchair.  02/03/2021: Looks well and healthy.  No palpable lumps or lymphadenopathy.  Lungs clear.  Heart is normal.  No focal neurologic deficit.  04/20/2021: In wheelchair.  In no acute discomfort.  No palpable lymphadenopathy or masses.  06/09/2021: In wheelchair.  In no acute discomfort.  Fully alert.  No palpable lymphadenopathy or masses.  Lungs clear.  Abdomen benign.  11/02/2021: In no acute discomfort.  In wheelchair, as usual.  Neurologically, nonfocal.  Lungs clear.  Heart sounds normal.       Assessment:   #Metastatic left breast cancer:  To summarize:   -Started as inflammatory carcinoma of left breast, diagnosed 12/2017   -ER positive, WI positive, HER-2 positive   -Metastasis to brain, lumbar spine, bones, lymph nodes, lungs, liver, adrenal   -S/p laminectomy and internal fixation for lumbar spine metastasis, 12/2017, then palliative radiotherapy to lumbar spine and brain   -Was extremely noncompliant with follow-up   -S/p Taxotere/Herceptin/Perjeta x1 on 03/21/2018, then lost to follow-up   -Presented again 1 year later, on 02/2019 with extensive bone metastasis, stable; progression of bilateral lung metastasis, bilateral axilla lymphadenopathy, progressive liver metastasis, progressive left adrenal metastasis   -Presumed dose  reduced Taxotere/Herceptin/Perjeta 03/07/2019, 1 year after first cycle of chemotherapy   -Excellent response post 8 cycles of Herceptin/Perjeta/Taxotere   >>>  Herceptin/Perjeta induced cardiac toxicity:   -From cycle #9 of chemotherapy onwards, discontinued Herceptin/Perjeta and continue Taxotere every 3 weeks   -No progression post Taxotere x28 cycles (CTs and bone scan: 11/10/2020)   -Genetic testing negative   >>>  Progression post Taxotere x30 cycles (new/progressive brain metastasis):  (No disease progression outside brain)   -New, progressive brain metastasis on brain MRI 12/09/2020   -S/p SRS to T8 brain metastasis January-February' 21   -Capecitabine monotherapy started 03/11/2021   -No progression on CTs and bone scan (11/29/2021)   >>>  Progression on Capecitabine (brain metastasis; liver metastasis):   -LVEF recovered to 64% on TTE 02/02/2022   -Kadcyla started 03/04/2022  -S/p SRS to brain metastasis February-March' 22   -Cycle #2 of Kadcyla on 03/25/2022      #History of brain metastasis:  -History of palliative RT to brain and lumbar spine 01/22/2018-02/02/2018   -S/p SRS to 8 brain metastasis (01/25/2021-02/01/2021)    -06/29/2021: Surveillance/restaging brain MRI with and without contrast (comparison: 03/10/2021): Overall stable to decreased size of small residual enhancing intracranial lesions, with minimal increase in size of a single right periventricular nodule (3 mm, previously <2 mm)   -09/30/2021: Brain MRI with and without contrast (comparison: 06/29/2021): Minimal increase in size of multiple small enhancing intracranial lesions   -As of 08/27/2021, tumor markers are rising   -09/30/2021: Brain MRI with and without contrast (comparison: 06/29/2021): Minimal increase in size of multiple small enhancing intracranial lesions   -01/13/2022: Brain MRI with and without contrast (comparison: Brain MRI 09/30/2021): New/progressive intra-axial metastasis; minimal local mass-effect; no shift    (Evaluated by Dr. Malik Predhruvop on 02/07/2022; SRS planned)   -S/p C3: SRS to brain metastasis 02/16/2022-03/17/2022       #Sites of disease:   Left breast (inflammatory carcinoma); brain metastasis; lumbar spine metastasis; extensive bone metastasis; bilateral axillary lymph nodes; lungs; liver; left adrenal       #Molecular markers:  ER positive.  MA positive.  HER-2 positive.   BRCA1/2 analysis negative   MMR proficient   NTRK gene fusion negative   Tumor mutational burden: QNS       #Taxotere -induced peripheral neuropathy:   -Mild; well controlled with Cymbalta and gabapentin       -04/13/2022: Labs reviewed; alk phos 240, elevated since 03/11/2022; WBC 2.9, hemoglobin 10.5, platelets 345K, ANC 0.92   -Cycle #2 of Kadcyla on 03/25/2022      Plan:  Periodically noncompliant with follow-up   Compliance emphasized.     -S/p Taxotere x30 cycles; then, Capecitabine  >>>   -Kadcyla started 03/04/2022; second cycle 03/25/2022; ANC 0.92 on 04/13/2022   >>>  -Hold next cycle of Kadcyla until ANC >1000/mm³; hold 1 week, then recheck CBC  -Continue Kadcyla every 3 weeks  -Check CBC and CMP every 10 days or so   -Restage with contrast-enhanced CT scans of C/A/P and whole-body nuclear medicine bone scan 2 months (May) after starting Kadcyla    -Check CA 15-3 and CA 27.29 level monthly to assess disease progress   -Check liquid biopsy for tumor mutational burden   -Monitor LVEF every 3 months while on Kadcyla; next TTE in May' 22     Status post C3 of SRS to brain metastases 02/16/2022-03/17/2022   -Follow-up and surveillance imaging per radiation oncology     -Continue Cymbalta and gabapentin for Taxotere-induced peripheral neuropathy     -Dental evaluation and preventive dentistry pending, before we can start bone antiresorptive therapy for bone metastasis; pending.  The   Above discussed at length with her.  All questions answered.   She understands and agrees this plan.  She also understands that her long-term prognosis  is guarded.   ------------    Discussion:  With progressive brain metastasis, systemic therapy options are:   -Ado-trastuzumab emtansine (T-DMI)   -Capecitabine + lapatinib   -Capecitabine + neratinib   -Paclitaxel + neratinib (category 2B)   -Capecitabine (category 2B)   -Tucatinib + trastuzumab + Capecitabine (if previously treated with 1 or more anti-HER-2 based regimens)      Ado-trastuzumab emtansine (T-DMI):   -Boxed warning: Serious hepatotoxicity including liver failure and death; monitors LFTs prior to initiation of chemotherapy and prior to each chemotherapy dose; reduced dose appropriately   -LVEF reduction; evaluate LVEF prior to entering treatment     -Dose: 3.6 mg/kg every 3 weeks until disease progression or unacceptable toxicity     -Dose reduction according to hepatotoxicity during treatment   -Withhold treatment for thrombocytopenia (see)   -Assess for cardiac toxicity, peripheral neuropathy, pulmonary toxicity, etc.     Warnings/precautions:   -Bone marrow suppression thrombocytopenia; neutropenia and anemia have also occurred     -Cardiotoxicity: Assess LVEF every 3 months during treatment; avoid if LVEF <50 percent, history of symptomatic CHF, serious arrhythmia, or recent history (within 6 months) of MI, or unstable angina     -Extravasation reactions   -Hemorrhage   -Hepatotoxicity   -Hypersensitivity/infusion related reactions   -Peripheral neuropathy, usually grade 1   -Pulmonary toxicity: ILD, pneumonitis, etc.     Monitoring parameters:   -Platelet count at baseline and prior to each dose, LFTs, hepatitis B status, LVEF assessment every 3 months, infusion reactions, bleeding, neuropathy, lung toxicity

## 2022-05-19 NOTE — PLAN OF CARE
PMH:    past medical history of metastatic left breast cancer on RT/CT, HTN, heart failure with recovered ejection fraction

## 2022-05-19 NOTE — PLAN OF CARE
Problem: Adult Inpatient Plan of Care  Goal: Plan of Care Review  Outcome: Ongoing, Progressing  Goal: Patient-Specific Goal (Individualized)  Outcome: Ongoing, Progressing  Goal: Absence of Hospital-Acquired Illness or Injury  Outcome: Ongoing, Progressing  Goal: Optimal Comfort and Wellbeing  Outcome: Ongoing, Progressing  Goal: Readiness for Transition of Care  Outcome: Ongoing, Progressing     Problem: Infection  Goal: Absence of Infection Signs and Symptoms  Outcome: Ongoing, Progressing     Problem: Skin Injury Risk Increased  Goal: Skin Health and Integrity  Outcome: Ongoing, Progressing     Problem: Coping Ineffective  Goal: Effective Coping  Outcome: Ongoing, Progressing     Problem: Fall Injury Risk  Goal: Absence of Fall and Fall-Related Injury  Outcome: Ongoing, Progressing

## 2022-05-20 NOTE — HISTORICAL [facility]ERNER
This is a historical note converted from [place]. Formatting and pictures may have been removed.  Please reference [place] for original formatting and attached multimedia. History of Present Illness  Reason for follow-up:  -Metastatic left breast cancer, ER positive, [place] positive, HER-2 positive  -Brain metastasis, lumbar spine metastasis, bone metastasis, bilaterally axillary lymph node metastasis, lung metastasis, liver metastasis, left adrenal metastasis  -Taxotere-induced peripheral neuropathy  ?  ?   Past medical history: Hypertension. ?Anemia. ?Chronic kidney disease. ?Tonsillectomy.  Social history: Denies history of tobacco, alcohol, or illicit drug abuse. ?Lives with 2 of her siblings. ?Has 3 children and 2 grandchildren.  Family history: Father with history of throat cancer in his 50s. Mother with history of ovarian cancer in her 50s. Brother with history of stomach cancer in her 50s  Menstrual history: As of today, 12/12/2017, premenopausal  ?   ?  History of present illness:?  Patient is being followed for metastatic left breast cancer, ER positive, [place] positive, HER-2 positive.  ?   For detailed history of present illness, please see my last note dated 02/19/2020.? Please also refer to assessment and plan section.  ?  Metastatic breast cancer:  Inflammatory carcinoma of left breast. ?ER positive. ?[place] positive. ?HER-2 positive.? Diagnosed 12/2017  Metastases to brain,?lumbar spine,?extensively to bones,?lymph nodes, lungs, liver, and adrenal.?  For lumbar spine metastasis,?status post laminectomy and internal fixation 12/2017  Status post radiation therapy to lumbar spine and brain?02/2018  Noncompliant with follow-up.  Status post Taxotere/Herceptin/Perjeta?x1 on?03/21/2018; she declined further chemotherapy?  Presented?1 year later,?on?02/2019:?Extensive bone metastases stable;?progression of bilateral lung metastasis, bilateral axillary?lymphadenopathy, progressive liver metastasis, progressive left  adrenal metastasis  Dose reduced Taxotere/Herceptin/Perjeta started?03/07/2019?(1 year after first cycle of chemotherapy)  Good response post 4 cycles  Excellent response?post 8 cycles  Starting?with ninth cycle of chemotherapy,?discontinued Herceptin/Perjeta?due to persistent?drop in LVEF,?without?recovery?post?discontinuation  Continued improvement/stability?post 13 cycles of Taxotere;?dramatic drop in tumor marker level  No brain metastasis?on surveillance brain MRI (02/10/2020)  -Stable metastatic disease?on restaging CTs and bone scan?post 18 cycles of Taxotere?(04/16/2020)  -No brain metastasis on surveillance brain MRI (05/27/2020)  -No metastatic disease progression?post?Taxotere x23 cycles?(restaging?CTs and bone scan: 07/17/2020)  -Genetic testing negative (07/23/2020)  -Questionable punctate?enhancing foci right cerebellar and right occipital (surveillance brain MRI: 09/09/2020)?(completely asymptomatic)  -No disease progression post Taxotere x28?(CTs, bone scan:?11/10/2020)?(bone scan: SuperScan?representing extensive skeletal metastatic involvement, unchanged)  >>>  Progression?on Taxotere?(new/progressive brain metastasis)  (no disease progression outside brain?on restaging CTs and bone scan):  -New/progressive brain metastasis post Taxotere x30  -Completed 31 cycles of Taxotere 12/28/2020, then discontinued  -12/09/2020: Surveillance brain MRI with and without contrast (comparison: 09/09/2020): New, progressive millimetric intra-axial metastasis (at least 5); bone metastases have also progressed; no scalp or intracranial extension  -02/25/2021: TTE: LVEF 45% (40% on 11/11/2019)  -History of palliative RT to brain and lumbar spine 01/22/2018-02/02/2018  -S/p SRS to 8 brain metastasis?(01/25/2021-02/01/2021)  -Restaging CTs and bone scan (02/25/2021):?No disease progression  (No disease progression outside brain)  -Surveillance brain MRI (03/10/2021):?Overall stable to minimally less prominent scattered  small interval metastasis  -Capecitabine started 03/11/2021  -No extracranial disease progression on restaging CTs and bone scan?(05/20/2021)  -Capecitabine dose decreased by 25% from cycle #5 onwards (neutropenia)  -06/29/2021: Surveillance/restaging brain MRI with and without contrast (comparison: 03/10/2021): Overall stable to decreased size of small residual enhancing intracranial lesions, with minimal increase in size of a single right periventricular nodule (3 mm, previously <2 mm)  -Cycle #7 of Capecitabine?started 07/19/2021  -08/11/2021: Restaging CTs C/A/P with contrast (comparison: 05/21/2021): No progression;?stable extensive bone metastasis;?no lung parenchymal or solid organ metastasis  -08/11/2021: Restaging bone scan (comparison: 05/20/2021): Widespread bone metastasis, stable to minimally improved  -08/04/2021: CA 27.29 level 83.8, rising; CA 15-3 level 60.8, rising  -09/30/2021: Brain MRI with and without contrast (comparison: 06/29/2021): Minimal increase in size of multiple small enhancing intracranial lesions  -As of 08/27/2021, tumor markers are rising  -10/07/2021: LVEF 45%  -No progression on restaging CTs and bone scan (11/29/2021)  >>>  Progression on Capecitabine?(brain metastasis; liver metastasis):  -01/13/2022: Brain MRI with and without contrast (comparison: Brain MRI 09/30/2021): New/progressive intra-axial metastasis; minimal local mass-effect; no shift  (Evaluated by Dr. Malik Prellop on 02/07/2022; SRS planned)  -02/02/2022: TTE: LVEF 64%  -02/02/2022: DEXA scan: Normal BMD femurs; BMD of left distal forearm consistent with osteoporosis  -02/02/2022: Restaging CT C/A/P with contrast (comparison: 11/29/2021): Right inferior hepatic nodule 15 x 18 mm, slightly larger; diffuse bone metastasis unchanged  (Progressive brain metastasis on brain MRI 01/13/2022)  (Enlarging liver metastasis?on restaging CTs 02/02/2022)  -Kadcyla started 03/04/2022  -S/p C3: SRS to brain metastasis  02/16/2022-03/17/2022  -03/11/2022: CEA level 106.04  -CA 27.29 level: 761, rising  -CA 15-3 level: 582, rising  -04/13/2022: Labs reviewed; alk phos 240, elevated since 03/11/2022; WBC 2.9, hemoglobin 10.5, platelets 345K, ANC 0.92  -Cycle #2 of Kadcyla on 03/25/2022  ?  ?  Interval History?  ?   04/13/2022:  -04/13/2022: Labs reviewed; alk phos 240, elevated since 03/11/2022; WBC 2.9, hemoglobin 10.5, platelets 345K, ANC 0.92  -Cycle #2 of Kadcyla on 03/25/2022  Presents for follow visit. ?Doing well.? No side effects with Kadcyla so far.? Grade 2 neutropenia today.? No weakness, fatigue, fevers, or chills.? No complaints. ?Good appetite. ?Reasonably good energy.? No unusual headaches, focal?neurological symptoms, chest pain, cough, dyspnea,?abdominal pain, etc.? Will recheck CBC next week;?will proceed with cycle #3 of Kadcyla?once ANC >1000/mm?.? Platelet count is normal.  ?  ?   Review of Systems:  All systems reviewed, and found to be negative?except for the symptoms detailed above.  ?  ?   Physical Examination:  VITAL SIGNS: ?Reviewed. ? ?  GENERAL:? In no apparent distress.?  HEAD:? No signs of head trauma.  EYES:? Pupils are equal.? Extraocular motions intact.?  EARS:? Hearing grossly intact.  MOUTH:? Oropharynx is normal.  NECK:? No adenopathy, no JVD.??  CHEST:? Chest with clear breath sounds bilaterally.? No wheezes, rales, or rhonchi.?  CARDIAC:? Regular rate and rhythm.? S1 and S2, without murmurs, gallops, or rubs.  VASCULAR:? No Edema.? Peripheral pulses normal and equal in all extremities.  ABDOMEN:? Soft, without detectable tenderness.? No sign of distention.? No?? rebound or guarding, and no masses palpated.?? Bowel Sounds normal.  MUSCULOSKELETAL:? Good range of motion of all major joints. Extremities without clubbing, cyanosis or edema.?  NEUROLOGIC EXAM:? Alert and oriented x 3.? No focal sensory or strength deficits.?? Speech normal.? Follows commands.  PSYCHIATRIC:? Mood normal.  SKIN:? No  rash or lesions.  01/29/2019:?Breast examination performed with her verbal consent, in the presence of Prenella, over LPN;?deformed left breast is noted,?with diffuse, vague, nontender mass?palpable in the mid breast?as well as lower outer quadrant;?skin retraction is noted;?nipple retraction is noted;?a couple of small erythematous nodules?in the upper part of right breast, a short distance from nipple;?no palpable regional lymphadenopathy; no warmth; no skin ulceration.  06/06/2019:?Chaperoned breast examination was performed?in the presence of Prenella, over LPN.? No mass palpable in left breast.? No lymphadenopathy in axilla on either side.  07/22/2020: Not examined; it was a telephone visit.  11/13/2020: In no acute discomfort. ?In a wheelchair.  02/03/2021: Looks well and healthy.? No palpable lumps or lymphadenopathy.? Lungs clear. ?Heart is normal.? No focal neurologic deficit.  04/20/2021: In wheelchair. ?In no acute discomfort.? No palpable lymphadenopathy or masses.  06/09/2021: In wheelchair. ?In no acute discomfort. ?Fully alert. ?No palpable lymphadenopathy or masses.? Lungs clear. ?Abdomen benign.  11/02/2021:?In no acute discomfort. ?In wheelchair, as usual.? Neurologically, nonfocal. ?Lungs clear. ?Heart sounds normal.  ?  ?  Assessment:  #Metastatic left breast cancer:  To summarize:  -Started as inflammatory carcinoma of left breast, diagnosed 12/2017  -ER positive, [place] positive, HER-2 positive  -Metastasis to brain, lumbar spine, bones, lymph nodes, lungs, liver, adrenal  -S/p laminectomy and internal fixation for lumbar spine metastasis, 12/2017, then palliative radiotherapy to lumbar spine and brain  -Was extremely noncompliant with follow-up  -S/p Taxotere/Herceptin/Perjeta x1 on 03/21/2018, then lost to follow-up  -Presented again 1 year later, on 02/2019 with extensive bone metastasis, stable; progression of bilateral lung metastasis, bilateral axilla lymphadenopathy, progressive liver  metastasis, progressive left adrenal metastasis  -Presumed dose reduced Taxotere/Herceptin/Perjeta 03/07/2019, 1 year after first cycle of chemotherapy  -Excellent response post 8 cycles of Herceptin/Perjeta/Taxotere  >>>  Herceptin/Perjeta induced cardiac toxicity:  -From cycle #9 of chemotherapy onwards, discontinued Herceptin/Perjeta and continue Taxotere every 3 weeks  -No progression post Taxotere x28 cycles (CTs and bone scan: 11/10/2020)  -Genetic testing negative  >>>  Progression?post Taxotere x30 cycles (new/progressive brain metastasis):  (No disease progression outside brain)  -New, progressive brain metastasis on brain MRI 12/09/2020  -S/p SRS to T8 brain metastasis January-February 21  -Capecitabine monotherapy started 03/11/2021  -No progression on CTs and bone scan (11/29/2021)  >>>  Progression on Capecitabine (brain metastasis; liver metastasis):  -LVEF recovered to 64% on TTE 02/02/2022  -Kadcyla started 03/04/2022  -S/p?SRS to brain metastasis?February-March 22  -Cycle #2 of Kadcyla on 03/25/2022  ?  ?  #History of brain metastasis:  -History of palliative RT to brain and lumbar spine 01/22/2018-02/02/2018  -S/p SRS to 8 brain metastasis?(01/25/2021-02/01/2021)?  -06/29/2021: Surveillance/restaging brain MRI with and without contrast (comparison: 03/10/2021): Overall stable to decreased size of small residual enhancing intracranial lesions, with minimal increase in size of a single right periventricular nodule (3 mm, previously <2 mm)  -09/30/2021: Brain MRI with and without contrast (comparison: 06/29/2021): Minimal increase in size of multiple small enhancing intracranial lesions  -As of 08/27/2021, tumor markers are rising  -09/30/2021: Brain MRI with and without contrast (comparison: 06/29/2021): Minimal increase in size of multiple small enhancing intracranial lesions  -01/13/2022: Brain MRI with and without contrast (comparison: Brain MRI 09/30/2021): New/progressive intra-axial metastasis;  minimal local mass-effect; no shift  (Evaluated by Dr. Malik Prellop on 02/07/2022; SRS planned)  -S/p C3: SRS to brain metastasis 02/16/2022-03/17/2022  ?  ?  #Sites of disease:  Left breast (inflammatory carcinoma);?brain metastasis; lumbar spine metastasis; extensive bone metastasis;?bilateral axillary?lymph nodes; lungs; liver; left?adrenal  ?  ?  #Molecular markers:  ER positive. ?OK positive. ?HER-2 positive.  BRCA1/2?analysis negative  MMR proficient  NTRK gene fusion negative  Tumor mutational burden:?QNS  ?  ?  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  ?  -04/13/2022: Labs reviewed; alk phos 240, elevated since 03/11/2022; WBC 2.9, hemoglobin 10.5, platelets 345K, ANC 0.92  -Cycle #2 of Kadcyla on 03/25/2022  ?  ?  Plan:  Periodically?noncompliant with follow-up  Compliance emphasized.  ?  -S/p Taxotere x30 cycles; then, Capecitabine  >>>  -Kadcyla started 03/04/2022; second cycle 03/25/2022;?ANC 0.92?on 04/13/2022  >>>  -Hold next cycle of Kadcyla?until ANC >1000/mm?; hold 1 week, then recheck CBC  -Continue Kadcyla every 3 weeks  -Check CBC and CMP every 10 days or so  -Restage with contrast-enhanced CT scans of C/A/P and whole-body nuclear medicine bone scan 2 months (May)?after starting?Kadcyla?  -Check CA 15-3 and CA 27.29 level?monthly to assess?disease progress  -Check liquid biopsy for tumor mutational burden  -Monitor LVEF every 3 months while on Kadcyla;?next TTE?in May 22  ?  Status post C3?of SRS to brain metastases 02/16/2022-03/17/2022  -Follow-up and surveillance imaging per?radiation oncology  ?  -Continue Cymbalta and gabapentin?for Taxotere-induced peripheral neuropathy  ?  -Dental evaluation and preventive dentistry pending,?before we can start bone antiresorptive therapy for bone metastasis;?pending.  ?  Follow-up with NP in 1 week, with CBC,?to assess?for resumption of Kadcyla.  ?  Above discussed at length with her.? All  questions answered.  She understands and agrees this plan.? She also understands that her long-term prognosis is guarded.  ------------  ?  Discussion:  With progressive brain metastasis, systemic therapy options are:  -Ado-trastuzumab emtansine (T-DMI)  -Capecitabine + lapatinib  -Capecitabine + neratinib  -Paclitaxel + neratinib (category 2B)  -Capecitabine (category 2B)  -Tucatinib + trastuzumab + Capecitabine (if previously treated with 1 or more anti-HER-2 based regimens)  ?  ?  Ado-trastuzumab emtansine (T-DMI):  -Boxed warning:?Serious hepatotoxicity including?liver failure and death; monitors LFTs prior to initiation of chemotherapy and prior to each chemotherapy dose; reduced dose appropriately  -LVEF reduction; evaluate LVEF prior to entering treatment  ?  -Dose: 3.6 mg/kg every 3 weeks until disease progression or unacceptable toxicity  ?  -Dose reduction according to hepatotoxicity during treatment  -Withhold treatment for thrombocytopenia (see)  -Assess for cardiac toxicity, peripheral neuropathy, pulmonary toxicity, etc.  ?  Warnings/precautions:  -Bone marrow suppression thrombocytopenia; neutropenia and anemia have also occurred  ?  -Cardiotoxicity: Assess LVEF every 3 months during treatment; avoid if LVEF <50 percent, history of symptomatic CHF, serious arrhythmia, or recent history (within 6 months) of MI, or unstable angina  ?  -Extravasation reactions  -Hemorrhage  -Hepatotoxicity  -Hypersensitivity/infusion related reactions  -Peripheral neuropathy, usually grade 1  -Pulmonary toxicity: ILD, pneumonitis, etc.  ?  Monitoring parameters:  -Platelet count at baseline and prior to each dose, LFTs, hepatitis B status, LVEF assessment every 3 months, infusion reactions, bleeding, neuropathy, lung toxicity  Physical Exam  Vitals & Measurements  T:?36.7? ?C (Oral)? HR:?68(Peripheral)? BP:?115/77?  HT:?142.20?cm? WT:?47.100?kg? BMI:?23.29?   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  Metastatic breast cancer  Metastatic breast cancer  Historical  Pregnant  Pregnant  Pregnant  Shingles  Procedure/Surgical History  Contrast injection(s) for radiologic evaluation of existing central venous access device, including fluoroscopy, image documentation and report (03/04/2022)  Drainage of Buttock Skin, External Approach (07/30/2019)  Incision and drainage of abscess (eg, carbuncle, suppurative hidradenitis, cutaneous or subcutaneous abscess, cyst, furuncle, or paronychia); simple or single (07/30/2019)  Transfusion of Nonautologous Red Blood Cells into Peripheral Vein, Percutaneous Approach (03/28/2018)  Cath, inf, per/cent/midline (02/23/2018)  Catheter Insertion Mediport (None) (02/23/2018)  Fluoroscopy of Superior Vena Cava using Low Osmolar Contrast, Guidance (02/23/2018)  Insertion of Infusion Device into Superior Vena Cava, Percutaneous Approach (02/23/2018)  Insertion of tunneled centrally inserted central venous access device, with subcutaneous port; age 5 years or older (02/23/2018)  Fusion of 2 or more Lumbar Vertebral Joints with Synthetic Substitute, Posterior Approach, Posterior Column, Open Approach (12/19/2017)  Fusion of Lumbosacral Joint with Synthetic Substitute, Posterior Approach, Posterior Column, Open Approach (12/19/2017)  Lumbar Foraminotomy MIS (.) (12/17/2017)  Bone biopsy sample (12/08/2017)  Breast biopsy sample (12/07/2017)  Biopsy of breast; percutaneous, needle core, not using imaging guidance (separate procedure) (12/06/2017)  Bone marrow; biopsy, needle or trocar (12/06/2017)  Excision of Left Breast, Percutaneous Approach, Diagnostic (12/06/2017)  Extraction of Iliac Bone Marrow, Percutaneous Approach, Diagnostic (12/06/2017)  Tonsillectomy   Medications  acetaminophen-hydrocodone 325 mg-10 mg oral tablet, 1  tab(s), Oral, QID, PRN  acetaminophen-hydrocodone 325 mg-10 mg oral tablet, 1 tab(s), Oral, QID, PRN,? ?Not taking  aspirin 81 mg oral Delayed Release (EC) tablet, 81 mg= 1 tab(s), Oral, Daily  aspirin 81 mg oral Delayed Release (EC) tablet, 81 mg= 1 tab(s), Oral, Daily, 3 refills  aspirin 81 mg oral tablet, 81 mg= 1 tab(s), Oral, Daily, 11 refills  capecitabine 500 mg oral tablet, See Instructions, 5 refills  capecitabine 500 mg oral tablet, 1500 mg= 3 tab(s), Oral, BID,? ?Not taking  CYCLOBENZAPRINE 10 MG TABLET, Oral, TID  Cymbalta 60 mg oral delayed release capsule, 60 mg= 1 cap(s), Oral, At Bedtime, 3 refills  Decadron 4 mg oral tablet, 16 mg= 4 tab(s), Oral, Daily,? ?Not taking  dexamethasone (for IVPB)  diphenhdramine (for Inj.), 25 mg= 0.5 mL, IV Push, Once-chemo  DULoxetine 30 mg oral delayed release capsule, 30 mg= 1 cap(s), Oral, Daily, 3 refills,? ?Not taking  DULoxetine 60 mg oral delayed release capsule, 60 mg= 1 cap(s), Oral, Daily, 3 refills  DULoxetine 60 mg oral delayed release capsule, 60 mg= 1 cap(s), Oral, Daily, 3 refills,? ?Not taking  GABAPENTIN 300 MG CAPSULE, 300 mg= 1 cap(s), Oral, TID  gabapentin 300 mg oral capsule, 300 mg= 1 cap(s), Oral, TID, 3 refills  Heparin Flush 100 U/mL - 5 mL, 500 units, IV Push, Once-chemo  Heparin Flush 100 U/mL - 5 mL, 500 units= 5 mL, IV Push, Once-chemo  Heparin Flush 100 U/mL - 5 mL, 500 units= 5 mL, IV Push, Once-chemo  lactulose 10 g/15 mL oral syrup, Oral, Daily  Lexapro 20 mg oral tablet, 20 mg= 1 tab(s), Oral, Daily, 11 refills  losartan 25 mg oral tablet, 25 mg= 1 tab(s), Oral, Daily, 6 refills  losartan 50 mg oral tablet, 50 mg= 1 tab(s), Oral, Daily, 3 refills  megestrol 40 mg oral tablet, See Instructions, 8 refills  meloxicam 7.5 mg oral tablet, 7.5 mg= 1 tab(s), Oral, Daily, 11 refills  metoprolol succinate 25 mg oral tablet extended release, 12.5 mg= 0.5 tab(s), Oral, Daily, 6 refills  Pazeo 0.7% ophthalmic solution, 1 drop(s), OPTH,  Daily  Phenergan 25 mg Tab, 25 mg= 1 tab(s), Oral, q6hr  potassium chloride 20 mEq oral TABLET extended release, 20 mEq= 1 tab(s), Oral, BID,? ?Not Taking per Prescriber  ProAir HFA 90 mcg/inh inhalation aerosol with adapter, 2 puff(s), INH, q4hr, PRN, 3 refills  traZODone 100 mg oral tablet, See Instructions, 11 refills  Tylenol 325 mg oral tablet, 650 mg= 2 tab(s), Oral, Once-chemo  Zofran (for IVPB) 16 mg + dexamethasone 10 mg/mL injectable solution 10 mg  Zofran ODT 8 mg oral tablet, disintegrating, 8 mg= 1 tab(s), Oral, q8hr, PRN, 1 refills  Allergies  No Known Allergies  Social History  Abuse/Neglect  No, No, Yes, 03/25/2022  Alcohol - Denies Alcohol Use, 08/24/2014  Never, 02/22/2022  Employment/School  homemaker, Work/School description: homemaker. Operates hazardous equipment: No., 04/20/2017  Exercise  Self assessment: Good condition., 04/20/2017  Home/Environment  Lives with Children. Living situation: Home/Independent. Alcohol abuse in household: No. Substance abuse in household: No. Smoker in household: No. Injuries/Abuse/Neglect in household: No. Feels unsafe at home: No. Family/Friends available for support: Yes. Concern for family members at home: No. Major illness in household: No. Financial concerns: No. TV/Computer concerns: No., 04/20/2017  Nutrition/Health  Regular, Wants to lose weight: No., 04/20/2017  Sexual  Sexually active: No., 11/05/2020  Gender Identity Identifies as female., 02/28/2019  Substance Use  Never, 02/22/2022  Tobacco - Denies Tobacco Use, 03/04/2013  Never (less than 100 in lifetime), No, 03/25/2022  Family History  Cancer: Mother.  Heart failure.: Mother.  Primary malignant neoplasm of breast: Sister.  Primary malignant neoplasm of lung: Father.  Immunizations  Vaccine Date Status   pneumococcal 23-polyvalent vaccine 11/14/2017 Recorded   influenza virus vaccine, inactivated 11/15/2016 Recorded

## 2022-05-20 NOTE — HISTORICAL [facility] CERNER
This is a historical note converted from [employer]. Formatting and pictures may have been removed.  Please reference [employer] for original formatting and attached multimedia. Chief Complaint  Breast Cancer  History of Present Illness  Problem list:  1. Stage IV Inflammatory breast cancer. Biopsy proven (right anterior iliac bone on 12/08/2017 showed metastatic carcinoma, consistent with a breast primary) metastatic left breast cancer, with extensive bone metastases, lung metastasis, possible liver and adrenal metastasis, extensive dulce metastasis, and small intracranial (posterior fossa) metastasis.?  ????  ER 85% positive, MS 45% positive, Ki-67 30% (high proliferation), HER-2 3+ (overexpressed)  Pathologic L4 compression fracture, to a lesser extent at L3 also, epidural extension of tumor at the L4 level resulting in severe canal stenosis, and left lower extremity weakness.  ?   -Germline?BRCA1/2?testing negative as of 08/17/2020.  ???  Current Treatment:?  -Ado-trastuzumab emtansine (T-DMI)? Kadcyla 3.6 mg/kg every 3 weeks until disease progression or unacceptable toxicity  start 3/4/2022  ?   Treatment History:  1. 12/18/17: Dr. Harish Pearson performed L3-L4 bilateral laminectomies and decompression of the posterior elements; L4-L5 bilateral laminectomies and foraminotomies with decompression of the thecal sac; lumbar open reduction and internal fixation; biopsy of L3 and biopsy of L4 vertebral bodies.??  2. Palliative radiation therapy to the lumbar spine post decompression surgery, as well as radiation therapy to the brain metastasis with Dr. Saeed. She completed on 2/2/2018.  3. Herceptin/Perjeta/Taxotere 03/21/2018-09/12/2019, herceptin and perjeta dropped d/t decreased LVEF of 45%.  4. Pa lliative chemotherapy with Taxotere every 21 days. Stopped after C31 on 12/28/2020 d/t progression.  5. Xeloda (capecitabine)?1000 mg/m??twice daily, days 1-14, cycle every 21 days; 3/11/2021-2/22/2021  ?   History of  present illness:?  52-year-old Afro-American female who was seen in internal medicine clinic on 12/06/2017 for left lower extremity pain and was found to have multiple abnormalities on blood tests. ?Was hospitalized for evaluation, and ultimately found to have metastatic breast cancer.  ?   For?a full, detailed note, see the note dated 7/22/2020.  ?  Interval History  3/4/2022:Patient presents today for scheduled chemotherapy teaching on Kadcyla. Discussed treatment regimen and potential side effects. We also discussed treatment options for any presenting symptoms. No further questions or needs voiced.  ?   03/11/2022:?Patient presents for a one week toxicity evaluation.?  Disease has progressed on Capecitabine. Stopped and Kadcyla started with first cycle given on 3/4/2022.?Patient reports?no concerns or issues during infusion. Feeling okay since treatment.?  No fever or symptoms of infection. + for fatigue, unchanged from before, does not interfere w/ ability to perform ADLs. Denies nausea, joint or muscle pain, headache, diarrhea.  Has constipation, onset prior to being on Kadcyla. Last BM this past Wednesday. Will treat with?Mirilax, enema or suppositories. Denies any blood in stool. ?Denies nose bleeds, ?peripheral neuropathy, fever, abdominal pain, cough, mouth sores, SOB or dizziness.  Reports appetite as good. Eats all through the day w/ 4 good meals.?  Complains of itching to her?stomach. ?Onset 3 days ago. No treatment tried. No known trigger.  She is getting brain radiotherapy; started week of 2/21/2022. States last treatment will be on Monday 03/14/2022. States radiation makes her feel tired and weak.  She is taking calcium and Vitamin D.  She has a dental exam on 4/15/2022.  ?   LABS: 3/11/2022  WBC4.6, ANC 2.36, Hgb 10.5, HCT 33.1, ,  K+ 3.4, creat 0.80, Alk phos 155, AST 73, ALT 7,  CEA, CA 27.29, CA 15-3 pending.  Review of Systems  A complete 12 point ROS was performed in full with pertinent  positives as described in interval history. Remainder of ROS were done in full and are negative.  Physical Exam  Vitals & Measurements  T:?36.5? ?C (Oral)? HR:?81(Peripheral)? RR:?18? BP:?100/59? SpO2:?100%?  HT:?142.20?cm? WT:?47.890?kg? BMI:?23.68?  General:?Alert and oriented. Neatly groomed. Well-nourished in no acute distress  Eye: PERRL, clear conjunctiva, Sclera anicteric.  HENT:?Hearing appears normal. Oral mucosa moist, no lesions, exudate.  Neck: no thyromegaly or cervical or supraclavicular region lymphadenopathy  Respiratory:?clear to auscultation bilaterally  Cardiovascular:?regular rate and rhythm without murmurs, gallops or rubs. No edema.  Gastrointestinal:?soft, non-tender, non-distended with normal bowel sounds.  Musculoskeletal:?good range of motion of all extremities/spine without limitation or discomfort  Integumentary: pinpoint rough rash, dry, no redness, left breast. No rash noted to abdomen.  Neurologic: cranial nerves intact, no signs of peripheral neurological deficit, motor/sensory function intact.??  Assessment/Plan  1.?Invasive ductal carcinoma of left breast, stage 4?C50.912,?Metastatic breast cancer?C50.912  ? Inflammatory carcinoma of left breast. ?ER positive. ?TN positive. ?HER-2 positive.? Diagnosed 12/2017  Metastases to brain,?lumbar spine,?extensively to bones,?lymph nodes, lungs, liver, and adrenal.?  For lumbar spine metastasis,?status post laminectomy and internal fixation 12/2017  Status post radiation therapy to lumbar spine and brain?02/2018  Noncompliant with follow-up.  Status post Taxotere/Herceptin/Perjeta?x1 on?03/21/2018; she declined further chemotherapy?  Presented?1 year later,?on?02/2019:?Extensive bone metastases stable;?progression of bilateral lung metastasis, bilateral axillary?lymphadenopathy, progressive liver metastasis, progressive left adrenal metastasis  Dose reduced Taxotere/Herceptin/Perjeta started?03/07/2019?(1 year after first cycle of  chemotherapy)  Good response post 4 cycles  Excellent response?post 8 cycles  Starting?with ninth cycle of chemotherapy,?discontinued Herceptin/Perjeta?due to persistent?drop in LVEF,?without?recovery?post?discontinuation  Continued improvement/stability?post 13 cycles of Taxotere;?dramatic drop in tumor marker level  No brain metastasis?on surveillance brain MRI (02/10/2020)  -Stable metastatic disease?on restaging CTs and bone scan?post 18 cycles of Taxotere?(04/16/2020)  -No brain metastasis on surveillance brain MRI (05/27/2020)  -No metastatic disease progression?post?Taxotere x23 cycles?(restaging?CTs and bone scan: 07/17/2020)  -Genetic testing negative (07/23/2020)  -Questionable punctate?enhancing foci right cerebellar and right occipital (surveillance brain MRI: 09/09/2020)?(completely asymptomatic)  -No disease progression post Taxotere x28?(CTs, bone scan:?11/10/2020)?(bone scan: SuperScan?representing extensive skeletal metastatic involvement, unchanged)  >>>  Progression?on Taxotere?(new/progressive brain metastasis)  (no disease progression outside brain?on restaging CTs and bone scan):  -New/progressive brain metastasis post Taxotere x30  -Completed 31 cycles of Taxotere 12/28/2020, then discontinued  -12/09/2020: Surveillance brain MRI with and without contrast (comparison: 09/09/2020): New, progressive millimetric intra-axial metastasis (at least 5); bone metastases have also progressed; no scalp or intracranial extension  - 02/25/2021: TTE: LVEF 45% (40% on 11/11/2019)  -History of palliative RT to brain and lumbar spine 01/22/2018-02/02/2018  -S/p SRS to 8 brain metastasis?(01/25/2021-02/01/2021)  -Restaging CTs and bone scan (02/25/2021):?No disease progression  (No disease progression outside brain)  -Surveillance brain MRI (03/10/2021):?Overall stable to minimally less prominent scattered small interval metastasis  -Capecitabine started 03/11/2021  -No extracranial disease progression on  restaging CTs and bone scan?(05/20/2021)  -Capecitabine dose decreased by 25% from cycle #5 onwards (neutropenia)  -06/29/2021: Surveillance/restaging brain MRI with and without contrast (comparison: 03/10/2021): Overall stable to decreased size of small residual enhancing intracranial lesions, with minimal increase in size of a single right periventricular nodule (3 mm, previously <2 mm)  -Cycle #7 of Capecitabine?started 07/19/2021  -08/11/2021: Restaging CTs C/A/P with contrast (comparison: 05/21/2021): No progression;?stable extensive bone metastasis;?no lung parenchymal or solid organ metastasis  -08/11/2021: Restaging bone scan (comparison: 05/20/2021): Widespread bone metastasis, stable to minimally improved  -08/04/2021: CA 27.29 level 83.8, rising; CA 15-3 level 60.8, rising  -09/30/2021: Brain MRI with and without contrast (comparison: 06/29/2021): Minimal increase in size of multiple small enhancing intracranial lesions  -As of 08/27/2021, tumor markers are rising  -10/07/2021: LVEF 45%  -No progression on restaging CTs and bone scan (11/29/2021)  >>>  Progression on Capecitabine?(brain metastasis; liver metastasis):  -01/13/2022: Brain MRI with and without contrast (comparison: Brain MRI 09/30/2021): New/progressive intra-axial metastasis; minimal local mass-effect; no shift  (Evaluated by Dr. Malik Prellop on 02/07/2022; SRS planned)  -02/02/2022: TTE: LVEF 64%  -02/02/2022: DEXA scan: Normal BMD femurs; BMD of left distal forearm consistent with osteoporosis  -02/02/2022: Restaging CT C/A/P with contrast (comparison: 11/29/2021): Right inferior hepatic nodule 15 x 18 mm, slightly larger; diffuse bone metastasis unchanged  (Progressive brain metastasis on brain MRI 01/13/2022)  (Enlarging liver metastasis?on restaging CTs 02/02/2022)  ?   # History of brain metastasis:  -History of palliative RT to brain and lumbar spine 01/22/2018-02/02/2018  -S/p SRS to 8 brain  metastasis?(01/25/2021-02/01/2021)?  -06/29/2021: Surveillance/restaging brain MRI with and without contrast (comparison: 03/10/2021): Overall stable to decreased size of small residual enhancing intracranial lesions, with minimal increase in size of a single right periventricular nodule (3 mm, previously <2 mm)  -09/30/2021: Brain MRI with and without contrast (comparison: 06/29/2021): Minimal increase in size of multiple small enhancing intracranial lesions  -As of 08/27/2021, tumor markers are rising  -09/30/2021: Brain MRI with and without contrast (comparison: 06/29/2021): Minimal increase in size of multiple small enhancing intracranial lesions  -01/13/2022: Brain MRI with and without contrast (comparison: Brain MRI 09/30/2021): New/progressive intra-axial metastasis; minimal local mass-effect; no shift  (Evaluated by Dr. Malik Prellop on 02/07/2022; SRS planned)  ?   # Sites of disease:  Left breast (inflammatory carcinoma);?brain metastasis; lumbar spine metastasis; extensive bone metastasis;?bilateral axillary?lymph nodes; lungs; liver; left?adrenal  ?   # Molecular markers:  ER positive. ?NV positive. ?HER-2 positive.  BRCA1/2?analysis negative  MMR proficient  NTRK gene fusion negative  Tumor mutational burden:?QNS  ?   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   Plan:  Periodically?noncompliant with follow-up  Compliance emphasized.  ?   -Disease progressed post Taxotere x30, with brain metastasis, without extracranial disease progression on restaging CTs and bone scan  -Received a total of 31 cycles of Taxotere  >>>  -Capecitabine started 03/11/2021; dose decreased by 25%?from cycle #5 onwards (neutropenia)  -Progressive brain metastases on brain MRI 01/13/2022  -Enlarging liver metastasis on restaging CTs 02/02/2022  -CEA 15-3 and CA 27.29 levels rising as of 01/13/2022  (Progression on Capecitabine)  -LVEF normalized?to 64% on TTE  (02/02/2022)  >>>  -Disease has progressed on Capecitabine; discontinue  -Ado-trastuzumab emtansine (T-DMI) started 3/4/2022  -Patient has done well with 1st cycle. One week toxicity labs reviewed, stable except for a mildly elevated Alk phos and AST.  -Check CBC and CMP every 10 days or so  -Restage with contrast-enhanced CT scans of C/A/P and whole-body nuclear medicine bone scan; due 4/29/22.  -Check CA 15-3 and CA 27.29 level?monthly to assess?disease progress  -Check liquid biopsy for tumor mutational burden  -Monitor LVEF every 3 months while on Kadcyla;?next TTE?in May 22  ?   Has been evaluated?by Dr. Saeed, radiologist,?for SRS to worsening brain metastasis?(brain MRI?01/13/2022)  Tells me that she started brain radiotherapy last week; will get records from oncologic.  Per patient report today(03/11/2022), she will complete radiotherapy on 03/14/2022.  ?  -Continue Cymbalta and gabapentin?for Taxotere-induced peripheral neuropathy  ?  -Dental evaluation and preventive dentistry pending,?before we can start bone antiresorptive therapy for bone metastasis;?pending.  Per patient report - Dental exam scheduled for 4/15/2022.  Continue Calcium and Vitamin D.  ?  Follow-up in 3 weeks MD/NP for next cycle of Kadcyla-?with labs done prior.  ?  Above discussed at length with her.? All questions answered.  Discussed scans and labs?and gave her copies of relevant reports.  Explained that we are going to stop Capecitabine?and start Kadcyla.? Discussed potential side effects of Kadcyla.? Formal chemotherapy teaching with nursing staff to follow.  She understands and agrees this plan.? She also understands that her long-term prognosis is guarded.  ------------  ?  Discussion:  With progressive brain metastasis, systemic therapy options are:  -Ado-trastuzumab emtansine (T-DMI)  -Capecitabine + lapatinib  -Capecitabine + neratinib  -Paclitaxel + neratinib (category 2B)  -Capecitabine (category 2B)  -Tucatinib +  trastuzumab + Capecitabine (if previously treated with 1 or more anti-HER-2 based regimens)  ?  ?  Ado-trastuzumab emtansine (T-DMI):  -Boxed warning:?Serious hepatotoxicity including?liver failure and death; monitors LFTs prior to initiation of chemotherapy and prior to each chemotherapy dose; reduced dose appropriately  -LVEF reduction; evaluate LVEF prior to entering treatment  ?  -Dose: 3.6 mg/kg every 3 weeks until disease progression or unacceptable toxicity  ?  -Dose reduction according to hepatotoxicity during treatment  -Withhold treatment for thrombocytopenia (see)  -Assess for cardiac toxicity, peripheral neuropathy, pulmonary toxicity, etc.  ?  Warnings/precautions:  -Bone marrow suppression thrombocytopenia; neutropenia and anemia have also occurred  ?  -Cardiotoxicity: Assess LVEF every 3 months during treatment; avoid if LVEF <50 percent, history of symptomatic CHF, serious arrhythmia, or recent history (within 6 months) of MI, or unstable angina  ?  -Extravasation reactions  -Hemorrhage  -Hepatotoxicity  -Hypersensitivity/infusion related reactions  -Peripheral neuropathy, usually grade 1  -Pulmonary toxicity: ILD, pneumonitis, etc.  ?  Monitoring parameters:  -Platelet count at baseline and prior to each dose, LFTs, hepatitis B status, LVEF assessment every 3 months, infusion reactions, bleeding, neuropathy, lung toxicity  ?  Left breast rash: Trial of OTC Hydrocortisone 1 % BID x 7 days.  ?  Constipation: Start Docuset Sodium one capsule BID. Continue mirilax prn. Benefits of water and fiber from diet reviewed. Suggested eating 1-2 prunes daily. She is to call office if above measures not helping.   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  Metastatic breast cancer  Metastatic breast  cancer  Historical  Pregnant  Pregnant  Pregnant  Shingles  Procedure/Surgical History  Contrast injection(s) for radiologic evaluation of existing central venous access device, including fluoroscopy, image documentation and report (03/04/2022)  Drainage of Buttock Skin, External Approach (07/30/2019)  Incision and drainage of abscess (eg, carbuncle, suppurative hidradenitis, cutaneous or subcutaneous abscess, cyst, furuncle, or paronychia); simple or single (07/30/2019)  Transfusion of Nonautologous Red Blood Cells into Peripheral Vein, Percutaneous Approach (03/28/2018)  Cath, inf, per/cent/midline (02/23/2018)  Catheter Insertion Mediport (None) (02/23/2018)  Fluoroscopy of Superior Vena Cava using Low Osmolar Contrast, Guidance (02/23/2018)  Insertion of Infusion Device into Superior Vena Cava, Percutaneous Approach (02/23/2018)  Insertion of tunneled centrally inserted central venous access device, with subcutaneous port; age 5 years or older (02/23/2018)  Fusion of 2 or more Lumbar Vertebral Joints with Synthetic Substitute, Posterior Approach, Posterior Column, Open Approach (12/19/2017)  Fusion of Lumbosacral Joint with Synthetic Substitute, Posterior Approach, Posterior Column, Open Approach (12/19/2017)  Lumbar Foraminotomy MIS (.) (12/17/2017)  Bone biopsy sample (12/08/2017)  Breast biopsy sample (12/07/2017)  Biopsy of breast; percutaneous, needle core, not using imaging guidance (separate procedure) (12/06/2017)  Bone marrow; biopsy, needle or trocar (12/06/2017)  Excision of Left Breast, Percutaneous Approach, Diagnostic (12/06/2017)  Extraction of Iliac Bone Marrow, Percutaneous Approach, Diagnostic (12/06/2017)  Tonsillectomy   Medications  acetaminophen-hydrocodone 325 mg-10 mg oral tablet, 1 tab(s), Oral, QID, PRN  acetaminophen-hydrocodone 325 mg-10 mg oral tablet, 1 tab(s), Oral, QID, PRN,? ?Not taking  aspirin 81 mg oral Delayed Release (EC) tablet, 81 mg= 1 tab(s), Oral, Daily  aspirin 81 mg  oral Delayed Release (EC) tablet, 81 mg= 1 tab(s), Oral, Daily, 3 refills  aspirin 81 mg oral tablet, 81 mg= 1 tab(s), Oral, Daily, 11 refills  capecitabine 500 mg oral tablet, See Instructions, 5 refills  capecitabine 500 mg oral tablet, 1500 mg= 3 tab(s), Oral, BID,? ?Not taking  CYCLOBENZAPRINE 10 MG TABLET, Oral, TID  Cymbalta 60 mg oral delayed release capsule, 60 mg= 1 cap(s), Oral, At Bedtime, 3 refills  Decadron 4 mg oral tablet, 16 mg= 4 tab(s), Oral, Daily,? ?Not taking  dexamethasone (for IVPB)  diphenhdramine (for Inj.), 25 mg= 0.5 mL, IV Push, Once-chemo  DULoxetine 30 mg oral delayed release capsule, 30 mg= 1 cap(s), Oral, Daily, 3 refills,? ?Not taking  DULoxetine 60 mg oral delayed release capsule, 60 mg= 1 cap(s), Oral, Daily, 3 refills  DULoxetine 60 mg oral delayed release capsule, 60 mg= 1 cap(s), Oral, Daily, 3 refills,? ?Not taking  GABAPENTIN 300 MG CAPSULE, 300 mg= 1 cap(s), Oral, TID  gabapentin 300 mg oral capsule, 300 mg= 1 cap(s), Oral, TID, 3 refills  Heparin Flush 100 U/mL - 5 mL, 500 units= 5 mL, IV Push, Once-chemo  Heparin Flush 100 U/mL - 5 mL, 500 units= 5 mL, IV Push, Once-chemo  lactulose 10 g/15 mL oral syrup, Oral, Daily  Lexapro 20 mg oral tablet, 20 mg= 1 tab(s), Oral, Daily, 11 refills  losartan 25 mg oral tablet, 25 mg= 1 tab(s), Oral, Daily, 6 refills  losartan 50 mg oral tablet, 50 mg= 1 tab(s), Oral, Daily, 3 refills  megestrol 40 mg oral tablet, See Instructions, 8 refills  meloxicam 7.5 mg oral tablet, 7.5 mg= 1 tab(s), Oral, Daily, 11 refills  metoprolol succinate 25 mg oral tablet extended release, 12.5 mg= 0.5 tab(s), Oral, Daily, 6 refills  Pazeo 0.7% ophthalmic solution, 1 drop(s), OPTH, Daily  Phenergan 25 mg Tab, 25 mg= 1 tab(s), Oral, q6hr  potassium chloride 20 mEq oral TABLET extended release, 20 mEq= 1 tab(s), Oral, BID,? ?Not Taking per Prescriber  ProAir HFA 90 mcg/inh inhalation aerosol with adapter, 2 puff(s), INH, q4hr, PRN, 3 refills  traZODone 100 mg  oral tablet, See Instructions, 11 refills  Tylenol 325 mg oral tablet, 650 mg= 2 tab(s), Oral, Once-chemo  Zofran (for IVPB) 16 mg + dexamethasone 10 mg/mL injectable solution 10 mg  Zofran ODT 8 mg oral tablet, disintegrating, 8 mg= 1 tab(s), Oral, q8hr, PRN, 1 refills  Allergies  No Known Allergies  Social History  Abuse/Neglect  No, No, Yes, 03/20/2022  Alcohol - Denies Alcohol Use, 08/24/2014  Never, 02/22/2022  Employment/School  homemaker, Work/School description: homemaker. Operates hazardous equipment: No., 04/20/2017  Exercise  Self assessment: Good condition., 04/20/2017  Home/Environment  Lives with Children. Living situation: Home/Independent. Alcohol abuse in household: No. Substance abuse in household: No. Smoker in household: No. Injuries/Abuse/Neglect in household: No. Feels unsafe at home: No. Family/Friends available for support: Yes. Concern for family members at home: No. Major illness in household: No. Financial concerns: No. TV/Computer concerns: No., 04/20/2017  Nutrition/Health  Regular, Wants to lose weight: No., 04/20/2017  Sexual  Sexually active: No., 11/05/2020  Gender Identity Identifies as female., 02/28/2019  Substance Use  Never, 02/22/2022  Tobacco - Denies Tobacco Use, 03/04/2013  Never (less than 100 in lifetime), No, 03/20/2022  Family History  Cancer: Mother.  Heart failure.: Mother.  Primary malignant neoplasm of breast: Sister.  Primary malignant neoplasm of lung: Father.  Immunizations  Vaccine Date Status   pneumococcal 23-polyvalent vaccine 11/14/2017 Recorded   influenza virus vaccine, inactivated 11/15/2016 Recorded   Health Maintenance  Health Maintenance  ???Pending?(in the next year)  ??? ??OverDue  ??? ? ? ?Breast Cancer Screening due??02/20/21??and every 731??day(s)  ??? ? ? ?Influenza Vaccine due??10/01/21??and every 1??day(s)  ??? ? ? ?Alcohol Misuse Screening due??01/02/22??and every 1??year(s)  ??? ??Due?  ??? ? ? ?Aspirin Therapy for CVD Prevention  due??02/23/22??and every 1??year(s)  ??? ? ? ?Colorectal Screening due??03/23/22??Unknown Frequency  ??? ? ? ?Tetanus Vaccine due??03/23/22??and every 10??year(s)  ??? ? ? ?Zoster Vaccine due??03/23/22??Unknown Frequency  ??? ??Due In Future?  ??? ? ? ?HF-Heart Failure Education not due until??05/12/22??and every 1??year(s)  ??? ? ? ?ADL Screening not due until??08/27/22??and every 1??year(s)  ??? ? ? ?Obesity Screening not due until??01/01/23??and every 1??year(s)  ??? ? ? ?HF-LVEF not due until??02/02/23??and every 1??year(s)  ??? ? ? ?Blood Pressure Screening not due until??03/11/23??and every 1??year(s)  ??? ? ? ?Body Mass Index Check not due until??03/11/23??and every 1??year(s)  ??? ? ? ?Hypertension Management-Blood Pressure not due until??03/11/23??and every 1??year(s)  ??? ? ? ?Diabetes Screening not due until??03/20/23??and every 1??year(s)  ??? ? ? ?Hypertension Management-BMP not due until??03/20/23??and every 1??year(s)  ???Satisfied?(in the past 1 year)  ??? ??Satisfied?  ??? ? ? ?ADL Screening on??08/27/21.??Satisfied by Gary CONWAY, Summer  ??? ? ? ?Blood Pressure Screening on??03/20/22.??Satisfied by Deondre CASTILLO Jaylene L.  ??? ? ? ?Body Mass Index Check on??03/11/22.??Satisfied by MAN Smith, Gely  ??? ? ? ?Depression Screening on??03/11/22.??Satisfied by MAN mSith, Gely  ??? ? ? ?Diabetes Screening on??03/20/22.??Satisfied by Suzanne Glover  ??? ? ? ?Hypertension Management-BMP on??03/20/22.??Satisfied by Suzanne Glover  ??? ? ? ?Influenza Vaccine on??03/03/22.??Satisfied by MAN Smith Ericka  ??? ? ? ?Obesity Screening on??03/11/22.??Satisfied by MAN Smith Ericka  ?

## 2022-05-20 NOTE — CONSULTS
"Consults     Patient Name: Christiana Webb   MRN: [id number]   Admission Date: 5/16/2022   Hospital Length of Stay: 4   Attending Provider: Guerrero Mcclure MD   Consulting Provider: Swapnil Chakraborty MD  Reason for Consult: Goals of Care  Primary Care Physician:  Primary Doctor No     Principal Problem: Fracture of left tibia and fibula, open type I or II, initial encounter     Patient information was obtained from relative(s) and ER records.     Final diagnoses:  [S72.92XB] Open fracture of left femur      Assessment/Plan:     I reviewed the patient and family's understanding of the seriousness of the illness and its expected prognosis. We discussed the patient's goals of care and treatment preferences. We discussed the difference between palliative and curative medicineI answered all questions and we formulated a plan including recommendations for symptom management and how to best achieve goals of care.       I reviewed the patient's current clinical status with the nurse and physician:  I reviewed clinical documentation, labs and imaging.  I met with the patient including her daughter-in-law and he will remain somewhat.  He I provided clinical update.  I explained that the patient has declined significantly from yesterday including increased respiratory muscle weakness resulting in intubation and mechanical ventilation. At present patient is off sedation and is minimally alert to move her right foot command.  She has some grimacing and is biting on the tube.  I explained to the patient's family that sedation would need to be resumed secondary to discomfort.  The patient is unable to communicate effectively present.  The patient's daughter-in-law states that her , Ross is having hard time with the patient's decline and is avoiding seeing the patient, physically, at present as "he cannot deal with it".  The patient's other 2 children are under the age of 18 and also are unwilling to visit for the same " reason.  I explained my concern, which is shared by the ICU physician and oncologist, that the patient will likely not be able to wean to extubation secondary to generalized weakness, cachexia.      Since the patient's son, is the decision maker, is currently not willing to visit the patient, I recommend intermittent family conferences outside of the patient's room to allow family to discuss, ask questions and to review realistic goals of care.  Oncology has already spoken to patient's family including her son the patient's poor prognosis and the fact that she will tolerate further aggressive treatment measures for metastatic cancer.  The patient's daughter-in-law states that she does expect that  will be able to except the patient to have a natural death any time soon.  In other words, he will likely continue to approve aggressive treatment measures.  I explained that focus on her comfort is an appropriate consideration at this time and should be discussed in family conference.  The patient's daughter-in-law agreement with this discussion.      Interval History:     Patient has declined with acute respiratory failure requiring intubation and mechanical ventilation. She also requires low dosed vasopressor agents. She remains a Full Code status.      Active Ambulatory Problems     Diagnosis Date Noted    No Active Ambulatory Problems     Resolved Ambulatory Problems     Diagnosis Date Noted    No Resolved Ambulatory Problems     Past Medical History:   Diagnosis Date    CHF (congestive heart failure)     HTN (hypertension)         Past Surgical History:   Procedure Laterality Date    INSERTION OF INTRAMEDULLARY NAIL INTO TIBIA Left 5/17/2022    Procedure: INSERTION, INTRAMEDULLARY GRAHAM, TIBIA;  Surgeon: Vimal Lamb DO;  Location: [facility];  Service: Orthopedics;  Laterality: Left;    INTRAMEDULLARY RODDING OF FEMUR Bilateral 5/17/2022    Procedure: INSERTION, INTRAMEDULLARY GRAHAM, FEMUR;  Surgeon: Vimal CUEVA  DO Adonis;  Location: [facility];  Service: Orthopedics;  Laterality: Bilateral;        Review of patient's allergies indicates:  No Known Allergies       Current Facility-Administered Medications:     0.9%  NaCl infusion (for blood administration), , Intravenous, Q24H PRN, Guerrero Mcclure MD    0.9%  NaCl infusion (for blood administration), , Intravenous, Q24H PRN, Guerrero Mcclure MD    0.9%  NaCl infusion (for blood administration), , Intravenous, Q24H PRN, Guerrero Mcclure MD    0.9%  NaCl infusion (for blood administration), , Intravenous, Q24H PRN, Jose Ramon Feliz MD    0.9%  NaCl infusion (for blood administration), , Intravenous, Q24H PRN, Guerrero Mcclure MD    0.9%  NaCl infusion (for blood administration), , Intravenous, Q24H PRN, Caio Bautista MD    0.9%  NaCl infusion (for blood administration), , Intravenous, Q24H PRN, Jeffery Chan MD    0.9%  NaCl infusion (for blood administration), , Intravenous, Q24H PRN, Ciao Bautista MD    0.9%  NaCl infusion (for blood administration), , Intravenous, Q24H PRN, Guerrero Mcclure MD    0.9%  NaCl infusion (for blood administration), , Intravenous, Q24H PRN, Jeffery Chan MD    0.9%  NaCl infusion (for blood administration), , Intravenous, Q24H PRN, Guerrero Mcclure MD    0.9%  NaCl infusion (for blood administration), , Intravenous, Q24H PRN, Guerrero Mcclure MD    0.9%  NaCl infusion (for blood administration), , Intravenous, Q24H PRN, Guerrero Mcclure MD    0.9%  NaCl infusion (for blood administration), , Intravenous, Q24H PRN, Guerrero Mcclure MD    0.9%  NaCl infusion (for blood administration), , Intravenous, Q24H PRN, Guerrero Mcclure MD    acetaminophen tablet 650 mg, 650 mg, Oral, Q6H, ELAN Kelley    albumin human 5% bottle 12.5 g, 12.5 g, Intravenous, Once, Will MD Tray    dexmedetomidine (PRECEDEX) 400mcg/100mL 0.9% NaCL infusion, 0-1.4 mcg/kg/hr, Intravenous, Continuous, Rod Woo Jr.,  MD, FCCP, Stopped at 05/20/22 0300    dextrose 5 % and 0.45 % NaCl with KCl 20 mEq infusion, , Intravenous, Continuous, ELAN Kelley, Last Rate: 75 mL/hr at 05/20/22 0317, New Bag at 05/20/22 0317    famotidine tablet 20 mg, 20 mg, Oral, BID, ELAN Kelley, 20 mg at 05/19/22 2052    gabapentin capsule 300 mg, 300 mg, Oral, TID, Guerrero Mcclure MD    hydrocortisone sodium succinate injection 50 mg, 50 mg, Intravenous, Q8H, Rod Woo Jr., MD, FCCP, 50 mg at 05/20/22 0539    ketorolac injection 15 mg, 15 mg, Intravenous, Q6H, ELAN Kelley, 15 mg at 05/20/22 0207    lactated ringers bolus 1,000 mL, 1,000 mL, Intravenous, Once, Caio Bautista MD    lactated ringers infusion, , Intravenous, Continuous, Reji Phoenix MD, Stopped at 05/19/22 1000    LIDOcaine (PF) 10 mg/ml (1%) injection 10 mg, 1 mL, Other, Once, Reji Phoenix MD    methocarbamoL injection 500 mg, 500 mg, Intravenous, Q6H, Guerrero Mcclure MD, 500 mg at 05/20/22 0207    morphine injection 2 mg, 2 mg, Intravenous, Q3H PRN, Reji Phoenix MD, 2 mg at 05/19/22 1828    morphine injection 4 mg, 4 mg, Intravenous, Q3H PRN, Reji Phoenix MD, 4 mg at 05/20/22 0001    NORepinephrine bitartrate-NaCl 8 mg/250 mL (32 mcg/mL) infusion, 0-3 mcg/kg/min, Intravenous, Continuous, Guerrero Mcclure MD, Last Rate: 3.2 mL/hr at 05/20/22 0345, 0.036 mcg/kg/min at 05/20/22 0345    ondansetron injection 4 mg, 4 mg, Intravenous, Q6H PRN, Reji Phoenix MD    oxyCODONE immediate release tablet 5 mg, 5 mg, Oral, Q6H PRN, Guerrero Mcclure MD, 5 mg at 05/18/22 0753    polyethylene glycol packet 17 g, 17 g, Oral, BID, Reji Phoenix MD    propofol (DIPRIVAN) 10 mg/mL infusion, 0-50 mcg/kg/min, Intravenous, Continuous, Rod Woo Jr., MD, FCCP, Last Rate: 7.2 mL/hr at 05/20/22 0355, 25 mcg/kg/min at 05/20/22 0355    senna tablet 8.6 mg, 8.6 mg, Oral, Daily, Rjei Phoenix MD    sodium chloride 0.9% flush 10 mL, 10  "mL, Intravenous, PRN, Reji Phoenix MD    sodium chloride 0.9% flush 10 mL, 10 mL, Intravenous, PRN, Jeffery Chan MD    white petrolatum-mineral oil (LUBIFRESH P.M.) ophthalmic ointment, , Both Eyes, TID PRN, Waylon Llanes MD, Given at 05/20/22 0317     sodium chloride, sodium chloride, sodium chloride, sodium chloride, sodium chloride, sodium chloride, sodium chloride, sodium chloride, sodium chloride, sodium chloride, sodium chloride, sodium chloride, sodium chloride, sodium chloride, sodium chloride, morphine, morphine, ondansetron, oxyCODONE, sodium chloride 0.9%, sodium chloride 0.9%, white petrolatum-mineral oiL     Family History   Problem Relation Age of Onset    Heart failure Mother     Cancer Mother     Cancer Father     Cancer Sister         Review of Systems:   Unable to obtain as patient is intubated with altered mental status           Objective:   /87   Pulse 76   Temp 99.1 °F (37.3 °C) (Core Bladder)   Resp (!) 25   Ht 5' 4.02" (1.626 m)   Wt 48.1 kg (106 lb)   SpO2 (!) 94%   BMI 18.19 kg/m²      Physical Exam   Constitutional: She appears ill.   HENT:   Mouth/Throat: Mucous membranes are moist.   Eyes: Pupils are equal, round, and reactive to light.   Cardiovascular: Normal rate, regular rhythm, normal heart sounds and normal pulses.   Pulmonary/Chest: Effort normal. She has rhonchi.   Abdominal: Soft. Bowel sounds are normal. She exhibits no distension. There is no abdominal tenderness.   Musculoskeletal:         General: Swelling present.      Cervical back: Neck supple.   Neurological: She is alert.   Skin: Skin is warm.   Vitals reviewed.         Review of Symptoms  Review of Symptoms    Symptom Assessment (ESAS 0-10 Scale)  Pain:  0  Dyspnea:  0  Anxiety:  0  Nausea:  0  Depression:  0  Anorexia:  0  Fatigue:  0  Insomnia:  0  Restlessness:  0  Agitation:  0     CAM / Delirium:  Positive  Constipation:  Negative  Diarrhea:  Negative    Bowel Management Plan (BMP):  Yes  "     Modified Dasia Scale:  0    Performance Status:  20    Living Arrangements:  Lives with family      Advance Care Planning   Advance Directives:   LaPOST: No    Do Not Resuscitate Status: No    Medical Power of : No      Decision Making:  Family answered questions          PAINAD: 0    Caregiver burden formerly assessed: yes        > 50% of 50 min of encounter was spent in chart review, face to face discussion of goals of care, symptom assessment, coordination of care and emotional support. >16 min was spent in advanced care planning discussion.    Swapnil Chakraborty MD  Palliative Medicine  [facility] - Observation Unit

## 2022-05-20 NOTE — PROGRESS NOTES
[facility] - [facility] ICU  Orthopedics  Progress Note    Patient Name: Christiana Webb  MRN: [id number]  Admission Date: 5/16/2022  Hospital Length of Stay: 4 days  Attending Provider: Guerrero Mcclure MD  Primary Care Provider: Primary Doctor No  Follow-up For: Procedure(s) (LRB):  INSERTION, INTRAMEDULLARY GRAHAM, TIBIA (Left)  INSERTION, INTRAMEDULLARY GRAHAM, FEMUR (Bilateral)    Post-Operative Day: 3 Day Post-Op  Subjective:     Principal Problem:Fracture of left tibia and fibula, open type I or II, initial encounter    Principal Orthopedic Problem: 3 Day Post-Op IMN bilateral femur; IMN left opten tibia fracture    Interval History: Patient is resting comfortably in bed this morning. Bleeding improved today. Dressing down to bilateral lower extremities. Some blistering to bilateral thighs due to swelling. She is intubated and sedated this morning.     Review of patient's allergies indicates:  No Known Allergies    Current Facility-Administered Medications   Medication    0.9%  NaCl infusion (for blood administration)    0.9%  NaCl infusion (for blood administration)    0.9%  NaCl infusion (for blood administration)    0.9%  NaCl infusion (for blood administration)    0.9%  NaCl infusion (for blood administration)    0.9%  NaCl infusion (for blood administration)    0.9%  NaCl infusion (for blood administration)    0.9%  NaCl infusion (for blood administration)    0.9%  NaCl infusion (for blood administration)    0.9%  NaCl infusion (for blood administration)    0.9%  NaCl infusion (for blood administration)    0.9%  NaCl infusion (for blood administration)    0.9%  NaCl infusion (for blood administration)    0.9%  NaCl infusion (for blood administration)    0.9%  NaCl infusion (for blood administration)    acetaminophen tablet 650 mg    albumin human 5% bottle 12.5 g    dexmedetomidine (PRECEDEX) 400mcg/100mL 0.9% NaCL infusion    dextrose 5 % and 0.45 % NaCl with KCl 20 mEq infusion  "   famotidine tablet 20 mg    gabapentin capsule 300 mg    hydrocortisone sodium succinate injection 50 mg    ketorolac injection 15 mg    lactated ringers bolus 1,000 mL    lactated ringers infusion    LIDOcaine (PF) 10 mg/ml (1%) injection 10 mg    methocarbamoL injection 500 mg    morphine injection 2 mg    morphine injection 4 mg    NORepinephrine bitartrate-NaCl 8 mg/250 mL (32 mcg/mL) infusion    ondansetron injection 4 mg    oxyCODONE immediate release tablet 5 mg    polyethylene glycol packet 17 g    propofol (DIPRIVAN) 10 mg/mL infusion    senna tablet 8.6 mg    sodium chloride 0.9% flush 10 mL    sodium chloride 0.9% flush 10 mL    white petrolatum-mineral oil (LUBIFRESH P.M.) ophthalmic ointment     Objective:     Vital Signs (Most Recent):  Temp: 99.1 °F (37.3 °C) (05/20/22 0400)  Pulse: 76 (05/20/22 0815)  Resp: (!) 25 (05/20/22 0815)  BP: 125/87 (05/20/22 0800)  SpO2: (!) 94 % (05/20/22 0815) Vital Signs (24h Range):  Temp:  [96.5 °F (35.8 °C)-99.1 °F (37.3 °C)] 99.1 °F (37.3 °C)  Pulse:  [57-98] 76  Resp:  [16-44] 25  SpO2:  [72 %-100 %] 94 %  BP: ()/() 125/87     Weight: 48.1 kg (106 lb)  Height: 5' 4" (162.6 cm)  Body mass index is 18.19 kg/m².      Intake/Output Summary (Last 24 hours) at 5/20/2022 0840  Last data filed at 5/20/2022 0600  Gross per 24 hour   Intake 3246 ml   Output 1945 ml   Net 1301 ml       Physical Exam:                LLE: -Skin: Dressing CDI, removed for exam, incisions are clean and dry. No signs of necrosis at this time.           -MSK: unable to participate in exam            -Neuro:  unable to participate in exam            -Lymphatic: No signs of lymphadenopathy           -CV: Capillary refill is less than 2 seconds. DP/PT pulses 2/4. Compartments soft and compressible                      RLE: -Skin: Dressing CDI, no active bleeding from traction pin site today., incisions are clean and dry.  Dressing with some drainage noted overnight.     "        -MSK: : unable to participate in exam            -Neuro:  unable to participate in exam            -Lymphatic: No signs of lymphadenopathy           -CV: Capillary refill is less than 2 seconds. DP/PT pulses  2/4. Compartments soft and compressible.     Diagnostic Findings:   Significant Labs:   Recent Lab Results  (Last 5 results in the past 72 hours)      05/20/22  0624   05/20/22  0153   05/19/22  2030   05/19/22  1249   05/19/22  1026        Base Deficit -6.6       -1.3         POC COHb               POC MetHb               POC O2Hb               Specimen source Arterial sample       Arterial sample         Albumin/Globulin Ratio   1.5             Abn Lymph Man     0           Abs Neut calc     9.152           Albumin   3.5             Alkaline Phosphatase   72             ALT   22             Aniso     1+     1+       aPTT   35.6  Comment: For Minimal Heparin Infusion, the goal aPTT 64-85 seconds corresponds to an anti-Xa of 0.3-0.5.    For Low Intensity and High Intensity Heparin, the goal aPTT  seconds corresponds to an anti-Xa of 0.3-0.7             AST   86             Bands     0     0       Base Excess, Arterial               Baso #     0.14     0            0           Basophil %     1.3     0            0           BILIRUBIN TOTAL   2.1             Blasts     0     0       BUN   20.8             Shantel/Echinocytes         1+       Calcium   8.2             Chloride   110             CO2   20             Creatinine   0.86             eGFR if    >60             Eos #     0.00     0            0           Eosinophil %     0.0     0            0           Fibrinogen   599.0             Globulin, Total   2.4             Glucose   93             Gran # (ANC)         4.68       HCO3 ART               Hematocrit     38.4     21.9       Hemoglobin     13.4     7.7       Immature Grans (Abs)     0.14     0.04       Immature Granulocytes     1.3     0.7       INR   1.51             Instr  WBC         6       LD   449             Lymph #     0.36     1.02            0.832           LYMPH %     3.5           Lymph Man     8     17       MCH     29.5     30.3       MCHC     34.9     35.2       MCV     84.4     86.2       Metamyelocytes     3     11       Mono #     0.17     0.36            0.416           Mono %     1.6     6            4           MPV     11.1     11.3       Myelocytes     0     0       Neut #     9.6           Neut %     92.3           Neutrophils Relative     84     67       nRBC     0.7     0.5       NRBC Man     1     1       O2 Sat, Arterial               Other Cells Manual     0           Ovalocytes         1+       Pappenheimer Bodies         1+       Plasmacyte Man         0       Platelet Estimate     Decreased     Decreased       Platelets     89     85       POC HCO3 19.1       22.7         POC HEMOGLOBIN               POC Ionized Calcium 1.11  Comment: Result is outside patient normal (reference) range.       1.14         POC PCO2 38       35         POC PH 7.31  Comment: Result is outside patient normal (reference) range.       7.42         POC PO2 77  Comment: Result is outside patient normal (reference) range.       47  Comment: Result is outside panic range (critical limits).         POC Potassium 3.7       3.5         POC SATURATED O2 94       83         POC Sodium 140       142         POC Temp 37.0       37.0         Poikilocytosis         1+       Poly     1+           Potassium   3.5             Prolymph Man     0           Prolymphocytes         0       Promyelocytes     1     0       PROTEIN TOTAL   5.9             Protime   17.8             RBC     4.55     2.54       RBC Morph     Abnormal     Abnormal       RDW     15.2     15.1       Sodium   141             Stomatocytes         1+       WBC     10.4     5.8                             Significant Imaging: I have reviewed and interpreted all pertinent imaging results/findings.     Assessment/Plan:     Active  Diagnoses:    Diagnosis Date Noted POA    PRINCIPAL PROBLEM:  Fracture of left tibia and fibula, open type I or II, initial encounter [S82.202B, S82.402B] 05/17/2022 Yes    Closed disp transvrs fx of shaft of left femur with routine healing [S72.322D] 05/17/2022 Not Applicable    Closed disp transvrs fx of shaft of right femur with routine healing [S72.321D] 05/17/2022 Not Applicable      Problems Resolved During this Admission:     Patient stable this morning.  WBAT to bilateral lower extremities. ROMAT. Work with therapy as much as possible once able to mobilize  Daily dry dressing changes beginning today as needed for leave open  To air if no drainage  Lovenox for DVT prophylaxis during stay and for 30 days upon discharge   We will continue to follow. She will need follow up in our office in approx 3 weeks for repeat x-rays and evaluation. Will likely benefit from a short rehab stay.  We will continue to monitor peripherally. Please call with any questions or concerns.      The above findings, diagnostics, and treatment plan were discussed with Dr. Lamb who is in agreement with the plan of care except as stated in additional documentation.      Luisana Tomlinson PA-C  Orthopedic Trauma Surgery  [facility]

## 2022-05-20 NOTE — PT/OT/SLP EVAL
Attempted to see pt for PT eval. Spoke to pt's nurse who reported that pt is now intubated but not sedated; however, is unable to follow commands appropriately/consistently at this time. RN requesting to hold PT for today and to f/u tomorrow to determine if pt is appropriate for therapy.

## 2022-05-20 NOTE — HISTORICAL [facility] CERNER
This is a historical note converted from [employer]. Formatting and pictures may have been removed.  Please reference [employer] for original formatting and attached multimedia. History of Present Illness  Reason for follow-up:  -Metastatic left breast cancer, ER positive, NM positive, HER-2 positive  -Brain metastasis, lumbar spine metastasis, bone metastasis, bilaterally axillary lymph node metastasis, lung metastasis, liver metastasis, left adrenal metastasis  -Taxotere-induced peripheral neuropathy  ?  ?   Past medical history: Hypertension. ?Anemia. ?Chronic kidney disease. ?Tonsillectomy.  Social history: Denies history of tobacco, alcohol, or illicit drug abuse. ?Lives with 2 of her siblings. ?Has 3 children and 2 grandchildren.  Family history: Father with history of throat cancer in his 50s. Mother with history of ovarian cancer in her 50s. Brother with history of stomach cancer in her 50s  Menstrual history: As of today, 12/12/2017, premenopausal  ?   ?  History of present illness:?  Patient is being followed for metastatic left breast cancer, ER positive, NM positive, HER-2 positive.  ?   For detailed history of present illness, please see my last note dated 02/19/2020.? Please also refer to assessment and plan section.  ?   ?  Interval History?  ?   02/21/2022:  -01/13/2022: Brain MRI with and without contrast (comparison: Brain MRI 09/30/2021): New/progressive intra-axial metastasis; minimal local mass-effect; no shift  (Evaluated by Dr. Malik Prellop on 02/07/2022; SRS planned)  -02/02/2022: TTE: LVEF 64%  -02/02/2022: DEXA scan: Normal BMD femurs; BMD of left distal forearm consistent with osteoporosis  -02/02/2022: Restaging CT C/A/P with contrast (comparison: 11/29/2021): Right inferior hepatic nodule 15 x 18 mm, slightly larger; diffuse bone metastasis unchanged  -02/22/2022: Labs reviewed; BUN 27.7, chronically elevated; rest of CMP unremarkable; ANC 2.08, hemoglobin 10.0; CBC is essentially  unremarkable  Presents for a follow visit.? In no acute discomfort.? Says that she started brain radiotherapy at [facility], last week.? Apparently, expected received 5 radiation therapy fractions.? This is for worsening brain metastasis.? Denies unusual headaches, focal neurological symptoms, seizures, or vision impairment.? Otherwise, overall, feels reasonably well.? Good appetite.? Chronic generalized fatigue.? No chest pain, cough, or dyspnea.? No new lumps or lymphadenopathy.  ?  ?  Review of Systems:  All systems reviewed, and found to be negative?except for the symptoms detailed above.  ?  ?   Physical Examination:  VITAL SIGNS: ?Reviewed. ? ?  GENERAL:? In no apparent distress.?  HEAD:? No signs of head trauma.  EYES:? Pupils are equal.? Extraocular motions intact.?  EARS:? Hearing grossly intact.  MOUTH:? Oropharynx is normal.  NECK:? No adenopathy, no JVD.??  CHEST:? Chest with clear breath sounds bilaterally.? No wheezes, rales, or rhonchi.?  CARDIAC:? Regular rate and rhythm.? S1 and S2, without murmurs, gallops, or rubs.  VASCULAR:? No Edema.? Peripheral pulses normal and equal in all extremities.  ABDOMEN:? Soft, without detectable tenderness.? No sign of distention.? No?? rebound or guarding, and no masses palpated.?? Bowel Sounds normal.  MUSCULOSKELETAL:? Good range of motion of all major joints. Extremities without clubbing, cyanosis or edema.?  NEUROLOGIC EXAM:? Alert and oriented x 3.? No focal sensory or strength deficits.?? Speech normal.? Follows commands.  PSYCHIATRIC:? Mood normal.  SKIN:? No rash or lesions.  01/29/2019:?Breast examination performed with her verbal consent, in the presence of Prenella, over LPN;?deformed left breast is noted,?with diffuse, vague, nontender mass?palpable in the mid breast?as well as lower outer quadrant;?skin retraction is noted;?nipple retraction is noted;?a couple of small erythematous nodules?in the upper part of right breast, a short distance from  nipple;?no palpable regional lymphadenopathy; no warmth; no skin ulceration.  06/06/2019:?Chaperoned breast examination was performed?in the presence of Prenella, over LPN.? No mass palpable in left breast.? No lymphadenopathy in axilla on either side.  07/22/2020: Not examined; it was a telephone visit.  11/13/2020: In no acute discomfort. ?In a wheelchair.  02/03/2021: Looks well and healthy.? No palpable lumps or lymphadenopathy.? Lungs clear. ?Heart is normal.? No focal neurologic deficit.  04/20/2021: In wheelchair. ?In no acute discomfort.? No palpable lymphadenopathy or masses.  06/09/2021: In wheelchair. ?In no acute discomfort. ?Fully alert. ?No palpable lymphadenopathy or masses.? Lungs clear. ?Abdomen benign.  11/02/2021:?In no acute discomfort. ?In wheelchair, as usual.? Neurologically, nonfocal. ?Lungs clear. ?Heart sounds normal.  ?  ?  Assessment:  #Metastatic breast cancer:  Inflammatory carcinoma of left breast. ?ER positive. ?IN positive. ?HER-2 positive.? Diagnosed 12/2017  Metastases to brain,?lumbar spine,?extensively to bones,?lymph nodes, lungs, liver, and adrenal.?  For lumbar spine metastasis,?status post laminectomy and internal fixation 12/2017  Status post radiation therapy to lumbar spine and brain?02/2018  Noncompliant with follow-up.  Status post Taxotere/Herceptin/Perjeta?x1 on?03/21/2018; she declined further chemotherapy?  Presented?1 year later,?on?02/2019:?Extensive bone metastases stable;?progression of bilateral lung metastasis, bilateral axillary?lymphadenopathy, progressive liver metastasis, progressive left adrenal metastasis  Dose reduced Taxotere/Herceptin/Perjeta started?03/07/2019?(1 year after first cycle of chemotherapy)  Good response post 4 cycles  Excellent response?post 8 cycles  Starting?with ninth cycle of chemotherapy,?discontinued Herceptin/Perjeta?due to persistent?drop in LVEF,?without?recovery?post?discontinuation  Continued improvement/stability?post 13  cycles of Taxotere;?dramatic drop in tumor marker level  No brain metastasis?on surveillance brain MRI (02/10/2020)  -Stable metastatic disease?on restaging CTs and bone scan?post 18 cycles of Taxotere?(04/16/2020)  -No brain metastasis on surveillance brain MRI (05/27/2020)  -No metastatic disease progression?post?Taxotere x23 cycles?(restaging?CTs and bone scan: 07/17/2020)  -Genetic testing negative (07/23/2020)  -Questionable punctate?enhancing foci right cerebellar and right occipital (surveillance brain MRI: 09/09/2020)?(completely asymptomatic)  -No disease progression post Taxotere x28?(CTs, bone scan:?11/10/2020)?(bone scan: SuperScan?representing extensive skeletal metastatic involvement, unchanged)  >>>  Progression?on Taxotere?(new/progressive brain metastasis)  (no disease progression outside brain?on restaging CTs and bone scan):  -New/progressive brain metastasis post Taxotere x30  -Completed 31 cycles of Taxotere 12/28/2020, then discontinued  -12/09/2020: Surveillance brain MRI with and without contrast (comparison: 09/09/2020): New, progressive millimetric intra-axial metastasis (at least 5); bone metastases have also progressed; no scalp or intracranial extension  -02/25/2021: TTE: LVEF 45% (40% on 11/11/2019)  -History of palliative RT to brain and lumbar spine 01/22/2018-02/02/2018  -S/p SRS to 8 brain metastasis?(01/25/2021-02/01/2021)  -Restaging CTs and bone scan (02/25/2021):?No disease progression  (No disease progression outside brain)  -Surveillance brain MRI (03/10/2021):?Overall stable to minimally less prominent scattered small interval metastasis  -Capecitabine started 03/11/2021  -No extracranial disease progression on restaging CTs and bone scan?(05/20/2021)  -Capecitabine dose decreased by 25% from cycle #5 onwards (neutropenia)  -06/29/2021: Surveillance/restaging brain MRI with and without contrast (comparison: 03/10/2021): Overall stable to decreased size of small residual  enhancing intracranial lesions, with minimal increase in size of a single right periventricular nodule (3 mm, previously <2 mm)  -Cycle #7 of Capecitabine?started 07/19/2021  -08/11/2021: Restaging CTs C/A/P with contrast (comparison: 05/21/2021): No progression;?stable extensive bone metastasis;?no lung parenchymal or solid organ metastasis  -08/11/2021: Restaging bone scan (comparison: 05/20/2021): Widespread bone metastasis, stable to minimally improved  -08/04/2021: CA 27.29 level 83.8, rising; CA 15-3 level 60.8, rising  -09/30/2021: Brain MRI with and without contrast (comparison: 06/29/2021): Minimal increase in size of multiple small enhancing intracranial lesions  -As of 08/27/2021, tumor markers are rising  -10/07/2021: LVEF 45%  -No progression on restaging CTs and bone scan (11/29/2021)  >>>  Progression on Capecitabine?(brain metastasis; liver metastasis):  -01/13/2022: Brain MRI with and without contrast (comparison: Brain MRI 09/30/2021): New/progressive intra-axial metastasis; minimal local mass-effect; no shift  (Evaluated by Dr. Malik Prellop on 02/07/2022; SRS planned)  -02/02/2022: TTE: LVEF 64%  -02/02/2022: DEXA scan: Normal BMD femurs; BMD of left distal forearm consistent with osteoporosis  -02/02/2022: Restaging CT C/A/P with contrast (comparison: 11/29/2021): Right inferior hepatic nodule 15 x 18 mm, slightly larger; diffuse bone metastasis unchanged  (Progressive brain metastasis on brain MRI 01/13/2022)  (Enlarging liver metastasis?on restaging CTs 02/02/2022)  ?  ?  #History of brain metastasis:  -History of palliative RT to brain and lumbar spine 01/22/2018-02/02/2018  -S/p SRS to 8 brain metastasis?(01/25/2021-02/01/2021)?  -06/29/2021: Surveillance/restaging brain MRI with and without contrast (comparison: 03/10/2021): Overall stable to decreased size of small residual enhancing intracranial lesions, with minimal increase in size of a single right periventricular nodule (3 mm,  previously <2 mm)  -09/30/2021: Brain MRI with and without contrast (comparison: 06/29/2021): Minimal increase in size of multiple small enhancing intracranial lesions  -As of 08/27/2021, tumor markers are rising  -09/30/2021: Brain MRI with and without contrast (comparison: 06/29/2021): Minimal increase in size of multiple small enhancing intracranial lesions  -01/13/2022: Brain MRI with and without contrast (comparison: Brain MRI 09/30/2021): New/progressive intra-axial metastasis; minimal local mass-effect; no shift  (Evaluated by Dr. Malik Predhruvop on 02/07/2022; SRS planned)  ?  ?  #Sites of disease:  Left breast (inflammatory carcinoma);?brain metastasis; lumbar spine metastasis; extensive bone metastasis;?bilateral axillary?lymph nodes; lungs; liver; left?adrenal  ?  ?  #Molecular markers:  ER positive. ?VT positive. ?HER-2 positive.  BRCA1/2?analysis negative  MMR proficient  NTRK gene fusion negative  Tumor mutational burden:?QNS  ?  ?  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  ?  Plan:  Periodically?noncompliant with follow-up  Compliance emphasized.  ?  -Disease progressed post Taxotere x30, with brain metastasis, without extracranial disease progression on restaging CTs and bone scan  -Received a total of 31 cycles of Taxotere  >>>  -Capecitabine started 03/11/2021; dose decreased by 25%?from cycle #5 onwards (neutropenia)  -Progressive brain metastases on brain MRI 01/13/2022  -Enlarging liver metastasis on restaging CTs 02/02/2022  -CEA 15-3 and CA 27.29 levels rising as of 01/13/2022  (Progression on Capecitabine)  -LVEF normalized?to 64% on TTE (02/02/2022)  >>>  -Disease has progressed on Capecitabine; discontinue  -Switch?to Ado-trastuzumab emtansine (T-DMI)  -Check CBC and CMP every 10 days or so  -Restage with contrast-enhanced CT scans of C/A/P and whole-body nuclear medicine bone scan 2 months after starting?Kadcyla?  -Check CA 15-3 and CA  27.29 level?monthly to assess?disease progress  -Check liquid biopsy for tumor mutational burden  -Monitor LVEF every 3 months while on Kadcyla;?next TTE?in May 22  ?  Has been evaluated?by Dr. Saeed, radiologist,?for SRS to worsening brain metastasis?(brain MRI?01/13/2022)  Tells me that she started brain radiotherapy last week; will get records from oncologic.  ?  -Continue Cymbalta and gabapentin?for Taxotere-induced peripheral neuropathy  ?  -Dental evaluation and preventive dentistry pending,?before we can start bone antiresorptive therapy for bone metastasis;?pending.  ?  Follow-up with NP within a week, for chemotherapy teaching.  ?  Above discussed at length with her.? All questions answered.  Discussed scans and labs?and gave her copies of relevant reports.  Explained that we are going to stop Capecitabine?and start Kadcyla.? Discussed potential side effects of Kadcyla.? Formal chemotherapy teaching with nursing staff to follow.  She understands and agrees this plan.? She also understands that her long-term prognosis is guarded.  ------------  ?  Discussion:  With progressive brain metastasis, systemic therapy options are:  -Ado-trastuzumab emtansine (T-DMI)  -Capecitabine + lapatinib  -Capecitabine + neratinib  -Paclitaxel + neratinib (category 2B)  -Capecitabine (category 2B)  -Tucatinib + trastuzumab + Capecitabine (if previously treated with 1 or more anti-HER-2 based regimens)  ?  ?  Ado-trastuzumab emtansine (T-DMI):  -Boxed warning:?Serious hepatotoxicity including?liver failure and death; monitors LFTs prior to initiation of chemotherapy and prior to each chemotherapy dose; reduced dose appropriately  -LVEF reduction; evaluate LVEF prior to entering treatment  ?  -Dose: 3.6 mg/kg every 3 weeks until disease progression or unacceptable toxicity  ?  -Dose reduction according to hepatotoxicity during treatment  -Withhold treatment for thrombocytopenia (see)  -Assess for cardiac toxicity, peripheral  neuropathy, pulmonary toxicity, etc.  ?  Warnings/precautions:  -Bone marrow suppression thrombocytopenia; neutropenia and anemia have also occurred  ?  -Cardiotoxicity: Assess LVEF every 3 months during treatment; avoid if LVEF <50 percent, history of symptomatic CHF, serious arrhythmia, or recent history (within 6 months) of MI, or unstable angina  ?  -Extravasation reactions  -Hemorrhage  -Hepatotoxicity  -Hypersensitivity/infusion related reactions  -Peripheral neuropathy, usually grade 1  -Pulmonary toxicity: ILD, pneumonitis, etc.  ?  Monitoring parameters:  -Platelet count at baseline and prior to each dose, LFTs, hepatitis B status, LVEF assessment every 3 months, infusion reactions, bleeding, neuropathy, lung toxicity  Physical Exam  Vitals & Measurements  T:?36.5? ?C (Oral)? HR:?90(Peripheral)? RR:?20? BP:?107/73?  HT:?163.00?cm? WT:?48.500?kg? BMI:?18.25?   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  Metastatic breast cancer  Metastatic breast cancer  Historical  Pregnant  Pregnant  Pregnant  Shingles  Procedure/Surgical History  Drainage of Buttock Skin, External Approach (07/30/2019)  Incision and drainage of abscess (eg, carbuncle, suppurative hidradenitis, cutaneous or subcutaneous abscess, cyst, furuncle, or paronychia); simple or single (07/30/2019)  Transfusion of Nonautologous Red Blood Cells into Peripheral Vein, Percutaneous Approach (03/28/2018)  Cath, inf, per/cent/midline (02/23/2018)  Catheter Insertion Mediport (None) (02/23/2018)  Fluoroscopy of Superior Vena Cava using Low Osmolar Contrast, Guidance (02/23/2018)  Insertion of Infusion Device into Superior Vena Cava, Percutaneous Approach (02/23/2018)  Insertion of tunneled centrally inserted central venous access device, with subcutaneous port; age 5 years or older  (02/23/2018)  Fusion of 2 or more Lumbar Vertebral Joints with Synthetic Substitute, Posterior Approach, Posterior Column, Open Approach (12/19/2017)  Fusion of Lumbosacral Joint with Synthetic Substitute, Posterior Approach, Posterior Column, Open Approach (12/19/2017)  Lumbar Foraminotomy MIS (.) (12/17/2017)  Bone biopsy sample (12/08/2017)  Breast biopsy sample (12/07/2017)  Biopsy of breast; percutaneous, needle core, not using imaging guidance (separate procedure) (12/06/2017)  Bone marrow; biopsy, needle or trocar (12/06/2017)  Excision of Left Breast, Percutaneous Approach, Diagnostic (12/06/2017)  Extraction of Iliac Bone Marrow, Percutaneous Approach, Diagnostic (12/06/2017)  Tonsillectomy   Medications  acetaminophen-hydrocodone 325 mg-10 mg oral tablet, 1 tab(s), Oral, QID, PRN  acetaminophen-hydrocodone 325 mg-10 mg oral tablet, 1 tab(s), Oral, QID, PRN,? ?Not taking  aspirin 81 mg oral Delayed Release (EC) tablet, 81 mg= 1 tab(s), Oral, Daily  aspirin 81 mg oral Delayed Release (EC) tablet, 81 mg= 1 tab(s), Oral, Daily, 3 refills  aspirin 81 mg oral tablet, 81 mg= 1 tab(s), Oral, Daily, 11 refills  capecitabine 500 mg oral tablet, See Instructions, 5 refills  capecitabine 500 mg oral tablet, 1500 mg= 3 tab(s), Oral, BID,? ?Not taking  CYCLOBENZAPRINE 10 MG TABLET, Oral, TID  Cymbalta 60 mg oral delayed release capsule, 60 mg= 1 cap(s), Oral, At Bedtime, 3 refills  Decadron 4 mg oral tablet, 16 mg= 4 tab(s), Oral, Daily,? ?Not taking  dexamethasone (for IVPB)  diphenhdramine (for Inj.), 25 mg= 0.5 mL, IV Push, Once-chemo  DULoxetine 30 mg oral delayed release capsule, 30 mg= 1 cap(s), Oral, Daily, 3 refills,? ?Not taking  DULoxetine 60 mg oral delayed release capsule, 60 mg= 1 cap(s), Oral, Daily, 3 refills  DULoxetine 60 mg oral delayed release capsule, 60 mg= 1 cap(s), Oral, Daily, 3 refills,? ?Not taking  GABAPENTIN 300 MG CAPSULE, 300 mg= 1 cap(s), Oral, TID  gabapentin 300 mg oral capsule, 300 mg=  1 cap(s), Oral, TID, 3 refills  Heparin Flush 100 U/mL - 5 mL, 500 units= 5 mL, IV Push, Once-chemo  Heparin Flush 100 U/mL - 5 mL, 500 units= 5 mL, IV Push, Once-chemo  lactulose 10 g/15 mL oral syrup, Oral, Daily  Lexapro 20 mg oral tablet, 20 mg= 1 tab(s), Oral, Daily, 11 refills  losartan 25 mg oral tablet, 25 mg= 1 tab(s), Oral, Daily, 6 refills  losartan 50 mg oral tablet, 50 mg= 1 tab(s), Oral, Daily, 3 refills  megestrol 40 mg oral tablet, See Instructions, 8 refills  meloxicam 7.5 mg oral tablet, 7.5 mg= 1 tab(s), Oral, Daily, 11 refills  metoprolol succinate 25 mg oral tablet extended release, 12.5 mg= 0.5 tab(s), Oral, Daily, 6 refills  Pazeo 0.7% ophthalmic solution, 1 drop(s), OPTH, Daily  Phenergan 25 mg Tab, 25 mg= 1 tab(s), Oral, q6hr  potassium chloride 20 mEq oral TABLET extended release, 20 mEq= 1 tab(s), Oral, BID,? ?Not Taking per Prescriber  ProAir HFA 90 mcg/inh inhalation aerosol with adapter, 2 puff(s), INH, q4hr, PRN, 3 refills  traZODone 100 mg oral tablet, See Instructions, 11 refills  Tylenol 325 mg oral tablet, 650 mg= 2 tab(s), Oral, Once-chemo  Zofran (for IVPB) 16 mg + dexamethasone 10 mg/mL injectable solution 10 mg  Zofran ODT 8 mg oral tablet, disintegrating, 8 mg= 1 tab(s), Oral, q8hr, PRN, 1 refills  Allergies  No Known Allergies  Social History  Abuse/Neglect  No, No, Yes, 01/14/2022  Alcohol - Denies Alcohol Use, 08/24/2014  Never, 01/14/2022  Employment/School  homemaker, Work/School description: homemaker. Operates hazardous equipment: No., 04/20/2017  Exercise  Self assessment: Good condition., 04/20/2017  Home/Environment  Lives with Children. Living situation: Home/Independent. Alcohol abuse in household: No. Substance abuse in household: No. Smoker in household: No. Injuries/Abuse/Neglect in household: No. Feels unsafe at home: No. Family/Friends available for support: Yes. Concern for family members at home: No. Major illness in household: No. Financial concerns: No.  TV/Computer concerns: No., 04/20/2017  Nutrition/Health  Regular, Wants to lose weight: No., 04/20/2017  Sexual  Sexually active: No., 11/05/2020  Gender Identity Identifies as female., 02/28/2019  Substance Use  Never, 01/14/2022  Tobacco - Denies Tobacco Use, 03/04/2013  Never (less than 100 in lifetime), No, 01/14/2022  Family History  Cancer: Mother.  Heart failure.: Mother.  Primary malignant neoplasm of breast: Sister.  Primary malignant neoplasm of lung: Father.  Immunizations  Vaccine Date Status   pneumococcal 23-polyvalent vaccine 11/14/2017 Recorded   influenza virus vaccine, inactivated 11/15/2016 Recorded
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
## 2022-05-20 NOTE — PROCEDURES
"Christiana Webb is a 57 y.o. female patient.    Temp: 99 °F (37.2 °C) (05/20/22 0000)  Pulse: 73 (05/20/22 0105)  Resp: (!) 25 (05/20/22 0105)  BP: 130/79 (05/20/22 0101)  SpO2: 100 % (05/20/22 0105)  Weight: 48.1 kg (106 lb) (05/17/22 0037)  Height: 5' 4" (162.6 cm) (05/17/22 0037)    PICC  Date/Time: 5/20/2022 1:09 AM  Performed by: Fernando Pak RN  Consent Done: Yes  Time out: Immediately prior to procedure a time out was called to verify the correct patient, procedure, equipment, support staff and site/side marked as required  Indications: vascular access  Anesthesia: local infiltration  Local anesthetic: lidocaine 1% without epinephrine  Anesthetic Total (mL): 3  Preparation: skin prepped with chlorhexidine (without alcohol)  Skin prep agent dried: skin prep agent completely dried prior to procedure  Sterile barriers: all five maximum sterile barriers used - cap, mask, sterile gown, sterile gloves, and large sterile sheet  Hand hygiene: hand hygiene performed prior to central venous catheter insertion  Location details: right basilic  Catheter type: triple lumen  Catheter size: 5 Fr  Catheter Length: 36cm    Ultrasound guidance: yes  Vessel Caliber: medium and patent, compressibility normal  Vascular Doppler: not done  Needle advanced into vessel with real time Ultrasound guidance.  Guidewire confirmed in vessel.  Image recorded and saved.  Sterile sheath used.  no esophageal manometryNumber of attempts: 1  Post-procedure: blood return through all ports and sterile dressing applied    Assessment: placement verified by x-ray, tip termination and successful placement          Name ALISE Pak RN [facility]  5/20/2022  "

## 2022-05-20 NOTE — PROGRESS NOTES
Acute Care/Trauma Surgery Progress Note    S:  Patient intubated yesterday for airway protection  Requiring levo 0.04  L CT to suction    Objective:  Vitals:    05/20/22 0606 05/20/22 0617 05/20/22 0630 05/20/22 0632   BP:  119/86  125/81   Pulse: (!) 57  (!) 59 60   Resp: (!) 21  18 (!) 23   Temp:       TempSrc:       SpO2: 97%  (!) 94% (!) 93%   Weight:       Height:           Intake/Output:    Intake/Output Summary (Last 24 hours) at 5/20/2022 0741  Last data filed at 5/20/2022 0600  Gross per 24 hour   Intake 3246 ml   Output 1955 ml   Net 1291 ml     General: NAD, AAOx3  Neuro: CN grossly intact, EOMI, PERRLA  CV: RRR, extrem wwp  Pulm: no increased wob, equal chest rise b/l,  mechanical intubation  Abd: s/nt/nd  MSK: moving all extremities, 2+ DP pulses b/l  Wounds: BLE with splints in place, legs soft    Labs:  H/H pending  INR 1.5  Fibrinogen 600    Micro:  Microbiology Results (last 7 days)     Procedure Component Value Units Date/Time    Blood Culture [[id number]] Collected: 05/19/22 0625    Order Status: Resulted Specimen: Blood Updated: 05/19/22 0629    Blood Culture [[id number]]     Order Status: Sent Specimen: Blood           Radiology:  CXR: final read pending    A/P:  57 yoF with hx metastatic breast cancer in peds vs skilled nursing found to have open L femur fxr, L tib/fib fxr, R femur fxr, L PTX s/p CT insertion, ARF     Neuro:  continue neuro-assessments, fu oncology, MM pain medications, daily sedation holiday  CV: wean levo as tolerated, continue resuscitation, Echo  Pulm: stable on NC, wean as tolerated, IS, continue CT to suction, fu CXR  FEN/GI: nutrition consult for TF  Renal: strict intake and output, continue peralta  Heme: trend H/H, fu post-transfusion H/H  ID: pancytopenic fu oncology  MSK: will ask PT/OT to evaluate once cleared by ortho, dressing changes per ortho  Ppx: will discuss lov timing, SCDs to RLE     Dispo: Continue ICU care.  Family requesting continued medical care and full code.   Palliative care on board.    Meghan Matos, HO4  [facility]

## 2022-05-20 NOTE — PT/OT/SLP PROGRESS
Pt intubated requiring mechanical ventilatory support. SLP services are not warranted at this time. SLP to sign off. Please reconsult as warranted.

## 2022-05-20 NOTE — HISTORICAL [facility]ER
This is a historical note converted from [place]. Formatting and pictures may have been removed.  Please reference [place] for original formatting and attached multimedia. History of Present Illness  Reason for follow-up:  -Metastatic left breast cancer, ER positive, UT positive, HER-2 positive  -Brain metastasis, lumbar spine metastasis, bone metastasis, bilaterally axillary lymph node metastasis, lung metastasis, liver metastasis, left adrenal metastasis  -Taxotere-induced peripheral neuropathy  ?  ?   Past medical history: Hypertension. ?Anemia. ?Chronic kidney disease. ?Tonsillectomy.  Social history: Denies history of tobacco, alcohol, or illicit drug abuse. ?Lives with 2 of her siblings. ?Has 3 children and 2 grandchildren.  Family history: Father with history of throat cancer in his 50s. Mother with history of ovarian cancer in her 50s. Brother with history of stomach cancer in her 50s  Menstrual history: As of today, 12/12/2017, premenopausal  ?   ?  History of present illness:?  Patient is being followed for metastatic left breast cancer, ER positive, UT positive, HER-2 positive.  ?   For detailed history of present illness, please see my last note dated 02/19/2020.? Please also refer to assessment and plan section.  ?   ?  Interval History?  ?   03/25/2022:  -Kadcyla started 03/04/2022  -S/p C3: SRS to brain metastasis 02/16/2022-03/17/2022  -03/11/2022: CEA level 106.04  -CA 27.29 level: 761, rising  -CA 15-3 level: 582, rising  -03/25/2022: Labs reviewed; AST 35, slightly elevated; alk phos 173; rest of CMP unremarkable; ANC 1.86, hemoglobin 9.5  Presents for follow visit. ?Doing well.? No complaints whatsoever. ?Good appetite.? Baseline satisfactory energy level, ECOG 1.? No unusual headaches, focal neurological symptoms, chest pain, cough, dyspnea, weakness, fatigue, etc.? No side effects with?Kadcyla.  ?  ?  Review of Systems:  All systems reviewed, and found to be negative?except for the symptoms  detailed above.  ?  ?   Physical Examination:  VITAL SIGNS: ?Reviewed. ? ?  GENERAL:? In no apparent distress.?  HEAD:? No signs of head trauma.  EYES:? Pupils are equal.? Extraocular motions intact.?  EARS:? Hearing grossly intact.  MOUTH:? Oropharynx is normal.  NECK:? No adenopathy, no JVD.??  CHEST:? Chest with clear breath sounds bilaterally.? No wheezes, rales, or rhonchi.?  CARDIAC:? Regular rate and rhythm.? S1 and S2, without murmurs, gallops, or rubs.  VASCULAR:? No Edema.? Peripheral pulses normal and equal in all extremities.  ABDOMEN:? Soft, without detectable tenderness.? No sign of distention.? No?? rebound or guarding, and no masses palpated.?? Bowel Sounds normal.  MUSCULOSKELETAL:? Good range of motion of all major joints. Extremities without clubbing, cyanosis or edema.?  NEUROLOGIC EXAM:? Alert and oriented x 3.? No focal sensory or strength deficits.?? Speech normal.? Follows commands.  PSYCHIATRIC:? Mood normal.  SKIN:? No rash or lesions.  01/29/2019:?Breast examination performed with her verbal consent, in the presence of Prenella, over LPN;?deformed left breast is noted,?with diffuse, vague, nontender mass?palpable in the mid breast?as well as lower outer quadrant;?skin retraction is noted;?nipple retraction is noted;?a couple of small erythematous nodules?in the upper part of right breast, a short distance from nipple;?no palpable regional lymphadenopathy; no warmth; no skin ulceration.  06/06/2019:?Chaperoned breast examination was performed?in the presence of Prenella, over LPN.? No mass palpable in left breast.? No lymphadenopathy in axilla on either side.  07/22/2020: Not examined; it was a telephone visit.  11/13/2020: In no acute discomfort. ?In a wheelchair.  02/03/2021: Looks well and healthy.? No palpable lumps or lymphadenopathy.? Lungs clear. ?Heart is normal.? No focal neurologic deficit.  04/20/2021: In wheelchair. ?In no acute discomfort.? No palpable lymphadenopathy or  masses.  06/09/2021: In wheelchair. ?In no acute discomfort. ?Fully alert. ?No palpable lymphadenopathy or masses.? Lungs clear. ?Abdomen benign.  11/02/2021:?In no acute discomfort. ?In wheelchair, as usual.? Neurologically, nonfocal. ?Lungs clear. ?Heart sounds normal.  ?  ?  Assessment:  #Metastatic breast cancer:  Inflammatory carcinoma of left breast. ?ER positive. ?IN positive. ?HER-2 positive.? Diagnosed 12/2017  Metastases to brain,?lumbar spine,?extensively to bones,?lymph nodes, lungs, liver, and adrenal.?  For lumbar spine metastasis,?status post laminectomy and internal fixation 12/2017  Status post radiation therapy to lumbar spine and brain?02/2018  Noncompliant with follow-up.  Status post Taxotere/Herceptin/Perjeta?x1 on?03/21/2018; she declined further chemotherapy?  Presented?1 year later,?on?02/2019:?Extensive bone metastases stable;?progression of bilateral lung metastasis, bilateral axillary?lymphadenopathy, progressive liver metastasis, progressive left adrenal metastasis  Dose reduced Taxotere/Herceptin/Perjeta started?03/07/2019?(1 year after first cycle of chemotherapy)  Good response post 4 cycles  Excellent response?post 8 cycles  Starting?with ninth cycle of chemotherapy,?discontinued Herceptin/Perjeta?due to persistent?drop in LVEF,?without?recovery?post?discontinuation  Continued improvement/stability?post 13 cycles of Taxotere;?dramatic drop in tumor marker level  No brain metastasis?on surveillance brain MRI (02/10/2020)  -Stable metastatic disease?on restaging CTs and bone scan?post 18 cycles of Taxotere?(04/16/2020)  -No brain metastasis on surveillance brain MRI (05/27/2020)  -No metastatic disease progression?post?Taxotere x23 cycles?(restaging?CTs and bone scan: 07/17/2020)  -Genetic testing negative (07/23/2020)  -Questionable punctate?enhancing foci right cerebellar and right occipital (surveillance brain MRI: 09/09/2020)?(completely asymptomatic)  -No disease progression post  Taxotere x28?(CTs, bone scan:?11/10/2020)?(bone scan: SuperScan?representing extensive skeletal metastatic involvement, unchanged)  >>>  Progression?on Taxotere?(new/progressive brain metastasis)  (no disease progression outside brain?on restaging CTs and bone scan):  -New/progressive brain metastasis post Taxotere x30  -Completed 31 cycles of Taxotere 12/28/2020, then discontinued  -12/09/2020: Surveillance brain MRI with and without contrast (comparison: 09/09/2020): New, progressive millimetric intra-axial metastasis (at least 5); bone metastases have also progressed; no scalp or intracranial extension  -02/25/2021: TTE: LVEF 45% (40% on 11/11/2019)  -History of palliative RT to brain and lumbar spine 01/22/2018-02/02/2018  -S/p SRS to 8 brain metastasis?(01/25/2021-02/01/2021)  -Restaging CTs and bone scan (02/25/2021):?No disease progression  (No disease progression outside brain)  -Surveillance brain MRI (03/10/2021):?Overall stable to minimally less prominent scattered small interval metastasis  -Capecitabine started 03/11/2021  -No extracranial disease progression on restaging CTs and bone scan?(05/20/2021)  -Capecitabine dose decreased by 25% from cycle #5 onwards (neutropenia)  -06/29/2021: Surveillance/restaging brain MRI with and without contrast (comparison: 03/10/2021): Overall stable to decreased size of small residual enhancing intracranial lesions, with minimal increase in size of a single right periventricular nodule (3 mm, previously <2 mm)  -Cycle #7 of Capecitabine?started 07/19/2021  -08/11/2021: Restaging CTs C/A/P with contrast (comparison: 05/21/2021): No progression;?stable extensive bone metastasis;?no lung parenchymal or solid organ metastasis  -08/11/2021: Restaging bone scan (comparison: 05/20/2021): Widespread bone metastasis, stable to minimally improved  -08/04/2021: CA 27.29 level 83.8, rising; CA 15-3 level 60.8, rising  -09/30/2021: Brain MRI with and without contrast (comparison:  06/29/2021): Minimal increase in size of multiple small enhancing intracranial lesions  -As of 08/27/2021, tumor markers are rising  -10/07/2021: LVEF 45%  -No progression on restaging CTs and bone scan (11/29/2021)  >>>  Progression on Capecitabine?(brain metastasis; liver metastasis):  -01/13/2022: Brain MRI with and without contrast (comparison: Brain MRI 09/30/2021): New/progressive intra-axial metastasis; minimal local mass-effect; no shift  (Evaluated by Dr. Malik Prellop on 02/07/2022; SRS planned)  -02/02/2022: TTE: LVEF 64%  -02/02/2022: DEXA scan: Normal BMD femurs; BMD of left distal forearm consistent with osteoporosis  -02/02/2022: Restaging CT C/A/P with contrast (comparison: 11/29/2021): Right inferior hepatic nodule 15 x 18 mm, slightly larger; diffuse bone metastasis unchanged  (Progressive brain metastasis on brain MRI 01/13/2022)  (Enlarging liver metastasis?on restaging CTs 02/02/2022)  -Kadcyla started 03/04/2022  -S/p C3: SRS to brain metastasis 02/16/2022-03/17/2022  -03/11/2022: CEA level 106.04  -CA 27.29 level: 761, rising  -CA 15-3 level: 582, rising  ?  ?  #History of brain metastasis:  -History of palliative RT to brain and lumbar spine 01/22/2018-02/02/2018  -S/p SRS to 8 brain metastasis?(01/25/2021-02/01/2021)?  -06/29/2021: Surveillance/restaging brain MRI with and without contrast (comparison: 03/10/2021): Overall stable to decreased size of small residual enhancing intracranial lesions, with minimal increase in size of a single right periventricular nodule (3 mm, previously <2 mm)  -09/30/2021: Brain MRI with and without contrast (comparison: 06/29/2021): Minimal increase in size of multiple small enhancing intracranial lesions  -As of 08/27/2021, tumor markers are rising  -09/30/2021: Brain MRI with and without contrast (comparison: 06/29/2021): Minimal increase in size of multiple small enhancing intracranial lesions  -01/13/2022: Brain MRI with and without contrast (comparison:  Brain MRI 09/30/2021): New/progressive intra-axial metastasis; minimal local mass-effect; no shift  (Evaluated by Dr. Malik Predhruvop on 02/07/2022; SRS planned)  -S/p C3: SRS to brain metastasis 02/16/2022-03/17/2022  ?  ?  #Sites of disease:  Left breast (inflammatory carcinoma);?brain metastasis; lumbar spine metastasis; extensive bone metastasis;?bilateral axillary?lymph nodes; lungs; liver; left?adrenal  ?  ?  #Molecular markers:  ER positive. ?RI positive. ?HER-2 positive.  BRCA1/2?analysis negative  MMR proficient  NTRK gene fusion negative  Tumor mutational burden:?QNS  ?  ?  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  ?  ?  -Kadcyla started 03/04/2022  -S/p C3: SRS to brain metastasis 02/16/2022-03/17/2022  -03/11/2022: CEA level 106.04  -CA 27.29 level: 761, rising  -CA 15-3 level: 582, rising  ?  Plan:  Periodically?noncompliant with follow-up  Compliance emphasized.  ?  -Disease progressed post Taxotere x30, with brain metastasis, without extracranial disease progression on restaging CTs and bone scan  -Received a total of 31 cycles of Taxotere  >>>  -Capecitabine started 03/11/2021; dose decreased by 25%?from cycle #5 onwards (neutropenia)  -Progressive brain metastases on brain MRI 01/13/2022  -Enlarging liver metastasis on restaging CTs 02/02/2022  -CEA 15-3 and CA 27.29 levels rising as of 01/13/2022  (Progression on Capecitabine)  -LVEF normalized?to 64% on TTE (02/02/2022)  >>>  -Kadcyla started 03/04/2022  >>>  -Continue Kadcyla every 3 weeks  -Check CBC and CMP every 10 days or so  -Restage with contrast-enhanced CT scans of C/A/P and whole-body nuclear medicine bone scan 2 months (May)?after starting?Kadcyla?  -Check CA 15-3 and CA 27.29 level?monthly to assess?disease progress  -Check liquid biopsy for tumor mutational burden  -Monitor LVEF every 3 months while on Kadcyla;?next TTE?in May 22  ?  Status post C3?of SRS to brain metastases  02/16/2022-03/17/2022  -Follow-up and surveillance imaging per?radiation oncology  ?  -Continue Cymbalta and gabapentin?for Taxotere-induced peripheral neuropathy  ?  -Dental evaluation and preventive dentistry pending,?before we can start bone antiresorptive therapy for bone metastasis;?pending.  ?  Follow-up with NP in 3 weeks.  ?  Above discussed at length with her.? All questions answered.  She understands and agrees this plan.? She also understands that her long-term prognosis is guarded.  ------------  ?  Discussion:  With progressive brain metastasis, systemic therapy options are:  -Ado-trastuzumab emtansine (T-DMI)  -Capecitabine + lapatinib  -Capecitabine + neratinib  -Paclitaxel + neratinib (category 2B)  -Capecitabine (category 2B)  -Tucatinib + trastuzumab + Capecitabine (if previously treated with 1 or more anti-HER-2 based regimens)  ?  ?  Ado-trastuzumab emtansine (T-DMI):  -Boxed warning:?Serious hepatotoxicity including?liver failure and death; monitors LFTs prior to initiation of chemotherapy and prior to each chemotherapy dose; reduced dose appropriately  -LVEF reduction; evaluate LVEF prior to entering treatment  ?  -Dose: 3.6 mg/kg every 3 weeks until disease progression or unacceptable toxicity  ?  -Dose reduction according to hepatotoxicity during treatment  -Withhold treatment for thrombocytopenia (see)  -Assess for cardiac toxicity, peripheral neuropathy, pulmonary toxicity, etc.  ?  Warnings/precautions:  -Bone marrow suppression thrombocytopenia; neutropenia and anemia have also occurred  ?  -Cardiotoxicity: Assess LVEF every 3 months during treatment; avoid if LVEF <50 percent, history of symptomatic CHF, serious arrhythmia, or recent history (within 6 months) of MI, or unstable angina  ?  -Extravasation reactions  -Hemorrhage  -Hepatotoxicity  -Hypersensitivity/infusion related reactions  -Peripheral neuropathy, usually grade 1  -Pulmonary toxicity: ILD, pneumonitis, etc.  ?  Monitoring  parameters:  -Platelet count at baseline and prior to each dose, LFTs, hepatitis B status, LVEF assessment every 3 months, infusion reactions, bleeding, neuropathy, lung toxicity  Physical Exam  Vitals & Measurements  T:?36.7? ?C (Oral)? HR:?67(Peripheral)? RR:?18? BP:?106/71? SpO2:?99%?  HT:?142.20?cm? WT:?48.710?kg? BMI:?24.09?   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  Metastatic breast cancer  Metastatic breast cancer  Historical  Pregnant  Pregnant  Pregnant  Shingles  Procedure/Surgical History  Contrast injection(s) for radiologic evaluation of existing central venous access device, including fluoroscopy, image documentation and report (03/04/2022)  Drainage of Buttock Skin, External Approach (07/30/2019)  Incision and drainage of abscess (eg, carbuncle, suppurative hidradenitis, cutaneous or subcutaneous abscess, cyst, furuncle, or paronychia); simple or single (07/30/2019)  Transfusion of Nonautologous Red Blood Cells into Peripheral Vein, Percutaneous Approach (03/28/2018)  Cath, inf, per/cent/midline (02/23/2018)  Catheter Insertion Mediport (None) (02/23/2018)  Fluoroscopy of Superior Vena Cava using Low Osmolar Contrast, Guidance (02/23/2018)  Insertion of Infusion Device into Superior Vena Cava, Percutaneous Approach (02/23/2018)  Insertion of tunneled centrally inserted central venous access device, with subcutaneous port; age 5 years or older (02/23/2018)  Fusion of 2 or more Lumbar Vertebral Joints with Synthetic Substitute, Posterior Approach, Posterior Column, Open Approach (12/19/2017)  Fusion of Lumbosacral Joint with Synthetic Substitute, Posterior Approach, Posterior Column, Open Approach (12/19/2017)  Lumbar Foraminotomy MIS (.) (12/17/2017)  Bone biopsy sample (12/08/2017)  Breast biopsy sample (12/07/2017)  Biopsy of breast;  percutaneous, needle core, not using imaging guidance (separate procedure) (12/06/2017)  Bone marrow; biopsy, needle or trocar (12/06/2017)  Excision of Left Breast, Percutaneous Approach, Diagnostic (12/06/2017)  Extraction of Iliac Bone Marrow, Percutaneous Approach, Diagnostic (12/06/2017)  Tonsillectomy   Medications  acetaminophen-hydrocodone 325 mg-10 mg oral tablet, 1 tab(s), Oral, QID, PRN  acetaminophen-hydrocodone 325 mg-10 mg oral tablet, 1 tab(s), Oral, QID, PRN,? ?Not taking  aspirin 81 mg oral Delayed Release (EC) tablet, 81 mg= 1 tab(s), Oral, Daily  aspirin 81 mg oral Delayed Release (EC) tablet, 81 mg= 1 tab(s), Oral, Daily, 3 refills  aspirin 81 mg oral tablet, 81 mg= 1 tab(s), Oral, Daily, 11 refills  capecitabine 500 mg oral tablet, See Instructions, 5 refills  capecitabine 500 mg oral tablet, 1500 mg= 3 tab(s), Oral, BID,? ?Not taking  CYCLOBENZAPRINE 10 MG TABLET, Oral, TID  Cymbalta 60 mg oral delayed release capsule, 60 mg= 1 cap(s), Oral, At Bedtime, 3 refills  Decadron 4 mg oral tablet, 16 mg= 4 tab(s), Oral, Daily,? ?Not taking  dexamethasone (for IVPB)  diphenhdramine (for Inj.), 25 mg= 0.5 mL, IV Push, Once-chemo  DULoxetine 30 mg oral delayed release capsule, 30 mg= 1 cap(s), Oral, Daily, 3 refills,? ?Not taking  DULoxetine 60 mg oral delayed release capsule, 60 mg= 1 cap(s), Oral, Daily, 3 refills  DULoxetine 60 mg oral delayed release capsule, 60 mg= 1 cap(s), Oral, Daily, 3 refills,? ?Not taking  GABAPENTIN 300 MG CAPSULE, 300 mg= 1 cap(s), Oral, TID  gabapentin 300 mg oral capsule, 300 mg= 1 cap(s), Oral, TID, 3 refills  Heparin Flush 100 U/mL - 5 mL, 500 units= 5 mL, IV Push, Once-chemo  Heparin Flush 100 U/mL - 5 mL, 500 units= 5 mL, IV Push, Once-chemo  lactulose 10 g/15 mL oral syrup, Oral, Daily  Lexapro 20 mg oral tablet, 20 mg= 1 tab(s), Oral, Daily, 11 refills  losartan 25 mg oral tablet, 25 mg= 1 tab(s), Oral, Daily, 6 refills  losartan 50 mg oral tablet, 50 mg= 1 tab(s),  Oral, Daily, 3 refills  megestrol 40 mg oral tablet, See Instructions, 8 refills  meloxicam 7.5 mg oral tablet, 7.5 mg= 1 tab(s), Oral, Daily, 11 refills  metoprolol succinate 25 mg oral tablet extended release, 12.5 mg= 0.5 tab(s), Oral, Daily, 6 refills  Pazeo 0.7% ophthalmic solution, 1 drop(s), OPTH, Daily  Phenergan 25 mg Tab, 25 mg= 1 tab(s), Oral, q6hr  potassium chloride 20 mEq oral TABLET extended release, 20 mEq= 1 tab(s), Oral, BID,? ?Not Taking per Prescriber  ProAir HFA 90 mcg/inh inhalation aerosol with adapter, 2 puff(s), INH, q4hr, PRN, 3 refills  traZODone 100 mg oral tablet, See Instructions, 11 refills  Tylenol 325 mg oral tablet, 650 mg= 2 tab(s), Oral, Once-chemo  Zofran (for IVPB) 16 mg + dexamethasone 10 mg/mL injectable solution 10 mg  Zofran ODT 8 mg oral tablet, disintegrating, 8 mg= 1 tab(s), Oral, q8hr, PRN, 1 refills  Allergies  No Known Allergies  Social History  Abuse/Neglect  No, No, Yes, 03/20/2022  Alcohol - Denies Alcohol Use, 08/24/2014  Never, 02/22/2022  Employment/School  homemaker, Work/School description: homemaker. Operates hazardous equipment: No., 04/20/2017  Exercise  Self assessment: Good condition., 04/20/2017  Home/Environment  Lives with Children. Living situation: Home/Independent. Alcohol abuse in household: No. Substance abuse in household: No. Smoker in household: No. Injuries/Abuse/Neglect in household: No. Feels unsafe at home: No. Family/Friends available for support: Yes. Concern for family members at home: No. Major illness in household: No. Financial concerns: No. TV/Computer concerns: No., 04/20/2017  Nutrition/Health  Regular, Wants to lose weight: No., 04/20/2017  Sexual  Sexually active: No., 11/05/2020  Gender Identity Identifies as female., 02/28/2019  Substance Use  Never, 02/22/2022  Tobacco - Denies Tobacco Use, 03/04/2013  Never (less than 100 in lifetime), No, 03/20/2022  Family History  Cancer: Mother.  Heart failure.: Mother.  Primary malignant  neoplasm of breast: Sister.  Primary malignant neoplasm of lung: Father.  Immunizations  Vaccine Date Status   pneumococcal 23-polyvalent vaccine 11/14/2017 Recorded   influenza virus vaccine, inactivated 11/15/2016 Recorded

## 2022-05-20 NOTE — PLAN OF CARE
Problem: Skin Injury Risk Increased  Goal: Skin Health and Integrity  Outcome: Ongoing, Progressing     Problem: Fall Injury Risk  Goal: Absence of Fall and Fall-Related Injury  Outcome: Ongoing, Progressing     Problem: Device-Related Complication Risk (Mechanical Ventilation, Invasive)  Goal: Optimal Device Function  Outcome: Ongoing, Progressing     Problem: Nutrition Impairment (Mechanical Ventilation, Invasive)  Goal: Optimal Nutrition Delivery  Outcome: Ongoing, Progressing

## 2022-05-20 NOTE — PT/OT/SLP EVAL
Chart reviewed. Pt now intubated due to resp decline. OT spoke with RN, RN stated pt not consistently following commands, not appropriate for OT evaluation at this time. OT to f/u tomorrow as pt appropriate.

## 2022-05-20 NOTE — HISTORICAL [facility] CERNER
This is a historical note converted from [employer]. Formatting and pictures may have been removed.  Please reference [employer] for original formatting and attached multimedia. Chief Complaint  breast cancer  History of Present Illness  Problem list:  1. Stage IV Inflammatory breast cancer. Biopsy proven (right anterior iliac bone on 12/08/2017 showed metastatic carcinoma, consistent with a breast primary) metastatic left breast cancer, with extensive bone metastases, lung metastasis, possible liver and adrenal metastasis, extensive dulce metastasis, and small intracranial (posterior fossa) metastasis.?  ????  ER 85% positive, MD 45% positive, Ki-67 30% (high proliferation), HER-2 3+ (overexpressed)  Pathologic L4 compression fracture, to a lesser extent at L3 also, epidural extension of tumor at the L4 level resulting in severe canal stenosis, and left lower extremity weakness.  ?   -Germline?BRCA1/2?testing negative as of 08/17/2020.  ???  Current Treatment:?  -Ado-trastuzumab emtansine (T-DMI)? Kadcyla 3.6 mg/kg every 3 weeks until disease progression or unacceptable toxicity  start 3/4/2022  ?   Treatment History:  1. 12/18/17: Dr. Harish Pearson performed L3-L4 bilateral laminectomies and decompression of the posterior elements; L4-L5 bilateral laminectomies and foraminotomies with decompression of the thecal sac; lumbar open reduction and internal fixation; biopsy of L3 and biopsy of L4 vertebral bodies.??  2. Palliative radiation therapy to the lumbar spine post decompression surgery, as well as radiation therapy to the brain metastasis with Dr. Saeed. She completed on 2/2/2018.  3. Herceptin/Perjeta/Taxotere 03/21/2018-09/12/2019, herceptin and perjeta dropped d/t decreased LVEF of 45%.  4. Palliative chemotherapy with Taxotere every 21 days. Stopped after C31 on 12/28/2020 d/t progression.  5. Xeloda (capecitabine)?1000 mg/m??twice daily, days 1-14, cycle every 21 days; 3/11/2021-2/22/2021  ?   History of present  illness:?  52-year-old Afro-American female who was seen in internal medicine clinic on 12/06/2017 for left lower extremity pain and was found to have multiple abnormalities on blood tests. ?Was hospitalized for evaluation, and ultimately found to have metastatic breast cancer.  ?   For?a full, detailed note, see the note dated 7/22/2020.  ?  Interval History  3/4/2022:Patient presents today for scheduled chemotherapy teaching on [facility]. Discussed treatment regimen and potential side effects. We also discussed treatment options for any presenting symptoms. No further questions or needs voiced.  ?  ?   ROS:  Constitutional: No fever, chills, weight loss, weakness or fatigue  Eye: No visual disturbances or changes in vision, no double vision  ENMT: no decreased hearing, nasal congestion, nosebleeds, sore throat, taste disturbance, tinnitus, vertigo  Respiratory: No shortness of breath, cough, sputum production, hemoptysis or pleuritic type chest pain  Cardiovascular: No chest pain, palpitations, syncope, leg swelling  Gastrointestinal: No nausea, vomiting, diarrhea, constipation, heartburn, abdominal pain  Genitourinary: No CVA tenderness  Hematology/lymph: no abnormal bruising, hemorrhage, petechiae swollen lymph glands  Musculoskeletal: No back or neck pain, joint pain, muscle pain or decreased range of motion  Integumentary: No rash, pruritus, skin lesion or any other significant skin complaints  Neurologic: alert and oriented x4, no abnormal balance, confusion, numbness, tingling, headache  Psychiatric: No anxiety, depression, suicidal or homicidal ideations  12 point ROS done in full with pertinent positives as described in interval history. Remainder of ROS are negative.?  ?  Patient Instructions  Discussed:  *Goals of therapy to be:Palliative  * Premedication  *Chemotherapy agents  Chemotherapy education:?  Discussed common side effects to include nausea and vomiting, which is quite manageable, alopecia, taste  changes, mouth ulcers, fatigue, fertility issues, low blood counts that may require transfusion, peripheral neuropathy (42% to 70%; grade 3/4: Less than 7%) arthralgia and or myalgias, and rare side effects including cardiac toxicity.  Discussed chemotherapy (Kadcyla) to be given every? 3weeks until disease progression or toxicity.  Notify our office for any problems or concerns, specifically shortness of breath, swelling, chest pain or fast heartbeat, intractable pain or nausea and vomiting, fever greater than 100.4, extreme fatigue or weakness.  Self-care tips include good hygiene in frequent handwashing to avoid infection, anti-nausea medicines to reduce nausea, soft toothbrush and mouth rinses 3 times a day with 1 teaspoon of baking soda with 8 ounces of water to prevent and treat mouth sores, avoid contact sports, avoid sun exposure and apply sunblock with SPF 30 or higher, drink 2-3 quarts of water every 24 hours, and maintaining good nutrition.  * Short term and long term effects of treatment  * Signs and symptoms to call clinic--->Notify our office for any problems or concerns, specifically shortness of breath, swelling, chest pain or fast heartbeat, intractable pain or nausea and vomiting, fever greater than 100.4, extreme fatigue or weakness.  * Written material given to patient  * All questions answered; patient verbalized understanding.  Review of Systems  A complete 12-point ROS was performed in full with pertinent positives as described in interval history. Remainder of ROS done in full and are negative.  ?  Physical Exam  Vitals & Measurements  T:?36.6? ?C (Oral)? HR:?88(Peripheral)? RR:?18? BP:?110/74?  HT:?163.00?cm? WT:?48.300?kg? BMI:?18.18?  Assessment/Plan  1.?Metastatic breast cancer?C50.919  #Metastatic breast cancer:  Inflammatory carcinoma of left breast. ?ER positive. ?SD positive. ?HER-2 positive.? Diagnosed 12/2017  Metastases to brain,?lumbar spine,?extensively to bones,?lymph nodes,  lungs, liver, and adrenal.?  For lumbar spine metastasis,?status post laminectomy and internal fixation 12/2017  Status post radiation therapy to lumbar spine and brain?02/2018  Noncompliant with follow-up.  Status post Taxotere/Herceptin/Perjeta?x1 on?03/21/2018; she declined further chemotherapy?  Presented?1 year later,?on?02/2019:?Extensive bone metastases stable;?progression of bilateral lung metastasis, bilateral axillary?lymphadenopathy, progressive liver metastasis, progressive left adrenal metastasis  Dose reduced Taxotere/Herceptin/Perjeta started?03/07/2019?(1 year after first cycle of chemotherapy)  Good response post 4 cycles  Excellent response?post 8 cycles  Starting?with ninth cycle of chemotherapy,?discontinued Herceptin/Perjeta?due to persistent?drop in LVEF,?without?recovery?post?discontinuation  Continued improvement/stability?post 13 cycles of Taxotere;?dramatic drop in tumor marker level  No brain metastasis?on surveillance brain MRI (02/10/2020)  -Stable metastatic disease?on restaging CTs and bone scan?post 18 cycles of Taxotere?(04/16/2020)  -No brain metastasis on surveillance brain MRI (05/27/2020)  -No metastatic disease progression?post?Taxotere x23 cycles?(restaging?CTs and bone scan: 07/17/2020)  -Genetic testing negative (07/23/2020)  -Questionable punctate?enhancing foci right cerebellar and right occipital (surveillance brain MRI: 09/09/2020)?(completely asymptomatic)  -No disease progression post Taxotere x28?(CTs, bone scan:?11/10/2020)?(bone scan: SuperScan?representing extensive skeletal metastatic involvement, unchanged)  >>>  Progression?on Taxotere?(new/progressive brain metastasis)  (no disease progression outside brain?on restaging CTs and bone scan):  -New/progressive brain metastasis post Taxotere x30  -Completed 31 cycles of Taxotere 12/28/2020, then discontinued  -12/09/2020: Surveillance brain MRI with and without contrast (comparison: 09/09/2020): New, progressive  millimetric intra-axial metastasis (at least 5); bone metastases have also progressed; no scalp or intracranial extension  -02/25/2021: TTE: LVEF 45% (40% on 11/11/2019)  -History of palliative RT to brain and lumbar spine 01/22/2018-02/02/2018  -S/p SRS to 8 brain metastasis?(01/25/2021-02/01/2021)  -Restaging CTs and bone scan (02/25/2021):?No disease progression  (No disease progression outside brain)  -Surveillance brain MRI (03/10/2021):?Overall stable to minimally less prominent scattered small interval metastasis  -Capecitabine started 03/11/2021  -No extracranial disease progression on restaging CTs and bone scan?(05/20/2021)  -Capecitabine dose decreased by 25% from cycle #5 onwards (neutropenia)  -06/29/2021: Surveillance/restaging brain MRI with and without contrast (comparison: 03/10/2021): Overall stable to decreased size of small residual enhancing intracranial lesions, with minimal increase in size of a single right periventricular nodule (3 mm, previously <2 mm)  -Cycle #7 of Capecitabine?started 07/19/2021  -08/11/2021: Restaging CTs C/A/P with contrast (comparison: 05/21/2021): No progression;?stable extensive bone metastasis;?no lung parenchymal or solid organ metastasis  -08/11/2021: Restaging bone scan (comparison: 05/20/2021): Widespread bone metastasis, stable to minimally improved  -08/04/2021: CA 27.29 level 83.8, rising; CA 15-3 level 60.8, rising  -09/30/2021: Brain MRI with and without contrast (comparison: 06/29/2021): Minimal increase in size of multiple small enhancing intracranial lesions  -As of 08/27/2021, tumor markers are rising  -10/07/2021: LVEF 45%  -No progression on restaging CTs and bone scan (11/29/2021)  >>>  Progression on Capecitabine?(brain metastasis; liver metastasis):  -01/13/2022: Brain MRI with and without contrast (comparison: Brain MRI 09/30/2021): New/progressive intra-axial metastasis; minimal local mass-effect; no shift  (Evaluated by Dr. Malik Prellop on  02/07/2022; SRS planned)  -02/02/2022: TTE: LVEF 64%  -02/02/2022: DEXA scan: Normal BMD femurs; BMD of left distal forearm consistent with osteoporosis  -02/02/2022: Restaging CT C/A/P with contrast (comparison: 11/29/2021): Right inferior hepatic nodule 15 x 18 mm, slightly larger; diffuse bone metastasis unchanged  (Progressive brain metastasis on brain MRI 01/13/2022)  (Enlarging liver metastasis?on restaging CTs 02/02/2022)  ?  ?   #History of brain metastasis:  -History of palliative RT to brain and lumbar spine 01/22/2018-02/02/2018  -S/p SRS to 8 brain metastasis?(01/25/2021-02/01/2021)?  -06/29/2021: Surveillance/restaging brain MRI with and without contrast (comparison: 03/10/2021): Overall stable to decreased size of small residual enhancing intracranial lesions, with minimal increase in size of a single right periventricular nodule (3 mm, previously <2 mm)  -09/30/2021: Brain MRI with and without contrast (comparison: 06/29/2021): Minimal increase in size of multiple small enhancing intracranial lesions  -As of 08/27/2021, tumor markers are rising  -09/30/2021: Brain MRI with and without contrast (comparison: 06/29/2021): Minimal increase in size of multiple small enhancing intracranial lesions  -01/13/2022: Brain MRI with and without contrast (comparison: Brain MRI 09/30/2021): New/progressive intra-axial metastasis; minimal local mass-effect; no shift  (Evaluated by Dr. Malik Prellop on 02/07/2022; SRS planned)  ?  ?   #Sites of disease:  Left breast (inflammatory carcinoma);?brain metastasis; lumbar spine metastasis; extensive bone metastasis;?bilateral axillary?lymph nodes; lungs; liver; left?adrenal  ?  ?   #Molecular markers:  ER positive. ?IA positive. ?HER-2 positive.  BRCA1/2?analysis negative  MMR proficient  NTRK gene fusion negative  Tumor mutational burden:?QNS  ?   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  Plan:  Periodically?noncompliant with follow-up  Compliance emphasized.  ?  -Disease progressed post Taxotere x30, with brain metastasis, without extracranial disease progression on restaging CTs and bone scan  -Received a total of 31 cycles of Taxotere  >>>  -Capecitabine started 03/11/2021; dose decreased by 25%?from cycle #5 onwards (neutropenia)  -Progressive brain metastases on brain MRI 01/13/2022  -Enlarging liver metastasis on restaging CTs 02/02/2022  -CEA 15-3 and CA 27.29 levels rising as of 01/13/2022  (Progression on Capecitabine)  -LVEF normalized?to 64% on TTE (02/02/2022)  >>>  -Disease has progressed on Capecitabine; discontinue  -Switch?to Ado-trastuzumab emtansine (T-DMI)  -Check CBC and CMP every 10 days or so  -Restage with contrast-enhanced CT scans of C/A/P and whole-body nuclear medicine bone scan 2 months after starting?Kadcyla?  -Check CA 15-3 and CA 27.29 level?monthly to assess?disease progress  -Check liquid biopsy for tumor mutational burden  -Monitor LVEF every 3 months while on Kadcyla;?next TTE?in May 22  ?  Has been evaluated?by Dr. Saeed, radiologist,?for SRS to worsening brain metastasis?(brain MRI?01/13/2022)  Tells me that she started brain radiotherapy last week; will get records from oncologic.  ?  -Continue Cymbalta and gabapentin?for Taxotere-induced peripheral neuropathy  ?  -Dental evaluation and preventive dentistry pending,?before we can start bone antiresorptive therapy for bone metastasis;?pending.  ?  -Okay to proceed with cycle 1 of Kadcyla on 3/7/2022  -Follow-up with NP in 1 week F2F on 3/11/2022 for toxicity check with CBC,CMP,MG, CEA, CA 27-29, CA 15-3  -Restage with contrast-enhanced CT scans of C/A/P and whole-body nuclear medicine bone scan 2 months after starting?Kadcyla due in May 2022-Ordered today  -Check CA 15-3 and CA 27.29 level?monthly to assess?disease progress  -Monitor LVEF every 3 months while on Kadcyla;?next TTE?in May 22-Ordered  today  ?  ?  Above discussed at length with her.? All questions answered.  She understands and agrees this plan.? She also understands that her long-term prognosis is guarded.  ------------  ?  Discussion:  With progressive brain metastasis, systemic therapy options are:  -Ado-trastuzumab emtansine (T-DMI)  -Capecitabine + lapatinib  -Capecitabine + neratinib  -Paclitaxel + neratinib (category 2B)  -Capecitabine (category 2B)  -Tucatinib + trastuzumab + Capecitabine (if previously treated with 1 or more anti-HER-2 based regimens)  ?  ?  Ado-trastuzumab emtansine (T-DMI):  -Boxed warning:?Serious hepatotoxicity including?liver failure and death; monitors LFTs prior to initiation of chemotherapy and prior to each chemotherapy dose; reduced dose appropriately  -LVEF reduction; evaluate LVEF prior to entering treatment  ?  -Dose: 3.6 mg/kg every 3 weeks until disease progression or unacceptable toxicity  ?  -Dose reduction according to hepatotoxicity during treatment  -Withhold treatment for thrombocytopenia (see)  -Assess for cardiac toxicity, peripheral neuropathy, pulmonary toxicity, etc.  ?  Warnings/precautions:  -Bone marrow suppression thrombocytopenia; neutropenia and anemia have also occurred  ?  -Cardiotoxicity: Assess LVEF every 3 months during treatment; avoid if LVEF <50 percent, history of symptomatic CHF, serious arrhythmia, or recent history (within 6 months) of MI, or unstable angina  ?  -Extravasation reactions  -Hemorrhage  -Hepatotoxicity  -Hypersensitivity/infusion related reactions  -Peripheral neuropathy, usually grade 1  -Pulmonary toxicity: ILD, pneumonitis, etc.  ?  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  Metastatic breast cancer  Metastatic breast cancer  Historical  Pregnant  Pregnant  Pregnant  Shingles  Procedure/Surgical History  Drainage of Buttock Skin, External Approach (07/30/2019)  Incision and drainage of abscess (eg, carbuncle, suppurative hidradenitis, cutaneous or subcutaneous abscess, cyst, furuncle, or paronychia); simple or single (07/30/2019)  Transfusion of Nonautologous Red Blood Cells into Peripheral Vein, Percutaneous Approach (03/28/2018)  Cath, inf, per/cent/midline (02/23/2018)  Catheter Insertion Mediport (None) (02/23/2018)  Fluoroscopy of Superior Vena Cava using Low Osmolar Contrast, Guidance (02/23/2018)  Insertion of Infusion Device into Superior Vena Cava, Percutaneous Approach (02/23/2018)  Insertion of tunneled centrally inserted central venous access device, with subcutaneous port; age 5 years or older (02/23/2018)  Fusion of 2 or more Lumbar Vertebral Joints with Synthetic Substitute, Posterior Approach, Posterior Column, Open Approach (12/19/2017)  Fusion of Lumbosacral Joint with Synthetic Substitute, Posterior Approach, Posterior Column, Open Approach (12/19/2017)  Lumbar Foraminotomy MIS (.) (12/17/2017)  Bone biopsy sample (12/08/2017)  Breast biopsy sample (12/07/2017)  Biopsy of breast; percutaneous, needle core, not using imaging guidance (separate procedure) (12/06/2017)  Bone marrow; biopsy, needle or trocar (12/06/2017)  Excision of Left Breast, Percutaneous Approach, Diagnostic (12/06/2017)  Extraction of Iliac Bone Marrow, Percutaneous Approach, Diagnostic (12/06/2017)  Tonsillectomy   Medications  acetaminophen-hydrocodone 325 mg-10 mg oral tablet, 1 tab(s), Oral, QID, PRN  acetaminophen-hydrocodone 325 mg-10 mg oral tablet, 1 tab(s), Oral, QID, PRN,? ?Not taking  aspirin 81 mg oral Delayed Release (EC) tablet, 81 mg= 1 tab(s), Oral, Daily  aspirin 81 mg oral Delayed Release (EC) tablet, 81 mg= 1  tab(s), Oral, Daily, 3 refills  aspirin 81 mg oral tablet, 81 mg= 1 tab(s), Oral, Daily, 11 refills  capecitabine 500 mg oral tablet, See Instructions, 5 refills  capecitabine 500 mg oral tablet, 1500 mg= 3 tab(s), Oral, BID,? ?Not taking  CYCLOBENZAPRINE 10 MG TABLET, Oral, TID  Cymbalta 60 mg oral delayed release capsule, 60 mg= 1 cap(s), Oral, At Bedtime, 3 refills  Decadron 4 mg oral tablet, 16 mg= 4 tab(s), Oral, Daily,? ?Not taking  dexamethasone (for IVPB)  diphenhdramine (for Inj.), 25 mg= 0.5 mL, IV Push, Once-chemo  DULoxetine 30 mg oral delayed release capsule, 30 mg= 1 cap(s), Oral, Daily, 3 refills,? ?Not taking  DULoxetine 60 mg oral delayed release capsule, 60 mg= 1 cap(s), Oral, Daily, 3 refills  DULoxetine 60 mg oral delayed release capsule, 60 mg= 1 cap(s), Oral, Daily, 3 refills,? ?Not taking  GABAPENTIN 300 MG CAPSULE, 300 mg= 1 cap(s), Oral, TID  gabapentin 300 mg oral capsule, 300 mg= 1 cap(s), Oral, TID, 3 refills  Heparin Flush 100 U/mL - 5 mL, 500 units= 5 mL, IV Push, Once-chemo  Heparin Flush 100 U/mL - 5 mL, 500 units= 5 mL, IV Push, Once-chemo  lactulose 10 g/15 mL oral syrup, Oral, Daily  Lexapro 20 mg oral tablet, 20 mg= 1 tab(s), Oral, Daily, 11 refills  losartan 25 mg oral tablet, 25 mg= 1 tab(s), Oral, Daily, 6 refills  losartan 50 mg oral tablet, 50 mg= 1 tab(s), Oral, Daily, 3 refills  megestrol 40 mg oral tablet, See Instructions, 8 refills  meloxicam 7.5 mg oral tablet, 7.5 mg= 1 tab(s), Oral, Daily, 11 refills  metoprolol succinate 25 mg oral tablet extended release, 12.5 mg= 0.5 tab(s), Oral, Daily, 6 refills  Pazeo 0.7% ophthalmic solution, 1 drop(s), OPTH, Daily  Phenergan 25 mg Tab, 25 mg= 1 tab(s), Oral, q6hr  potassium chloride 20 mEq oral TABLET extended release, 20 mEq= 1 tab(s), Oral, BID,? ?Not Taking per Prescriber  ProAir HFA 90 mcg/inh inhalation aerosol with adapter, 2 puff(s), INH, q4hr, PRN, 3 refills  traZODone 100 mg oral tablet, See Instructions, 11  refills  Tylenol 325 mg oral tablet, 650 mg= 2 tab(s), Oral, Once-chemo  Zofran (for IVPB) 16 mg + dexamethasone 10 mg/mL injectable solution 10 mg  Zofran ODT 8 mg oral tablet, disintegrating, 8 mg= 1 tab(s), Oral, q8hr, PRN, 1 refills  Allergies  No Known Allergies  Social History  Abuse/Neglect  No, No, Yes, 03/03/2022  Alcohol - Denies Alcohol Use, 08/24/2014  Never, 02/22/2022  Employment/School  homemaker, Work/School description: homemaker. Operates hazardous equipment: No., 04/20/2017  Exercise  Self assessment: Good condition., 04/20/2017  Home/Environment  Lives with Children. Living situation: Home/Independent. Alcohol abuse in household: No. Substance abuse in household: No. Smoker in household: No. Injuries/Abuse/Neglect in household: No. Feels unsafe at home: No. Family/Friends available for support: Yes. Concern for family members at home: No. Major illness in household: No. Financial concerns: No. TV/Computer concerns: No., 04/20/2017  Nutrition/Health  Regular, Wants to lose weight: No., 04/20/2017  Sexual  Sexually active: No., 11/05/2020  Gender Identity Identifies as female., 02/28/2019  Substance Use  Never, 02/22/2022  Tobacco - Denies Tobacco Use, 03/04/2013  Never (less than 100 in lifetime), No, 03/03/2022  Family History  Cancer: Mother.  Heart failure.: Mother.  Primary malignant neoplasm of breast: Sister.  Primary malignant neoplasm of lung: Father.  Immunizations  Vaccine Date Status   pneumococcal 23-polyvalent vaccine 11/14/2017 Recorded   influenza virus vaccine, inactivated 11/15/2016 Recorded   Health Maintenance  Health Maintenance  ???Pending?(in the next year)  ??? ??OverDue  ??? ? ? ?Breast Cancer Screening due??02/20/21??and every 731??day(s)  ??? ? ? ?Influenza Vaccine due??10/01/21??and every 1??day(s)  ??? ? ? ?Alcohol Misuse Screening due??01/02/22??and every 1??year(s)  ??? ??Due?  ??? ? ? ?Colorectal Screening due??03/04/22??Unknown Frequency  ??? ? ? ?Tetanus Vaccine  due??03/04/22??and every 10??year(s)  ??? ? ? ?Zoster Vaccine due??03/04/22??Unknown Frequency  ??? ??Due In Future?  ??? ? ? ?HF-Heart Failure Education not due until??05/12/22??and every 1??year(s)  ??? ? ? ?ADL Screening not due until??08/27/22??and every 1??year(s)  ??? ? ? ?Obesity Screening not due until??01/01/23??and every 1??year(s)  ??? ? ? ?HF-LVEF not due until??02/02/23??and every 1??year(s)  ???Satisfied?(in the past 1 year)  ??? ??Satisfied?  ??? ? ? ?ADL Screening on??08/27/21.??Satisfied by Gary CONWAY, Summer  ??? ? ? ?Blood Pressure Screening on??03/04/22.??Satisfied by Patricia Jay LPN  ??? ? ? ?Body Mass Index Check on??03/04/22.??Satisfied by Patricia Jay LPN  ??? ? ? ?Depression Screening on??03/04/22.??Satisfied by Patricia Jay LPN  ??? ? ? ?Diabetes Screening on??03/04/22.??Satisfied by Johnnie Gutiérrez  ??? ? ? ?Hypertension Management-Blood Pressure on??03/04/22.??Satisfied by Patricia Jay LPN  ??? ? ? ?Influenza Vaccine on??03/03/22.??Satisfied by MAN Smith Ericka  ??? ? ? ?Obesity Screening on??03/04/22.??Satisfied by Patricia Jay LPN  ?  Lab Results  Test Name Test Result Date/Time   Sodium Lvl 140 mmol/L 03/04/2022 07:06 CST   Potassium Lvl 4.0 mmol/L 03/04/2022 07:06 CST   Chloride 103 mmol/L 03/04/2022 07:06 CST   CO2 24 mmol/L 03/04/2022 07:06 CST   Calcium Lvl 10.3 mg/dL 03/04/2022 07:06 CST   Glucose Lvl 74 mg/dL 03/04/2022 07:06 CST   BUN 21.6 mg/dL (High) 03/04/2022 07:06 CST   Creatinine 0.76 mg/dL 03/04/2022 07:06 CST   Est Creat Clearance Ser 73.61 mL/min 03/04/2022 09:43 CST   eGFR- 03/04/2022 07:06 CST   eGFR-ZEESHAN 84 mL/min/1.73 m2 (Low) 03/04/2022 07:06 CST   Bili Total 0.4 mg/dL 03/04/2022 07:06 CST   Bili Direct 0.2 mg/dL 03/04/2022 07:06 CST   Bili Indirect 0.20 mg/dL 03/04/2022 07:06 CST   AST 24 unit/L 03/04/2022 07:06 CST   ALT 6 unit/L 03/04/2022 07:06 CST   Alk Phos 125 unit/L 03/04/2022 07:06 CST   Total Protein  8.3 gm/dL 03/04/2022 07:06 CST   Albumin Lvl 3.5 gm/dL 03/04/2022 07:06 CST   Globulin 4.8 gm/dL (High) 03/04/2022 07:06 CST   A/G Ratio 0.7 ratio (Low) 03/04/2022 07:06 CST   WBC 4.3 x10(3)/mcL (Low) 03/04/2022 07:06 CST   RBC 3.40 x10(6)/mcL (Low) 03/04/2022 07:06 CST   Hgb 10.2 gm/dL (Low) 03/04/2022 07:06 CST   Hct 32.8 % (Low) 03/04/2022 07:06 CST   Platelet 392 x10(3)/mcL 03/04/2022 07:06 CST   MCV 96.5 fL 03/04/2022 07:06 CST   MCH 30.0 pg 03/04/2022 07:06 CST   MCHC 31.1 gm/dL 03/04/2022 07:06 CST   RDW 16.6 % (High) 03/04/2022 07:06 CST   MPV 9.9 fL 03/04/2022 07:06 CST   Abs Neut 2.56 x10(3)/mcL 03/04/2022 07:06 CST   Neutro Auto 60 % 03/04/2022 07:06 CST   Lymph Auto 23 % 03/04/2022 07:06 CST   Mono Auto 15 % 03/04/2022 07:06 CST   Eos Auto 2 % 03/04/2022 07:06 CST   Abs Eos 0.1 x10(3)/mcL 03/04/2022 07:06 CST   Basophil Auto 0 % 03/04/2022 07:06 CST   Abs Neutro 2.56 x10(3)/mcL 03/04/2022 07:06 CST   Abs Lymph 1.0 x10(3)/mcL 03/04/2022 07:06 CST   Abs Mono 0.6 x10(3)/mcL 03/04/2022 07:06 CST   Abs Baso 0.0 x10(3)/mcL 03/04/2022 07:06 CST   NRBC% 0.0 % 03/04/2022 07:06 CST   IG% 0 % 03/04/2022 07:06 CST   IG# 0.020 03/04/2022 07:06 CST       Okay to proceed with cycle 1 of Kadcyla today 3/4/2022  void above date 3/7/2022
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
## 2022-05-21 NOTE — PLAN OF CARE
Problem: Adult Inpatient Plan of Care  Goal: Plan of Care Review  Outcome: Ongoing, Progressing  Goal: Patient-Specific Goal (Individualized)  Outcome: Ongoing, Progressing  Goal: Absence of Hospital-Acquired Illness or Injury  Outcome: Ongoing, Progressing  Goal: Optimal Comfort and Wellbeing  Outcome: Ongoing, Progressing  Goal: Readiness for Transition of Care  Outcome: Ongoing, Not Progressing     Problem: Adult Inpatient Plan of Care  Goal: Plan of Care Review  Outcome: Ongoing, Progressing  Goal: Patient-Specific Goal (Individualized)  Outcome: Ongoing, Progressing  Goal: Absence of Hospital-Acquired Illness or Injury  Outcome: Ongoing, Progressing  Goal: Optimal Comfort and Wellbeing  Outcome: Ongoing, Progressing  Goal: Readiness for Transition of Care  Outcome: Ongoing, Not Progressing     Problem: Infection  Goal: Absence of Infection Signs and Symptoms  Outcome: Ongoing, Progressing     Problem: Skin Injury Risk Increased  Goal: Skin Health and Integrity  Outcome: Ongoing, Progressing     Problem: Coping Ineffective  Goal: Effective Coping  Outcome: Ongoing, Progressing     Problem: Fall Injury Risk  Goal: Absence of Fall and Fall-Related Injury  Outcome: Ongoing, Progressing     Problem: Communication Impairment (Mechanical Ventilation, Invasive)  Goal: Effective Communication  Outcome: Ongoing, Progressing     Problem: Device-Related Complication Risk (Mechanical Ventilation, Invasive)  Goal: Optimal Device Function  Outcome: Ongoing, Progressing     Problem: Inability to Wean (Mechanical Ventilation, Invasive)  Goal: Mechanical Ventilation Liberation  Outcome: Ongoing, Progressing     Problem: Nutrition Impairment (Mechanical Ventilation, Invasive)  Goal: Optimal Nutrition Delivery  Outcome: Ongoing, Progressing     Problem: Skin and Tissue Injury (Mechanical Ventilation, Invasive)  Goal: Absence of Device-Related Skin and Tissue Injury  Outcome: Ongoing, Progressing     Problem: Ventilator-Induced  Lung Injury (Mechanical Ventilation, Invasive)  Goal: Absence of Ventilator-Induced Lung Injury  Outcome: Ongoing, Progressing     Problem: Noninvasive Ventilation Acute  Goal: Effective Unassisted Ventilation and Oxygenation  Outcome: Ongoing, Progressing

## 2022-05-21 NOTE — PROGRESS NOTES
Acute Care/Trauma Surgery Progress Note    S:  NAEON  Patient requiring sedation  L CT to suction, no airleak  Vent settings stable    Objective:  Vitals:    05/21/22 0600 05/21/22 0630 05/21/22 0800 05/21/22 0822   BP: (!) 144/95 121/78     Pulse: 91 82  75   Resp: (!) 33 (!) 29  (!) 27   Temp:   97.6 °F (36.4 °C)    TempSrc:       SpO2: (!) 93% 96%  98%   Weight:       Height:           Intake/Output:    Intake/Output Summary (Last 24 hours) at 5/21/2022 0921  Last data filed at 5/21/2022 0800  Gross per 24 hour   Intake 2424 ml   Output 1012 ml   Net 1412 ml     General: NAD, AAOx3  Neuro: CN grossly intact, EOMI, PERRLA  CV: RRR, extrem wwp  Pulm: no increased wob, equal chest rise b/l,  mechanical intubation  Abd: s/nt/nd  MSK: moving all extremities, 2+ DP pulses b/l  Wounds: BLE with splints in place, legs soft    Labs:  WBC 9.3  Hb 13.5  Plt 63  Mag 1.8  Phos 2.5  ABG 7.4/29/83/18/96/-5.6    Micro:  Microbiology Results (last 7 days)     Procedure Component Value Units Date/Time    Respiratory Culture [[id number]]     Order Status: Sent Specimen: Lung Aspirate from Endotracheal Aspirate     Blood Culture [[id number]]  (Normal) Collected: 05/19/22 0625    Order Status: Completed Specimen: Blood Updated: 05/20/22 1103     CULTURE, BLOOD ([facility]) No Growth At 24 Hours    Blood Culture [[id number]]     Order Status: Sent Specimen: Blood           Radiology:  CXR: No significant change     A/P:  57 yoF with hx metastatic breast cancer in peds vs alf found to have open L femur fxr, L tib/fib fxr, R femur fxr, L PTX s/p CT insertion, ARF     Neuro:  continue neuro-assessments, MM pain medications, daily sedation holiday  CV: no pressor requirements, continue to monitor closely.  Ok to de-access Mediport.  Pulm:wean vent as tolerated, continue CT to suction, daily CXR  FEN/GI: nutrition consult for TF  Renal: strict intake and output, continue peralta, replace lytes  Heme: trend H/H and coags  ID: no need for antibiotic  therapy  MSK: dressing changes per ortho  Ppx: will discuss lov timing     Dispo: Continue ICU care.  Family requesting continued medical care and full code.  Palliative care on board    Meghan Matos, HO4  [facility]

## 2022-05-22 NOTE — PROGRESS NOTES
Acute Care/Trauma Surgery Progress Note    S:  Patient with desaturations overnight, FiO2 increased  Requiring pressor support    Objective:  Vitals:    05/22/22 0600 05/22/22 0800 05/22/22 0858 05/22/22 1135   BP: 105/71      Pulse: 98   (!) 119   Resp: (!) 25   (!) 29   Temp:  98.3 °F (36.8 °C)     TempSrc:       SpO2: (!) 94%  96% 98%   Weight:       Height:           Intake/Output:    Intake/Output Summary (Last 24 hours) at 5/22/2022 1404  Last data filed at 5/22/2022 1200  Gross per 24 hour   Intake 2130 ml   Output 740 ml   Net 1390 ml       General: NAD, AAOx3  Neuro: CN grossly intact, EOMI, PERRLA  CV: RRR, extrem wwp  Pulm: no increased wob, equal chest rise b/l,  mechanical intubation  Abd: s/nt/nd  MSK: moving all extremities, 2+ DP pulses b/l  Wounds: BLE with splints in place, legs soft    Labs:  WBC 6.1  H/H 15/45  Plt 71        Micro:  Microbiology Results (last 7 days)     Procedure Component Value Units Date/Time    Blood Culture [[id number]]  (Normal) Collected: 05/19/22 0625    Order Status: Completed Specimen: Blood Updated: 05/22/22 1102     CULTURE, BLOOD ([facility]) No Growth At 72 Hours    Respiratory Culture [[id number]]     Order Status: Sent Specimen: Lung Aspirate from Endotracheal Aspirate     Blood Culture [[id number]]     Order Status: Canceled Specimen: Blood           Radiology:  CXR:   Worsening confluent airspace opacities at the left base and right perihilar and infrahilar region.   Support catheters remain in place    A/P:  57 yoF with hx metastatic breast cancer in peds vs [facility] found to have open L femur fxr, L tib/fib fxr, R femur fxr, L PTX s/p CT insertion, ARF     Neuro:  continue neuro-assessments, MM pain medications  CV: wean pressors as tolerated, continue to monitor closely.  Ok to de-access Mediport.  Pulm:wean vent as tolerated, continue CT to suction, daily CXR, may need trach pending family discussion  FEN/GI: TF, may need PEG pending family discussion  Renal: strict  intake and output, continue peralta, replace lytes  Heme: trend H/H and coags  ID: no need for antibiotic therapy  MSK: dressing changes per ortho  Ppx: will discuss lov timing     Dispo: Continue ICU care.  Family requesting continued medical care and full code.  Palliative care on board. Will transfer care to ICU team.  Please call with questions/concerns.       Meghan Matos, HO4  [facility] [facility]
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
## 2022-05-23 NOTE — PHYSICIAN QUERY
PT Name: Christiana Webb  MR #: [id number]     DOCUMENTATION CLARIFICATION     CDS/: Kayleigh Hamilton RN, CDS   Contact Information: [email or web address]  This form is a permanent document in the medical record.     Query Date: May 23, 2022    By submitting this query, we are merely seeking further clarification of documentation.  Please utilize your independent clinical judgment when addressing the question(s) below.  The Medical Record contains the following:  Indicators Supporting Clinical Findings Location in Medical Record   x Acute Illness (e.g. AMI, Sepsis, etc.) Bilateral femur fractures, left tibia, fibular fractures  Large left pneumothorax s/p left sided CT  Left 6th-9th rib fracture  Breast cancer with metastasis to liver, bone, brain   HFrecEF (TTE 2/22 64%, decreased EF previously likely chemotherapy induced)  Fracture of left tibia and fibula, open type I or II,  hypoxic respiratory failure on vent   Critical Care Medicine 5/17            Critical Care Medicine 5/23   x Vital Signs  /69  HR 98    BP: ()/()   HR   BP: ()/(45-95)  HR   BP: ()/()  HR 57-98   Critical Care Medicine 5/17  Orthopedic surgery 5/18  Orthopedic surgery 5/19  Critical Care Medicine 5/20   x Acidosis documented Respiratory acidosis   Critical Care Medicine 5/23   x ABGs/Labs H&H   5.2/15.8  Calcium 7.6  Chloride 115  Potassium 4.3  H&H   5.8/16.9   Lactate 9.9                               Magnesium 1.8         Phosphorus 0.8  PH 7.44  PCO2 34  PO2 54  HCO3  23.1  PH 7.25  PCO2 41  PO2 62  HCO2 18 Labs 5/17  Labs 5/18  Labs 5/23  ABG's 5/18  ABG's 5/22   x Hypotension or Low Blood Pressure documented   hypotensive shock, likely acute blood loss related     Critical Care medicine 5/18    Altered Mental Status or Confusion      Diaphoresis, Cold Extremities or Cyanosis      Oliguria     x Medication/Treatment  -Vasopressors  -Inotropic Drugs  -IV Fluids   -IV  Antibiotics  -Cardiac Assist Devices  -Hemodynamic Monitoring  -Blood/Blood Products Transfused with several units PRBC, FFP, platelets  Lactated ringers 1000mL bolus  Sodium chloride 0.9% 500 mL bolus  Tranexamic acid injection  D51/2NS with KCL 20mEq continuous infusion  Magnesium sulfate 2g IVPB  Potassium phosphate 15 mmol infusion  Potassium phosphate 30 mmol infusion  Phenylephrine infusion  Norepinephrine bitartrate-NaCl continuous infusion Blood bank record 5/17-19  MAR 5/17  MAR 5/18  MAR 5/16  MAR 5/19-present  MAR 5/21  MAR 5/21  MAR 5/23  MAR 5/17-18  MAR 5/22-present   x Other Procedure:  Treatment right diaphyseal femur fracture with intramedullary nail; Treatment Left diaphyseal femur fracture with intramedullary nail ;  Left tibia ORIF with Nail ; Left fibula ORIF with nail; Excisional debridment Left tibia open fracture    mL OP note 5/17     Provider, please specify diagnosis or diagnoses associated with above clinical findings.  [  X  ] Hemorrhagic Shock     [    ] Hypovolemic Shock     [    ] Other Shock (please specify): __________     [    ] Other Condition (please specify): _________   [  ] Clinically Undetermined       Please document in your progress notes daily for the duration of treatment until resolved and include in your discharge summary.     Form No. [id number]

## 2022-05-23 NOTE — PT/OT/SLP PROGRESS
Physical Therapy      Patient Name:  Christiana Webb   MRN:  [id number]    Patient with worsening conditions and poor prognosis. Pt is not appropriate for PT at this time.   PT to sign off; if anything changes and therapy is deemed appropriate then please re-consult.

## 2022-05-23 NOTE — PHYSICIAN QUERY
PT Name: Christiana Webb  MR #: [id number]    DOCUMENTATION CLARIFICATION      CDS/: Kayleigh Hamilton RN, CDS   Contact Information: [email or web address]    This form is a permanent document in the medical record.      Query Date: May 23, 2022    By submitting this query, we are merely seeking further clarification of documentation. Please utilize your independent clinical judgment when addressing the question(s) below.    The Medical Record contains the following:   Indicators  Supporting Clinical Findings Location in Medical Record   x Anemia documented  She has received transfusions blood products secondary to anemia, thrombocytopenia related to recent surgery and Hgb is stable at present    demonstrating evidence of hypotensive shock, likely acute blood loss related   Palliative Care 5/18        Critical Care medicine 5/18   x H&H H&H   9.2/30.2  H&H   5.2/15.8  H&H   5.8/16.9  H&H   6.5/19.0  H&H   11.3/33.2 Labs 5/16  Labs 5/17  Labs 5/18  Labs 5/19  Labs 5/23   x BP                    HR /69  HR 98    BP: ()/()   HR   BP: ()/(45-95)  HR   BP: ()/()  HR 57-98 Critical Care Medicine 5/17  Orthopedic surgery 5/18  Orthopedic surgery 5/19  Critical Care Medicine 5/20    GI bleeding documented      Acute bleeding (Non GI site)     x Transfusion(s) Transfused with several units PRBC, FFP, platelets   Blood bank record 5/17-19   x Acute/Chronic illness Bilateral femur fractures, left tibia, fibular fractures  Large left pneumothorax s/p left sided CT  Left 6th-9th rib fracture  Breast cancer with metastasis to liver, bone, brain   HFrecEF (TTE 2/22 64%, decreased EF previously likely chemotherapy induced)  HTN  Fracture of left tibia and fibula, open type I or II,  hypoxic respiratory failure on vent  Respiratory acidosis   Critical Care Medicine 5/17              Critical Care Medicine 5/23   x Treatments Procedure:  Treatment right diaphyseal femur fracture  with intramedullary nail; Treatment Left diaphyseal femur fracture with intramedullary nail ;  Left tibia ORIF with Nail ; Left fibula ORIF with nail; Excisional debridment Left tibia open fracture    mL OP note 5/17    Other       Provider, please specify diagnosis or diagnoses associated with above clinical findings.   [ X  ] Acute blood loss anemia expected post-operatively    [   ] Other Hematological Diagnosis (please specify): _________________   [   ] Clinically Undetermined            Please document in your progress notes daily for the duration of treatment, until resolved, and include in your discharge summary.    Form No. [id number]

## 2022-05-23 NOTE — PROGRESS NOTES
Arterial Catheter Insertion Procedure Note     Procedure: Insertion of Arterial Catheter     Indications: Hemodynamic Monitoring     Procedure Details   Informed consent was not obtained for the procedure: Due to emergency circumstance.    Maximum sterile technique was used including antiseptics, cap, sterile gloves, sterile gown, hand hygiene, mask and sterile maximum barrier drape.     Under sterile conditions the skin above the R brachial artery was prepped with Chloroprep and covered with a sterile drape. Local anesthesia was applied to the skin and subcutaneous tissues. A 22-gauge needle was used to identify the artery. An 18-gauge needle was then inserted into the artery. A guide wire was then passed easily through the needle. The needle was then withdrawn. A 20 g arterial catheter was then inserted into the vessel over the guide wire. The catheter was sutured into place. Estimated blood loss 3cc.  Number of attempts: 1    Ultrasound/Sonosite was used during the procedure.     Findings:   There were no changes to vital signs. Catheter was flushed with 10 cc NS. Patient tolerated procedure well.
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
## 2022-05-25 NOTE — PROGRESS NOTES
Pharmacokinetic Initial Assessment: IV Vancomycin    Assessment/Plan:    Initiate intravenous vancomycin 750 mg IV q12h  Desired empiric serum trough concentration is 15-20  Draw vancomycin trough level 60 min prior to fourth dose on 5/26 at approximately 1600  Pharmacy will continue to follow and monitor vancomycin.      Please contact pharmacy at extension [phone] with any questions regarding this assessment.     Thank you for the consult,   Robert Barnes       Patient brief summary:  Christiana Webb is a 57 y.o. female initiated on antimicrobial therapy with IV Vancomycin for treatment of suspected bacteremia    Drug Allergies:   Review of patient's allergies indicates:  No Known Allergies    Actual Body Weight:   48 kg    Renal Function:   Estimated Creatinine Clearance: 58.2 mL/min (based on SCr of 0.83 mg/dL).,     Dialysis Method (if applicable):  N/A    CBC (last 72 hours):  Recent Labs   Lab Result Units 05/22/22  2345 05/23/22  0340 05/23/22  1731 05/24/22  0138 05/24/22  0627 05/25/22  0210   WBC x10(3)/mcL 7.4 10.3 5.0 17.6* 17.4* 14.7*   Hgb gm/dL 10.0* 11.3* 7.5* 11.5* 12.3 10.4*   Hct % 29.2* 33.2* 23.4* 36.5* 38.0 32.4*   Platelet x10(3)/mcL 60* 63* 45* 68* 56* 49*   Mono % %  --   --   --   --  1.7  --    Monocyte Man % 2 1  --  1  --   --    Eos % %  --   --   --   --  0.5  --    Eos Man %  --  2 7 4  --   --    Basophil % %  --   --   --   --  2.0  --    Basophil Man %  --   --   --  0  --   --        Metabolic Panel (last 72 hours):  Recent Labs   Lab Result Units 05/22/22  1300 05/22/22  1307 05/23/22  0101 05/23/22  0340 05/24/22  0138 05/25/22  0210   Sodium Level mmol/L  --   --  130* 138 139 142   Urine Sodium mmol/L  --  <20.0  --   --   --   --    Potassium Level mmol/L  --   --  6.8* 4.1 5.4* 5.0   Urine Potassium mmol/L  --  35.0  --   --   --   --    Chloride mmol/L  --   --  112* 114* 113* 113*   Carbon Dioxide mmol/L  --   --  15* 17* 17* 19*   Glucose Level mg/dL  --   --  686* 144* 76  68*   Glucose, UA mg/dL  --  Negative  --   --   --   --    Blood Urea Nitrogen mg/dL  --   --  33.4* 40.0* 35.2* 42.8*   Creatinine mg/dL  --   --  0.83 0.82 0.80 0.83   Albumin Level gm/dL  --   --  1.3* 1.5* 1.9* 1.9*   Bilirubin Total mg/dL  --   --  0.6 0.8 2.2* 3.8*   Alkaline Phosphatase unit/L  --   --  91 122 116 117   Aspartate Aminotransferase unit/L  --   --  39* 38* 41* 41*   Alanine Aminotransferase unit/L  --   --  9 10 9 9   Magnesium Level mg/dL 1.70  --  1.80 2.20  --   --    Phosphorus Level mg/dL 1.3*  --   --  0.8*  --   --        Drug levels (last 3 results):  No results for input(s): VANCOMYCINRA, VANCORANDOM, VANCOMYCINPE, VANCOPEAK, VANCOMYCINTR, VANCOTROUGH in the last 72 hours.    Microbiologic Results:  Microbiology Results (last 7 days)       Procedure Component Value Units Date/Time    Blood Culture [[id number]]     Order Status: Sent Specimen: Blood from Arm     Blood Culture [[id number]]     Order Status: Sent Specimen: Arterial Blood Line     Blood Culture [[id number]]  (Normal) Collected: 05/19/22 0625    Order Status: Completed Specimen: Blood Updated: 05/24/22 1102     CULTURE, BLOOD ([facility]) No Growth at 5 days    Urine culture [[id number]] Collected: 05/22/22 1307    Order Status: Completed Specimen: Urine Updated: 05/24/22 0930     Urine Culture No Growth    Respiratory Culture [[id number]]     Order Status: Sent Specimen: Lung Aspirate from Endotracheal Aspirate     Blood Culture [[id number]]     Order Status: Canceled Specimen: Blood
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
## 2022-05-25 NOTE — CONSULTS
"Consults     Patient Name: Christiana Webb   MRN: [id number]   Admission Date: 5/16/2022   Hospital Length of Stay: 9   Attending Provider: Reji Chavez MD   Consulting Provider: Swapnil Chakraborty MD  Reason for Consult: Goals of Care  Primary Care Physician:  Primary Doctor No     Principal Problem: Fracture of left tibia and fibula, open type I or II, initial encounter     Patient information was obtained from relative(s) and ER records.     Final diagnoses:  [S72.92XB] Open fracture of left femur      Assessment/Plan:     I reviewed the patient and family's understanding of the seriousness of the illness and its expected prognosis. I reviewed the patient's current clinical status with the nurse and physician. I reviewed clinical documentation, labs and imaging.  I met with the patient with her nephew and sister at bedside. The patient's son, Ross was not present. His family stated that he visited earlier in the day. He was quite upset after talking with the ICU doctor and stated to them that he didn't want anyone talking to him about taking his mother "off of the machine."  He is not doing to do this.    The  was present during my encounter with current family members. She stated that she was able to contact Ross by phone. Through their conversation he repeated this statement and stated that he wished for his mother to remain a full code status. He told her also that he would not be in favor of placing a tracheostomy with Peg tube for long-term vent management.     I discussed the patient's current condition which includes that of continued hypoxic respiratory failure, acidosis, fever with possible underlying infection, pneumonia, hypotension and shock requiring 2 vasopressor agents.  After we reviewed the seriousness of the patient's current condition, I discussed my concerns about her chances of weaning from the mechanical ventilator.  I agree with not considering placement of " tracheostomy and PEG as I do not believe that the patient will be able to participate in any active treatment for her underlying metastatic malignancy and her prognosis going forward will be very short.  I wanted to point out the increased risk of attempting resuscitative measures in the event of a cardiopulmonary arrest.  The patient has underlying rib fractures, bony metastasis and frail body.  She is most likely to experience traumatic chest trauma and associated pain in the event of survival from CPR related measures.  Also, I believe that her chances of survival are slim considering her underlying frailty.  Since the patient's son is not present for this discussion, again, I recommend meeting as a family with the patient's son to review his concerns, fears and understanding of the risks and benefits associated with continued aggressive treatment options.  The  and family recommended waiting a couple of days as the patient is not in a place to these conversations.    For now the patient will remain a full code status.  I will continue to follow up in provide education and support.      Interval History:     Patient has declined further with fever, tachypnea, acidosis, shock and acute hypoxic respiratory failure.      Active Ambulatory Problems     Diagnosis Date Noted    No Active Ambulatory Problems     Resolved Ambulatory Problems     Diagnosis Date Noted    No Resolved Ambulatory Problems     Past Medical History:   Diagnosis Date    CHF (congestive heart failure)     HTN (hypertension)         Past Surgical History:   Procedure Laterality Date    INSERTION OF INTRAMEDULLARY NAIL INTO TIBIA Left 5/17/2022    Procedure: INSERTION, INTRAMEDULLARY GRAHAM, TIBIA;  Surgeon: Vimal Lamb DO;  Location: [facility];  Service: Orthopedics;  Laterality: Left;    INTRAMEDULLARY RODDING OF FEMUR Bilateral 5/17/2022    Procedure: INSERTION, INTRAMEDULLARY GRAHAM, FEMUR;  Surgeon: Vimal Lamb DO;  Location:  [facility] OR;  Service: Orthopedics;  Laterality: Bilateral;        Review of patient's allergies indicates:  No Known Allergies       Current Facility-Administered Medications:     0.9%  NaCl infusion (for blood administration), , Intravenous, Q24H PRN, ELAN Pinto    acetaminophen oral solution 650 mg, 650 mg, Oral, Q6H PRN, Waylon Llanes MD, 650 mg at 05/25/22 0435    alteplase injection 2 mg, 2 mg, Intra-Catheter, Once, Caio Bautista MD    ceFEPIme (MAXIPIME) 2 g in dextrose 5 % in water (D5W) 5 % 50 mL IVPB (MB+), 2 g, Intravenous, Q8H, Waylon Llanes MD, Stopped at 05/25/22 1250    dexmedetomidine (PRECEDEX) 400mcg/100mL 0.9% NaCL infusion, 0-1.4 mcg/kg/hr, Intravenous, Continuous, Rod Woo Jr., MD, FCCP, Last Rate: 9.6 mL/hr at 05/25/22 0435, 0.8 mcg/kg/hr at 05/25/22 0435    enoxaparin injection 40 mg, 40 mg, Subcutaneous, Daily, Reji Chavez MD, 40 mg at 05/24/22 1700    famotidine tablet 20 mg, 20 mg, Oral, BID, ELAN Kelley, 20 mg at 05/25/22 0804    fentaNYL 2500 mcg in 0.9% sodium chloride 250 mL infusion premix (titrating), 0-250 mcg/hr, Intravenous, Continuous, Reji Chavez MD, Last Rate: 10 mL/hr at 05/25/22 1429, 100 mcg/hr at 05/25/22 1429    levETIRAcetam in NaCl (iso-os) IVPB 1,000 mg, 1,000 mg, Intravenous, Once, Waylon Llanes MD    LIDOcaine (PF) 10 mg/ml (1%) injection 10 mg, 1 mL, Other, Once, Reji Phoenix MD    methocarbamoL injection 500 mg, 500 mg, Intravenous, Q6H, Guerrero Mcclure MD, 500 mg at 05/25/22 0804    morphine injection 2 mg, 2 mg, Intravenous, Q3H PRN, Reji Phoenix MD, 2 mg at 05/19/22 1828    morphine injection 4 mg, 4 mg, Intravenous, Q3H PRN, Reji Phoenix MD, 4 mg at 05/23/22 0958    NORepinephrine bitartrate-NaCl 8 mg/250 mL (32 mcg/mL) infusion, 0-3 mcg/kg/min, Intravenous, Continuous, Guerrero Mcclure MD, Last Rate: 7.2 mL/hr at 05/24/22 2130, 0.08 mcg/kg/min at 05/24/22 2130    ondansetron injection 4 mg, 4  "mg, Intravenous, Q6H PRN, Reji Phoenix MD    polyethylene glycol packet 17 g, 17 g, Oral, BID, Reji Phoenix MD, 17 g at 05/25/22 0805    propofol (DIPRIVAN) 10 mg/mL infusion, 0-50 mcg/kg/min, Intravenous, Continuous, Rod Woo Jr., MD, FCCP, Stopped at 05/23/22 1749    senna tablet 8.6 mg, 8.6 mg, Oral, Daily, Reji Phoenix MD, 8.6 mg at 05/25/22 0804    sodium chloride 0.9% flush 10 mL, 10 mL, Intravenous, PRN, Reji Phoenix MD    sodium chloride 0.9% flush 10 mL, 10 mL, Intravenous, PRN, Jeffery Chan MD    vancomycin (VANCOCIN) 750 mg in dextrose 5 % 250 mL IVPB, 750 mg, Intravenous, Q12H, Waylon Llanes MD, Last Rate: 250 mL/hr at 05/25/22 1700, 750 mg at 05/25/22 1700    Pharmacy to dose Vancomycin consult, , , Once **AND** vancomycin - pharmacy to dose, , Intravenous, pharmacy to manage frequency, Waylon Llanes MD    vasopressin (PITRESSIN) 0.2 Units/mL in dextrose 5 % 100 mL infusion, 0.04 Units/min, Intravenous, Continuous, Deon Penn MD, Last Rate: 12 mL/hr at 05/25/22 1231, 0.04 Units/min at 05/25/22 1231    white petrolatum-mineral oil (LUBIFRESH P.M.) ophthalmic ointment, , Both Eyes, TID PRN, Waylon Llanes MD, Given at 05/25/22 1234     sodium chloride, acetaminophen, morphine, morphine, ondansetron, sodium chloride 0.9%, sodium chloride 0.9%, Pharmacy to dose Vancomycin consult **AND** vancomycin - pharmacy to dose, white petrolatum-mineral oiL     Family History   Problem Relation Age of Onset    Heart failure Mother     Cancer Mother     Cancer Father     Cancer Sister         Review of Systems:   Unable to obtain as patient is intubated with altered mental status           Objective:   BP (!) 147/82   Pulse 78   Temp 100 °F (37.8 °C) (Oral)   Resp (!) 35   Ht 5' 4.02" (1.626 m)   Wt 48.1 kg (106 lb)   SpO2 (!) 92%   BMI 18.19 kg/m²      Physical Exam   Constitutional: She appears ill.   HENT:   Mouth/Throat: Mucous membranes are moist.   Eyes: Pupils are equal, round, " and reactive to light.   Cardiovascular: Normal rate, regular rhythm, normal heart sounds and normal pulses.   Pulmonary/Chest: Effort normal. She has rhonchi.   Abdominal: Soft. Bowel sounds are normal. She exhibits no distension. There is no abdominal tenderness.   Musculoskeletal:         General: Swelling present.      Cervical back: Neck supple.   Neurological: She is alert.   Skin: Skin is warm.   Vitals reviewed.         Review of Symptoms  Review of Symptoms    Symptom Assessment (ESAS 0-10 Scale)  Pain:  0  Dyspnea:  0  Anxiety:  0  Nausea:  0  Depression:  0  Anorexia:  0  Fatigue:  0  Insomnia:  0  Restlessness:  0  Agitation:  0     CAM / Delirium:  Positive  Constipation:  Negative  Diarrhea:  Negative    Bowel Management Plan (BMP):  Yes      Modified Dasia Scale:  0    Performance Status:  20    Living Arrangements:  Lives with family      Advance Care Planning   Advance Directives:   [place]: No    Do Not Resuscitate Status: No    Medical Power of : No      Decision Making:  Family answered questions          PAINAD: 0    Caregiver burden formerly assessed: yes        > 50% of 45 min of encounter was spent in chart review, face to face discussion of goals of care, symptom assessment, coordination of care and emotional support.    Swapnil Chakraborty MD  Palliative Medicine  [facility]

## 2022-05-25 NOTE — PLAN OF CARE
Problem: Adult Inpatient Plan of Care  Goal: Plan of Care Review  Outcome: Ongoing, Progressing  Flowsheets (Taken 5/25/2022 1807)  Plan of Care Reviewed With: family     Problem: Device-Related Complication Risk (Mechanical Ventilation, Invasive)  Goal: Optimal Device Function  Outcome: Ongoing, Progressing  Intervention: Optimize Device Care and Function  Flowsheets (Taken 5/25/2022 1807)  Aspiration Precautions:   oral hygiene care promoted   respiratory status monitored   tube feeding placement verified   upright posture maintained  Airway Safety Measures:   manual resuscitator/mask at bedside   oxygen flowmeter at bedside   suction at bedside

## 2022-05-25 NOTE — PLAN OF CARE
DR OCAMPO AND I HAD DISCUSSION WITH FAMILY. THEY ARE NOT READY TO MAKE DECISION YET.       SON INFORMED ME HE DOES NOT WANT A TRACH AND PEG  AT THIS TIME HE DOES WANT EVERYTHING DONE. . HE WAS INITIALLY WANTING HIS MOTHER TO BE TRANSFERRED TO ANOTHER FACILITY. INFORMED HIM THAT IT IS A LATERAL TRANSFER, MAKING IT VERY DIFFICULT. AFTER A LONG DISCUSSION HE WAS OK WITH NOT DOING SO. WILL FOLLOW FOR NEEDS
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
## 2022-05-26 NOTE — PROGRESS NOTES
Pharmacokinetic Assessment Follow Up: IV Vancomycin    Vancomycin serum concentration assessment(s):    The trough level was drawn correctly and can be used to guide therapy at this time. The measurement is above the desired definitive target range of 15 to 20 mcg/mL.    Vancomycin Regimen Plan:    Change regimen to Vancomycin 500 mg IV every 12 hours with next serum trough concentration measured at 0500 prior to the fourth dose on 05/28    Drug levels (last 3 results):  Recent Labs   Lab Result Units 05/26/22  1736   Vancomycin Trough ug/ml 24.9*       Pharmacy will continue to follow and monitor vancomycin.    Please contact pharmacy at extension [phone] for questions regarding this assessment.    Thank you for the consult,   Huy Gomez, PharmD       Patient brief summary:  Christiana Webb is a 57 y.o. female initiated on antimicrobial therapy with IV Vancomycin for treatment of bacteremia    The patient's current regimen is vancomycin 750 mg IV every 12 hours    Drug Allergies:   Review of patient's allergies indicates:  No Known Allergies    Actual Body Weight:   48 kg    Renal Function:   Estimated Creatinine Clearance: 59.6 mL/min (based on SCr of 0.81 mg/dL).,     Dialysis Method (if applicable):  N/A    CBC (last 72 hours):  Recent Labs   Lab Result Units 05/24/22  0138 05/24/22  0627 05/25/22  0210 05/26/22  0133   WBC x10(3)/mcL 17.6* 17.4* 14.7* 8.8   Hgb gm/dL 11.5* 12.3 10.4* 9.7*   Hct % 36.5* 38.0 32.4* 30.1*   Platelet x10(3)/mcL 68* 56* 49* 46*   Mono % %  --  1.7  --   --    Monocyte Man % 1  --  1 4   Eos % %  --  0.5  --   --    Eos Man % 4  --  2 2   Basophil % %  --  2.0  --   --    Basophil Man % 0  --   --   --        Metabolic Panel (last 72 hours):  Recent Labs   Lab Result Units 05/24/22  0138 05/25/22  0210 05/25/22  0757 05/26/22  1052   Sodium Level mmol/L 139 142  --  141   Potassium Level mmol/L 5.4* 5.0  --  4.6   Chloride mmol/L 113* 113*  --  109*   Carbon Dioxide mmol/L 17* 19*   --  15*   Glucose Level mg/dL 76 68*  --  79   Blood Urea Nitrogen mg/dL 35.2* 42.8*  --  57.6*   Creatinine mg/dL 0.80 0.83  --  0.81   Albumin Level gm/dL 1.9* 1.9*  --  1.6*   Bilirubin Total mg/dL 2.2* 3.8*  --  8.6*   Alkaline Phosphatase unit/L 116 117  --  130   Aspartate Aminotransferase unit/L 41* 41*  --  66*   Alanine Aminotransferase unit/L 9 9  --  18   Phosphorus Level mg/dL  --   --  2.5  --        Vancomycin Administrations:  vancomycin given in the last 96 hours                     vancomycin (VANCOCIN) 750 mg in dextrose 5 % 250 mL IVPB (mg) 750 mg New Bag 05/26/22 1755     750 mg New Bag  0450     750 mg New Bag 05/25/22 1700     750 mg New Bag  0648                    Microbiologic Results:  Microbiology Results (last 7 days)       Procedure Component Value Units Date/Time    Blood Culture [[id number]]  (Normal) Collected: 05/25/22 0539    Order Status: Completed Specimen: Arterial Blood Line Updated: 05/26/22 0702     CULTURE, BLOOD ([facility]) No Growth At 24 Hours    Blood Culture [[id number]]  (Abnormal) Collected: 05/25/22 0539    Order Status: Completed Specimen: Blood from Arm Updated: 05/26/22 0253     GRAM STAIN Gram Positive Cocci, probable Staphylococcus      Seen in gram stain of broth only      1 of 1 Pediatric bottle positive     Blood Culture [[id number]]  (Normal) Collected: 05/19/22 0625    Order Status: Completed Specimen: Blood Updated: 05/24/22 1102     CULTURE, BLOOD ([facility]) No Growth at 5 days    Urine culture [[id number]] Collected: 05/22/22 1307    Order Status: Completed Specimen: Urine Updated: 05/24/22 0930     Urine Culture No Growth    Respiratory Culture [[id number]]     Order Status: Sent Specimen: Lung Aspirate from Endotracheal Aspirate
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
## 2022-05-26 NOTE — PLAN OF CARE
Problem: Device-Related Complication Risk (Mechanical Ventilation, Invasive)  Goal: Optimal Device Function  Outcome: Ongoing, Progressing  Intervention: Optimize Device Care and Function  Flowsheets (Taken 5/26/2022 6959)  Aspiration Precautions:   oral hygiene care promoted   respiratory status monitored   tube feeding placement verified   upright posture maintained  Airway Safety Measures:   manual resuscitator/mask at bedside   oxygen flowmeter at bedside   suction at bedside     Problem: Skin and Tissue Injury (Mechanical Ventilation, Invasive)  Goal: Absence of Device-Related Skin and Tissue Injury  Outcome: Ongoing, Progressing  Intervention: Maintain Skin and Tissue Health  Flowsheets (Taken 5/26/2022 4136)  Device Skin Pressure Protection:   pressure points protected   skin-to-device areas padded   skin-to-skin areas padded   positioning supports utilized   adhesive use limited   absorbent pad utilized/changed
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
## 2022-05-27 NOTE — PLAN OF CARE
Problem: Coping Ineffective  Goal: Effective Coping  Outcome: Ongoing, Not Progressing  Intervention: Support and Enhance Coping Strategies  Flowsheets (Taken 5/27/2022 1816)  Supportive Measures:   self-care encouraged   self-reflection promoted   verbalization of feelings encouraged   decision-making supported   goal-setting facilitated  Family/Support System Care:   caregiver stress acknowledged   family care conference arranged   involvement promoted   presence promoted   self-care encouraged   support provided     Problem: Infection  Goal: Absence of Infection Signs and Symptoms  Outcome: Unable to Meet, Plan Revised  Intervention: Prevent or Manage Infection  Flowsheets (Taken 5/27/2022 1816)  Fever Reduction/Comfort Measures:   fluid intake increased   ice pack(s) applied   lightweight bedding   lightweight clothing  Infection Management:   aseptic technique maintained   cultures obtained and sent to lab  Isolation Precautions: precautions maintained     Problem: Skin Injury Risk Increased  Goal: Skin Health and Integrity  Intervention: Optimize Skin Protection  Flowsheets (Taken 5/27/2022 1816)  Pressure Reduction Techniques:   heels elevated off bed   positioned off wounds   pressure points protected   weight shift assistance provided  Pressure Reduction Devices:   heel offloading device utilized   positioning supports utilized   pressure-redistributing mattress utilized   foam padding utilized  Skin Protection:   adhesive use limited   incontinence pads utilized   pulse oximeter probe site changed   silicone foam dressing in place   skin-to-device areas padded   skin-to-skin areas padded   transparent dressing maintained   tubing/devices free from skin contact  Head of Bed (HOB) Positioning: HOB at 30-45 degrees
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
## 2022-05-28 PROBLEM — Z51.5 COMFORT MEASURES ONLY STATUS: Status: ACTIVE | Noted: 2022-01-01

## 2022-05-28 NOTE — PROGRESS NOTES
Pharmacokinetic Assessment Follow Up: IV Vancomycin    Vancomycin serum concentration assessment(s):    The trough level was drawn correctly and can be used to guide therapy at this time. The measurement is above the desired definitive target range of 15 to 20 mcg/mL.    Vancomycin Regimen Plan:  Patient was on a regimen of 500 mg IV vancomycin every 12 hours  Discontinue the scheduled vancomycin regimen and re-dose when the random level is less than 20 mcg/mL, next level to be drawn at 1700 on 5/28.    Drug levels (last 3 results):  Recent Labs   Lab Result Units 05/26/22  1736 05/28/22  0600   Vancomycin Trough ug/ml 24.9* 32.0*       Pharmacy will continue to follow and monitor vancomycin.    Please contact pharmacy at extension [phone] for questions regarding this assessment.    Thank you for the consult,   Guerrero Krishnan       Patient brief summary:  Christiana Webb is a 57 y.o. female initiated on antimicrobial therapy with IV Vancomycin for treatment of bacteremia        Drug Allergies:   Review of patient's allergies indicates:  No Known Allergies    Actual Body Weight:   48 kg    Renal Function:   Estimated Creatinine Clearance: 38.2 mL/min (A) (based on SCr of 1.26 mg/dL (H)).,     Dialysis Method (if applicable):  N/A    CBC (last 72 hours):  Recent Labs   Lab Result Units 05/26/22  0133 05/26/22 2308 05/27/22  0534 05/28/22  0028   WBC x10(3)/mcL 8.8 10.3 12.4* 11.8*   Hgb gm/dL 9.7* 9.6* 9.3* 8.6*   Hct % 30.1* 29.2* 28.8* 27.3*   Platelet x10(3)/mcL 46* 47* 49* 44*   Monocyte Man % 4 2 1 3   Eos Man % 2  --  1 2   Basophil Man %  --   --   --  1       Metabolic Panel (last 72 hours):  Recent Labs   Lab Result Units 05/25/22  0757 05/26/22  1052 05/26/22  2308 05/27/22  0534 05/28/22  0028   Sodium Level mmol/L  --  141 139 135* 132*   Potassium Level mmol/L  --  4.6 4.9 4.7 4.5   Chloride mmol/L  --  109* 110* 108* 100   Carbon Dioxide mmol/L  --  15* 16* 15* 15*   Glucose Level mg/dL  --  79 62* 102*  147*   Blood Urea Nitrogen mg/dL  --  57.6* 61.4* 73.0* 81.9*   Creatinine mg/dL  --  0.81 0.92 1.13* 1.26*   Albumin Level gm/dL  --  1.6* 1.5* 1.5* 1.3*   Bilirubin Total mg/dL  --  8.6* 9.9* 11.5* 12.4*   Alkaline Phosphatase unit/L  --  130 115 116 117   Aspartate Aminotransferase unit/L  --  66* 62* 52* 49*   Alanine Aminotransferase unit/L  --  18 17 16 12   Magnesium Level mg/dL  --   --  1.80  --  1.60   Phosphorus Level mg/dL 2.5  --  3.9  --  3.6       Vancomycin Administrations:  vancomycin given in the last 96 hours                     vancomycin (VANCOCIN) 500 mg in dextrose 5 % in water (D5W) 5 % 100 mL IVPB (MB+) (mg) 500 mg New Bag 05/27/22 1814     500 mg New Bag  0624    vancomycin (VANCOCIN) 750 mg in dextrose 5 % 250 mL IVPB (mg) 750 mg New Bag 05/26/22 1755     750 mg New Bag  0450     750 mg New Bag 05/25/22 1700     750 mg New Bag  0648                    Microbiologic Results:  Microbiology Results (last 7 days)       Procedure Component Value Units Date/Time    Blood Culture [[id number]]  (Normal) Collected: 05/25/22 0539    Order Status: Completed Specimen: Arterial Blood Line Updated: 05/28/22 0701     CULTURE, BLOOD ([facility]) No Growth At 72 Hours    Blood Culture [[id number]] Collected: 05/27/22 1701    Order Status: Resulted Specimen: Blood, Venous Updated: 05/27/22 1707    Blood Culture [[id number]]     Order Status: Canceled Specimen: Blood, Venous     Blood Culture [[id number]]     Order Status: Sent Specimen: Blood     Blood Culture [[id number]]     Order Status: Canceled Specimen: Blood     Blood Culture [[id number]]  (Abnormal) Collected: 05/25/22 0539    Order Status: Completed Specimen: Blood from Arm Updated: 05/27/22 0627     CULTURE, BLOOD ([facility]) Staphylococcus aureus     Comment: Susceptibility To Follow        GRAM STAIN Gram Positive Cocci, probable Staphylococcus      Seen in gram stain of broth only      1 of 1 Pediatric bottle positive     Blood Culture [[id number]]  (Normal)  Collected: 05/19/22 0625    Order Status: Completed Specimen: Blood Updated: 05/24/22 1102     CULTURE, BLOOD ([facility]) No Growth at 5 days    Urine culture [[id number]] Collected: 05/22/22 1307    Order Status: Completed Specimen: Urine Updated: 05/24/22 0930     Urine Culture No Growth
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
## 2022-05-30 DIAGNOSIS — C50.919 MALIGNANT NEOPLASM OF FEMALE BREAST, UNSPECIFIED ESTROGEN RECEPTOR STATUS, UNSPECIFIED LATERALITY, UNSPECIFIED SITE OF BREAST: Primary | ICD-10-CM

## 2022-05-30 LAB — BACTERIA BLD CULT: NORMAL

## 2022-05-31 LAB
BACTERIA BLD CULT: ABNORMAL
GRAM STN SPEC: ABNORMAL

## (undated) DEVICE — IMPLANTABLE DEVICE
Type: IMPLANTABLE DEVICE | Site: LEG | Status: NON-FUNCTIONAL
Removed: 2022-05-17

## (undated) DEVICE — SPONGE GAUZE 4X4 12 PLY STRL

## (undated) DEVICE — DRAPE STERI U-SHAPED 47X51IN

## (undated) DEVICE — Device

## (undated) DEVICE — SPONGE GAUZE 16PLY 4X4

## (undated) DEVICE — GOWN SMARTGOWN 3XL XLONG

## (undated) DEVICE — PENCIL ELECSURG ROCKER 15FT

## (undated) DEVICE — SEE MEDLINE ITEM 157125

## (undated) DEVICE — DRESSING XEROFORM 5X9IN

## (undated) DEVICE — MONOCRYL

## (undated) DEVICE — BANDAGE COMPR VELCLOSE 4INX5YD

## (undated) DEVICE — SUT VICRYL BR 1 GEN 27 CT-1

## (undated) DEVICE — BIT DRILL 4.2MM 3 FLUTD 145MM

## (undated) DEVICE — GLOVE PROTEXIS HYDROGEL SZ6

## (undated) DEVICE — WIRE GUIDE 3.2MM 400MM
Type: IMPLANTABLE DEVICE | Site: LEG | Status: NON-FUNCTIONAL
Removed: 2022-05-17

## (undated) DEVICE — APPLICATOR CHLORAPREP ORN 26ML

## (undated) DEVICE — GLOVE PROTEXIS NEU-THERA SZ6

## (undated) DEVICE — DRESSING XEROFORM FOIL 4X4

## (undated) DEVICE — DRAPE C-ARMOR EQUIPMENT COVER

## (undated) DEVICE — SLEEVE OTR PROTCT STRL 12MM

## (undated) DEVICE — GLOVE PROTEXIS HYDROGEL SZ9

## (undated) DEVICE — SEE MEDLINE ITEM 157150

## (undated) DEVICE — SEE MEDLINE ITEM 157166

## (undated) DEVICE — BANDAGE COMPR 6IN HOOK 6INX5YD

## (undated) DEVICE — BIT DRILL QC 1.8X110MM

## (undated) DEVICE — DRESSING TELFA + BARR 4X6IN

## (undated) DEVICE — SET CYSTO IRR DRP CHMBR 84IN

## (undated) DEVICE — STAPLER SKIN PROXIMATE WIDE

## (undated) DEVICE — COVER FULLGUARD SHOE HIGH-TOP

## (undated) DEVICE — DRESSING ISLAND TELFA 4X5IN

## (undated) DEVICE — STAPLER SKIN REGULAR

## (undated) DEVICE — GLOVE 9.0 PROTEXIS PI BLUE

## (undated) DEVICE — COVER SHOE FLUID RESISTANT XL

## (undated) DEVICE — SPONGE LAP STRL 18X18IN

## (undated) DEVICE — BLADE SURG CARBON STEEL #10

## (undated) DEVICE — TAPE SILK 3IN

## (undated) DEVICE — GAUZE SPONGE 4X4 12PLY